# Patient Record
Sex: FEMALE | Race: WHITE | NOT HISPANIC OR LATINO | Employment: OTHER | URBAN - METROPOLITAN AREA
[De-identification: names, ages, dates, MRNs, and addresses within clinical notes are randomized per-mention and may not be internally consistent; named-entity substitution may affect disease eponyms.]

---

## 2017-05-09 ENCOUNTER — HOSPITAL ENCOUNTER (EMERGENCY)
Facility: HOSPITAL | Age: 82
Discharge: HOME | End: 2017-05-10
Attending: EMERGENCY MEDICINE
Payer: MEDICARE

## 2017-05-09 VITALS
DIASTOLIC BLOOD PRESSURE: 60 MMHG | HEIGHT: 64 IN | WEIGHT: 160 LBS | HEART RATE: 81 BPM | TEMPERATURE: 98 F | SYSTOLIC BLOOD PRESSURE: 134 MMHG | OXYGEN SATURATION: 98 % | RESPIRATION RATE: 16 BRPM | BODY MASS INDEX: 27.31 KG/M2

## 2017-05-09 DIAGNOSIS — R63.8 DECREASED ORAL INTAKE: ICD-10-CM

## 2017-05-09 DIAGNOSIS — N39.0 URINARY TRACT INFECTION WITHOUT HEMATURIA, SITE UNSPECIFIED: Primary | ICD-10-CM

## 2017-05-09 DIAGNOSIS — S13.9XXA NECK SPRAIN, INITIAL ENCOUNTER: ICD-10-CM

## 2017-05-09 PROCEDURE — 99285 EMERGENCY DEPT VISIT HI MDM: CPT | Mod: 25

## 2017-05-09 PROCEDURE — P9612 CATHETERIZE FOR URINE SPEC: HCPCS

## 2017-05-09 NOTE — ED AVS SNAPSHOT
[facility] [address] [place] [address]               Ellen Maravilla   2017 10:36 PM   ED    Description:  Female : 1932   Department:  [facility]           Your Care was Coordinated By:     Provider Role From To    Jake Mckeon MD Attending Provider [phone] --    Rakel Bhakta NP Nurse Practitioner [phone] --      Reason for Visit     Fall           Diagnoses this Visit        Comments    Urinary tract infection without hematuria, site unspecified    -  Primary     Decreased oral intake         Neck sprain, initial encounter           ED Disposition     None           To Do List           Follow-up Information     Follow up with [facility].    Specialty:  Emergency Medicine    Why:  As needed, If symptoms worsen    Contact information:    [address] [place] [id number] [phone]        Follow up with Tien Mckeon MD. Schedule an appointment as soon as possible for a visit in 2 days.    Specialty:  Family Medicine    Contact information:    [address] [facility] [address]  [phone]         These Medications        Disp Refills Start End    ciprofloxacin HCl (CIPRO) 500 MG tablet 13 tablet 0 5/10/2017 2017    Take 1 tablet (500 mg total) by mouth every 12 (twelve) hours. - Oral    Pharmacy: [id number] - [place], [place] - [address] [facility] Ph #: [phone]         [facility]     [facility] Nurse Care [facility] - 24/ Assistance  Unless otherwise directed by your provider, please contact [facility], our nurse care line that is available for 24/7 assistance.     Registered nurses in the [facility] provide: appointment scheduling, clinical advisement, health education, and other advisory services.  Call: [phone] (toll free)               Medications           Message regarding Medications     Verify the changes  and/or additions to your medication regime listed below are the same as discussed with your clinician today.  If any of these changes or additions are incorrect, please notify your healthcare provider.        START taking these NEW medications        Refills    ciprofloxacin HCl (CIPRO) 500 MG tablet 0    Sig: Take 1 tablet (500 mg total) by mouth every 12 (twelve) hours.    Class: Print    Route: Oral      These medications were administered today        Dose Freq    sodium chloride 0.9% bolus 500 mL 500 mL ED 1 Time    Sig: Inject 500 mLs into the vein ED 1 Time.    Class: Normal    Route: Intravenous    ciprofloxacin HCl tablet 500 mg 500 mg ED 1 Time    Sig: Take 1 tablet (500 mg total) by mouth ED 1 Time.    Class: Normal    Route: Oral           Verify that the below list of medications is an accurate representation of the medications you are currently taking.  If none reported, the list may be blank. If incorrect, please contact your healthcare provider. Carry this list with you in case of emergency.           Current Medications     alendronate (FOSAMAX) 70 MG tablet     amlodipine (NORVASC) 2.5 MG tablet Take 2.5 mg by mouth every evening.      amoxicillin-clavulanate 500-125mg (AUGMENTIN) 500-125 mg Tab     calcium-vitamin D (CALCIUM-VITAMIN D) 500 mg(1,250mg) -200 unit per tablet Take 1 tablet by mouth 2 (two) times daily with meals.      ciprofloxacin HCl (CIPRO) 500 MG tablet Take 1 tablet (500 mg total) by mouth every 12 (twelve) hours.    levothyroxine (SYNTHROID) 25 MCG tablet Take 25 mcg by mouth once daily.      nystatin (MYCOSTATIN) powder Apply topically 4 (four) times daily.    risedronate (ACTONEL) 35 MG tablet Take 35 mg by mouth every 7 days. with water on empty stomach, nothing by mouth or lie down for next 30 minutes.     UNABLE TO FIND medication name: viteyes 2 drops both eyes twice daily           Clinical Reference Information           Your Vitals Were     BP Pulse Temp Resp Height  "Weight    134/60 (BP Location: Right arm, Patient Position: Sitting) 81 98.4 °F (36.9 °C) (Oral) 16 5' 4" (1.626 m) 72.6 kg (160 lb)    SpO2 BMI             98% 27.46 kg/m2         Allergies as of 5/10/2017     No Known Allergies      Immunizations Administered on Date of Encounter - 5/10/2017     None      ED Micro, Lab, POCT     Start Ordered       Status Ordering Provider    05/10/17 0132 05/10/17 0131  Urine culture  STAT      In process     05/10/17 0035 05/10/17 0034  TSH  STAT      Final result     05/10/17 0033 05/10/17 0034  Magnesium  STAT      Final result     05/10/17 0033 05/10/17 0034  Phosphorus  STAT      Final result     05/10/17 0027 05/10/17 0026  CBC auto differential  STAT      Final result     05/10/17 0027 05/10/17 0026  Comprehensive metabolic panel  STAT      Final result     05/10/17 0027 05/10/17 0026  Urinalysis Catheterized  STAT      Final result     05/10/17 0026 05/10/17 0026  Urinalysis Microscopic  Once      Final result       ED Imaging Orders     Start Ordered       Status Ordering Provider    05/10/17 0034 05/10/17 0034  X-Ray Chest AP Portable  1 time imaging      In process     05/09/17 2256 05/09/17 2256  CT Cervical Spine Without Contrast  1 time imaging      In process         Discharge Instructions         Bladder Infection, Female (Adult)    Urine is normally doesn't have any bacteria in it. But bacteria can get into the urinary tract from the skin around the rectum. Or they can travel in the blood from elsewhere in the body. Once they are in your urinary tract, they can cause infection in the urethra (urethritis), the bladder (cystitis), or the kidneys (pyelonephritis).  The most common place for an infection is in the bladder. This is called a bladder infection. This is one of the most common infections in women. Most bladder infections are easily treated. They are not serious unless the infection spreads to the kidney.  The phrases "bladder infection," "UTI," and " ""cystitis" are often used to describe the same thing. But they are not always the same. Cystitis is an inflammation of the bladder. The most common cause of cystitis is an infection.  Symptoms  The infection causes inflammation in the urethra and bladder. This causes many of the symptoms. The most common symptoms of a bladder infection are:  · Pain or burning when urinating  · Having to urinate more often than usual  · Urgent need to urinate  · Only a small amount of urine comes out  · Blood in urine  · Abdominal discomfort. This is usually in the lower abdomen above the pubic bone.  · Cloudy urine  · Strong- or bad-smelling urine  · Unable to urinate (urinary retention)  · Unable to hold urine in (urinary incontinence)  · Fever  · Loss of appetite  · Confusion (in older adults)  Causes  Bladder infections are not contagious. You can't get one from someone else, from a toilet seat, or from sharing a bath.  The most common cause of bladder infections is bacteria from the bowels. The bacteria get onto the skin around the opening of the urethra. From there, they can get into the urine and travel up to the bladder, causing inflammation and infection. This usually happens because of:  · Wiping improperly after urinating. Always wipe from front to back.  · Bowel incontinence  · Pregnancy  · Procedures such as having a catheter inserted  · Older age  · Not emptying your bladder. This can allow bacteria a chance to grow in your urine.  · Dehydration  · Constipation  · Sex  · Use of a diaphragm for birth control   Treatment  Bladder infections are diagnosed by a urine test. They are treated with antibiotics and usually clear up quickly without complications. Treatment helps prevent a more serious kidney infection.  Medicines  Medicines can help in the treatment of a bladder infection:  · Take antibiotics until they are used up, even if you feel better. It is important to finish them to make sure the infection has " cleared.  · You can use acetaminophen or ibuprofen for pain, fever, or discomfort, unless another medicine was prescribed. If you have chronic liver or kidney disease, talk with your healthcare provider before using these medicines. Also talk with your provider if you've ever had a stomach ulcer or gastrointestinal bleeding, or are taking blood-thinner medicines.  · If you are given phenazopydridine to reduce burning with urination, it will cause your urine to become a bright orange color. This can stain clothing.  Care and prevention  These self-care steps can help prevent future infections:  · Drink plenty of fluids to prevent dehydration and flush out your bladder. Do this unless you must restrict fluids for other health reasons, or your doctor told you not to.  · Proper cleaning after going to the bathroom is important. Wipe from front to back after using the toilet to prevent the spread of bacteria.  · Urinate more often. Don't try to hold urine in for a long time.  · Wear loose-fitting clothes and cotton underwear. Avoid tight-fitting pants.  · Improve your diet and prevent constipation. Eat more fresh fruit and vegetables, and fiber, and less junk and fatty foods.  · Avoid sex until your symptoms are gone.  · Avoid caffeine, alcohol, and spicy foods. These can irritate your bladder.  · Urinate right after intercourse to flush out your bladder.  · If you use birth control pills and have frequent bladder infections, discuss it with your doctor.  Follow-up care  Call your healthcare provider if all symptoms are not gone after 3 days of treatment. This is especially important if you have repeat infections.  If a culture was done, you will be told if your treatment needs to be changed. If directed, you can call to find out the results.  If X-rays were done, you will be told if the results will affect your treatment.  Call 911  Call 911 if any of the following occur:  · Trouble breathing  · Hard to wake up  or confusion  · Fainting or loss of consciousness  · Rapid heart rate  When to seek medical advice  Call your healthcare provider right away if any of these occur:  · Fever of 100.4ºF (38.0ºC) or higher, or as directed by your healthcare provider  · Symptoms are not better by the third day of treatment  · Back or belly (abdominal) pain that gets worse  · Repeated vomiting, or unable to keep medicine down  · Weakness or dizziness  · Vaginal discharge  · Pain, redness, or swelling in the outer vaginal area (labia)  Date Last Reviewed: 10/1/2016  © 7333-0124 [employer]. [address] [place] [address]. All rights reserved. This information is not intended as a substitute for professional medical care. Always follow your healthcare professional's instructions.          Discharge References/Attachments     STRAINS AND SPRAINS, SELF-CARE FOR (ENGLISH)       [facility] complies with applicable Federal civil rights laws and does not discriminate on the basis of race, color, national origin, age, disability, or sex.        Language Assistance Services     ATTENTION: Language assistance services are available, free of charge. Please call [phone].      ATENCIÓN: Si habla español, tiene a dahl disposición servicios gratuitos de asistencia lingüística. Llame al [phone].     COLIN Ý: N?u b?n nói Ti?ng Vi?t, có các d?ch v? h? tr? ngôn ng? mi?n phí dành cho b?n. G?i s? [phone].

## 2017-05-10 LAB
ALBUMIN SERPL BCP-MCNC: 3.2 G/DL
ALP SERPL-CCNC: 71 U/L
ALT SERPL W/O P-5'-P-CCNC: 13 U/L
ANION GAP SERPL CALC-SCNC: 10 MMOL/L
AST SERPL-CCNC: 16 U/L
BACTERIA #/AREA URNS HPF: NORMAL /HPF
BASOPHILS # BLD AUTO: 0 K/UL
BASOPHILS NFR BLD: 0.5 %
BILIRUB SERPL-MCNC: 0.9 MG/DL
BILIRUB UR QL STRIP: NEGATIVE
BUN SERPL-MCNC: 26 MG/DL
CALCIUM SERPL-MCNC: 9.1 MG/DL
CHLORIDE SERPL-SCNC: 105 MMOL/L
CLARITY UR: CLEAR
CO2 SERPL-SCNC: 24 MMOL/L
COLOR UR: YELLOW
CREAT SERPL-MCNC: 0.6 MG/DL
DIFFERENTIAL METHOD: ABNORMAL
EOSINOPHIL # BLD AUTO: 0 K/UL
EOSINOPHIL NFR BLD: 0.4 %
ERYTHROCYTE [DISTWIDTH] IN BLOOD BY AUTOMATED COUNT: 13.8 %
EST. GFR  (AFRICAN AMERICAN): >60 ML/MIN/1.73 M^2
EST. GFR  (NON AFRICAN AMERICAN): >60 ML/MIN/1.73 M^2
GLUCOSE SERPL-MCNC: 121 MG/DL
GLUCOSE UR QL STRIP: NEGATIVE
HCT VFR BLD AUTO: 40.6 %
HGB BLD-MCNC: 13.4 G/DL
HGB UR QL STRIP: NEGATIVE
HYALINE CASTS #/AREA URNS LPF: 0 /LPF
KETONES UR QL STRIP: ABNORMAL
LEUKOCYTE ESTERASE UR QL STRIP: NEGATIVE
LYMPHOCYTES # BLD AUTO: 1.2 K/UL
LYMPHOCYTES NFR BLD: 21.4 %
MAGNESIUM SERPL-MCNC: 1.8 MG/DL
MCH RBC QN AUTO: 35.3 PG
MCHC RBC AUTO-ENTMCNC: 33.1 %
MCV RBC AUTO: 107 FL
MICROSCOPIC COMMENT: NORMAL
MONOCYTES # BLD AUTO: 0.5 K/UL
MONOCYTES NFR BLD: 8.5 %
NEUTROPHILS # BLD AUTO: 3.8 K/UL
NEUTROPHILS NFR BLD: 69.2 %
NITRITE UR QL STRIP: POSITIVE
PH UR STRIP: 6 [PH] (ref 5–8)
PHOSPHATE SERPL-MCNC: 3.1 MG/DL
PLATELET # BLD AUTO: 201 K/UL
PMV BLD AUTO: 6.2 FL
POTASSIUM SERPL-SCNC: 3.9 MMOL/L
PROT SERPL-MCNC: 6.7 G/DL
PROT UR QL STRIP: ABNORMAL
RBC # BLD AUTO: 3.8 M/UL
RBC #/AREA URNS HPF: 3 /HPF (ref 0–4)
SODIUM SERPL-SCNC: 139 MMOL/L
SP GR UR STRIP: 1.02 (ref 1–1.03)
SQUAMOUS #/AREA URNS HPF: 1 /HPF
TSH SERPL DL<=0.005 MIU/L-ACNC: 1.85 UIU/ML
URN SPEC COLLECT METH UR: ABNORMAL
UROBILINOGEN UR STRIP-ACNC: ABNORMAL EU/DL
WBC # BLD AUTO: 5.5 K/UL
WBC #/AREA URNS HPF: 1 /HPF (ref 0–5)

## 2017-05-10 PROCEDURE — 85025 COMPLETE CBC W/AUTO DIFF WBC: CPT

## 2017-05-10 PROCEDURE — 84443 ASSAY THYROID STIM HORMONE: CPT

## 2017-05-10 PROCEDURE — 36415 COLL VENOUS BLD VENIPUNCTURE: CPT

## 2017-05-10 PROCEDURE — 80053 COMPREHEN METABOLIC PANEL: CPT

## 2017-05-10 PROCEDURE — 81000 URINALYSIS NONAUTO W/SCOPE: CPT

## 2017-05-10 PROCEDURE — 25000003 PHARM REV CODE 250: Performed by: NURSE PRACTITIONER

## 2017-05-10 PROCEDURE — 87086 URINE CULTURE/COLONY COUNT: CPT

## 2017-05-10 PROCEDURE — 84100 ASSAY OF PHOSPHORUS: CPT

## 2017-05-10 PROCEDURE — 83735 ASSAY OF MAGNESIUM: CPT

## 2017-05-10 RX ORDER — CIPROFLOXACIN 500 MG/1
500 TABLET ORAL
Status: COMPLETED | OUTPATIENT
Start: 2017-05-10 | End: 2017-05-10

## 2017-05-10 RX ORDER — CIPROFLOXACIN 500 MG/1
500 TABLET ORAL EVERY 12 HOURS
Qty: 13 TABLET | Refills: 0 | Status: SHIPPED | OUTPATIENT
Start: 2017-05-10 | End: 2017-05-17

## 2017-05-10 RX ADMIN — SODIUM CHLORIDE 500 ML: 0.9 INJECTION, SOLUTION INTRAVENOUS at 01:05

## 2017-05-10 RX ADMIN — CIPROFLOXACIN 500 MG: 500 TABLET, FILM COATED ORAL at 01:05

## 2017-05-10 NOTE — ED NOTES
Pt was moving on the couch when she fell to the floor and was caught between coffee table and couch. No LOC per EMS. Pt c/o neck pain.

## 2017-05-10 NOTE — ED PROVIDER NOTES
Encounter Date: 5/9/2017       History     Chief Complaint   Patient presents with    Fall     fell off couch while trying to move over. Complains of neck pain     Review of patient's allergies indicates:  No Known Allergies  HPI Comments: Ellen Maravilla is an 84 year old female with pmh CAD, HTN, thyroid/uterine cancer presenting to the ED with c/o posterior neck pain. The patient was lying on a couch and rolled over, falling off of the couch and onto the floor. She did not hit her head or have LOC. She states she fell between a coffee table and couch and was unable to get up because of her positioning. She was able to stand and ambulate upon EMS arrival. She denies hip and back pain, upper extremity weakness.     After workup was initiated regarding patient's neck pain, the patient's son and daughter in law arrived to the ED with additional history. The son stated that the patient had a decrease in her appetite over the past 3-4 days and was not eating as usual. She was drinking Boost with no vomiting or diarrhea. She also had no fever, urinary symptoms, abdominal pain, chest pain, SOB. The patient stated that she was hungry but was unsure of why she was not eating as she typically does.     The history is provided by the patient.     Past Medical History:   Diagnosis Date    Coronary artery disease     Hypertension     Thyroid disease     Uterine cancer      Past Surgical History:   Procedure Laterality Date    APPENDECTOMY      BACK SURGERY      HYSTERECTOMY      KNEE SURGERY      TONSILLECTOMY       History reviewed. No pertinent family history.  Social History   Substance Use Topics    Smoking status: Never Smoker    Smokeless tobacco: Never Used    Alcohol use No      Comment: rarely     Review of Systems   Constitutional: Negative.    HENT: Negative.    Respiratory: Negative.    Cardiovascular: Negative.    Gastrointestinal: Negative.    Genitourinary: Negative.    Musculoskeletal: Positive for  neck pain.   Skin: Negative.    Neurological: Negative.        Physical Exam   Initial Vitals   BP Pulse Resp Temp SpO2   05/09/17 2233 05/09/17 2233 05/09/17 2233 05/09/17 2233 05/09/17 2233   134/60 81 16 98.4 °F (36.9 °C) 98 %     Physical Exam    Nursing note and vitals reviewed.  Constitutional: She appears well-developed and well-nourished. She is not diaphoretic. No distress.   HENT:   Head: Normocephalic and atraumatic.   Eyes: Conjunctivae are normal.   Neck: Spinous process tenderness present.       Cervical collar in place   Cardiovascular: Regular rhythm.   Pulmonary/Chest: Breath sounds normal. No respiratory distress.   Abdominal: Soft. Bowel sounds are normal. There is no tenderness. There is no guarding.   Musculoskeletal:        Right shoulder: Normal.        Left shoulder: Normal.        Right upper arm: Normal.        Left upper arm: Normal.   Neurological: She is alert and oriented to person, place, and time.   Sensation intact to light touch in bilateral upper extremities. 5/5 strength in bilateral upper extremities   Skin: Skin is warm and dry.   Psychiatric: She has a normal mood and affect.         ED Course   Procedures  Labs Reviewed - No data to display  EKG Readings: (Independently Interpreted)   Rhythm: Normal Sinus Rhythm. Heart Rate: 76. Clinical Impression: with RBBB Other Impression: No evidence of acute ischemia as interpreted by Dr. Mckeon.                 APC / Resident Notes:   Ellen Maravilla is an 84 year old female presenting to the ED with c/o neck pain s/p fall. There was no head injury or LOC and patient had a normal neuro exam. I do not suspect intracranial hemorrhage or skull fracture and do not think that imaging is necessary at this time. The patient's cervical spine CT was negative for fracture or subluxation and patient was able to perform full ROM of the neck without pain. The patient's son later arrived and reported that her appetite was decreased. Workup was expanded  at that time and patient was found to have a UTI. Urine sent for culture and patient treated with Cipro. She has been able to tolerate PO intake without difficulty and has had no vomiting or diarrhea. I do not think that admission is necessary at this time for IV hydration at this time. I encouraged the continuation of boost and advised them to follow up with Dr. Mckeon in 1-2 days. I informed them of the finding of the thyroid mass and provided a copy of the CT as well as the radiology report for follow up. I discussed specific return precautions and the patient and family verbalized understanding. Based on my clinical evaluation, I do not appreciate any immediate, emergent, or life threatening condition or etiology that warrants additional workup today and feel that the patient can be discharged with close follow up care.                 ED Course     Clinical Impression:   The primary encounter diagnosis was Urinary tract infection without hematuria, site unspecified. Diagnoses of Decreased oral intake and Neck sprain, initial encounter were also pertinent to this visit.    Disposition:   Disposition: Discharged  Condition: Stable       Rakel Bhakta NP  05/10/17 5660

## 2017-05-10 NOTE — DISCHARGE INSTRUCTIONS

## 2017-05-11 LAB — BACTERIA UR CULT: NO GROWTH

## 2017-06-05 ENCOUNTER — TELEPHONE (OUTPATIENT)
Dept: NEUROLOGY | Facility: CLINIC | Age: 82
End: 2017-06-05

## 2017-06-05 NOTE — TELEPHONE ENCOUNTER
Received referral from Dr. Mckeon for patient to be seen for senility. Referral in [facility].

## 2017-12-10 ENCOUNTER — HOSPITAL ENCOUNTER (OUTPATIENT)
Facility: HOSPITAL | Age: 82
Discharge: HOME HEALTH | End: 2017-12-12
Attending: EMERGENCY MEDICINE | Admitting: INTERNAL MEDICINE
Payer: MEDICARE

## 2017-12-10 DIAGNOSIS — S22.000A CLOSED COMPRESSION FRACTURE OF THORACIC VERTEBRA, INITIAL ENCOUNTER: ICD-10-CM

## 2017-12-10 DIAGNOSIS — E03.9 HYPOTHYROIDISM, UNSPECIFIED TYPE: ICD-10-CM

## 2017-12-10 DIAGNOSIS — M54.6 DISCOGENIC THORACIC PAIN: ICD-10-CM

## 2017-12-10 DIAGNOSIS — M81.0 OSTEOPOROSIS, UNSPECIFIED OSTEOPOROSIS TYPE, UNSPECIFIED PATHOLOGICAL FRACTURE PRESENCE: ICD-10-CM

## 2017-12-10 DIAGNOSIS — I10 HYPERTENSION, UNSPECIFIED TYPE: Primary | ICD-10-CM

## 2017-12-10 DIAGNOSIS — R29.6 MULTIPLE FALLS: ICD-10-CM

## 2017-12-10 LAB
ANION GAP SERPL CALC-SCNC: 9 MMOL/L
BACTERIA #/AREA URNS HPF: ABNORMAL /HPF
BASOPHILS # BLD AUTO: 0.1 K/UL
BASOPHILS NFR BLD: 1.2 %
BILIRUB UR QL STRIP: NEGATIVE
BUN SERPL-MCNC: 22 MG/DL
CALCIUM SERPL-MCNC: 8.8 MG/DL
CHLORIDE SERPL-SCNC: 104 MMOL/L
CLARITY UR: CLEAR
CO2 SERPL-SCNC: 23 MMOL/L
COLOR UR: YELLOW
CREAT SERPL-MCNC: 0.6 MG/DL
DIFFERENTIAL METHOD: ABNORMAL
EOSINOPHIL # BLD AUTO: 0 K/UL
EOSINOPHIL NFR BLD: 0.6 %
ERYTHROCYTE [DISTWIDTH] IN BLOOD BY AUTOMATED COUNT: 13.7 %
EST. GFR  (AFRICAN AMERICAN): >60 ML/MIN/1.73 M^2
EST. GFR  (NON AFRICAN AMERICAN): >60 ML/MIN/1.73 M^2
GLUCOSE SERPL-MCNC: 128 MG/DL
GLUCOSE UR QL STRIP: NEGATIVE
HCT VFR BLD AUTO: 35.3 %
HGB BLD-MCNC: 11.9 G/DL
HGB UR QL STRIP: ABNORMAL
KETONES UR QL STRIP: ABNORMAL
LEUKOCYTE ESTERASE UR QL STRIP: NEGATIVE
LYMPHOCYTES # BLD AUTO: 0.7 K/UL
LYMPHOCYTES NFR BLD: 10.9 %
MCH RBC QN AUTO: 31.3 PG
MCHC RBC AUTO-ENTMCNC: 33.8 G/DL
MCV RBC AUTO: 93 FL
MICROSCOPIC COMMENT: ABNORMAL
MONOCYTES # BLD AUTO: 0.3 K/UL
MONOCYTES NFR BLD: 4.6 %
NEUTROPHILS # BLD AUTO: 5.6 K/UL
NEUTROPHILS NFR BLD: 82.7 %
NITRITE UR QL STRIP: NEGATIVE
PH UR STRIP: 6 [PH] (ref 5–8)
PLATELET # BLD AUTO: 130 K/UL
PMV BLD AUTO: 6.7 FL
POTASSIUM SERPL-SCNC: 3.5 MMOL/L
PROT UR QL STRIP: ABNORMAL
RBC # BLD AUTO: 3.82 M/UL
RBC #/AREA URNS HPF: 18 /HPF (ref 0–4)
SODIUM SERPL-SCNC: 136 MMOL/L
SP GR UR STRIP: >=1.03 (ref 1–1.03)
SQUAMOUS #/AREA URNS HPF: 5 /HPF
URN SPEC COLLECT METH UR: ABNORMAL
UROBILINOGEN UR STRIP-ACNC: 1 EU/DL
WBC # BLD AUTO: 6.7 K/UL
WBC #/AREA URNS HPF: 1 /HPF (ref 0–5)

## 2017-12-10 PROCEDURE — 85025 COMPLETE CBC W/AUTO DIFF WBC: CPT

## 2017-12-10 PROCEDURE — G0378 HOSPITAL OBSERVATION PER HR: HCPCS

## 2017-12-10 PROCEDURE — 63600175 PHARM REV CODE 636 W HCPCS: Performed by: EMERGENCY MEDICINE

## 2017-12-10 PROCEDURE — 80048 BASIC METABOLIC PNL TOTAL CA: CPT

## 2017-12-10 PROCEDURE — 36415 COLL VENOUS BLD VENIPUNCTURE: CPT

## 2017-12-10 PROCEDURE — 81000 URINALYSIS NONAUTO W/SCOPE: CPT

## 2017-12-10 PROCEDURE — 96374 THER/PROPH/DIAG INJ IV PUSH: CPT

## 2017-12-10 PROCEDURE — 51701 INSERT BLADDER CATHETER: CPT

## 2017-12-10 PROCEDURE — 96375 TX/PRO/DX INJ NEW DRUG ADDON: CPT

## 2017-12-10 PROCEDURE — 99285 EMERGENCY DEPT VISIT HI MDM: CPT | Mod: 25

## 2017-12-10 RX ORDER — ENOXAPARIN SODIUM 100 MG/ML
40 INJECTION SUBCUTANEOUS EVERY 24 HOURS
Status: DISCONTINUED | OUTPATIENT
Start: 2017-12-10 | End: 2017-12-12 | Stop reason: HOSPADM

## 2017-12-10 RX ORDER — METOPROLOL TARTRATE 25 MG/1
25 TABLET, FILM COATED ORAL 2 TIMES DAILY
COMMUNITY
End: 2019-06-05 | Stop reason: CLARIF

## 2017-12-10 RX ORDER — SODIUM,POTASSIUM PHOSPHATES 280-250MG
2 POWDER IN PACKET (EA) ORAL
Status: DISCONTINUED | OUTPATIENT
Start: 2017-12-10 | End: 2017-12-12 | Stop reason: HOSPADM

## 2017-12-10 RX ORDER — LANOLIN ALCOHOL/MO/W.PET/CERES
800 CREAM (GRAM) TOPICAL
Status: DISCONTINUED | OUTPATIENT
Start: 2017-12-10 | End: 2017-12-12 | Stop reason: HOSPADM

## 2017-12-10 RX ORDER — IBUPROFEN 200 MG
24 TABLET ORAL
Status: DISCONTINUED | OUTPATIENT
Start: 2017-12-10 | End: 2017-12-12 | Stop reason: HOSPADM

## 2017-12-10 RX ORDER — GLUCAGON 1 MG
1 KIT INJECTION
Status: DISCONTINUED | OUTPATIENT
Start: 2017-12-10 | End: 2017-12-12 | Stop reason: HOSPADM

## 2017-12-10 RX ORDER — POTASSIUM CHLORIDE 20 MEQ/15ML
40 SOLUTION ORAL
Status: DISCONTINUED | OUTPATIENT
Start: 2017-12-10 | End: 2017-12-12 | Stop reason: HOSPADM

## 2017-12-10 RX ORDER — MORPHINE SULFATE 2 MG/ML
2 INJECTION, SOLUTION INTRAMUSCULAR; INTRAVENOUS
Status: COMPLETED | OUTPATIENT
Start: 2017-12-10 | End: 2017-12-10

## 2017-12-10 RX ORDER — LEVOTHYROXINE SODIUM 25 UG/1
25 TABLET ORAL
Status: DISCONTINUED | OUTPATIENT
Start: 2017-12-11 | End: 2017-12-12 | Stop reason: HOSPADM

## 2017-12-10 RX ORDER — SODIUM CHLORIDE 0.9 % (FLUSH) 0.9 %
5 SYRINGE (ML) INJECTION
Status: DISCONTINUED | OUTPATIENT
Start: 2017-12-10 | End: 2017-12-12 | Stop reason: HOSPADM

## 2017-12-10 RX ORDER — METOPROLOL TARTRATE 25 MG/1
25 TABLET, FILM COATED ORAL 2 TIMES DAILY
Status: DISCONTINUED | OUTPATIENT
Start: 2017-12-10 | End: 2017-12-12 | Stop reason: HOSPADM

## 2017-12-10 RX ORDER — ONDANSETRON 2 MG/ML
8 INJECTION INTRAMUSCULAR; INTRAVENOUS EVERY 8 HOURS PRN
Status: DISCONTINUED | OUTPATIENT
Start: 2017-12-10 | End: 2017-12-12 | Stop reason: HOSPADM

## 2017-12-10 RX ORDER — IBUPROFEN 200 MG
16 TABLET ORAL
Status: DISCONTINUED | OUTPATIENT
Start: 2017-12-10 | End: 2017-12-12 | Stop reason: HOSPADM

## 2017-12-10 RX ORDER — ONDANSETRON 2 MG/ML
4 INJECTION INTRAMUSCULAR; INTRAVENOUS
Status: COMPLETED | OUTPATIENT
Start: 2017-12-10 | End: 2017-12-10

## 2017-12-10 RX ORDER — POTASSIUM CHLORIDE 20 MEQ/15ML
60 SOLUTION ORAL
Status: DISCONTINUED | OUTPATIENT
Start: 2017-12-10 | End: 2017-12-12 | Stop reason: HOSPADM

## 2017-12-10 RX ADMIN — ONDANSETRON HYDROCHLORIDE 4 MG: 2 INJECTION, SOLUTION INTRAMUSCULAR; INTRAVENOUS at 09:12

## 2017-12-10 RX ADMIN — MORPHINE SULFATE 2 MG: 2 INJECTION, SOLUTION INTRAMUSCULAR; INTRAVENOUS at 09:12

## 2017-12-11 PROBLEM — S22.000A CLOSED COMPRESSION FRACTURE OF THORACIC VERTEBRA: Status: ACTIVE | Noted: 2017-12-11

## 2017-12-11 LAB
ANION GAP SERPL CALC-SCNC: 8 MMOL/L
BUN SERPL-MCNC: 24 MG/DL
CALCIUM SERPL-MCNC: 8.5 MG/DL
CHLORIDE SERPL-SCNC: 105 MMOL/L
CO2 SERPL-SCNC: 25 MMOL/L
CREAT SERPL-MCNC: 0.6 MG/DL
ERYTHROCYTE [DISTWIDTH] IN BLOOD BY AUTOMATED COUNT: 14 %
EST. GFR  (AFRICAN AMERICAN): >60 ML/MIN/1.73 M^2
EST. GFR  (NON AFRICAN AMERICAN): >60 ML/MIN/1.73 M^2
ESTIMATED AVG GLUCOSE: 94 MG/DL
GLUCOSE SERPL-MCNC: 124 MG/DL
HBA1C MFR BLD HPLC: 4.9 %
HCT VFR BLD AUTO: 34.4 %
HGB BLD-MCNC: 11.5 G/DL
MCH RBC QN AUTO: 31.1 PG
MCHC RBC AUTO-ENTMCNC: 33.4 G/DL
MCV RBC AUTO: 93 FL
PLATELET # BLD AUTO: 123 K/UL
PMV BLD AUTO: 6.8 FL
POTASSIUM SERPL-SCNC: 3.6 MMOL/L
RBC # BLD AUTO: 3.7 M/UL
SODIUM SERPL-SCNC: 138 MMOL/L
WBC # BLD AUTO: 6.6 K/UL

## 2017-12-11 PROCEDURE — 25000003 PHARM REV CODE 250: Performed by: INTERNAL MEDICINE

## 2017-12-11 PROCEDURE — 85027 COMPLETE CBC AUTOMATED: CPT

## 2017-12-11 PROCEDURE — 97166 OT EVAL MOD COMPLEX 45 MIN: CPT

## 2017-12-11 PROCEDURE — 97110 THERAPEUTIC EXERCISES: CPT

## 2017-12-11 PROCEDURE — G0378 HOSPITAL OBSERVATION PER HR: HCPCS

## 2017-12-11 PROCEDURE — 25000003 PHARM REV CODE 250: Performed by: NURSE PRACTITIONER

## 2017-12-11 PROCEDURE — 83036 HEMOGLOBIN GLYCOSYLATED A1C: CPT

## 2017-12-11 PROCEDURE — 97530 THERAPEUTIC ACTIVITIES: CPT

## 2017-12-11 PROCEDURE — G8983 BODY POS D/C STATUS: HCPCS | Mod: CJ

## 2017-12-11 PROCEDURE — 97162 PT EVAL MOD COMPLEX 30 MIN: CPT

## 2017-12-11 PROCEDURE — 94761 N-INVAS EAR/PLS OXIMETRY MLT: CPT

## 2017-12-11 PROCEDURE — 80048 BASIC METABOLIC PNL TOTAL CA: CPT

## 2017-12-11 PROCEDURE — 36415 COLL VENOUS BLD VENIPUNCTURE: CPT

## 2017-12-11 PROCEDURE — 63600175 PHARM REV CODE 636 W HCPCS: Performed by: NURSE PRACTITIONER

## 2017-12-11 PROCEDURE — G8982 BODY POS GOAL STATUS: HCPCS | Mod: CJ

## 2017-12-11 PROCEDURE — G8987 SELF CARE CURRENT STATUS: HCPCS | Mod: CL

## 2017-12-11 PROCEDURE — 99219 PR INITIAL OBSERVATION CARE,LEVL II: CPT | Mod: ,,, | Performed by: INTERNAL MEDICINE

## 2017-12-11 PROCEDURE — G8981 BODY POS CURRENT STATUS: HCPCS | Mod: CK

## 2017-12-11 PROCEDURE — G8988 SELF CARE GOAL STATUS: HCPCS | Mod: CK

## 2017-12-11 RX ORDER — FERROUS SULFATE, DRIED 160(50) MG
1 TABLET, EXTENDED RELEASE ORAL 2 TIMES DAILY WITH MEALS
Status: DISCONTINUED | OUTPATIENT
Start: 2017-12-11 | End: 2017-12-12 | Stop reason: HOSPADM

## 2017-12-11 RX ORDER — HYDROCODONE BITARTRATE AND ACETAMINOPHEN 5; 325 MG/1; MG/1
1 TABLET ORAL EVERY 6 HOURS PRN
Status: DISCONTINUED | OUTPATIENT
Start: 2017-12-11 | End: 2017-12-12 | Stop reason: HOSPADM

## 2017-12-11 RX ORDER — PANTOPRAZOLE SODIUM 40 MG/1
40 TABLET, DELAYED RELEASE ORAL DAILY
Status: DISCONTINUED | OUTPATIENT
Start: 2017-12-11 | End: 2017-12-12 | Stop reason: HOSPADM

## 2017-12-11 RX ORDER — RISEDRONATE SODIUM 35 MG/1
35 TABLET, FILM COATED ORAL
Status: DISCONTINUED | OUTPATIENT
Start: 2017-12-11 | End: 2017-12-11

## 2017-12-11 RX ORDER — AMLODIPINE BESYLATE 2.5 MG/1
2.5 TABLET ORAL NIGHTLY
Status: DISCONTINUED | OUTPATIENT
Start: 2017-12-11 | End: 2017-12-12 | Stop reason: HOSPADM

## 2017-12-11 RX ADMIN — METOPROLOL TARTRATE 25 MG: 25 TABLET ORAL at 09:12

## 2017-12-11 RX ADMIN — PANTOPRAZOLE SODIUM 40 MG: 40 TABLET, DELAYED RELEASE ORAL at 10:12

## 2017-12-11 RX ADMIN — CALCIUM CARBONATE 500 MG (1,250 MG)-VITAMIN D3 200 UNIT TABLET 1 TABLET: at 10:12

## 2017-12-11 RX ADMIN — POTASSIUM CHLORIDE 40 MEQ: 20 SOLUTION ORAL at 02:12

## 2017-12-11 RX ADMIN — ENOXAPARIN SODIUM 40 MG: 100 INJECTION SUBCUTANEOUS at 04:12

## 2017-12-11 RX ADMIN — METOPROLOL TARTRATE 25 MG: 25 TABLET ORAL at 10:12

## 2017-12-11 RX ADMIN — HYDROCODONE BITARTRATE AND ACETAMINOPHEN 1 TABLET: 5; 325 TABLET ORAL at 11:12

## 2017-12-11 RX ADMIN — HYDROCODONE BITARTRATE AND ACETAMINOPHEN 1 TABLET: 5; 325 TABLET ORAL at 02:12

## 2017-12-11 RX ADMIN — CALCIUM CARBONATE 500 MG (1,250 MG)-VITAMIN D3 200 UNIT TABLET 1 TABLET: at 04:12

## 2017-12-11 RX ADMIN — AMLODIPINE BESYLATE 2.5 MG: 2.5 TABLET ORAL at 09:12

## 2017-12-11 RX ADMIN — QUETIAPINE FUMARATE 12.5 MG: 25 TABLET ORAL at 10:12

## 2017-12-11 NOTE — PROGRESS NOTES
The following medication been discontinued for Ellen Maravilla [id number] according to the pharmacy practice protocol:  Risedronate    Thank you,  Monique Au

## 2017-12-11 NOTE — PT/OT/SLP EVAL
"Occupational Therapy   Evaluation    Name: Ellen Maravilla  MRN: [id number]  Admitting Diagnosis:  Multiple falls      Recommendations:     Discharge Recommendations: nursing facility, skilled  Discharge Equipment Recommendations:  none  Barriers to discharge:  Decreased caregiver support    History:     Occupational Profile:  Living Environment: Pt lives with her son in a 2 story house with a 2nd floor bedroom although she can stay downstairs if needed.  Previous level of function: Pt was (I)/Mod I with ADL's. She has a walker but was walking around her house with no assistive devices. She has fallen a few times recently.  Roles and Routines: Pt enjoys doing word puzzles and is alone most of the day while her son is at work. Her other son checks in on her frequently.  Equipment Owned:  cane, quad, bath bench, wheelchair, walker, rolling, 3-in-1 commode  Assistance upon Discharge: She does not have anyone who can stay with her consistently.     Past Medical History:   Diagnosis Date    Coronary artery disease     Dementia     Hypertension     Thyroid disease     Uterine cancer        Past Surgical History:   Procedure Laterality Date    APPENDECTOMY      BACK SURGERY      HYSTERECTOMY      KNEE SURGERY      TONSILLECTOMY         Subjective     Chief Complaint: "Back pain when I move"  Patient/Family stated goals: To stop hurting.  Communicated with: nurse Sandy prior to session.  Pain/Comfort:  · Pain Rating 1: 2/10  · Location - Orientation 1: generalized  · Location 1: back  · Pain Addressed 1: Pre-medicate for activity, Distraction  · Pain Rating Post-Intervention 1: 2/10    Objective:     Patient found with: telemetry (supine in bed)    General Precautions: Standard, fall   Orthopedic Precautions:N/A   Braces: N/A     Occupational Performance:    Bed Mobility:    · Pt repeatedly refused to perform any bed mobility due to fear of increased pain and c/o fatigue.    Activities of Daily Living:  · Grooming: " stand by assistance and set-up to wipe face with a washcloth in bed    Cognitive/Visual Perceptual:  Cognitive/Psychosocial Skills:     -       Oriented to: Person, Place, Time and Situation   -       Follows Commands/attention:Follows two-step commands  -       Communication: clear/fluent  -       Memory: Poor immediate recall  -       Safety awareness/insight to disability: impaired   -       Mood/Affect/Coping skills/emotional control: Anxious and uncooperative  Visual/Perceptual: Intact    Physical Exam:  Postural examination/scapula alignment:    -       Rounded shoulders  -       Forward head  Skin integrity: Visible skin intact  Sensation:    -       Intact  Dominant hand: -       Right  Upper Extremity Range of Motion:     -       Right Upper Extremity: WFL  -       Left Upper Extremity: WFL  Upper Extremity Strength:    -       Right Upper Extremity: 4/5  -       Left Upper Extremity:  4/5   Strength:    -       Right Upper Extremity: WFL  -       Left Upper Extremity: WFL  Fine Motor Coordination:    -       Intact    Patient left supine with all lines intact and call button in reach    AMPAC 6 Click:  AMPAC Total Score: 14    Treatment & Education:  OT ed pt on OT role & POC as well as discharge recommendations.  OT ed pt on fall risk and strongly advised pt to call for help for all OOB mobility.  OT ed pt on risks of prolonged bedrest & importance of active participation in therapy and getting OOB in order to regain ADL independence,OT also instructed pt on keeping HOB upright as pt can tolerate to preserve endurance and minimize effects of prolonged bedrest.       Education:    Assessment:     Ellen Maravilla is a 84 y.o. female with a medical diagnosis of Multiple falls.  She presents with impaired functional status. Evaluation was limited due to pt refusal to perform mobility tasks. Performance deficits affecting function are weakness, impaired self care skills, impaired endurance, impaired  "functional mobilty, gait instability, pain, impaired cognition, impaired cardiopulmonary response to activity, decreased safety awareness, impaired balance.  She is at an increased risk for more falls and needs 24 hour supervision for safety.    Rehab Prognosis:  Fair; patient would benefit from acute skilled OT services to address these deficits and reach maximum level of function.         Clinical Decision Makin.  OT Mod:  "Pt evaluation falls under moderate complexity for evaluation coding due to identification of 3-5 performance deficits noted as stated above. Eval required Min/Mod assistance to complete on this date and detailed assessment(s) were utilized. Moreover, an expanded review of history and occupational profile obtained with additional review of cognitive, physical and psychosocial hx."     Plan:     Patient to be seen 3 x/week, 4 x/week to address the above listed problems via self-care/home management, therapeutic activities, therapeutic exercises  · Plan of Care Expires:   17  · Plan of Care Reviewed with: patient, son    This Plan of care has been discussed with the patient who was involved in its development and understands and is in agreement with the identified goals and treatment plan    GOALS:    Occupational Therapy Goals        Problem: Occupational Therapy Goal    Goal Priority Disciplines Outcome Interventions   Occupational Therapy Goal     OT, PT/OT Ongoing (interventions implemented as appropriate)    Description:  Goals to be met by: 17     Patient will increase functional independence with ADLs by performing:    UE Dressing with Set-up Assistance and Supervision.  Grooming while standing at sink with Contact Guard Assistance and Assistive Devices as needed.  Toileting from bedside commode with Minimal Assistance and Assistive Devices as needed for hygiene and clothing management.   Sitting at edge of bed x5 minutes with Supervision.  Supine to sit with Contact " Guard Assistance and use of bedrail as needed.  Toilet transfer to bedside commode with Minimal Assistance.  Upper extremity exercise program x10 reps per handout, with assistance as needed.                      Time Tracking:     OT Date of Treatment: 12/11/17  OT Start Time: 1040  OT Stop Time: 1102  OT Total Time (min): 22 min    Billable Minutes:Evaluation 22    SELMA Alegre  12/11/2017

## 2017-12-11 NOTE — PLAN OF CARE
12/11/17 0838   Patient Assessment/Suction   Level of Consciousness (AVPU) alert   PRE-TX-O2-ETCO2   O2 Device (Oxygen Therapy) room air   SpO2 (!) 94 %   Pulse Oximetry Type Intermittent   $ Pulse Oximetry - Multiple Charge Pulse Oximetry - Multiple   Pulse 84   Resp 18

## 2017-12-11 NOTE — PLAN OF CARE
The pt stated that she lives at home alone with her son, Jhonny Patino [phone]. She denies HH and has a home cane, walker, wheelchair and shower chair. She stated that she hasn't been driving lately and her son brings her to medical appts. She stated that she arrived from [facility]. She sees Dr. Mckeon and has [employer] insurance. She uses [facility] pharmacy. She stated that once discharged she plans on returning to her home with her son. I educated her on the blue discharge folder and left my name and number on the pts white board. Magdalena Obrien LMSW     12/11/17 1403   Discharge Assessment   Assessment Type Discharge Planning Assessment   Confirmed/corrected address and phone number on facesheet? Yes   Assessment information obtained from? Patient   Communicated expected length of stay with patient/caregiver no   Prior to hospitilization cognitive status: Alert/Oriented   Prior to hospitalization functional status: Assistive Equipment   Current cognitive status: Alert/Oriented   Current Functional Status: Assistive Equipment   Lives With child(buzz), adult   Able to Return to Prior Arrangements yes   Is patient able to care for self after discharge? Unable to determine at this time (comments)   Readmission Within The Last 30 Days no previous admission in last 30 days   Patient currently being followed by outpatient case management? No   Patient currently receives any other outside agency services? No   Equipment Currently Used at Home cane, quad;walker, rolling;wheelchair;shower chair   Do you have any problems affording any of your prescribed medications? No   Is the patient taking medications as prescribed? yes   Does the patient have transportation home? Yes   Transportation Available family or friend will provide   Does the patient receive services at the [facility]? No   Discharge Plan A Home   Discharge Plan B Home with family   Patient/Family In Agreement With Plan yes

## 2017-12-11 NOTE — NURSING
"Patient removed Iv @0330  When asked if another could be started patient was very anxious and stated "I will not have an IV until my son is here."  Used patient teaching for need of IV and medication, patient continued to refuse.  Patient also refused tele box.  Patient also refused 0600 dose of levothyroxine.  Day shift nurse notified at shift report.    "

## 2017-12-11 NOTE — ED PROVIDER NOTES
Encounter Date: 12/10/2017    SCRIBE #1 NOTE: [facility] am scribing for, and in the presence of, Dr. Gracia .       History     Chief Complaint   Patient presents with    Fall     Back pain        12/10/2017 8:04 PM     Chief complaint: Back pain      Ellen Maravilla is a 84 y.o. female with a hx of HTN, CAD, Thyroid disease, Uterine cancer, and Dementia who presents to the ED via EMS with complaints of persistent back pain since a slip and fall in the bathroom 3 days ago. Pt was seen for the sx at [facility]. No alleviating or exacerbating sx noted. NKDA noted.       The history is provided by the patient and a relative. The history is limited by the condition of the patient (dementia).     Review of patient's allergies indicates:  No Known Allergies  Past Medical History:   Diagnosis Date    Coronary artery disease     Dementia     Hypertension     Thyroid disease     Uterine cancer      Past Surgical History:   Procedure Laterality Date    APPENDECTOMY      BACK SURGERY      HYSTERECTOMY      KNEE SURGERY      TONSILLECTOMY       History reviewed. No pertinent family history.  Social History   Substance Use Topics    Smoking status: Never Smoker    Smokeless tobacco: Never Used    Alcohol use No      Comment: rarely     Review of Systems   Unable to perform ROS: Dementia       Physical Exam     Initial Vitals   BP Pulse Resp Temp SpO2   -- -- -- -- --      MAP       --         Physical Exam    Nursing note and vitals reviewed.  Constitutional: She appears well-developed and well-nourished. She is not diaphoretic. No distress.   HENT:   Head: Normocephalic and atraumatic.   Neck: Normal range of motion. Neck supple.   Cardiovascular: Normal rate, regular rhythm and normal heart sounds. Exam reveals no gallop and no friction rub.    No murmur heard.  Pulmonary/Chest: Breath sounds normal. No respiratory distress. She has no wheezes. She has no rhonchi. She has no rales.   Right upper port in place.     Abdominal: Soft. She exhibits no distension. There is no tenderness. There is no rebound.   Musculoskeletal: Normal range of motion.   Bilateral TKA scars. Paraspinal tenderness. Thoracic spine tenderness. Lumbar spine and thoracic spine pain.   Neurological: She is alert and oriented to person, place, and time.   Skin: Skin is warm and dry.   Psychiatric: She has a normal mood and affect. Her behavior is normal. Judgment and thought content normal.         ED Course   Procedures  Labs Reviewed - No data to display          Medical Decision Making:   Clinical Tests:   Lab Tests: Ordered and Reviewed                   ED Course as of Dec 10 2303   Sun Dec 10, 2017   2057 Lumbar films demonstrate L4 height loss.  L2 height loss L1 prior kyphoplasty.  Possible L5 height loss.  Numerous old thoracic fractures.  [EF]   2205 84-year-old female presents after a fall at home.  Several falls recently.  No evidence of head trauma.  Recently seen at [facility] for thoracic and low back pain after fall.  X-rays here demonstrate multiple thoracic and lumbar fractures.  Patient has a history of dementia and initially refused x-rays with her son was able to convince her to get them.  Family reports difficulty controlling the pain at home and difficulty caring for the patient to request admission to the hospital.  Patient will be admitted to hospital medicine to [facility].  Case discussed with her at this time.  [EF]      ED Course User Index  [EF] Jaec Gracia MD     Clinical Impression:     1. Discogenic thoracic pain              CT of the head at [facility] Yesterday Is Unremarkable  CT of the lumbar spine demonstrates multilevel body height loss    I, Dr. Gracia, personally performed the services described in this documentation. All medical record entries made by the scribe were at my direction and in my presence.  I have reviewed the chart and agree that the record reflects my personal  performance and is accurate and complete.11:52 AM 02/15/2018                 Jace Gracia MD  12/11/17 0001       Jace Gracia MD  02/15/18 1152

## 2017-12-11 NOTE — PT/OT/SLP EVAL
"Physical Therapy Evaluation    Patient Name:  Ellen Maravilla   MRN:  [id number]    Recommendations:     Discharge Recommendations:  nursing facility, skilled   Discharge Equipment Recommendations:     Barriers to discharge: Inaccessible home and Decreased caregiver support    Assessment:     Ellen Maravilla is a 84 y.o. female admitted with a medical diagnosis of <principal problem not specified>.  She presents with the following impairments/functional limitations:  weakness, impaired endurance, impaired self care skills, impaired functional mobilty, gait instability, impaired cognition, decreased lower extremity function, pain, impaired cardiopulmonary response to activity . Pt admit with fall history, back pain. Pt stated had a fall "3 days ago" coming from bathroom and since then unable to walk. Pt appears anxious, poor memory- dementia. Pt will benefit from 24 hr supervision with therapies SNF..    Rehab Prognosis:  fair; patient would benefit from acute skilled PT services to address these deficits and reach maximum level of function.      Recent Surgery: * No surgery found *      Plan:     During this hospitalization, patient to be seen 6 x/week to address the above listed problems via therapeutic activities, therapeutic exercises, gait training  · Plan of Care Expires:  12/29/17   Plan of Care Reviewed with: patient    Subjective     Communicated with nurse Castro prior to session.  Patient found supine upon PT entry to room, agreeable to evaluation.    Stated had a long night and is tired " I can't get up right now"  Stated of SOB with exercises- SAT on RA 95%      Chief Complaint: "back pain"  Patient comments/goals: go home with son  Pain/Comfort:  · Pain Rating 1: 7/10  · Location 1: back  · Pain Addressed 1: Pre-medicate for activity, Reposition    Patients cultural, spiritual, Congregational conflicts given the current situation:      Living Environment:  Home with son in a 2 story home with 13 steps to " bedroom. Son manages [employer]- family owned  Pt stated has a guest room downstairs but has been sleeping in sofa at the den.      Prior to admission, patients level of function was previously ambulatory but has not ambulated since fall.  Patient has the following equipment:  QC/SC.  DME owned (not currently used): .  Upon discharge, patient will have assistance from son and granddaughter.    Objective:     Patient found with:       General Precautions: Standard,     Orthopedic Precautions:N/A   Braces: N/A     Exams:  · RLE ROM: WFL  · RLE Strength: 3+/5  · LLE ROM: WFL  · LLE Strength: 3+/5    Functional Mobility:  · Bed Mobility:     · Rolling Left:  minimum assistance  · Rolling Right: minimum assistance    A  Therapeutic Activities and Exercises:   pt seen for limited PT eval in supine  Pt repositioned mod assist  Rolling min assist ustilizing rails  Pt completed AP,LAQ, HS x 10 with rests due to fatigue/back pain and SOB  Pt encouraged AP exercises throughout the day    Patient left HOB elevated with all lines intact and call button in reach.    GOALS:    Physical Therapy Goals        Problem: Physical Therapy Goal    Goal Priority Disciplines Outcome Goal Variances Interventions   Physical Therapy Goal    High PT/OT, PT      Description:  Goals to be met by: 2017     Patient will increase functional independence with mobility by performin. Supine to sit with MInimal Assistance  2. Sit to stand transfer with Minimal Assistance  3. Bed to chair transfer with Minimal Assistance using Rolling Walker  4. Gait  x 50 feet with Minimal Assistance using Rolling Walker.   5. Lower extremity exercise program x20 reps per handout, with assistance as needed                      History:     Past Medical History:   Diagnosis Date    Coronary artery disease     Dementia     Hypertension     Thyroid disease     Uterine cancer        Past Surgical History:   Procedure Laterality Date     APPENDECTOMY      BACK SURGERY      HYSTERECTOMY      KNEE SURGERY      TONSILLECTOMY         Clinical Decision Making:     History  Co-morbidities and personal factors that may impact the plan of care Examination  Body Structures and Functions, activity limitations and participation restrictions that may impact the plan of care Clinical Presentation   Decision Making/ Complexity Score   Co-morbidities:   [] Time since onset of injury / illness / exacerbation  [] Status of current condition  []Patient's cognitive status and safety concerns    [] Multiple Medical Problems (see med hx)  Personal Factors:   [] Patient's age  [] Prior Level of function   [] Patient's home situation (environment and family support)  [] Patient's level of motivation  [] Expected progression of patient      HISTORY:(criteria)    [] 23302 - no personal factors/history    [] 72092 - has 1-2 personal factor/comorbidity     [] 61621 - has >3 personal factor/comorbidity     Body Regions:  [] Objective examination findings  [] Head     []  Neck  [] Trunk   [] Upper Extremity  [] Lower Extremity    Body Systems:  [] For communication ability, affect, cognition, language, and learning style: the assessment of the ability to make needs known, consciousness, orientation (person, place, and time), expected emotional /behavioral responses, and learning preferences (eg, learning barriers, education  needs)  [] For the neuromuscular system: a general assessment of gross coordinated movement (eg, balance, gait, locomotion, transfers, and transitions) and motor function  (motor control and motor learning)  [] For the musculoskeletal system: the assessment of gross symmetry, gross range of motion, gross strength, height, and weight  [] For the integumentary system: the assessment of pliability(texture), presence of scar formation, skin color, and skin integrity  [] For cardiovascular/pulmonary system: the assessment of heart rate, respiratory rate,  blood pressure, and edema     Activity limitations:    [] Patient's cognitive status and saf ety concerns          [] Status of current condition      [] Weight bearing restriction  [] Cardiopulmunary Restriction    Participation Restrictions:   [] Goals and goal agreement with the patient     [] Rehab potential (prognosis) and probable outcome      Examination of Body System: (criteria)    [] 67911 - addressing 1-2 elements    [] 98073 - addressing a total of 3 or more elements     [] 37656 -  Addressing a total of 4 or more elements         Clinical Presentation: (criteria)  Choose one     On examination of body system using standardized tests and measures patient presents with (CHOOSE ONE) elements from any of the following: body structures and functions, activity limitations, and/or participation restrictions.  Leading to a clinical presentation that is considered (CHOOSE ONE)                              Clinical Decision Making  (Eval Complexity):  Choose One     Time Tracking:     PT Received On: 12/11/17  PT Start Time: 0904     PT Stop Time: 0933  PT Total Time (min): 29 min     Billable Minutes: Evaluation 10, Therapeutic Activity 9 and Therapeutic Exercise 10      Amanda Martinez, PT  12/11/2017

## 2017-12-11 NOTE — ED NOTES
Spoke with Guzman house supervisor at [facility], awaiting medical records. Medical release form faxed to House supervisor fax machine.

## 2017-12-11 NOTE — ED NOTES
Pt c/o fall a few days ago c/o back pain not getting better.  Pt seen at [facility] for same and discharged.  Pt awake and alert. Resp even and unlabored. Pt reports tenderness to middle of mid and lower back.

## 2017-12-11 NOTE — PLAN OF CARE
"   12/11/17 1207   Discharge Reassessment   Assessment Type Discharge Planning Reassessment     Spoke with the pt along with Dr Finn at the bedside regarding her plan of care; the pt is refusing to walk at this time. She states she has been "put through the ringers" and needs to rest right now. She denies the need for any placement at the time of discharge. She states she is going to return and is refusing NH placement.....LÁZARO Rockwell CM   "

## 2017-12-11 NOTE — H&P
PCP: Tien Mckeon MD    History & Physical    Chief Complaint: Back pain s/p recent fall at home few days ago    History of Present Illness:  Patient is a 84 y.o. female admitted to Hospitalist Service from [facility] with complaint of back pain s/p recent fall at home few days. Patient has PMH significant for hypertension, CAD and hypothyroidism. Patient presented with complaints of persistent back pain since a slip and fall in the bathroom 3 days ago. Patient denied chest pain, shortness of breath, abdominal pain, nausea, vomiting, headache, vision changes, focal neuro-deficits, cough or fever. No reported history of LOC or head injury.     Past Medical History:   Diagnosis Date    Coronary artery disease     Dementia     Hypertension     Thyroid disease     Uterine cancer      Past Surgical History:   Procedure Laterality Date    APPENDECTOMY      BACK SURGERY      HYSTERECTOMY      KNEE SURGERY      TONSILLECTOMY       History reviewed. No pertinent family history.  Social History   Substance Use Topics    Smoking status: Never Smoker    Smokeless tobacco: Never Used    Alcohol use No      Comment: rarely      Review of patient's allergies indicates:  No Known Allergies  PTA Medications   Medication Sig    metoprolol tartrate (LOPRESSOR) 25 MG tablet Take 25 mg by mouth 2 (two) times daily.    QUETIAPINE FUMARATE (QUETIAPINE ORAL) Take 12.5 mg by mouth once daily.    levothyroxine (SYNTHROID) 25 MCG tablet Take 25 mcg by mouth once daily.       Review of Systems: Poor historian due to dementia. Unable to obtain ROS.     OBJECTIVE:     Vital Signs (Most Recent)  Temp: 98.1 °F (36.7 °C) (12/11/17 0802)  Pulse: 84 (12/11/17 0838)  Resp: 18 (12/11/17 0838)  BP: (!) 191/81 (12/11/17 0802)  SpO2: (!) 94 % (12/11/17 0838)    Physical Exam:  General appearance: well developed, appears stated age  Head: normocephalic, atraumatic  Eyes:  conjunctivae/corneas clear.  PERRL.  Nose: Nares normal. Septum midline.  Throat: lips, mucosa, and tongue normal; teeth and gums normal, no throat erythema.  Neck: supple, symmetrical, trachea midline, no JVD and thyroid not enlarged, symmetric, no tenderness/mass/nodules  Lungs:  clear to auscultation bilaterally and normal respiratory effort  Chest wall: Right upper chest port in place.  Heart: regular rate and rhythm, S1, S2 normal, no murmur, click, rub or gallop  Abdomen: soft, non-tender non-distented; bowel sounds normal; no masses,  no organomegaly  Extremities: no cyanosis, clubbing or edema. Bilateral TKA scars. Paraspinal tenderness. Thoracic spine tenderness. Lumbar spine and thoracic spine pain.   Pulses: 2+ and symmetric  Skin: Skin color, texture, turgor normal. No rashes or lesions.  Lymph nodes: Cervical, supraclavicular, and axillary nodes normal.  Neurologic: Normal strength and tone. No focal numbness or weakness. CNII-XII intact.      Laboratory:   CBC:   Recent Labs  Lab 12/11/17  0009   WBC 6.60   RBC 3.70*   HGB 11.5*   HCT 34.4*   *   MCV 93   MCH 31.1*   MCHC 33.4     CMP:   Recent Labs  Lab 12/10/17  2045   *   CALCIUM 8.8      K 3.5   CO2 23      BUN 22   CREATININE 0.6       Recent Labs  Lab 12/10/17  2055   COLORU Yellow   SPECGRAV >=1.030*   PHUR 6.0   PROTEINUA Trace*   BACTERIA Rare   NITRITE Negative   LEUKOCYTESUR Negative   UROBILINOGEN 1.0   Diagnostic Results:  Thoracic and lumbar X-Ray:   Multiple thoracolumbar compression fractures in the setting of osteopenia. That of T9 is new since August 2016. That of L2 has increased since October 2013. Status post L1 vertebroplasty/kyphoplasty.    Assessment/Plan:     Active Hospital Problems    Diagnosis  POA    *Multiple falls [R29.6]  Not Applicable    Closed compression fracture of thoracic vertebra, T9 and old multiple lumbar compression fractures [S22.000A]  Unknown    Discogenic thoracic pain [M54.6]  Supportive care, fall  precautions, refusing NH placement.  Refusing PT - evaluation pending.  Will consult spine specialist for opinion.    Yes    Hypothyroidism [E03.9]  Chronic problem. Will continue chronic medications and monitor for any changes, adjusting as needed.    Yes      Chronic problem. Will continue chronic medications and monitor for any changes, adjusting as needed.    Yes    Osteoporosis [M81.0]  Continue Bisphosphonate therapy.     Yes        VTE Risk Mitigation         Ordered     enoxaparin injection 40 mg  Daily     Route:  Subcutaneous        12/10/17 2253     Medium Risk of VTE  Once      12/10/17 2253        Brittanie Finn MD  Department of Hospital Medicine   [facility]

## 2017-12-11 NOTE — PLAN OF CARE
Problem: Patient Care Overview  Goal: Plan of Care Review  Outcome: Ongoing (interventions implemented as appropriate)  Pt is non-compliant with care, extensive assist and redirecting for pt to participate with ADL's, pt is alert, able to answer simple questions and follow simple commands, bruise noted to left arm s/p fall at home, pt c/o intermittent pain to spine relieved with prn medication (see MAR's) maintaining sinus rhythm on telemetry, bed alarm is set, continue to monitor, observe and note any changes, safety maintain

## 2017-12-11 NOTE — PLAN OF CARE
Problem: Physical Therapy Goal  Goal: Physical Therapy Goal  Goals to be met by: 2017     Patient will increase functional independence with mobility by performin. Supine to sit with MInimal Assistance  2. Sit to stand transfer with Minimal Assistance  3. Bed to chair transfer with Minimal Assistance using Rolling Walker  4. Gait  x 50 feet with Minimal Assistance using Rolling Walker.   5. Lower extremity exercise program x20 reps per handout, with assistance as needed     Outcome: Ongoing (interventions implemented as appropriate)  PT eval and treat- pt will benefit from 24 hr supervision with therapies at [facility]

## 2017-12-12 VITALS
WEIGHT: 166.81 LBS | TEMPERATURE: 97 F | RESPIRATION RATE: 16 BRPM | OXYGEN SATURATION: 93 % | HEART RATE: 63 BPM | SYSTOLIC BLOOD PRESSURE: 116 MMHG | DIASTOLIC BLOOD PRESSURE: 55 MMHG | HEIGHT: 64 IN | BODY MASS INDEX: 28.48 KG/M2

## 2017-12-12 PROCEDURE — G0378 HOSPITAL OBSERVATION PER HR: HCPCS

## 2017-12-12 PROCEDURE — 25000003 PHARM REV CODE 250: Performed by: NURSE PRACTITIONER

## 2017-12-12 PROCEDURE — 99217 PR OBSERVATION CARE DISCHARGE: CPT | Mod: ,,, | Performed by: INTERNAL MEDICINE

## 2017-12-12 PROCEDURE — 25000003 PHARM REV CODE 250: Performed by: INTERNAL MEDICINE

## 2017-12-12 RX ORDER — AMLODIPINE BESYLATE 2.5 MG/1
2.5 TABLET ORAL NIGHTLY
Qty: 30 TABLET | Refills: 11 | Status: SHIPPED | OUTPATIENT
Start: 2017-12-12 | End: 2019-06-05 | Stop reason: CLARIF

## 2017-12-12 RX ORDER — RISEDRONATE SODIUM 35 MG/1
35 TABLET, FILM COATED ORAL
Start: 2017-12-12 | End: 2019-06-05 | Stop reason: CLARIF

## 2017-12-12 RX ORDER — HYDROCODONE BITARTRATE AND ACETAMINOPHEN 5; 325 MG/1; MG/1
1 TABLET ORAL EVERY 6 HOURS PRN
Qty: 14 TABLET | Refills: 0 | Status: SHIPPED | OUTPATIENT
Start: 2017-12-12 | End: 2019-06-05 | Stop reason: CLARIF

## 2017-12-12 RX ORDER — FERROUS SULFATE, DRIED 160(50) MG
1 TABLET, EXTENDED RELEASE ORAL 2 TIMES DAILY WITH MEALS
COMMUNITY
Start: 2017-12-12 | End: 2019-06-05 | Stop reason: CLARIF

## 2017-12-12 RX ADMIN — HYDROCODONE BITARTRATE AND ACETAMINOPHEN 1 TABLET: 5; 325 TABLET ORAL at 08:12

## 2017-12-12 RX ADMIN — CALCIUM CARBONATE 500 MG (1,250 MG)-VITAMIN D3 200 UNIT TABLET 1 TABLET: at 08:12

## 2017-12-12 RX ADMIN — LEVOTHYROXINE SODIUM 25 MCG: 25 TABLET ORAL at 06:12

## 2017-12-12 RX ADMIN — QUETIAPINE FUMARATE 12.5 MG: 25 TABLET ORAL at 08:12

## 2017-12-12 RX ADMIN — METOPROLOL TARTRATE 25 MG: 25 TABLET ORAL at 08:12

## 2017-12-12 RX ADMIN — PANTOPRAZOLE SODIUM 40 MG: 40 TABLET, DELAYED RELEASE ORAL at 08:12

## 2017-12-12 NOTE — HPI
Patient admitted after fal  With acute thoracic pain. History of lumbar vertebroplasty 10 years ago at [facility].history aided by her son.

## 2017-12-12 NOTE — PT/OT/SLP PROGRESS
Physical Therapy      Patient Name:  Ellen Maravilla   MRN:  [id number]    Patient not seen today secondary to Patient unwilling to participate (Pt visited- son present, TLSO at bedside and son stated was instructed on donning and doffing and that pt will have HHPT. Pt for discharge home per nurse Isaac)..    Amanda Martinez, PT

## 2017-12-12 NOTE — PLAN OF CARE
12/12/17 0951   Discharge Reassessment   Assessment Type Discharge Planning Reassessment     Spoke with Edvin MOORE regarding the need for a TLSO brace; he will have one delivered to the room this morning....LÁZARO Rockwell CM

## 2017-12-12 NOTE — PLAN OF CARE
[facility] sent home health referral and evals to [facility] via [facility] system for professing, awaiting N approval. Hardik    1251- per Odette w/ PHN, awaiting accepting home health agency     1540- Per Saritha w/ LUISA, awaiting to be processed     1631- Per Saritha, patient assigned to [facility]

## 2017-12-12 NOTE — PROGRESS NOTES
IRVIN Escobar NP notified that patient has no IV access & refused placement of new access. Order received O.K for no IV access.

## 2017-12-12 NOTE — PROGRESS NOTES
"Patient refused to be changed this am. Threatening staff , balling up fist. Stated " I'm going to hit you in your head." Will continue to monitor.   "

## 2017-12-12 NOTE — PLAN OF CARE
Problem: Patient Care Overview  Goal: Plan of Care Review  Outcome: Ongoing (interventions implemented as appropriate)  Patient oriented to person , place & situation . No iv access due to patient refusing reinsertion. No pressure ulcers noted, patient turns self in bed with verbal cueing. Remains free from falls/injury. Instructed to call for assistance as needed during night. Call light in reach. Bed in low & locked position. Frequent rounds made for safety. Medicated for pain once during night.

## 2017-12-12 NOTE — PT/OT/SLP PROGRESS
"Occupational Therapy      Patient Name:  Ellen Maravilla   MRN:  [id number]    Patient not seen today secondary to Patient unwilling to participate (Despite encouragement, pt repeatedly stated and the yelled, "Leave me alone!"). Pt's son was present and stated that she is often uncooperative. He stated that he was educated on how to properly put TLSO brace on pt and he stated he had no questions on how to do so.    SELMA Alegre  12/12/2017  "

## 2017-12-12 NOTE — PROGRESS NOTES
"[facility]  Orthopedics  Progress Note    Patient Name: Ellen Maravilla  MRN: [id number]  Admission Date: 12/10/2017  Hospital Length of Stay: 0 days  Attending Provider: Brittanie Finn MD  Primary Care Provider: Tien Mckeon MD    Subjective:     Principal Problem:Multiple falls     Principal Orthopedic Problem:  Thoracic pain         Review of patient's allergies indicates:  No Known Allergies    Current Facility-Administered Medications   Medication    amLODIPine tablet 2.5 mg    calcium-vitamin D3 500 mg(1,250mg) -200 unit per tablet 1 tablet    dextrose 50% injection 12.5 g    dextrose 50% injection 25 g    enoxaparin injection 40 mg    glucagon (human recombinant) injection 1 mg    glucose chewable tablet 16 g    glucose chewable tablet 24 g    hydrocodone-acetaminophen 5-325mg per tablet 1 tablet    influenza (FLUZONE HIGH-DOSE) vaccine 0.5 mL    levothyroxine tablet 25 mcg    magnesium oxide tablet 800 mg    magnesium oxide tablet 800 mg    metoprolol tartrate (LOPRESSOR) tablet 25 mg    ondansetron injection 8 mg    pantoprazole EC tablet 40 mg    potassium chloride 10% solution 40 mEq    potassium chloride 10% solution 40 mEq    potassium chloride 10% solution 60 mEq    potassium, sodium phosphates 280-160-250 mg packet 2 packet    potassium, sodium phosphates 280-160-250 mg packet 2 packet    potassium, sodium phosphates 280-160-250 mg packet 2 packet    quetiapine split tablet 12.5 mg    sodium chloride 0.9% flush 5 mL     Objective:     Vital Signs (Most Recent):  Temp: 97.4 °F (36.3 °C) (12/12/17 0748)  Pulse: 73 (12/12/17 0748)  Resp: 16 (12/12/17 0748)  BP: (!) 181/75 (12/12/17 0748)  SpO2: 96 % (12/12/17 0748) Vital Signs (24h Range):  Temp:  [96.1 °F (35.6 °C)-98.2 °F (36.8 °C)] 97.4 °F (36.3 °C)  Pulse:  [67-74] 73  Resp:  [16-20] 16  SpO2:  [92 %-96 %] 96 %  BP: (139-181)/(65-75) 181/75     Weight: 75.7 kg (166 lb 12.8 oz)  Height: 5' 4" (162.6 cm)  Body " mass index is 28.63 kg/m².      Intake/Output Summary (Last 24 hours) at 12/12/17 0906  Last data filed at 12/12/17 0600   Gross per 24 hour   Intake              500 ml   Output                0 ml   Net              500 ml       General    Neck: Normal range of motion.   Abdominal: Soft. There is no tenderness.   Neurological: She is alert.   Psychiatric: She has a normal mood and affect.         Back (L-Spine & T-Spine) / Neck (C-Spine) Exam     Comments:  Diffuse tender ness lower thoracic area  nv intact both lower ext  Thoracic and lumbar xrays reviewed        Significant Labs: All pertinent labs within the past 24 hours have been reviewed.    Significant Imaging: I have reviewed and interpreted all pertinent imaging results/findings.    Assessment/Plan:     Closed compression fracture of thoracic vertebra, T9 and old multiple lumbar compression fractures    Imp-multiple thoracic compression fractures presumed new.neurologically intact  Plan-treatment options discussed with patient and her son and she refuses vertebroplasty procedure          -recc TLSO back brace to be worn when out of bed and activity as tolerated                    Mild analgesics for pain                     Follow up in 2 weeks              Gibran Patel MD  Orthopedics  [facility]

## 2017-12-12 NOTE — ASSESSMENT & PLAN NOTE
Imp-multiple thoracic compression fractures presumed new.neurologically intact  Plan-treatment options discussed with patient and her son and she refuses vertebroplasty procedure          -[facility] TLSO back brace to be worn when out of bed and activity as tolerated                    Mild analgesics for pain                     Follow up in 2 weeks

## 2017-12-12 NOTE — DISCHARGE SUMMARY
Discharge Summary  Hospital Medicine    Admit Date: 12/10/2017    Date and Time: 12/12/20179:43 AM    Discharge Attending Physician: Brittanie Finn MD    Primary Care Physician: Tien Mckeon MD    Diagnoses:  Active Hospital Problems    Diagnosis  POA    *Multiple falls [R29.6]  Not Applicable    Closed compression fracture of thoracic vertebra, T9 and old multiple lumbar compression fractures [S22.000A]  Yes    Discogenic thoracic pain [M54.6]  Yes    Hypothyroidism [E03.9]  Yes    Hypertension [I10]  Yes    Osteoporosis [M81.0]  Yes        Discharged Condition: Good    Hospital Course:   Patient is a 84 y.o. female admitted to Hospitalist Service from [facility] Room with complaint of back pain s/p recent fall at home few days. Patient has PMH significant for hypertension, CAD and hypothyroidism. Patient presented with complaints of persistent back pain since a slip and fall in the bathroom 3 days ago. Patient denied chest pain, shortness of breath, abdominal pain, nausea, vomiting, headache, vision changes, focal neuro-deficits, cough or fever. No reported history of LOC or head injury.  Patient was admitted to Hospitalist medicine service. Patient was evaluated by Dr. Patel. Patient declined Kyphoplasty or any surgical intervention. Patient provided supportive care and a TLSO brace applied to the back. Home pT is arranged. Fall precautions discussed with the patient. Patient was discharged home in stable condition with following discharge plan of care.     Consults: Dr. Patel    Significant Diagnostic Studies:   Thoracic and lumbar X-Ray:   Multiple thoracolumbar compression fractures in the setting of osteopenia. That of T9 is new since August 2016. That of L2 has increased since October 2013. Status post L1 vertebroplasty/kyphoplasty.    Microbiology Results (last 7 days)     ** No results found for the last 168 hours. **        Special Treatments/Procedures: None  Disposition:  Home or Self Care    Medications:  Reconciled Home Medications: Current Discharge Medication List      START taking these medications    Details   hydrocodone-acetaminophen 5-325mg (NORCO) 5-325 mg per tablet Take 1 tablet by mouth every 6 (six) hours as needed for Pain.  Qty: 14 tablet, Refills: 0         CONTINUE these medications which have CHANGED    Details   amLODIPine (NORVASC) 2.5 MG tablet Take 1 tablet (2.5 mg total) by mouth every evening.  Qty: 30 tablet, Refills: 11      calcium-vitamin D3 (CALCIUM 500 + D) 500 mg(1,250mg) -200 unit per tablet Take 1 tablet by mouth 2 (two) times daily with meals.      risedronate (ACTONEL) 35 MG tablet Take 1 tablet (35 mg total) by mouth every 7 days. with water on empty stomach, nothing by mouth or lie down for next 30 minutes.         CONTINUE these medications which have NOT CHANGED    Details   metoprolol tartrate (LOPRESSOR) 25 MG tablet Take 25 mg by mouth 2 (two) times daily.      QUETIAPINE FUMARATE (QUETIAPINE ORAL) Take 12.5 mg by mouth once daily.      levothyroxine (SYNTHROID) 25 MCG tablet Take 25 mcg by mouth once daily.           STOP taking these medications       alendronate (FOSAMAX) 70 MG tablet Comments:   Reason for Stopping:         amoxicillin-clavulanate 500-125mg (AUGMENTIN) 500-125 mg Tab Comments:   Reason for Stopping:         nystatin (MYCOSTATIN) powder Comments:   Reason for Stopping:         UNABLE TO FIND Comments:   Reason for Stopping:               Discharge Procedure Orders  Referral to Home health   Referral Priority: Routine Referral Type: Home Health   Referral Reason: Specialty Services Required    Requested Specialty: Home Health Services    Number of Visits Requested: 1      Diet general   Order Comments: Cardiac/ 2 gram sodium low cholesterol diet     Other restrictions (specify):   Order Comments: Fall precautions     Call MD for:   Order Comments: For worsening symptoms, chest pain, shortness of breath, increased abdominal  pain, high grade fever, stroke or stroke like symptoms, immediately go to the nearest Emergency Room or call 911 as soon as possible.       Follow-up Information     Tien Mckeon MD In 1 week.    Specialty:  Family Medicine  Contact information:  [address] [facility] [address]  [phone]             Gibran Patel MD In 2 weeks.    Specialty:  Orthopedic Surgery  Contact information:  [address]  [address]  [facility] [address]  [phone]

## 2017-12-12 NOTE — SUBJECTIVE & OBJECTIVE
"Principal Problem:Multiple falls     Principal Orthopedic Problem:  Thoracic pain         Review of patient's allergies indicates:  No Known Allergies    Current Facility-Administered Medications   Medication    amLODIPine tablet 2.5 mg    calcium-vitamin D3 500 mg(1,250mg) -200 unit per tablet 1 tablet    dextrose 50% injection 12.5 g    dextrose 50% injection 25 g    enoxaparin injection 40 mg    glucagon (human recombinant) injection 1 mg    glucose chewable tablet 16 g    glucose chewable tablet 24 g    hydrocodone-acetaminophen 5-325mg per tablet 1 tablet    influenza (FLUZONE HIGH-DOSE) vaccine 0.5 mL    levothyroxine tablet 25 mcg    magnesium oxide tablet 800 mg    magnesium oxide tablet 800 mg    metoprolol tartrate (LOPRESSOR) tablet 25 mg    ondansetron injection 8 mg    pantoprazole EC tablet 40 mg    potassium chloride 10% solution 40 mEq    potassium chloride 10% solution 40 mEq    potassium chloride 10% solution 60 mEq    potassium, sodium phosphates 280-160-250 mg packet 2 packet    potassium, sodium phosphates 280-160-250 mg packet 2 packet    potassium, sodium phosphates 280-160-250 mg packet 2 packet    quetiapine split tablet 12.5 mg    sodium chloride 0.9% flush 5 mL     Objective:     Vital Signs (Most Recent):  Temp: 97.4 °F (36.3 °C) (12/12/17 0748)  Pulse: 73 (12/12/17 0748)  Resp: 16 (12/12/17 0748)  BP: (!) 181/75 (12/12/17 0748)  SpO2: 96 % (12/12/17 0748) Vital Signs (24h Range):  Temp:  [96.1 °F (35.6 °C)-98.2 °F (36.8 °C)] 97.4 °F (36.3 °C)  Pulse:  [67-74] 73  Resp:  [16-20] 16  SpO2:  [92 %-96 %] 96 %  BP: (139-181)/(65-75) 181/75     Weight: 75.7 kg (166 lb 12.8 oz)  Height: 5' 4" (162.6 cm)  Body mass index is 28.63 kg/m².      Intake/Output Summary (Last 24 hours) at 12/12/17 0906  Last data filed at 12/12/17 0600   Gross per 24 hour   Intake              500 ml   Output                0 ml   Net              500 ml       General    Neck: Normal range of " motion.   Abdominal: Soft. There is no tenderness.   Neurological: She is alert.   Psychiatric: She has a normal mood and affect.         Back (L-Spine & T-Spine) / Neck (C-Spine) Exam     Comments:  Diffuse tender ness lower thoracic area  nv intact both lower ext  Thoracic and lumbar xrays reviewed        Significant Labs: All pertinent labs within the past 24 hours have been reviewed.    Significant Imaging: I have reviewed and interpreted all pertinent imaging results/findings.

## 2017-12-12 NOTE — NURSING
Pt is being discharged. Pt refused flu shot prior to discharge. Son verbalized ok. No IV to discontinue. No falls/trauma on shift. VSS, afebrile. Pt given pain medication once on shift. Discharge instructions given to son and daughter in law. Rx for Norco given to son. All items gathered at bedside. Wheelchair assistance provided.

## 2018-07-09 ENCOUNTER — HOSPITAL ENCOUNTER (EMERGENCY)
Facility: HOSPITAL | Age: 83
Discharge: HOME | End: 2018-07-09
Attending: EMERGENCY MEDICINE
Payer: MEDICARE

## 2018-07-09 VITALS
HEIGHT: 64 IN | RESPIRATION RATE: 16 BRPM | BODY MASS INDEX: 28.34 KG/M2 | WEIGHT: 166 LBS | OXYGEN SATURATION: 97 % | TEMPERATURE: 97 F | SYSTOLIC BLOOD PRESSURE: 192 MMHG | DIASTOLIC BLOOD PRESSURE: 80 MMHG | HEART RATE: 76 BPM

## 2018-07-09 DIAGNOSIS — S05.02XA ABRASION OF LEFT CORNEA, INITIAL ENCOUNTER: Primary | ICD-10-CM

## 2018-07-09 PROCEDURE — 63600175 PHARM REV CODE 636 W HCPCS: Performed by: EMERGENCY MEDICINE

## 2018-07-09 PROCEDURE — 99283 EMERGENCY DEPT VISIT LOW MDM: CPT | Mod: 25

## 2018-07-09 PROCEDURE — 90715 TDAP VACCINE 7 YRS/> IM: CPT | Performed by: EMERGENCY MEDICINE

## 2018-07-09 PROCEDURE — 90471 IMMUNIZATION ADMIN: CPT | Performed by: EMERGENCY MEDICINE

## 2018-07-09 RX ORDER — ERYTHROMYCIN 5 MG/G
OINTMENT OPHTHALMIC
Qty: 1 TUBE | Refills: 0 | Status: SHIPPED | OUTPATIENT
Start: 2018-07-09 | End: 2019-06-05 | Stop reason: CLARIF

## 2018-07-09 RX ADMIN — CLOSTRIDIUM TETANI TOXOID ANTIGEN (FORMALDEHYDE INACTIVATED), CORYNEBACTERIUM DIPHTHERIAE TOXOID ANTIGEN (FORMALDEHYDE INACTIVATED), BORDETELLA PERTUSSIS TOXOID ANTIGEN (GLUTARALDEHYDE INACTIVATED), BORDETELLA PERTUSSIS FILAMENTOUS HEMAGGLUTININ ANTIGEN (FORMALDEHYDE INACTIVATED), BORDETELLA PERTUSSIS PERTACTIN ANTIGEN, AND BORDETELLA PERTUSSIS FIMBRIAE 2/3 ANTIGEN 0.5 ML: 5; 2; 2.5; 5; 3; 5 INJECTION, SUSPENSION INTRAMUSCULAR at 08:07

## 2018-07-09 NOTE — ED PROVIDER NOTES
"Encounter Date: 7/9/2018    SCRIBE #1 NOTE: I, Frank Erazo, am scribing for, and in the presence of, Dr. Mccullough .       History     Chief Complaint   Patient presents with    Eye Injury     " scratched eye last night "     Time seen by provider: 8:02 AM on 07/09/2018    Chief Complaint: left eye injury     Ellen Maravilla is a 85 y.o. female with a PMHx HTN, CAD, thyroid disease, and dementia who presents to the ED for further evaluation of an left eye injury that occurred last night. Patient reports that she scratched her left eye accidentally with her nails. She describes the pain as more of an itch. Patient states that she has been having more tear production so far from this eye and has had eye redness. Patient is unsure of her last tetanus shot. Son relays that the patient has a history of catarct extractions, but unsure of what eye this occurred in. She denies visual disturbances, headache, or any other complaints at this time.            The history is provided by the patient and a relative.     Review of patient's allergies indicates:  No Known Allergies  Past Medical History:   Diagnosis Date    Coronary artery disease     Dementia     Hypertension     Thyroid disease     Uterine cancer      Past Surgical History:   Procedure Laterality Date    APPENDECTOMY      BACK SURGERY      HYSTERECTOMY      KNEE SURGERY      TONSILLECTOMY       History reviewed. No pertinent family history.  Social History   Substance Use Topics    Smoking status: Never Smoker    Smokeless tobacco: Never Used    Alcohol use No      Comment: rarely     Review of Systems   Eyes: Positive for pain, discharge and redness. Negative for visual disturbance.   Neurological: Negative for headaches.   All other systems reviewed and are negative.      Physical Exam     Initial Vitals [07/09/18 0758]   BP Pulse Resp Temp SpO2   (!) 192/80 76 16 97.4 °F (36.3 °C) 97 %      MAP       --         Physical Exam    Nursing note and " vitals reviewed.  Constitutional: She appears well-developed and well-nourished. She is not diaphoretic. No distress.   HENT:   Head: Normocephalic and atraumatic.   Mouth/Throat: Oropharynx is clear and moist.   Eyes: Conjunctivae are normal.   Irregular 3x2mm corneal abrasion over the left pupil. Negative milagro's test. EMOI intact. Slight sclera injection present. Bilateral cataracts noted.     Neck: Neck supple.   Cardiovascular: Normal rate, regular rhythm and intact distal pulses. Exam reveals no gallop and no friction rub.    Murmur heard.   Systolic murmur is present with a grade of 2/6   Pulmonary/Chest: Breath sounds normal. She has no wheezes. She has no rhonchi. She has no rales.   Abdominal: Soft. She exhibits no distension. There is no tenderness.   Musculoskeletal: Normal range of motion.   Neurological: She is alert and oriented to person, place, and time.   Skin: No rash noted. No erythema.   Psychiatric: Her speech is normal.         ED Course   Procedures  Labs Reviewed - No data to display       Imaging Results    None          Medical Decision Making:   History:   Old Medical Records: I decided to obtain old medical records.            Scribe Attestation:   Scribe #1: I performed the above scribed service and the documentation accurately describes the services I performed. I attest to the accuracy of the note.    I, Dr. Richi Mccullough, personally performed the services described in this documentation. All medical record entries made by the scribe were at my direction and in my presence.  I have reviewed the chart and agree that the record reflects my personal performance and is accurate and complete. Richi Mccullough MD.  8:51 AM 07/09/2018    Ellen Maravilla is a 85 y.o. female presenting with left corneal abrasion from scratching.  No open globe.  Prophylactic ophthalmic antibiotic initiated.  Tetanus updated.  Very low suspicion for other acute process such as angle closure glaucoma,  uveitis.  There is clear abrasion on fluorescein staining.  Follow up with Ophthalmology.  Return precautions reviewed.               Clinical Impression:   The encounter diagnosis was Abrasion of left cornea, initial encounter.      Disposition:   Disposition: Discharged  Condition: Stable                        Richi Mccullough MD  07/09/18 0853

## 2019-06-05 ENCOUNTER — HOSPITAL ENCOUNTER (EMERGENCY)
Facility: HOSPITAL | Age: 84
Discharge: HOME | End: 2019-06-05
Attending: EMERGENCY MEDICINE
Payer: MEDICARE

## 2019-06-05 VITALS
HEART RATE: 82 BPM | DIASTOLIC BLOOD PRESSURE: 70 MMHG | TEMPERATURE: 98 F | WEIGHT: 220 LBS | OXYGEN SATURATION: 95 % | RESPIRATION RATE: 16 BRPM | BODY MASS INDEX: 37.76 KG/M2 | SYSTOLIC BLOOD PRESSURE: 174 MMHG

## 2019-06-05 DIAGNOSIS — M25.552 LEFT HIP PAIN: ICD-10-CM

## 2019-06-05 DIAGNOSIS — S30.0XXA CONTUSION OF LOWER BACK, INITIAL ENCOUNTER: Primary | ICD-10-CM

## 2019-06-05 DIAGNOSIS — M54.50 LOW BACK PAIN: ICD-10-CM

## 2019-06-05 DIAGNOSIS — S70.02XA CONTUSION OF LEFT HIP, INITIAL ENCOUNTER: ICD-10-CM

## 2019-06-05 PROCEDURE — 99284 EMERGENCY DEPT VISIT MOD MDM: CPT

## 2019-06-05 PROCEDURE — 25000003 PHARM REV CODE 250: Performed by: EMERGENCY MEDICINE

## 2019-06-05 RX ORDER — ACETAMINOPHEN 500 MG
1000 TABLET ORAL
Status: COMPLETED | OUTPATIENT
Start: 2019-06-05 | End: 2019-06-05

## 2019-06-05 RX ADMIN — ACETAMINOPHEN 1000 MG: 500 TABLET ORAL at 06:06

## 2019-06-05 NOTE — ED PROVIDER NOTES
Encounter Date: 6/5/2019    SCRIBE #1 NOTE: IYuliana, am scribing for, and in the presence of, Frank Hidalgo MD.       History     Chief Complaint   Patient presents with    Fall     fell down two steps. c/o lower back pain and left lower abdominal pain     Time seen by provider: 6:19 PM on 06/05/2019    Ellen Maravilla is a 86 y.o. female with PMHx of HTN, CAD, thyroid disease, uterine CA, and dementia who presents to the ED via EMS with an onset of lower back pain and left hip pain s/p fall that occurred prior to arrival. While at home, the pt tripped and fell down two steps in which she landed onto her buttocks. She reports hitting her head, but denies LOC. Pt denies abdominal pain, nausea, vomiting, headache, or any other symptoms at this time. Patient has ambulated since fall. PSHx of knee surgery, hysterectomy, tonsillectomy, back surgery, appendectomy noted. NKDA noted.    The history is provided by the patient and the EMS personnel.     Review of patient's allergies indicates:  No Known Allergies  Past Medical History:   Diagnosis Date    Coronary artery disease     Dementia     Hypertension     Thyroid disease     Uterine cancer      Past Surgical History:   Procedure Laterality Date    APPENDECTOMY      APPENDECTOMY-LAPAROSCOPIC N/A 6/29/2014    [facility] OR    BACK SURGERY      COLONOSCOPY N/A 9/28/2012    Performed by Quincy Tidwell MD at [facility] ENDO    ESOPHAGOGASTRODUODENOSCOPY (EGD) N/A 2/11/2015    Performed by Gibran Gregorio [facility] at [facility] (2ND FLR)    HYSTERECTOMY      KNEE SURGERY      TONSILLECTOMY       History reviewed. No pertinent family history.  Social History     Tobacco Use    Smoking status: Never Smoker    Smokeless tobacco: Never Used   Substance Use Topics    Alcohol use: No     Comment: rarely    Drug use: No     Review of Systems   Constitutional: Negative for fever.   HENT: Negative for sore throat.    Respiratory:  Negative for shortness of breath.    Cardiovascular: Negative for chest pain.   Gastrointestinal: Negative for nausea.   Genitourinary: Negative for dysuria.   Musculoskeletal: Positive for arthralgias (Left hip pain) and back pain.   Skin: Negative for rash.   Neurological: Negative for syncope, weakness and headaches.   Hematological: Does not bruise/bleed easily.       Physical Exam     Initial Vitals [06/05/19 1819]   BP Pulse Resp Temp SpO2   (!) 181/77 86 16 98.3 °F (36.8 °C) 95 %      MAP       --         Physical Exam    Nursing note and vitals reviewed.  Constitutional: She appears well-developed.   HENT:   Head: Normocephalic and atraumatic.   No hematoma.    Eyes: EOM are normal. Pupils are equal, round, and reactive to light.   Neck: Normal range of motion. Neck supple.   Cardiovascular: Normal rate, regular rhythm, normal heart sounds and intact distal pulses. Exam reveals no gallop and no friction rub.    No murmur heard.  Pulmonary/Chest: Effort normal and breath sounds normal. No respiratory distress. She has no decreased breath sounds. She has no wheezes. She has no rhonchi. She has no rales.   Abdominal: Soft. Bowel sounds are normal. She exhibits no distension. There is no tenderness.   Erythema well demarcated and painless abdomen.    Musculoskeletal:        Cervical back: She exhibits normal range of motion.        Lumbar back: She exhibits pain (left lower lumbar region). She exhibits no tenderness (lumbar spine).   Midline surgical scar over lumbar region. No signs of hernia on left pelvis. No ecchymosis on buttocks or significant tenderness.     Neurological: She is alert and oriented to person, place, and time.   Skin: Skin is warm and dry.   Surgical scars to bilateral knees.    Psychiatric: She has a normal mood and affect.         ED Course   Procedures  Labs Reviewed - No data to display       Imaging Results          CT Lumbar Spine Without Contrast (In process)                X-Ray Hip  2 View Left (In process)                X-Ray Lumbar Spine Ap And Lateral (In process)               X-Rays:   Independently Interpreted Readings:   Other Readings:  Left Hip 2 View: Osteopenia.No sign of fracture of left hip.  Lumbar Spine Ap and Lateral: Prior kyphoplasty. DJD. spinal stenosis of lumbar region L4, L2,L1, T11, T9, and T10.    Medical Decision Making:   History:   Old Medical Records: I decided to obtain old medical records.  Independently Interpreted Test(s):   I have ordered and independently interpreted X-rays - see prior notes.  Clinical Tests:   Radiological Study: Ordered and Reviewed  Patient appears to have a hip contusion and back contusion.  CT was read as old fractures except for fracture of undetermined age near L2.  Given that the patient was able to stand up walk around did have any significant discomfort I suspect is probably old and that she can follow up with primary care physician next week.  She will go home with family.  I stressed with them that she needs to use her walker and/or cane.  If she has worsening significant pain weakness number she can return to the ER.  She can take Tylenol for discomfort.  Stable for discharge. She did have some mild lower abdominal discomfort but I believe that some mild abdominal wall strain and no signs of hernia.  Vital signs are stable except for mild hypertension, but she is stable for discharge.            Scribe Attestation:   Scribe #1: I performed the above scribed service and the documentation accurately describes the services I performed. I attest to the accuracy of the note.    I, Dr. Frank Hidalgo personally performed the services described in this documentation. All medical record entries made by the scribe were at my direction and in my presence.  I have reviewed the chart and agree that the record reflects my personal performance and is accurate and complete. Frank Hidalgo MD.  8:59 PM 06/05/2019    DISCLAIMER: This note was  prepared with Dragon NaturallySpeaking voice recognition transcription software. Garbled syntax, mangled pronouns, and other bizarre constructions may be attributed to that software system            Clinical Impression:       ICD-10-CM ICD-9-CM   1. Contusion of lower back, initial encounter S30.0XXA 922.31   2. Left hip pain M25.552 719.45   3. Low back pain M54.5 724.2   4. Contusion of left hip, initial encounter S70.02XA 924.01                                Frank Hidalgo MD  06/05/19 2054

## 2019-06-06 NOTE — DISCHARGE INSTRUCTIONS
Since Ms. Maravilla was able to walk in the ER I doubt she has an acute fracture of her spine.  I think the findings on the CT scan represent all old fractures of the lumbar spine (vertebrae).  She can take tylenol for pain.  She need to call her regular doctor tomorrow to schedule an appointment for next week.  Make sure she uses a cane or walker when moving around her house and check her home for any potential risks for falling.

## 2019-11-21 ENCOUNTER — HOSPITAL ENCOUNTER (INPATIENT)
Facility: HOSPITAL | Age: 84
LOS: 9 days | Discharge: NURSING, REHAB OR LONG-TERM CARE | DRG: 065 | End: 2019-11-30
Attending: EMERGENCY MEDICINE | Admitting: INTERNAL MEDICINE
Payer: MEDICARE

## 2019-11-21 DIAGNOSIS — R07.9 CHEST PAIN: ICD-10-CM

## 2019-11-21 DIAGNOSIS — G93.40 ENCEPHALOPATHY: ICD-10-CM

## 2019-11-21 DIAGNOSIS — R42 DIZZINESS: ICD-10-CM

## 2019-11-21 DIAGNOSIS — M79.605 LEFT LEG PAIN: ICD-10-CM

## 2019-11-21 DIAGNOSIS — N39.0 URINARY TRACT INFECTION WITHOUT HEMATURIA, SITE UNSPECIFIED: ICD-10-CM

## 2019-11-21 DIAGNOSIS — I63.9 ACUTE ISCHEMIC STROKE: Primary | ICD-10-CM

## 2019-11-21 DIAGNOSIS — G45.9 TIA (TRANSIENT ISCHEMIC ATTACK): ICD-10-CM

## 2019-11-21 PROBLEM — Z95.828 PORT-A-CATH IN PLACE: Status: ACTIVE | Noted: 2019-11-21

## 2019-11-21 PROBLEM — E44.1 MALNUTRITION OF MILD DEGREE: Status: ACTIVE | Noted: 2019-11-21

## 2019-11-21 PROBLEM — E78.5 HYPERLIPIDEMIA: Status: ACTIVE | Noted: 2019-11-21

## 2019-11-21 PROBLEM — N30.00 ACUTE CYSTITIS WITHOUT HEMATURIA: Status: ACTIVE | Noted: 2019-11-21

## 2019-11-21 PROBLEM — F01.50 VASCULAR DEMENTIA WITHOUT BEHAVIORAL DISTURBANCE: Status: ACTIVE | Noted: 2019-11-21

## 2019-11-21 LAB
ALBUMIN SERPL BCP-MCNC: 3.4 G/DL (ref 3.5–5.2)
ALP SERPL-CCNC: 99 U/L (ref 55–135)
ALT SERPL W/O P-5'-P-CCNC: 10 U/L (ref 10–44)
ANION GAP SERPL CALC-SCNC: 11 MMOL/L (ref 8–16)
AST SERPL-CCNC: 15 U/L (ref 10–40)
BACTERIA #/AREA URNS HPF: ABNORMAL /HPF
BASOPHILS # BLD AUTO: 0.02 K/UL (ref 0–0.2)
BASOPHILS NFR BLD: 0.6 % (ref 0–1.9)
BILIRUB SERPL-MCNC: 0.9 MG/DL (ref 0.1–1)
BILIRUB UR QL STRIP: ABNORMAL
BNP SERPL-MCNC: 15 PG/ML (ref 0–99)
BUN SERPL-MCNC: 19 MG/DL (ref 8–23)
CALCIUM SERPL-MCNC: 9.3 MG/DL (ref 8.7–10.5)
CHLORIDE SERPL-SCNC: 103 MMOL/L (ref 95–110)
CHOLEST SERPL-MCNC: 212 MG/DL (ref 120–199)
CHOLEST/HDLC SERPL: 3.1 {RATIO} (ref 2–5)
CLARITY UR: CLEAR
CO2 SERPL-SCNC: 25 MMOL/L (ref 23–29)
COLOR UR: YELLOW
CREAT SERPL-MCNC: 0.7 MG/DL (ref 0.5–1.4)
DIFFERENTIAL METHOD: ABNORMAL
EOSINOPHIL # BLD AUTO: 0 K/UL (ref 0–0.5)
EOSINOPHIL NFR BLD: 0.9 % (ref 0–8)
ERYTHROCYTE [DISTWIDTH] IN BLOOD BY AUTOMATED COUNT: 15 % (ref 11.5–14.5)
EST. GFR  (AFRICAN AMERICAN): >60 ML/MIN/1.73 M^2
EST. GFR  (NON AFRICAN AMERICAN): >60 ML/MIN/1.73 M^2
ESTIMATED AVG GLUCOSE: 100 MG/DL (ref 68–131)
GLUCOSE SERPL-MCNC: 121 MG/DL (ref 70–110)
GLUCOSE UR QL STRIP: NEGATIVE
HBA1C MFR BLD HPLC: 5.1 % (ref 4–5.6)
HCT VFR BLD AUTO: 40.6 % (ref 37–48.5)
HDLC SERPL-MCNC: 68 MG/DL (ref 40–75)
HDLC SERPL: 32.1 % (ref 20–50)
HGB BLD-MCNC: 13.4 G/DL (ref 12–16)
HGB UR QL STRIP: ABNORMAL
IMM GRANULOCYTES # BLD AUTO: 0 K/UL (ref 0–0.04)
KETONES UR QL STRIP: ABNORMAL
LDLC SERPL CALC-MCNC: 132 MG/DL (ref 63–159)
LEUKOCYTE ESTERASE UR QL STRIP: NEGATIVE
LYMPHOCYTES # BLD AUTO: 0.9 K/UL (ref 1–4.8)
LYMPHOCYTES NFR BLD: 28.1 % (ref 18–48)
MCH RBC QN AUTO: 34.3 PG (ref 27–31)
MCHC RBC AUTO-ENTMCNC: 33 G/DL (ref 32–36)
MCV RBC AUTO: 104 FL (ref 82–98)
MICROSCOPIC COMMENT: ABNORMAL
MONOCYTES # BLD AUTO: 0.2 K/UL (ref 0.3–1)
MONOCYTES NFR BLD: 5.3 % (ref 4–15)
NEUTROPHILS # BLD AUTO: 2.1 K/UL (ref 1.8–7.7)
NEUTROPHILS NFR BLD: 65.1 % (ref 38–73)
NITRITE UR QL STRIP: POSITIVE
NONHDLC SERPL-MCNC: 144 MG/DL
NRBC BLD-RTO: 0 /100 WBC
PH UR STRIP: 5 [PH] (ref 5–8)
PLATELET # BLD AUTO: 140 K/UL (ref 150–350)
PMV BLD AUTO: 9.2 FL (ref 9.2–12.9)
POTASSIUM SERPL-SCNC: 4 MMOL/L (ref 3.5–5.1)
PROT SERPL-MCNC: 6.9 G/DL (ref 6–8.4)
PROT UR QL STRIP: NEGATIVE
RBC # BLD AUTO: 3.91 M/UL (ref 4–5.4)
RBC #/AREA URNS HPF: 2 /HPF (ref 0–4)
SODIUM SERPL-SCNC: 139 MMOL/L (ref 136–145)
SP GR UR STRIP: >=1.03 (ref 1–1.03)
TRIGL SERPL-MCNC: 60 MG/DL (ref 30–150)
TSH SERPL DL<=0.005 MIU/L-ACNC: 3.72 UIU/ML (ref 0.4–4)
URN SPEC COLLECT METH UR: ABNORMAL
UROBILINOGEN UR STRIP-ACNC: ABNORMAL EU/DL
WBC # BLD AUTO: 3.2 K/UL (ref 3.9–12.7)

## 2019-11-21 PROCEDURE — 25000003 PHARM REV CODE 250: Performed by: NURSE PRACTITIONER

## 2019-11-21 PROCEDURE — 81000 URINALYSIS NONAUTO W/SCOPE: CPT

## 2019-11-21 PROCEDURE — 99285 EMERGENCY DEPT VISIT HI MDM: CPT | Mod: 25

## 2019-11-21 PROCEDURE — G0378 HOSPITAL OBSERVATION PER HR: HCPCS

## 2019-11-21 PROCEDURE — 94760 N-INVAS EAR/PLS OXIMETRY 1: CPT

## 2019-11-21 PROCEDURE — 83036 HEMOGLOBIN GLYCOSYLATED A1C: CPT

## 2019-11-21 PROCEDURE — 63600175 PHARM REV CODE 636 W HCPCS: Performed by: NURSE PRACTITIONER

## 2019-11-21 PROCEDURE — 11000001 HC ACUTE MED/SURG PRIVATE ROOM

## 2019-11-21 PROCEDURE — 96372 THER/PROPH/DIAG INJ SC/IM: CPT | Mod: 59

## 2019-11-21 PROCEDURE — 96375 TX/PRO/DX INJ NEW DRUG ADDON: CPT

## 2019-11-21 PROCEDURE — 25500020 PHARM REV CODE 255: Performed by: EMERGENCY MEDICINE

## 2019-11-21 PROCEDURE — 80061 LIPID PANEL: CPT

## 2019-11-21 PROCEDURE — 85025 COMPLETE CBC W/AUTO DIFF WBC: CPT

## 2019-11-21 PROCEDURE — 83880 ASSAY OF NATRIURETIC PEPTIDE: CPT

## 2019-11-21 PROCEDURE — 84443 ASSAY THYROID STIM HORMONE: CPT

## 2019-11-21 PROCEDURE — 63600175 PHARM REV CODE 636 W HCPCS: Performed by: EMERGENCY MEDICINE

## 2019-11-21 PROCEDURE — 96365 THER/PROPH/DIAG IV INF INIT: CPT

## 2019-11-21 PROCEDURE — 36415 COLL VENOUS BLD VENIPUNCTURE: CPT

## 2019-11-21 PROCEDURE — 80053 COMPREHEN METABOLIC PANEL: CPT

## 2019-11-21 PROCEDURE — 87086 URINE CULTURE/COLONY COUNT: CPT

## 2019-11-21 PROCEDURE — 93005 ELECTROCARDIOGRAM TRACING: CPT

## 2019-11-21 RX ORDER — NAPROXEN SODIUM 220 MG/1
81 TABLET, FILM COATED ORAL DAILY
Status: DISCONTINUED | OUTPATIENT
Start: 2019-11-22 | End: 2019-11-22

## 2019-11-21 RX ORDER — POLYETHYLENE GLYCOL 3350 17 G/17G
17 POWDER, FOR SOLUTION ORAL DAILY
Status: DISCONTINUED | OUTPATIENT
Start: 2019-11-22 | End: 2019-11-30 | Stop reason: HOSPADM

## 2019-11-21 RX ORDER — POTASSIUM CHLORIDE 20 MEQ/15ML
60 SOLUTION ORAL
Status: DISCONTINUED | OUTPATIENT
Start: 2019-11-21 | End: 2019-11-30 | Stop reason: HOSPADM

## 2019-11-21 RX ORDER — SODIUM CHLORIDE 0.9 % (FLUSH) 0.9 %
10 SYRINGE (ML) INJECTION
Status: DISCONTINUED | OUTPATIENT
Start: 2019-11-21 | End: 2019-11-30 | Stop reason: HOSPADM

## 2019-11-21 RX ORDER — SODIUM,POTASSIUM PHOSPHATES 280-250MG
2 POWDER IN PACKET (EA) ORAL
Status: DISCONTINUED | OUTPATIENT
Start: 2019-11-21 | End: 2019-11-30 | Stop reason: HOSPADM

## 2019-11-21 RX ORDER — IBUPROFEN 200 MG
16 TABLET ORAL
Status: DISCONTINUED | OUTPATIENT
Start: 2019-11-21 | End: 2019-11-30 | Stop reason: HOSPADM

## 2019-11-21 RX ORDER — QUETIAPINE FUMARATE 25 MG/1
25 TABLET, FILM COATED ORAL NIGHTLY
Status: DISCONTINUED | OUTPATIENT
Start: 2019-11-21 | End: 2019-11-22

## 2019-11-21 RX ORDER — LANOLIN ALCOHOL/MO/W.PET/CERES
800 CREAM (GRAM) TOPICAL
Status: DISCONTINUED | OUTPATIENT
Start: 2019-11-21 | End: 2019-11-30 | Stop reason: HOSPADM

## 2019-11-21 RX ORDER — AMLODIPINE BESYLATE 2.5 MG/1
2.5 TABLET ORAL DAILY
Status: DISCONTINUED | OUTPATIENT
Start: 2019-11-22 | End: 2019-11-29

## 2019-11-21 RX ORDER — ONDANSETRON 4 MG/1
8 TABLET, ORALLY DISINTEGRATING ORAL EVERY 8 HOURS PRN
Status: DISCONTINUED | OUTPATIENT
Start: 2019-11-21 | End: 2019-11-30 | Stop reason: HOSPADM

## 2019-11-21 RX ORDER — POTASSIUM CHLORIDE 20 MEQ/15ML
40 SOLUTION ORAL
Status: DISCONTINUED | OUTPATIENT
Start: 2019-11-21 | End: 2019-11-30 | Stop reason: HOSPADM

## 2019-11-21 RX ORDER — ACETAMINOPHEN 325 MG/1
650 TABLET ORAL EVERY 4 HOURS PRN
Status: DISCONTINUED | OUTPATIENT
Start: 2019-11-21 | End: 2019-11-30 | Stop reason: HOSPADM

## 2019-11-21 RX ORDER — IBUPROFEN 400 MG/1
400 TABLET ORAL EVERY 6 HOURS PRN
Status: DISCONTINUED | OUTPATIENT
Start: 2019-11-21 | End: 2019-11-30 | Stop reason: HOSPADM

## 2019-11-21 RX ORDER — IBUPROFEN 200 MG
24 TABLET ORAL
Status: DISCONTINUED | OUTPATIENT
Start: 2019-11-21 | End: 2019-11-30 | Stop reason: HOSPADM

## 2019-11-21 RX ORDER — ENOXAPARIN SODIUM 100 MG/ML
40 INJECTION SUBCUTANEOUS EVERY 24 HOURS
Status: DISCONTINUED | OUTPATIENT
Start: 2019-11-21 | End: 2019-11-30 | Stop reason: HOSPADM

## 2019-11-21 RX ORDER — ONDANSETRON 2 MG/ML
8 INJECTION INTRAMUSCULAR; INTRAVENOUS
Status: COMPLETED | OUTPATIENT
Start: 2019-11-21 | End: 2019-11-21

## 2019-11-21 RX ORDER — GLUCAGON 1 MG
1 KIT INJECTION
Status: DISCONTINUED | OUTPATIENT
Start: 2019-11-21 | End: 2019-11-30 | Stop reason: HOSPADM

## 2019-11-21 RX ORDER — ATORVASTATIN CALCIUM 40 MG/1
40 TABLET, FILM COATED ORAL DAILY
Status: DISCONTINUED | OUTPATIENT
Start: 2019-11-22 | End: 2019-11-30 | Stop reason: HOSPADM

## 2019-11-21 RX ORDER — ONDANSETRON 2 MG/ML
8 INJECTION INTRAMUSCULAR; INTRAVENOUS EVERY 8 HOURS PRN
Status: DISCONTINUED | OUTPATIENT
Start: 2019-11-21 | End: 2019-11-30 | Stop reason: HOSPADM

## 2019-11-21 RX ORDER — METOPROLOL TARTRATE 25 MG/1
25 TABLET, FILM COATED ORAL 2 TIMES DAILY
Status: DISCONTINUED | OUTPATIENT
Start: 2019-11-21 | End: 2019-11-30 | Stop reason: HOSPADM

## 2019-11-21 RX ORDER — QUETIAPINE FUMARATE 25 MG/1
25 TABLET, FILM COATED ORAL NIGHTLY
Status: ON HOLD | COMMUNITY
End: 2019-11-29 | Stop reason: SDUPTHER

## 2019-11-21 RX ADMIN — IOHEXOL 100 ML: 350 INJECTION, SOLUTION INTRAVENOUS at 01:11

## 2019-11-21 RX ADMIN — ENOXAPARIN SODIUM 40 MG: 100 INJECTION SUBCUTANEOUS at 05:11

## 2019-11-21 RX ADMIN — ONDANSETRON 8 MG: 2 INJECTION INTRAMUSCULAR; INTRAVENOUS at 12:11

## 2019-11-21 RX ADMIN — METOPROLOL TARTRATE 25 MG: 25 TABLET, FILM COATED ORAL at 09:11

## 2019-11-21 RX ADMIN — QUETIAPINE FUMARATE 25 MG: 25 TABLET ORAL at 09:11

## 2019-11-21 RX ADMIN — CEFTRIAXONE 1 G: 1 INJECTION, SOLUTION INTRAVENOUS at 01:11

## 2019-11-21 NOTE — SUBJECTIVE & OBJECTIVE
Past Medical History:   Diagnosis Date    Coronary artery disease     Dementia     Hypertension     Thyroid disease     Uterine cancer        Past Surgical History:   Procedure Laterality Date    APPENDECTOMY      BACK SURGERY      HYSTERECTOMY      KNEE SURGERY      TONSILLECTOMY         Review of patient's allergies indicates:  No Known Allergies    No current facility-administered medications on file prior to encounter.      Current Outpatient Medications on File Prior to Encounter   Medication Sig    QUEtiapine (SEROQUEL) 25 MG Tab Take 25 mg by mouth every evening.     Family History     None        Tobacco Use    Smoking status: Never Smoker    Smokeless tobacco: Never Used   Substance and Sexual Activity    Alcohol use: No     Comment: rarely    Drug use: No    Sexual activity: Not on file     Review of Systems   Unable to perform ROS: Dementia   Constitutional: Positive for activity change.   Musculoskeletal: Positive for arthralgias, back pain and gait problem.     Objective:     Vital Signs (Most Recent):  Temp: 97.9 °F (36.6 °C) (11/21/19 1045)  Pulse: 95 (11/21/19 1432)  Resp: 16 (11/21/19 1045)  BP: (!) 171/81 (11/21/19 1432)  SpO2: 97 % (11/21/19 1432) Vital Signs (24h Range):  Temp:  [97.9 °F (36.6 °C)] 97.9 °F (36.6 °C)  Pulse:  [84-95] 95  Resp:  [16] 16  SpO2:  [94 %-98 %] 97 %  BP: (131-193)/(65-81) 171/81     Weight: 77.1 kg (170 lb)  Body mass index is 27.44 kg/m².    Physical Exam   Constitutional: She appears well-developed and well-nourished.   HENT:   Head: Normocephalic and atraumatic.   Right Ear: External ear normal.   Left Ear: External ear normal.   Mouth/Throat: Oropharynx is clear and moist.   Eyes: Pupils are equal, round, and reactive to light. Conjunctivae and EOM are normal.   Neck: Normal range of motion. Neck supple. No JVD present.   Cardiovascular: Normal rate, regular rhythm and intact distal pulses.   Murmur (ASM) heard.  Pulmonary/Chest: Effort normal and  breath sounds normal. No respiratory distress. She has no wheezes. She has no rales.   Lungs CTA bilaterally, currently on room air   Abdominal: Soft. Bowel sounds are normal. There is no tenderness. There is no guarding.   Musculoskeletal: Normal range of motion. She exhibits edema.   Mild BLE edema   Neurological: She is alert. No sensory deficit.   Patient oriented to self only, advanced dementia. No obvious deficits in strength, equal to upper and lower ext.    Skin: Skin is warm and dry. Capillary refill takes less than 2 seconds. There is pallor.   Psychiatric: She has a normal mood and affect. Her speech is normal. Judgment normal. She is withdrawn.   Nursing note and vitals reviewed.        CRANIAL NERVES     CN III, IV, VI   Pupils are equal, round, and reactive to light.  Extraocular motions are normal.        Significant Labs:   CBC:   Recent Labs   Lab 11/21/19  1054   WBC 3.20*   HGB 13.4   HCT 40.6   *     CMP:   Recent Labs   Lab 11/21/19  1054      K 4.0      CO2 25   *   BUN 19   CREATININE 0.7   CALCIUM 9.3   PROT 6.9   ALBUMIN 3.4*   BILITOT 0.9   ALKPHOS 99   AST 15   ALT 10   ANIONGAP 11   EGFRNONAA >60     All pertinent labs within the past 24 hours have been reviewed.    Significant Imaging: I have reviewed and interpreted all pertinent imaging results/findings within the past 24 hours.

## 2019-11-21 NOTE — HOSPITAL COURSE
Patient monitor closely during her stay. Patient was placed on continuous telemetry monitoring. Patient presented with increased confusion and slurred speech. Urinalysis was significant for few bacteria and nitrate positive. Urine culture was obtained and patient was continued on IV Rocephin.  CT head without contrast showed  no  acute abnormality. CT abdomen and pelvis with contrast showed no acute abdominopelvic process. 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 Patient was also noted to have osteopenia with multiple chronic thoracolumbar compression fractures and a prior L1 vertebroplasty.  Neurology was consulted.  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 Neurology was consulted. Patient had an echocardiogram which showed EF of 55%. No PFO on saline bubble and color flow study was noted. A bilateral carotid ultrasound was done with bilateral carotid artery disease noted but no significant stenosis noted. Patient was continued on aspirin and statin.  Physical therapy, occupational therapy, and speech therapy were consulted.  She did complain of some left leg pain during her stay.  Ultrasound of left lower extremity was negative for DVT.  X-ray of the left hip showed no acute issues.  Patient continued with her increased confusion and also put periods of agitation.  She was noted to have a UTI.  A urine culture was sent to the lab and she was placed on IV Rocephin.  Psych was consulted and felt patient had delirium on top of her dementia.  The patient's Seroquel was increased.  She was noted to have improvement in confusion.  It was felt patient would benefit from further physical therapy Occupational therapy due to her debility.  Case management was consulted for discharge planning needs.  In the meantime, patient underwent modified barium  swallow study which showed patient with significant dysphagia and speech therapy recommended NPO status.  The patient's overall condition was discussed with her son, Jhonny Patino, who was to get with his stepbrother for a family meeting for further discussion of the patient's overall condition and discharge options.  Patient was later noted to be lethargic.  It was unsure if patient may have extended her stroke or if she was having affects from her Seroquel.  Her Seroquel was placed on hold. The next morning the patient was noted to be more alert with improvement in her mental status.  Patient was scheduled for a repeat modified barium swallow study with speech therapy.  Further discussion regarding the patient's overall status was had with her sons, Jhonny Patino and Lionel. They reported their mother had a prior living will and her wishes were to be a do not resuscitate. Plan was then for patient to have a  repeat modified barium swallow study and if  patient remained in decline, hospice was to be consulted. Patient did much better on the repeat modified barium swallow study and speech recommended patient could have a pureed diet with thin liquids but no straw with crushed medicines in pudding or applesauce.  Patient had a repeat MRI of the brain without contrast which showed unchanged appearance of recent acute infarctions in the anterior kathleen.  No interval infarction or hemorrhage was noted. The patient's overall condition continued to improve.  She was eating 100% of her meals and participating in therapy.  Case management was consulted to assist with discharge planning needs.  SNF was consulted.  Patient was accepted to [facility] for further physical therapy and Occupational therapy.

## 2019-11-21 NOTE — PLAN OF CARE
11/21/19 1513   BLANCO Message   Medicare Outpatient and Observation Notification regarding financial responsibility Explained to patient/caregiver;Given to patient/caregiver;Signed/date by patient/caregiver   Date BLANCO was signed 11/21/19   Time BLANCO was signed 1519

## 2019-11-21 NOTE — ASSESSMENT & PLAN NOTE
Fall precautions.   Continuous telemetry.     Antithrombotics for secondary stroke prevention: Antiplatelets: Aspirin: 81 mg daily    Statins for secondary stroke prevention and hyperlipidemia, if present:   Statins: Atorvastatin- 40 mg daily    Aggressive risk factor modification: HTN, HLD     Rehab efforts: The patient has been evaluated by a stroke team provider and the therapy needs have been fully considered based off the presenting complaints and exam findings. The following therapy evaluations are needed: PT evaluate and treat, OT evaluate and treat, SLP evaluate and treat    Diagnostics ordered/pending: HgbA1C to assess blood glucose levels, Lipid Profile to assess cholesterol levels, MRI head without contrast to assess brain parenchyma, TSH to assess thyroid function, Other: carotid US and Echo    VTE prophylaxis: Enoxaparin 40 mg SQ every 24 hours    BP parameters: TIA: SBP <220 until imaging confirmation of no infarct

## 2019-11-21 NOTE — ED PROVIDER NOTES
Encounter Date: 11/21/2019    SCRIBE #1 NOTE: IMagda, am scribing for, and in the presence of, Josué Gzuman MD.       History     Chief Complaint   Patient presents with    Aphasia     Last seen normal on Monday.       Time seen by provider: 12:12 PM on 11/21/2019    Ellen Maravilla is a 86 y.o. female with PMHx of HTN, CAD, thyroid disease, and dementia who presents to the ED for evaluation of an episode of confusion and slurred speech that was noticed x24 hours ago. Patient has had recent decreased appetite, nausea, and vomiting that was noticed yesterday. Patient was referred to the ED by her PCP, Dr. Tien Mckeon, for further evaluation of a suspected stroke. Per son, the patient typically ambulates with a walker but has had increasing difficulty walking. Patient was last seen normal x2 days ago. Patient denies pain, cough, and abdominal pain. Full ROS limited due to dementia. Son denies recent known falls. SHx includes hysterectomy and appendectomy. NKDA.     The history is provided by a relative and the patient (son).     Review of patient's allergies indicates:  No Known Allergies  Past Medical History:   Diagnosis Date    Coronary artery disease     Dementia     Hypertension     Thyroid disease     Uterine cancer      Past Surgical History:   Procedure Laterality Date    APPENDECTOMY      BACK SURGERY      HYSTERECTOMY      KNEE SURGERY      TONSILLECTOMY       History reviewed. No pertinent family history.  Social History     Tobacco Use    Smoking status: Never Smoker    Smokeless tobacco: Never Used   Substance Use Topics    Alcohol use: No     Comment: rarely    Drug use: No     Review of Systems   Unable to perform ROS: Dementia   Constitutional: Positive for appetite change.   Respiratory: Positive for cough.    Gastrointestinal: Positive for nausea and vomiting. Negative for abdominal pain.   Neurological: Positive for speech difficulty.   Psychiatric/Behavioral: Positive  for confusion.       Physical Exam     Initial Vitals [11/21/19 1045]   BP Pulse Resp Temp SpO2   131/65 89 16 97.9 °F (36.6 °C) (!) 94 %      MAP       --         Physical Exam    Nursing note and vitals reviewed.  Constitutional: She appears well-developed. She is not diaphoretic.  Non-toxic appearance. She does not have a sickly appearance. She appears ill. No distress.   Patient elderly and chronically ill appearing.    HENT:   Head: Normocephalic and atraumatic.   Nose: Nose normal.   Eyes: EOM are normal. No scleral icterus.   Neck: Normal range of motion. Neck supple.   Cardiovascular: Normal rate, regular rhythm, normal heart sounds and intact distal pulses. Exam reveals no gallop and no friction rub.    No murmur heard.  Pulses:       Radial pulses are 2+ on the right side, and 2+ on the left side.        Dorsalis pedis pulses are 2+ on the right side, and 2+ on the left side.        Posterior tibial pulses are 2+ on the right side, and 2+ on the left side.   Pulmonary/Chest: Breath sounds normal. No stridor. No respiratory distress. She has no wheezes. She has no rhonchi. She has no rales.   Right upper chest wall port.   Equal, bilateral breath sounds noted without wheezing.    Abdominal: Soft. Bowel sounds are normal. There is tenderness in the right lower quadrant. There is no rebound and no guarding.   Abdomen soft and non distended. RLQ abdominal tenderness.    Musculoskeletal: Normal range of motion.   Neurological: She is alert. She displays normal reflexes.   Alert. Oriented to person only, not to place or time.  3/5 strength in BLE's. 5/5 strength in BUE's.    Skin: Skin is warm and dry. Capillary refill takes less than 2 seconds. No rash noted.   Psychiatric: She has a normal mood and affect.         ED Course   Procedures  Labs Reviewed   CBC W/ AUTO DIFFERENTIAL - Abnormal; Notable for the following components:       Result Value    WBC 3.20 (*)     RBC 3.91 (*)     Mean Corpuscular Volume 104  "(*)     Mean Corpuscular Hemoglobin 34.3 (*)     RDW 15.0 (*)     Platelets 140 (*)     Lymph # 0.9 (*)     Mono # 0.2 (*)     All other components within normal limits   COMPREHENSIVE METABOLIC PANEL - Abnormal; Notable for the following components:    Glucose 121 (*)     Albumin 3.4 (*)     All other components within normal limits   URINALYSIS, REFLEX TO URINE CULTURE - Abnormal; Notable for the following components:    Specific Gravity, UA >=1.030 (*)     Ketones, UA 1+ (*)     Bilirubin (UA) 2+ (*)     Occult Blood UA 1+ (*)     Nitrite, UA Positive (*)     Urobilinogen, UA 2.0-3.0 (*)     All other components within normal limits    Narrative:     Preferred Collection Type->Urine, Clean Catch   URINALYSIS MICROSCOPIC - Abnormal; Notable for the following components:    Bacteria Few (*)     All other components within normal limits    Narrative:     Preferred Collection Type->Urine, Clean Catch   CULTURE, URINE     EKG Readings: (Independently Interpreted)   Initial Reading: No STEMI. Rhythm: Sinus Tachycardia. Heart Rate: 103. Conduction: RBBB.   Normal CO and QTc intervals.   Nonspecific ST segment changes.        Imaging Results          CT Abdomen Pelvis With Contrast (In process)                CT Head Without Contrast (Final result)  Result time 11/21/19 11:33:44    Final result by Everett Coello MD (11/21/19 11:33:44)                 Impression:      1. There is generalized volume loss with moderate to marked nonspecific white matter change.  There is no hemorrhage, mass, mass effect or obvious acute infarction.  It should be noted that MRI is more sensitive in the detection of subtle or acute nonhemorrhagic ischemic disease.  2. Atherosclerosis.      Electronically signed by: Everett Coello MD  Date:    11/21/2019  Time:    11:33             Narrative:    EXAMINATION:  CT HEAD WITHOUT CONTRAST    CLINICAL HISTORY:  Dizziness;PCP concerned about possible "mild stroke" that occurred on Monday or " Tuesday; sent for further eval;    TECHNIQUE:  Routine unenhanced axial images were obtained through the head.  Sagittal and coronal reformatted images were created.  The study is reviewed in bone and soft tissue windows.    COMPARISON:  Head CT dated 12/09/2017    FINDINGS:  Intracranial contents: There is no obvious acute abnormality but there is generalized volume loss with moderate to marked nonspecific white matter change.  These white matter findings were present previously and although are nonspecific, likely reflect sequelae of chronic small vessel disease.  There is no intracranial hemorrhage or mass effect.  There is no hydrocephalus or midline shift.  Also, the gray-white interface is preserved without obvious acute infarction.  There is a tiny chronic lacunar infarction in the left thalamus.  There is no abnormal extra-axial fluid collection.  The basilar cisterns are open.  The cerebellar tonsils are normal position.  Sellar structures are normal.  The orbits are grossly normal.    Extracranial contents, calvarium, soft tissues: The calvarium is normal.  There is moderate to marked atherosclerosis.  The included paranasal sinuses and mastoid air cells are grossly clear.                                 Medical Decision Making:   History:   Old Medical Records: I decided to obtain old medical records.  Independently Interpreted Test(s):   I have ordered and independently interpreted EKG Reading(s) - see prior notes  Clinical Tests:   Lab Tests: Reviewed and Ordered  Radiological Study: Reviewed and Ordered  Medical Tests: Reviewed and Ordered  Other:   I have discussed this case with another health care provider.            Scribe Attestation:   Scribe #1: I performed the above scribed service and the documentation accurately describes the services I performed. I attest to the accuracy of the note.        Attending Attestation:     Physician Attestation for Scribe:    I, Dr. Josué Guzman, personally  performed the services described in this documentation.   All medical record entries made by the scribe were at my direction and in my presence.   I have reviewed the chart and agree that the record is accurate and complete.   Josué Guzman MD  9:15 AM 11/22/2019     DISCLAIMER: This note was prepared with [employer] voice recognition transcription software. Garbled syntax, mangled pronouns, and other bizarre constructions may be attributed to that software system.            ED Course as of Nov 21 1347   Thu Nov 21, 2019   1200 Impression      1. There is generalized volume loss with moderate to marked nonspecific white matter change.  There is no hemorrhage, mass, mass effect or obvious acute infarction.  It should be noted that MRI is more sensitive in the detection of subtle or acute nonhemorrhagic ischemic disease.  2. Atherosclerosis.      Electronically signed by: Everett Coello MD  Date: 11/21/2019  Time: 11:33    [BD]   1300 86-year-old female past medical history of dementia, hypertension, CAD and hypothyroidism presents today with decreased p.o. worsening confusion x2 days.    [BD]   1341 UA concerning for UTI.  Given Rocephin.  Abdominal exam with mild right lower abdominal tenderness will get CT abdomen pelvis and admit to Medicine for further workup and management of AMS. Likely delirium vs TIA vs polypharmacy vs other.  Patient accepted by Dr. Finn with CT abdomen pelvis pending at the time of admission.    [BD]      ED Course User Index  [BD] Josué Guzman MD                Clinical Impression:       ICD-10-CM ICD-9-CM   1. Dizziness R42 780.4         Disposition:   Disposition: Admitted                     Josué Guzman MD  11/22/19 0918

## 2019-11-21 NOTE — ASSESSMENT & PLAN NOTE
Patient is not chronically on statin.will start statin. Monitor clinically. Last LDL was   Lab Results   Component Value Date    LDLCALC 132.0 11/21/2019

## 2019-11-21 NOTE — ASSESSMENT & PLAN NOTE
Nutrition consulted. Body mass index is 27.44 kg/m².. Encourage maximal PO intake. Diet supplementation ordered per nutrition approval. Will encourage PO and monitor closely for weight changes.

## 2019-11-21 NOTE — ASSESSMENT & PLAN NOTE
Urinalysis nitrite positive with few bacteria. High risk culture in process.   Continue on IV rocephin.

## 2019-11-21 NOTE — ED NOTES
ER MD @ bedside for initial eval. Somewhat confused. Equal  strength bilat. Denies any c/o of her own except acknowledeges decrease appetite

## 2019-11-21 NOTE — H&P
"[facility] Medicine  History & Physical    Patient Name: Ellen Maravilla  MRN: [id number]  Admission Date: 11/21/2019  Attending Physician: Brittanie Finn MD   Primary Care Provider: Tien Mckeon MD         Patient information was obtained from patient, relative(s) and ER records.     Subjective:     Principal Problem:TIA (transient ischemic attack)    Chief Complaint:   Chief Complaint   Patient presents with    Aphasia     Last seen normal on Monday.        HPI: Ellen Maravilla is a 86 y.o. female with a PMHx HTN, CAD, thyroid disease, uterine cancer, and dementia who presented to the emergency department increased confusion and slurred speech. History limited due to patient's baseline dementia and increased confusion. Patient's son and grandson are at the bedside and providing history. The son reports that the patient became confused yesterday morning and had slurred speech and generalized weakness. The family brought her to see her PCP, Dr. Mckeon, who referred her to the ED for further evaluation. The son reports she was unable to get up from the chair today, and typically she is able to ambulate with her walker. The patient also had decreased appetite that began last night, and had nausea and vomiting upon arrival to Dr. Mckeon's office. Per the son she had one occurrence of emesis that he described as bile. The patient is currently reporting "spasming" right knee pain, the son denies any previous complaints of knee pain. Son denies of any known falls. The patient reports her nausea is improved at this time after receiving medication in the ED. The patient denies any shortness of breath, abdominal pain, chest pain, headaches, or cough.    The ED work up is significant for urinalysis revealing few bacteria and nitrite positive. Urine cx pending. CT head, abd/pelvis show no acute process. Started on IV rocephin. Patient will be placed in observation for further work up and " monitoring.     Past Medical History:   Diagnosis Date    Coronary artery disease     Dementia     Hypertension     Thyroid disease     Uterine cancer        Past Surgical History:   Procedure Laterality Date    APPENDECTOMY      BACK SURGERY      HYSTERECTOMY      KNEE SURGERY      TONSILLECTOMY         Review of patient's allergies indicates:  No Known Allergies    No current facility-administered medications on file prior to encounter.      Current Outpatient Medications on File Prior to Encounter   Medication Sig    QUEtiapine (SEROQUEL) 25 MG Tab Take 25 mg by mouth every evening.     Family History     None        Tobacco Use    Smoking status: Never Smoker    Smokeless tobacco: Never Used   Substance and Sexual Activity    Alcohol use: No     Comment: rarely    Drug use: No    Sexual activity: Not on file     Review of Systems   Unable to perform ROS: Dementia   Constitutional: Positive for activity change.   Musculoskeletal: Positive for arthralgias, back pain and gait problem.     Objective:     Vital Signs (Most Recent):  Temp: 97.9 °F (36.6 °C) (11/21/19 1045)  Pulse: 95 (11/21/19 1432)  Resp: 16 (11/21/19 1045)  BP: (!) 171/81 (11/21/19 1432)  SpO2: 97 % (11/21/19 1432) Vital Signs (24h Range):  Temp:  [97.9 °F (36.6 °C)] 97.9 °F (36.6 °C)  Pulse:  [84-95] 95  Resp:  [16] 16  SpO2:  [94 %-98 %] 97 %  BP: (131-193)/(65-81) 171/81     Weight: 77.1 kg (170 lb)  Body mass index is 27.44 kg/m².    Physical Exam   Constitutional: She appears well-developed and well-nourished.   HENT:   Head: Normocephalic and atraumatic.   Right Ear: External ear normal.   Left Ear: External ear normal.   Mouth/Throat: Oropharynx is clear and moist.   Eyes: Pupils are equal, round, and reactive to light. Conjunctivae and EOM are normal.   Neck: Normal range of motion. Neck supple. No JVD present.   Cardiovascular: Normal rate, regular rhythm and intact distal pulses.   Murmur (ASM) heard.  Pulmonary/Chest:  Effort normal and breath sounds normal. No respiratory distress. She has no wheezes. She has no rales.   Lungs CTA bilaterally, currently on room air   Abdominal: Soft. Bowel sounds are normal. There is no tenderness. There is no guarding.   Musculoskeletal: Normal range of motion. She exhibits edema.   Mild BLE edema   Neurological: She is alert. No sensory deficit.   Patient oriented to self only, advanced dementia. No obvious deficits in strength, equal to upper and lower ext.    Skin: Skin is warm and dry. Capillary refill takes less than 2 seconds. There is pallor.   Psychiatric: She has a normal mood and affect. Her speech is normal. Judgment normal. She is withdrawn.   Nursing note and vitals reviewed.        CRANIAL NERVES     CN III, IV, VI   Pupils are equal, round, and reactive to light.  Extraocular motions are normal.        Significant Labs:   CBC:   Recent Labs   Lab 11/21/19  1054   WBC 3.20*   HGB 13.4   HCT 40.6   *     CMP:   Recent Labs   Lab 11/21/19  1054      K 4.0      CO2 25   *   BUN 19   CREATININE 0.7   CALCIUM 9.3   PROT 6.9   ALBUMIN 3.4*   BILITOT 0.9   ALKPHOS 99   AST 15   ALT 10   ANIONGAP 11   EGFRNONAA >60     All pertinent labs within the past 24 hours have been reviewed.    Significant Imaging: I have reviewed and interpreted all pertinent imaging results/findings within the past 24 hours.    Assessment/Plan:     * TIA (transient ischemic attack)  Fall precautions.   Continuous telemetry.     Antithrombotics for secondary stroke prevention: Antiplatelets: Aspirin: 81 mg daily    Statins for secondary stroke prevention and hyperlipidemia, if present:   Statins: Atorvastatin- 40 mg daily    Aggressive risk factor modification: HTN, HLD     Rehab efforts: The patient has been evaluated by a stroke team provider and the therapy needs have been fully considered based off the presenting complaints and exam findings. The following therapy evaluations are  needed: PT evaluate and treat, OT evaluate and treat, SLP evaluate and treat    Diagnostics ordered/pending: HgbA1C to assess blood glucose levels, Lipid Profile to assess cholesterol levels, MRI head without contrast to assess brain parenchyma, TSH to assess thyroid function, Other: carotid US and Echo    VTE prophylaxis: Enoxaparin 40 mg SQ every 24 hours    BP parameters: TIA: SBP <220 until imaging confirmation of no infarct             Hyperlipidemia   Patient is not chronically on statin.will start statin. Monitor clinically. Last LDL was   Lab Results   Component Value Date    LDLCALC 132.0 11/21/2019            Port-A-Cath in place  Care per nursing.   Maintain sterility when accessing or de accessing port.         Vascular dementia without behavioral disturbance  Dementia is controlled currently. Continue home dementia meds and non-pharmacologic interventions to prevent delirium (No VS between 11PM-5AM, activity during day, opening blinds, providing glasses/hearing aids, and up in chair during daytime). Use PRN anti-psychotics to prevent behavior of self harm during sundowning, and avoid narcotics and benzos unless absolutely necessary. PRN anti-psychotics prescribed to avoid self harm behaviors.        Malnutrition of mild degree  Nutrition consulted. Body mass index is 27.44 kg/m².. Encourage maximal PO intake. Diet supplementation ordered per nutrition approval. Will encourage PO and monitor closely for weight changes.        Acute cystitis without hematuria  Urinalysis nitrite positive with few bacteria. High risk culture in process.   Continue on IV rocephin.       Closed compression fracture of thoracic vertebra, T9 and old multiple lumbar compression fractures  Chronic.   Mild analgesics for pain    Hypothyroidism  Patient has chronic hypothyroidism. TFTs reviewed-   Lab Results   Component Value Date    TSH 1.848 05/10/2017   . Patient not currently on levothyroxine. Will obtain  TSH.      Hypertension  Chronic, uncontrolled.  Latest blood pressure and vitals reviewed-   Temp:  [96.9 °F (36.1 °C)-97.9 °F (36.6 °C)]   Pulse:  [84-99]   Resp:  [16]   BP: (131-193)/(65-81)   SpO2:  [94 %-98 %] .   Patient is currently on no home antihypertensive medication. Pain may be a factor.    Will utilize p.r.n. blood pressure medication only if patient's blood pressure greater than  180/110 and she develops symptoms such as worsening chest pain or shortness of breath.  Reviewed previous records.  Patient was previously on Norvasc 2.5 mg and metoprolol 25 mg b.i.d..  Will resume.  Cardiac diet          VTE Risk Mitigation (From admission, onward)         Ordered     enoxaparin injection 40 mg  Daily      11/21/19 1548     IP VTE HIGH RISK PATIENT  Once      11/21/19 1548                   Wen Escobar NP  Department of Hospital Medicine   [facility]

## 2019-11-21 NOTE — PROGRESS NOTES
"I have evaluated and performed a medical screening assessment on this patient while awaiting a thorough evaluation and treatment. All of the emergency department beds are occupied at this time. When appropriate, laboratory studies will be ordered from triage. The patient has been advised of this process and care plan. Patient complains of dizziness and possible "mild stroke" a couple of days ago.  Caregiver states she seemed like she wasn't able to move her legs as well yesterday.  Went to PCP today and they sent her here for further evaluation.  Pt is alert and oriented.      Orders pending:EKG, CT Head, CBC, CMP, UA  Disposition: stable     "

## 2019-11-21 NOTE — PLAN OF CARE
POC reviewed with patient and patient's family, understanding verbalized. Disoriented to time and situation. Turned q 2 for bedbound status and limited mobility. Full liquid diet. Telemetry monitoring maintained. VS stable throughout shift. Safety maintained. Will continue to monitor.

## 2019-11-21 NOTE — ASSESSMENT & PLAN NOTE
Patient has chronic hypothyroidism. TFTs reviewed-   Lab Results   Component Value Date    TSH 1.848 05/10/2017   . Patient not currently on levothyroxine. Will obtain TSH.

## 2019-11-21 NOTE — ASSESSMENT & PLAN NOTE
Chronic, uncontrolled.  Latest blood pressure and vitals reviewed-   Temp:  [96.9 °F (36.1 °C)-97.9 °F (36.6 °C)]   Pulse:  [84-99]   Resp:  [16]   BP: (131-193)/(65-81)   SpO2:  [94 %-98 %] .   Patient is currently on no home antihypertensive medication. Pain may be a factor.    Will utilize p.r.n. blood pressure medication only if patient's blood pressure greater than  180/110 and she develops symptoms such as worsening chest pain or shortness of breath.  Reviewed previous records.  Patient was previously on Norvasc 2.5 mg and metoprolol 25 mg b.i.d..  Will resume.  Cardiac diet

## 2019-11-21 NOTE — HPI
"This is a 86 y.o. female with a PMHx HTN, CAD, thyroid disease, uterine cancer, and dementia who presented to the emergency department increased confusion and slurred speech. History limited due to patient's baseline dementia and increased confusion. Patient's son and grandson are at the bedside and providing history. The son reports that the patient became confused yesterday morning and had slurred speech and generalized weakness. The family brought her to see her PCP, Dr. Mckeon, who referred her to the ED for further evaluation. The son reports she was unable to get up from the chair today, and typically she is able to ambulate with her walker. The patient also had decreased appetite that began last night, and had nausea and vomiting upon arrival to Dr. Mckeon's office. Per the son she had one occurrence of emesis that he described as bile. The patient is currently reporting "spasming" right knee pain, the son denies any previous complaints of knee pain. Son denies of any known falls. The patient reports her nausea is improved at this time after receiving medication in the ED. The patient denies any shortness of breath, abdominal pain, chest pain, headaches, or cough. The ED work up is significant for urinalysis revealing few bacteria and nitrite positive. Urine cx pending. CT scan of head and abd/pelvis show no acute process.  Patient was started  on IV rocephin.  Patient was placed in the hospital for further evaluation treatment.  "

## 2019-11-21 NOTE — ASSESSMENT & PLAN NOTE
Dementia is controlled currently. Continue home dementia meds and non-pharmacologic interventions to prevent delirium (No VS between 11PM-5AM, activity during day, opening blinds, providing glasses/hearing aids, and up in chair during daytime). Use PRN anti-psychotics to prevent behavior of self harm during sundowning, and avoid narcotics and benzos unless absolutely necessary. PRN anti-psychotics prescribed to avoid self harm behaviors.

## 2019-11-22 PROBLEM — E44.0 MODERATE MALNUTRITION: Status: ACTIVE | Noted: 2019-11-21

## 2019-11-22 PROBLEM — I63.9 ACUTE ISCHEMIC STROKE: Status: ACTIVE | Noted: 2019-11-21

## 2019-11-22 LAB
25(OH)D3+25(OH)D2 SERPL-MCNC: 33 NG/ML (ref 30–96)
ALBUMIN SERPL BCP-MCNC: 3.1 G/DL (ref 3.5–5.2)
ALP SERPL-CCNC: 87 U/L (ref 55–135)
ALT SERPL W/O P-5'-P-CCNC: 12 U/L (ref 10–44)
ANION GAP SERPL CALC-SCNC: 8 MMOL/L (ref 8–16)
ANION GAP SERPL CALC-SCNC: 8 MMOL/L (ref 8–16)
APTT BLDCRRT: 27 SEC (ref 21–32)
AST SERPL-CCNC: 17 U/L (ref 10–40)
BASOPHILS # BLD AUTO: 0.01 K/UL (ref 0–0.2)
BASOPHILS NFR BLD: 0.2 % (ref 0–1.9)
BILIRUB SERPL-MCNC: 0.9 MG/DL (ref 0.1–1)
BUN SERPL-MCNC: 17 MG/DL (ref 8–23)
BUN SERPL-MCNC: 17 MG/DL (ref 8–23)
CALCIUM SERPL-MCNC: 8.8 MG/DL (ref 8.7–10.5)
CALCIUM SERPL-MCNC: 8.8 MG/DL (ref 8.7–10.5)
CHLORIDE SERPL-SCNC: 101 MMOL/L (ref 95–110)
CHLORIDE SERPL-SCNC: 101 MMOL/L (ref 95–110)
CK MB SERPL-MCNC: 1.7 NG/ML (ref 0.1–6.5)
CK MB SERPL-RTO: 1.4 % (ref 0–5)
CK SERPL-CCNC: 120 U/L (ref 20–180)
CO2 SERPL-SCNC: 26 MMOL/L (ref 23–29)
CO2 SERPL-SCNC: 26 MMOL/L (ref 23–29)
CREAT SERPL-MCNC: 0.6 MG/DL (ref 0.5–1.4)
CREAT SERPL-MCNC: 0.6 MG/DL (ref 0.5–1.4)
DIFFERENTIAL METHOD: ABNORMAL
EOSINOPHIL # BLD AUTO: 0 K/UL (ref 0–0.5)
EOSINOPHIL NFR BLD: 0.2 % (ref 0–8)
ERYTHROCYTE [DISTWIDTH] IN BLOOD BY AUTOMATED COUNT: 14.9 % (ref 11.5–14.5)
EST. GFR  (AFRICAN AMERICAN): >60 ML/MIN/1.73 M^2
EST. GFR  (AFRICAN AMERICAN): >60 ML/MIN/1.73 M^2
EST. GFR  (NON AFRICAN AMERICAN): >60 ML/MIN/1.73 M^2
EST. GFR  (NON AFRICAN AMERICAN): >60 ML/MIN/1.73 M^2
GLUCOSE SERPL-MCNC: 110 MG/DL (ref 70–110)
GLUCOSE SERPL-MCNC: 110 MG/DL (ref 70–110)
HCT VFR BLD AUTO: 37.7 % (ref 37–48.5)
HGB BLD-MCNC: 12.7 G/DL (ref 12–16)
IMM GRANULOCYTES # BLD AUTO: 0.03 K/UL (ref 0–0.04)
INR PPP: 1.1 (ref 0.8–1.2)
LYMPHOCYTES # BLD AUTO: 0.8 K/UL (ref 1–4.8)
LYMPHOCYTES NFR BLD: 12.6 % (ref 18–48)
MAGNESIUM SERPL-MCNC: 1.6 MG/DL (ref 1.6–2.6)
MCH RBC QN AUTO: 34 PG (ref 27–31)
MCHC RBC AUTO-ENTMCNC: 33.7 G/DL (ref 32–36)
MCV RBC AUTO: 101 FL (ref 82–98)
MONOCYTES # BLD AUTO: 0.3 K/UL (ref 0.3–1)
MONOCYTES NFR BLD: 4.2 % (ref 4–15)
NEUTROPHILS # BLD AUTO: 5.1 K/UL (ref 1.8–7.7)
NEUTROPHILS NFR BLD: 82.3 % (ref 38–73)
NRBC BLD-RTO: 0 /100 WBC
PHOSPHATE SERPL-MCNC: 3 MG/DL (ref 2.7–4.5)
PLATELET # BLD AUTO: 130 K/UL (ref 150–350)
PMV BLD AUTO: 8.6 FL (ref 9.2–12.9)
POTASSIUM SERPL-SCNC: 3.9 MMOL/L (ref 3.5–5.1)
POTASSIUM SERPL-SCNC: 3.9 MMOL/L (ref 3.5–5.1)
PROT SERPL-MCNC: 6.4 G/DL (ref 6–8.4)
PROTHROMBIN TIME: 11.2 SEC (ref 9–12.5)
RBC # BLD AUTO: 3.74 M/UL (ref 4–5.4)
SODIUM SERPL-SCNC: 135 MMOL/L (ref 136–145)
SODIUM SERPL-SCNC: 135 MMOL/L (ref 136–145)
TROPONIN I SERPL DL<=0.01 NG/ML-MCNC: 0.02 NG/ML (ref 0–0.03)
WBC # BLD AUTO: 6.17 K/UL (ref 3.9–12.7)

## 2019-11-22 PROCEDURE — 83735 ASSAY OF MAGNESIUM: CPT

## 2019-11-22 PROCEDURE — 92610 EVALUATE SWALLOWING FUNCTION: CPT

## 2019-11-22 PROCEDURE — 36415 COLL VENOUS BLD VENIPUNCTURE: CPT

## 2019-11-22 PROCEDURE — G8988 SELF CARE GOAL STATUS: HCPCS | Mod: CK

## 2019-11-22 PROCEDURE — 97162 PT EVAL MOD COMPLEX 30 MIN: CPT | Performed by: PHYSICAL THERAPIST

## 2019-11-22 PROCEDURE — 97802 MEDICAL NUTRITION INDIV IN: CPT

## 2019-11-22 PROCEDURE — 84484 ASSAY OF TROPONIN QUANT: CPT

## 2019-11-22 PROCEDURE — 85610 PROTHROMBIN TIME: CPT

## 2019-11-22 PROCEDURE — 63600175 PHARM REV CODE 636 W HCPCS: Performed by: NURSE PRACTITIONER

## 2019-11-22 PROCEDURE — 80053 COMPREHEN METABOLIC PANEL: CPT

## 2019-11-22 PROCEDURE — 85730 THROMBOPLASTIN TIME PARTIAL: CPT

## 2019-11-22 PROCEDURE — 82550 ASSAY OF CK (CPK): CPT

## 2019-11-22 PROCEDURE — 86580 TB INTRADERMAL TEST: CPT | Performed by: NURSE PRACTITIONER

## 2019-11-22 PROCEDURE — 30200315 PPD INTRADERMAL TEST REV CODE 302: Performed by: NURSE PRACTITIONER

## 2019-11-22 PROCEDURE — G8987 SELF CARE CURRENT STATUS: HCPCS | Mod: CK

## 2019-11-22 PROCEDURE — 82306 VITAMIN D 25 HYDROXY: CPT

## 2019-11-22 PROCEDURE — 84100 ASSAY OF PHOSPHORUS: CPT

## 2019-11-22 PROCEDURE — 94761 N-INVAS EAR/PLS OXIMETRY MLT: CPT

## 2019-11-22 PROCEDURE — 25000003 PHARM REV CODE 250: Performed by: NURSE PRACTITIONER

## 2019-11-22 PROCEDURE — 97530 THERAPEUTIC ACTIVITIES: CPT | Performed by: PHYSICAL THERAPIST

## 2019-11-22 PROCEDURE — 11000001 HC ACUTE MED/SURG PRIVATE ROOM

## 2019-11-22 PROCEDURE — 97165 OT EVAL LOW COMPLEX 30 MIN: CPT

## 2019-11-22 PROCEDURE — 85025 COMPLETE CBC W/AUTO DIFF WBC: CPT

## 2019-11-22 PROCEDURE — 82553 CREATINE MB FRACTION: CPT

## 2019-11-22 RX ORDER — QUETIAPINE FUMARATE 25 MG/1
50 TABLET, FILM COATED ORAL NIGHTLY
Status: DISCONTINUED | OUTPATIENT
Start: 2019-11-22 | End: 2019-11-24

## 2019-11-22 RX ORDER — ASPIRIN 81 MG/1
81 TABLET ORAL DAILY
Status: DISCONTINUED | OUTPATIENT
Start: 2019-11-23 | End: 2019-11-30 | Stop reason: HOSPADM

## 2019-11-22 RX ADMIN — QUETIAPINE FUMARATE 50 MG: 25 TABLET ORAL at 09:11

## 2019-11-22 RX ADMIN — TUBERCULIN PURIFIED PROTEIN DERIVATIVE 5 UNITS: 5 INJECTION, SOLUTION INTRADERMAL at 05:11

## 2019-11-22 RX ADMIN — ENOXAPARIN SODIUM 40 MG: 100 INJECTION SUBCUTANEOUS at 05:11

## 2019-11-22 RX ADMIN — IBUPROFEN 400 MG: 400 TABLET, FILM COATED ORAL at 11:11

## 2019-11-22 RX ADMIN — METOPROLOL TARTRATE 25 MG: 25 TABLET, FILM COATED ORAL at 09:11

## 2019-11-22 RX ADMIN — CEFTRIAXONE 1 G: 1 INJECTION, SOLUTION INTRAVENOUS at 12:11

## 2019-11-22 NOTE — PT/OT/SLP EVAL
Occupational Therapy   Evaluation    Name: Ellen Maravilla  MRN: [id number]  Admitting Diagnosis:  Acute ischemic stroke      Recommendations:     Discharge Recommendations: nursing facility, skilled  Discharge Equipment Recommendations:  (TBD)  Barriers to discharge:  None    Assessment:     Ellne Maravilla is a 86 y.o. female with a medical diagnosis of Acute ischemic stroke.  Pt self limiting with participation in OT evaluation. Pt refused to participate with most self care tasks and transfers. Pt was agreeable to MMT and AROM assessment of B UE. Pt seemed slightly weaker in L UE as compared to R UE. Pt tangential at times requiring verbal cues to re-orient.  Performance deficits affecting function: weakness, impaired endurance, impaired self care skills, impaired functional mobilty, decreased lower extremity function, decreased upper extremity function, decreased coordination, decreased safety awareness.      Rehab Prognosis: Fair; patient would benefit from acute skilled OT services to address these deficits and reach maximum level of function.       Plan:     Patient to be seen 3 x/week to address the above listed problems via self-care/home management, therapeutic activities, therapeutic exercises  · Plan of Care Expires: 12/06/19  · Plan of Care Reviewed with: patient    Subjective     Chief Complaint: fatigue  Patient/Family Comments/goals: none     Occupational Profile:  Living Environment: Pt stated she lives with  and son in a 2SH.   Previous level of function: Pt stated she requires assist with dressing and bathing. Pt also stated she was ambulatory with RW.  Equipment Used at Home:  rollator  Assistance upon Discharge: Pt will receive assistance from family.     Pain/Comfort:  · Pain Rating 1: 0/10  · Pain Rating Post-Intervention 1: 0/10    Patients cultural, spiritual, Confucianist conflicts given the current situation:      Objective:     Communicated with: nurse Gaona prior to session.  Patient  found up in chair with telemetry, peripheral IV(AVASYS) upon OT entry to room.    General Precautions: Standard, fall, aspiration   Orthopedic Precautions:N/A   Braces: N/A     Occupational Performance:    Bed Mobility:    · Not assessed; pt seated in chair upon arrival. See PT notes.    Functional Mobility/Transfers:  · Pt refused participation with transfers.       Activities of Daily Living:  · Grooming: Stand-by Assistance with hair grooming while seated in chair. Noted tremors in L UE when combing hair.     Cognitive/Visual Perceptual:  Cognitive/Psychosocial Skills:     -       Oriented to: Person and Place   -       Follows Commands/attention:Follows two-step commands  -       Communication: clear but tangential at times.  -       Safety awareness/insight to disability: impaired   -       Mood/Affect/Coping skills/emotional control: Partly cooperative  Visual/Perceptual:      -Functional    Physical Exam:  Postural examination/scapula alignment:    -       Rounded shoulders  -       Forward head  Dominant hand:    -       Right  Upper Extremity Range of Motion:     -       Right Upper Extremity: WFL  -       Left Upper Extremity: WFL  Upper Extremity Strength:    -       Right Upper Extremity: 4-/5  -       Left Upper Extremity: 3+/5   Strength:    -       Right Upper Extremity: 4-/5  -       Left Upper Extremity: 3+/5  Fine Motor Coordination:    -       Impaired  2/2 intermittent tremors when combing her hair.  Gross motor coordination:   Limited 2/2 fatigue and incoodination    AMPAC 6 Click ADL:  AMPAC Total Score: 15    Treatment & Education:  Pt educated on benefits of full participation in therapy to increase independence with ADLs and functional mobility.   OT ed pt on OT role & POC as well as discharge recommendations.    Education:    Patient left up in chair with all lines intact, call button in reach, chair alarm on and nurse notified    GOALS:   Multidisciplinary Problems     Occupational  Therapy Goals        Problem: Occupational Therapy Goal    Goal Priority Disciplines Outcome Interventions   Occupational Therapy Goal     OT, PT/OT Ongoing, Progressing    Description:  Goals to be met by: 12/6/52019    Patient will increase functional independence with ADLs by performing:    LE Dressing with Stand-by Assistance and Assistive Devices as needed.  Grooming while standing at sink with Stand-by Assistance.  Toileting from toilet with Stand-by Assistance for hygiene and clothing management.   Supine to sit with Contact Guard Assistance.  Toilet transfer to toilet with Contact Guard Assistance.  Upper extremity exercise program x10 reps per handout, with supervision.                      History:     Past Medical History:   Diagnosis Date    Coronary artery disease     Dementia     Hypertension     Thyroid disease     Uterine cancer        Past Surgical History:   Procedure Laterality Date    APPENDECTOMY      BACK SURGERY      HYSTERECTOMY      KNEE SURGERY      TONSILLECTOMY         Time Tracking:     OT Date of Treatment: 11/22/19  OT Start Time: 1327  OT Stop Time: 1339  OT Total Time (min): 12 min    Billable Minutes:Evaluation 12    Christiano Ayoub OT  11/22/2019

## 2019-11-22 NOTE — PLAN OF CARE
11/21/19 2055   Patient Assessment/Suction   Level of Consciousness (AVPU) alert   PRE-TX-O2   SpO2 96 %   Pulse Oximetry Type Intermittent   $ Pulse Oximetry - Single Charge Pulse Oximetry - Single

## 2019-11-22 NOTE — ASSESSMENT & PLAN NOTE
Patient has chronic hypothyroidism. TFTs reviewed-   Lab Results   Component Value Date    TSH 3.719 11/21/2019      well developed

## 2019-11-22 NOTE — SUBJECTIVE & OBJECTIVE
"Interval History: Patient seen and examined. Patient was very agitated overnight and was moved to a room closer to the nurses station with a telesitter. Patient is calm and cooperative at the time of the exam. She follows commands and denies any pain at this time. The patient is slower to respond and does have garbled speech, unsure if this is from additional seroquel or stroke.     Per nurse the son came by in the morning and gave the patient an additional dose of seroquel because "you didn't give her any that's why she's like this." The nurse informed the son that we did in fact give her bedtime dose of seroquel and that he should not give her any medications while she is in the hospital.     Review of Systems   Unable to perform ROS: Dementia   Constitutional: Positive for activity change.   Musculoskeletal: Positive for back pain.   Psychiatric/Behavioral: Positive for confusion.     Objective:     Vital Signs (Most Recent):  Temp: 97.6 °F (36.4 °C) (11/22/19 1154)  Pulse: 82 (11/22/19 1154)  Resp: 16 (11/22/19 0727)  BP: (!) 111/59 (11/22/19 1154)  SpO2: 95 % (11/22/19 1154) Vital Signs (24h Range):  Temp:  [96.1 °F (35.6 °C)-97.6 °F (36.4 °C)] 97.6 °F (36.4 °C)  Pulse:  [] 82  Resp:  [16-18] 16  SpO2:  [95 %-98 %] 95 %  BP: (111-193)/(59-93) 111/59     Weight: 77.1 kg (170 lb)  Body mass index is 27.44 kg/m².  No intake or output data in the 24 hours ending 11/22/19 1215   Physical Exam   Constitutional: She appears well-developed and well-nourished.   HENT:   Head: Normocephalic and atraumatic.   Right Ear: External ear normal.   Left Ear: External ear normal.   Mouth/Throat: Oropharynx is clear and moist.   Eyes: Pupils are equal, round, and reactive to light. Conjunctivae and EOM are normal.   Neck: Normal range of motion. Neck supple. No JVD present.   Cardiovascular: Normal rate, regular rhythm and intact distal pulses.   Murmur (ASM) heard.  Pulmonary/Chest: Effort normal and breath sounds normal. " No respiratory distress. She has no wheezes. She has no rales.   Lungs CTA bilaterally, currently on room air   Abdominal: Soft. Bowel sounds are normal. There is no tenderness. There is no guarding.   Musculoskeletal: Normal range of motion. She exhibits no edema.   Mild BLE edema   Neurological: She is alert. No sensory deficit.   Patient oriented to self only, advanced dementia. No obvious deficits in strength, equal to upper and lower ext.    Skin: Skin is warm and dry. Capillary refill takes less than 2 seconds. There is pallor.   Psychiatric: She has a normal mood and affect. Judgment normal. Her speech is delayed and slurred. She is withdrawn.   Nursing note and vitals reviewed.      Significant Labs:   BMP:   Recent Labs   Lab 11/22/19  0644     110   *  135*   K 3.9  3.9     101   CO2 26  26   BUN 17  17   CREATININE 0.6  0.6   CALCIUM 8.8  8.8   MG 1.6     CBC:   Recent Labs   Lab 11/21/19  1054 11/22/19  0644   WBC 3.20* 6.17   HGB 13.4 12.7   HCT 40.6 37.7   * 130*     Urine Studies:   Recent Labs   Lab 11/21/19  1230   COLORU Yellow   APPEARANCEUA Clear   PHUR 5.0   SPECGRAV >=1.030*   PROTEINUA Negative   GLUCUA Negative   KETONESU 1+*   BILIRUBINUA 2+*   OCCULTUA 1+*   NITRITE Positive*   UROBILINOGEN 2.0-3.0*   LEUKOCYTESUR Negative   RBCUA 2   BACTERIA Few*     All pertinent labs within the past 24 hours have been reviewed.    Significant Imaging: I have reviewed and interpreted all pertinent imaging results/findings within the past 24 hours.

## 2019-11-22 NOTE — PT/OT/SLP EVAL
Physical Therapy Evaluation    Patient Name:  Ellen Maravilla   MRN:  [id number]    Recommendations:     Discharge Recommendations:  nursing facility, skilled   Discharge Equipment Recommendations: (TBD)   Barriers to discharge: Pt requires increased level of assistance for all functional mobility    Assessment:     Ellen Maravilla is a 86 y.o. female admitted with a medical diagnosis of Acute ischemic stroke.  She presents with the following impairments/functional limitations:  weakness, gait instability, decreased upper extremity function, impaired endurance, impaired balance, decreased lower extremity function, decreased safety awareness, impaired functional mobilty.  During PT evaluation, pt is highly distractable, and difficult to understand what she is saying.  Pt is able to follow commands and demonstrates fair strength in all extremities, however L side does seem weaker compared to the right.  She required Mod A for transfer supine<>sit, and Mod A for sit<>Stand with RW, and Max A for transfer bed<>chair with RW.      Rehab Prognosis: Fair; patient would benefit from acute skilled PT services to address these deficits and reach maximum level of function.    Recent Surgery: * No surgery found *      Plan:     During this hospitalization, patient to be seen daily to address the identified rehab impairments via gait training, therapeutic activities, therapeutic exercises and progress toward the following goals:    · Plan of Care Expires:  12/13/19    Subjective     Chief Complaint: None stat ed  Patient/Family Comments/goals: none stat ed  Pain/Comfort:  · Pain Rating 1: 0/10    Patients cultural, spiritual, Gnosticist conflicts given the current situation:      Living Environment:  Unable to fully determine.  Pt states she lives with her oldest son, but she is unable to answer how many stairs to enter or if the home is all on one level  Prior to admission, patients level of function was - unknown.  Equipment used  at home: rollator(unable to fully assess).  DME owned (not currently used): none.  Upon discharge, patient will have assistance from -unknown.    Objective:     Communicated with nurse Gaona prior to session.  Patient found HOB elevated with peripheral IV, telemetry  upon PT entry to room.    General Precautions: Standard, fall   Orthopedic Precautions:N/A   Braces: N/A     Exams:  · Cognitive Exam:  Patient is oriented to Person  · Postural Exam:  Patient presented with the following abnormalities:    · -       Rounded shoulders  · -       Forward head  · RLE ROM: WFL  · RLE Strength: grossly 3/5  · LLE ROM: WFL  · LLE Strength: Deficits: Grossly 3-/5    Functional Mobility:  · Bed Mobility:     · Supine to Sit: moderate assistance  · Transfers:     · Sit to Stand:  moderate assistance with rolling walker  · Bed to Chair: maximal assistance with  rolling walker  using  Stand Pivot  · Balance: fair      AM-PAC 6 CLICK MOBILITY  Total Score:10     Patient left up in chair with all lines intact, call button in reach, chair alarm on and nurse Candelaria notified.    GOALS:   Multidisciplinary Problems     Physical Therapy Goals        Problem: Physical Therapy Goal    Goal Priority Disciplines Outcome Goal Variances Interventions   Physical Therapy Goal     PT, PT/OT      Description:  Goals to be met by: 2019     Patient will increase functional independence with mobility by performin. Supine to sit with Contact Guard Assistance  2. Sit to supine with Contact Guard Assistance  3. Sit to stand transfer with Contact Guard Assistance  4. Bed to chair transfer with Contact Guard Assistance using Rolling Walker  5. Gait  x 20 feet with Minimal Assistance using Rolling Walker.   6. Lower extremity exercise program x10-20 reps per handout, with supervision                      History:     Past Medical History:   Diagnosis Date    Coronary artery disease     Dementia     Hypertension     Thyroid disease      Uterine cancer        Past Surgical History:   Procedure Laterality Date    APPENDECTOMY      BACK SURGERY      HYSTERECTOMY      KNEE SURGERY      TONSILLECTOMY         Time Tracking:     PT Received On: 11/22/19  PT Start Time: 1122     PT Stop Time: 1145  PT Total Time (min): 23 min     Billable Minutes: Evaluation 10 and Therapeutic Activity 13      Christina Sesay, PT  11/22/2019

## 2019-11-22 NOTE — ASSESSMENT & PLAN NOTE
With history of pathological fractures and osteoporosis.   Will check vitamin D levels  Fall precautions.

## 2019-11-22 NOTE — ASSESSMENT & PLAN NOTE
Dementia is controlled currently. Continue home dementia meds and non-pharmacologic interventions to prevent delirium (No VS between 11PM-5AM, activity during day, opening blinds, providing glasses/hearing aids, and up in chair during daytime). Use PRN anti-psychotics to prevent behavior of self harm during sundowning, and avoid narcotics and benzos unless absolutely necessary. PRN anti-psychotics prescribed to avoid self harm behaviors.    Became agitated overnight, increase seroquel.

## 2019-11-22 NOTE — ASSESSMENT & PLAN NOTE
Contributing Nutrition Diagnosis  Moderate chronic condition related malnutrition    Related to (etiology):   Decreased appetite r/t dementia at times    Signs and Symptoms (as evidenced by):   1) Mild LE edmea  2) Mild-moderate muscle/fat loss as charted below  * per family has variable appetite but maintaining wt  Interventions/Recommendations (treatment strategy):  Above    Nutrition Diagnosis Status:   New

## 2019-11-22 NOTE — ASSESSMENT & PLAN NOTE
Fall precautions.   Continuous telemetry.     Antithrombotics for secondary stroke prevention: Antiplatelets: Aspirin: 81 mg daily    Statins for secondary stroke prevention and hyperlipidemia, if present:   Statins: Atorvastatin- 40 mg daily    Aggressive risk factor modification: HTN, HLD     Rehab efforts: The patient has been evaluated by a stroke team provider and the therapy needs have been fully considered based off the presenting complaints and exam findings. The following therapy evaluations are needed: PT evaluate and treat, OT evaluate and treat, SLP evaluate and treat    Diagnostics ordered/pending: HgbA1C to assess blood glucose levels, Lipid Profile to assess cholesterol levels, MRI head without contrast to assess brain parenchyma, TSH to assess thyroid function, Other: carotid US and Echo    VTE prophylaxis: Enoxaparin 40 mg SQ every 24 hours    BP parameters: TIA: SBP <220 until imaging confirmation of no infarct      MRI head without contrast revealed 3 small linear foci of acute nonhemorrhagic infarction in the central kathleen.  Neurology consulted.  MRA brain without contrast ordered.  Reviewed lipid panel, HGBA1C, TSH, and Carotid US.  Awaiting ECHO.

## 2019-11-22 NOTE — PT/OT/SLP EVAL
Speech Language Pathology Evaluation  Bedside Swallow    Patient Name:  Ellen Maravilla   MRN:  [id number]  Admitting Diagnosis: Acute ischemic stroke    Recommendations:                 General Recommendations:  Speech language evaluation and Cognitive-linguistic evaluation  Diet recommendations:  Mechanical soft(IDDSI 5), Thin   Aspiration Precautions: Assistance with meals, Check for pocketing/oral residue and Monitor for s/s of aspiration   General Precautions: Standard, aspiration, fall  Communication strategies:  none    History:     Past Medical History:   Diagnosis Date    Coronary artery disease     Dementia     Hypertension     Thyroid disease     Uterine cancer        Past Surgical History:   Procedure Laterality Date    APPENDECTOMY      BACK SURGERY      HYSTERECTOMY      KNEE SURGERY      TONSILLECTOMY         Social History: Patient lives with  and son per her report.    Prior Intubation HX:  none    Modified Barium Swallow: none    Imaging:   MRI BRAIN WITHOUT CONTRAST   Final Result   Abnormal      1. There are 3 small linear foci of acute nonhemorrhagic infarction in the central kathleen.   2. There is generalized volume loss with moderate to marked nonspecific white matter change.  These findings likely reflect sequelae of chronic small vessel disease.  Also, there is a small chronic infarction in the posterior right cerebellum.   This report was flagged in Epic as abnormal.  Abnormal critical results were discussed with the patient's nurse, Candelaria Kuhn, at 8:35 am 11/22/2019         Electronically signed by: Everett Coello MD   Date:    11/22/2019   Time:    08:35      US Carotid Bilateral   Final Result      No evidence of a hemodynamically significant carotid bifurcation stenosis.      Flow in vertebral arteries appears antegrade bilaterally         Electronically signed by: Cee Boyd MD   Date:    11/22/2019   Time:    08:37      CT Abdomen Pelvis With Contrast   Final  "Result      No acute abdominopelvic process.      Unchanged pancreatic cystic lesion.  These commonly represent benign pancreatic cystic neoplasms.  For a lesion of this size follow-up to 10 years of stability is recommended, with next follow-up in 2 years.      Atherosclerosis.  Unchanged small splenic artery aneurysm.      Osteopenia with multiple chronic thoracolumbar compression fractures.  Prior L1 vertebroplasty.         Electronically signed by: Carlos Wilkinson MD   Date:    11/21/2019   Time:    14:25      CT Head Without Contrast   Final Result      1. There is generalized volume loss with moderate to marked nonspecific white matter change.  There is no hemorrhage, mass, mass effect or obvious acute infarction.  It should be noted that MRI is more sensitive in the detection of subtle or acute nonhemorrhagic ischemic disease.   2. Atherosclerosis.         Electronically signed by: Everett Coello MD   Date:    11/21/2019   Time:    11:33      MRA Brain without contrast    (Results Pending)        Prior diet: Regular/thin.    Occupation/hobbies/homemaking: Per chart pt ambulated with walker, however, she reports she is nonambulatory.    Subjective     "Better today than yesterday."    Objective:   Pt seen for clinical swallow evaluation. She is awake and alert sitting up in chair. Oriented to self and place only. Dysarthria noted with reduced speech intelligibility.     Oral Musculature Evaluation  · Oral Musculature: WFL  · Dentition: upper and lower dentures  · Secretion Management: adequate  · Mucosal Quality: good  · Mandibular Strength and Mobility: WFL  · Oral Labial Strength and Mobility: impaired retraction(reduced retraction on L)  · Lingual Strength and Mobility: WFL  · Buccal Strength and Mobility: WFL  · Volitional Cough: fair  · Volitional Swallow: able to palpate larygneal rise  · Voice Prior to PO Intake: clear quality. +dysarthria    Bedside Swallow Eval:   Consistencies Assessed:  · Thin " liquids --via tsp, cup and straw  · Puree --applesauce  · Mixed consistencies --diced peaches     Oral Phase:   · Lingual residue--clears with liquid wash    Pharyngeal Phase:   · no overt clinical signs/symptoms of aspiration    Compensatory Strategies  · None    Treatment: Pt/family educated re: results/recs of evaluation, SLP role and POC.     Assessment:     Ellen Maravilla is a 86 y.o. female with an SLP diagnosis of Dysphagia and Dysarthria.   Clinical swallow evaluation completed. Pt presents with oral dysphagia and no overt s/s penetration/aspiration. Dysarthria noted. Pt would benefit from SLP evaluation; will await second sign. Will follow.    Goals:   Multidisciplinary Problems     SLP Goals        Problem: SLP Goal    Goal Priority Disciplines Outcome   SLP Goal     SLP Ongoing, Progressing   Description:    1) Participate in full SLP evaluation                    Plan:     · Patient to be seen:  5 x/week   · Plan of Care expires:  12/06/19  · Plan of Care reviewed with:  patient   · SLP Follow-Up:  Yes       Discharge recommendations:  nursing facility, skilled       Time Tracking:     SLP Treatment Date:   11/22/19  Speech Start Time:  1350  Speech Stop Time:  1403     Speech Total Time (min):  13 min    Billable Minutes: Eval Swallow and Oral Function 13 and Total Time 13    Rayna Artis CCC-SLP  11/22/2019

## 2019-11-22 NOTE — CONSULTS
"  [facility]  Adult Nutrition  Consult Note    SUMMARY   Intervention: fat/sodium modified diet and nutrition supplement therapy-commercial food     Recommendation:   1) Continue cardiac diet, texture per SLP   2) weigh pt weekly   3) Nutrition education and handouts given   4) Boost pudding daily to encourage protein intake    Goals: 1) PO intakes > 50% of meals and supplements at f/u  Nutrition Goal Status: new  Communication of RD Recs: (POC, sticky note, second sign)    Reason for Assessment    Reason For Assessment: consult  Diagnosis: (TIA)  Relevant Medical History: HTN, CAD, uterine CA, dementia  Interdisciplinary Rounds: attended    General Information Comments: 87 y/o female admitted with TIA and cystitis, undergoing work-up. SLP evaluated today rec. Mechanical soft textures, now on cardiac diet. Wt gain per chart review. Per pt and son pt with variable appetite at home, sometimes drinks Boost. Loves her fried foods and hamburgers. Educated pt and son on heart healthy diet and left educational materials for the son who does most of the cooking. NFPE done 11/22/19, mild wasting seen.     Nutrition Discharge Planning: To be determined- Boost plus daily if skips a meal    Nutrition Risk Screen    Nutrition Risk Screen: no indicators present    Nutrition/Diet History    Patient Reported Diet/Restrictions/Preferences: general  Spiritual, Cultural Beliefs, Confucianism Practices, Values that Affect Care: no  Food Allergies: NKFA  Factors Affecting Nutritional Intake: None identified at this time    Anthropometrics    Temp: 97.6 °F (36.4 °C)  Height Method: Measured(office 5/6/19)  Height: 5' 5"  Height (inches): 65 in  Weight Method: Bed Scale  Weight: 77.1 kg (169 lb 15.6 oz)  Weight (lb): 169.98 lb  Ideal Body Weight (IBW), Female: 125 lb  % Ideal Body Weight, Female (lb): 135.98 lb  BMI (Calculated): 28.3  BMI Grade: 25 - 29.9 - overweight  Weight Loss: unintentional  Usual Body Weight (UBW), " k.4 kg(4/10/18)  Weight Change Amount: (76.7 kg 19)  % Usual Body Weight: 103.85  % Weight Change From Usual Weight: 3.63 %       Lab/Procedures/Meds    Pertinent Labs Reviewed: reviewed  BMP  Lab Results   Component Value Date     (L) 2019     (L) 2019    K 3.9 2019    K 3.9 2019     2019     2019    CO2 26 2019    CO2 26 2019    BUN 17 2019    BUN 17 2019    CREATININE 0.6 2019    CREATININE 0.6 2019    CALCIUM 8.8 2019    CALCIUM 8.8 2019    ANIONGAP 8 2019    ANIONGAP 8 2019    ESTGFRAFRICA >60 2019    ESTGFRAFRICA >60 2019    EGFRNONAA >60 2019    EGFRNONAA >60 2019     Lab Results   Component Value Date    CHOL 212 (H) 2019     Lab Results   Component Value Date    HDL 68 2019     Lab Results   Component Value Date    LDLCALC 132.0 2019     Lab Results   Component Value Date    TRIG 60 2019     Lab Results   Component Value Date    CHOLHDL 32.1 2019     Lab Results   Component Value Date    ALBUMIN 3.1 (L) 2019       Pertinent Medications Reviewed: reviewed  Pertinent Medications Comments: statin, zofran, KCl, KNaPhos, polyethylene glycol    Estimated/Assessed Needs    Weight Used For Calorie Calculations: 77.1 kg (169 lb 15.6 oz)  Energy Calorie Requirements (kcal): Giles St Jeor ( x 1.2) = 1450 kcal  Energy Need Method: Giles-St Jeor  Protein Requirements: 1.1 g protein/kg ( age) = 84 g protein  Weight Used For Protein Calculations: 77.1 kg (169 lb 15.6 oz)  Fluid Requirements (mL): 1500 ml  Estimated Fluid Requirement Method: RDA Method  CHO Requirement: N/A      Nutrition Prescription Ordered    Current Diet Order: Cardiac    Evaluation of Received Nutrient/Fluid Intake    Energy Calories Required: not meeting needs  Protein Required: not meeting needs  Fluid Required: not meeting needs  Comments: diet just advanced,  pt not hungry today  Tolerance: tolerating  % Intake of Estimated Energy Needs: 0%  % Meal Intake: 0%    Nutrition Risk    Level of Risk/Frequency of Follow-up: moderate 2 x weekly    Assessment and Plan    Moderate malnutrition  Contributing Nutrition Diagnosis  Moderate chronic condition related malnutrition    Related to (etiology):   Decreased appetite r/t dementia at times    Signs and Symptoms (as evidenced by):   1) Mild LE edmea  2) Mild-moderate muscle/fat loss as charted below  * per family has variable appetite but maintaining wt  Interventions/Recommendations (treatment strategy):  Above    Nutrition Diagnosis Status:   New           Monitor and Evaluation    Food and Nutrient Intake: energy intake, food and beverage intake  Food and Nutrient Adminstration: diet order  Anthropometric Measurements: weight  Biochemical Data, Medical Tests and Procedures: electrolyte and renal panel  Nutrition-Focused Physical Findings: overall appearance     Malnutrition Assessment     Skin (Micronutrient): (Eric = 13)  Teeth (Micronutrient): (Denies chewing trouble)   Micronutrient Evaluation: suspected deficiency  Micronutrient Evaluation Comments: Na       Orbital Region (Subcutaneous Fat Loss): mild depletion  Upper Arm Region (Subcutaneous Fat Loss): mild depletion   Mcloud Region (Muscle Loss): mild depletion  Clavicle Bone Region (Muscle Loss): mild depletion  Clavicle and Acromion Bone Region (Muscle Loss): mild depletion  Scapular Bone Region (Muscle Loss): mild depletion  Dorsal Hand (Muscle Loss): moderate depletion  Posterior Calf Region (Muscle Loss): (edematous LEs)   Edema (Fluid Accumulation): 2-->mild   Subcutaneous Fat Loss (Final Summary): mild protein-calorie malnutrition  Muscle Loss Evaluation (Final Summary): mild protein-calorie malnutrition         Nutrition Follow-Up    RD Follow-up?: Yes

## 2019-11-22 NOTE — CONSULTS
"[facility]  Neurology  Consult Note    Patient Name: Ellen Maravilla  MRN: [id number]  Admission Date: 11/21/2019  Hospital Length of Stay: 1 days  Code Status: Full Code   Attending Provider: Brittanie Finn MD   Consulting Provider:  Dano Crandall MD  Consulting NP: Radha Hanson NP  Primary Care Physician: Tien Mckeon MD  Principal Problem:Acute ischemic stroke    Inpatient consult to Neurology  Consult performed by: Frank Crandall MD  Consult ordered by: Wen Escobar NP        Subjective:     Chief Complaint:    Chief Complaint   Patient presents with    Aphasia     Last seen normal on Monday.       HPI: Ellen Maravilla is a 86 y.o. female with a PMHx HTN, CAD, thyroid disease, uterine cancer, and dementia who presented to the emergency department increased confusion and slurred speech. History limited due to patient's baseline dementia and increased confusion. Patient's son and grandson are at the bedside and providing history. The son reports that the patient became confused yesterday morning and had slurred speech and generalized weakness. The family brought her to see her PCP, Dr. Mckeon, who referred her to the ED for further evaluation. The son reports she was unable to get up from the chair today, and typically she is able to ambulate with her walker. The patient also had decreased appetite that began last night, and had nausea and vomiting upon arrival to Dr. Mckeon's office. Per the son she had one occurrence of emesis that he described as bile. The patient is currently reporting "spasming" right knee pain, the son denies any previous complaints of knee pain. Son denies of any known falls. The patient reports her nausea is improved at this time after receiving medication in the ED. The patient denies any shortness of breath, abdominal pain, chest pain, headaches, or cough.     The ED work up is significant for urinalysis revealing few bacteria and nitrite positive. Urine " cx pending. CT head, abd/pelvis show no acute process. Started on IV rocephin. Patient will be placed in observation for further work up and monitoring.        Neurology Interval History: Patient seen and examined with Dr. Crandall. She is dysarthric on exam. She states she does not feel well today, but feels better then she did yesterday. She was reportedly last seen in her normal state 2 days ago. No family members are at bedside currently. She does not currently have any facial droop or decreased sensation. The patient denies any headache, tingling or numbness, unilateral extremity weakness, or visual disturbance. MRI brain did reveal 3 small linear foci of acute nonhemorrhagic infarction in the central kathleen, will f/u MRA brain without contrast.      Past Medical History:   Diagnosis Date    Coronary artery disease     Dementia     Hypertension     Thyroid disease     Uterine cancer        Past Surgical History:   Procedure Laterality Date    APPENDECTOMY      BACK SURGERY      HYSTERECTOMY      KNEE SURGERY      TONSILLECTOMY         Review of patient's allergies indicates:  No Known Allergies    Current Neurological Medications:     Scheduled Meds:   amLODIPine  2.5 mg Oral Daily    [START ON 11/23/2019] aspirin  81 mg Oral Daily    atorvastatin  40 mg Oral Daily    cefTRIAXone (ROCEPHIN) IVPB  1 g Intravenous Q24H    enoxaparin  40 mg Subcutaneous Daily    metoprolol tartrate  25 mg Oral BID    polyethylene glycol  17 g Oral Daily    QUEtiapine  50 mg Oral QHS    tuberculin  5 Units Intradermal Once     Continuous Infusions:   sodium chloride 0.9%       PRN Meds:.acetaminophen, dextrose 50%, dextrose 50%, glucagon (human recombinant), glucose, glucose, ibuprofen, magnesium oxide, magnesium oxide, ondansetron, ondansetron, potassium chloride 10%, potassium chloride 10%, potassium chloride 10%, potassium, sodium phosphates, potassium, sodium phosphates, potassium, sodium phosphates, sodium  chloride 0.9%, sodium chloride 0.9%      No current facility-administered medications on file prior to encounter.      Current Outpatient Medications on File Prior to Encounter   Medication Sig    QUEtiapine (SEROQUEL) 25 MG Tab Take 25 mg by mouth every evening.      Family History     Problem Relation (Age of Onset)    Depression Father    Early death Father    No Known Problems Mother        Tobacco Use    Smoking status: Never Smoker    Smokeless tobacco: Never Used   Substance and Sexual Activity    Alcohol use: No     Comment: rarely    Drug use: No    Sexual activity: Not on file     Review of Systems   Constitutional: Positive for activity change.   HENT: Positive for trouble swallowing and voice change. Negative for tinnitus.    Respiratory: Negative.    Cardiovascular: Negative.    Gastrointestinal: Negative.    Genitourinary: Negative.    Musculoskeletal: Negative.    Neurological: Positive for speech difficulty and weakness. Negative for dizziness, tremors, seizures, syncope, facial asymmetry, light-headedness, numbness and headaches.   Hematological: Negative.    Psychiatric/Behavioral: Negative.      Objective:     Vital Signs (Most Recent):  Temp: 97.6 °F (36.4 °C) (11/22/19 1154)  Pulse: 82 (11/22/19 1154)  Resp: 16 (11/22/19 0727)  BP: (!) 111/59 (11/22/19 1154)  SpO2: 95 % (11/22/19 1154) Vital Signs (24h Range):  Temp:  [96.1 °F (35.6 °C)-97.6 °F (36.4 °C)] 97.6 °F (36.4 °C)  Pulse:  [] 82  Resp:  [16-18] 16  SpO2:  [95 %-97 %] 95 %  BP: (111-180)/(59-93) 111/59     Weight: 77.1 kg (169 lb 15.6 oz)  Body mass index is 28.29 kg/m².    Physical Exam   Constitutional: She appears well-developed and well-nourished.   HENT:   Head: Normocephalic and atraumatic.   Eyes: Pupils are equal, round, and reactive to light. EOM are normal.   Neck: Normal range of motion. Neck supple.   Cardiovascular: Normal rate.   Pulmonary/Chest: Effort normal.   Abdominal: Soft.   Musculoskeletal: Normal range  of motion.   Neurological: She is alert. She displays normal reflexes. No cranial nerve deficit or sensory deficit. She exhibits normal muscle tone. She has an abnormal Finger-Nose-Finger Test. Coordination abnormal.   Reflex Scores:       Tricep reflexes are 2+ on the right side and 2+ on the left side.       Bicep reflexes are 2+ on the right side and 2+ on the left side.       Brachioradialis reflexes are 2+ on the right side and 2+ on the left side.       Patellar reflexes are 2+ on the right side and 2+ on the left side.       Achilles reflexes are 2+ on the right side and 2+ on the left side.  Skin: Skin is warm and dry. Capillary refill takes less than 2 seconds.   Psychiatric: She has a normal mood and affect.       NEUROLOGICAL EXAMINATION:     MENTAL STATUS   Oriented to person.   Disoriented to place.   Disoriented to time.     CRANIAL NERVES   Cranial nerves II through XII intact.     CN III, IV, VI   Pupils are equal, round, and reactive to light.  Extraocular motions are normal.     MOTOR EXAM   Muscle bulk: normal    Strength   Right neck flexion: 4/5  Left neck flexion: 4/5  Right neck extension: 4/5  Left neck extension: 4/5  Right deltoid: 4/5  Left deltoid: 4/5  Right biceps: 4/5  Left biceps: 4/5  Right triceps: 4/5  Left triceps: 4/5  Right wrist flexion: 4/5  Left wrist flexion: 4/5  Right wrist extension: 4/5  Left wrist extension: 4/5  Right interossei: 4/5  Left interossei: 4/5  Right abdominals: 3/5  Left abdominals: 3/5  Right iliopsoas: 3/5  Left iliopsoas: 3/5  Right quadriceps: 3/5  Left quadriceps: 3/5  Right hamstring: 3/5  Left hamstring: 3/5  Right glutei: 3/5  Left glutei: 3/5  Right anterior tibial: 3/5  Left anterior tibial: 3/5  Right posterior tibial: 3/5  Left posterior tibial: 3/5  Right peroneal: 3/5  Left peroneal: 3/5  Right gastroc: 3/5  Left gastroc: 3/5    REFLEXES     Reflexes   Right brachioradialis: 2+  Left brachioradialis: 2+  Right biceps: 2+  Left biceps:  2+  Right triceps: 2+  Left triceps: 2+  Right patellar: 2+  Left patellar: 2+  Right achilles: 2+  Left achilles: 2+  Right : 2+  Left : 2+    SENSORY EXAM   Light touch normal.     GAIT AND COORDINATION      Coordination   Finger to nose coordination: abnormal        NIH Stroke Scale:    Level of Consciousness: 0 - alert  LOC Questions: 2 - answers none correctly  LOC Commands: 0 - performs both correctly  Best Gaze: 0 - normal  Visual: 0 - no visual loss  Facial Palsy: 0 - normal  Motor Left Arm: 0 - no drift  Motor Right Arm: 0 - no drift  Motor Left Le - can't resist gravity  Motor Right Le - can't resist gravity  Limb Ataxia: 2 - present in two limbs  Sensory: 0 - normal  Best Language: 0 - no aphasia  Dysarthria: 1 - mild to moderate dysarthria  Extinction and Inattention: 0 - no neglect  NIH Stroke Scale Total: 9        Significant Labs:     Lab Results   Component Value Date    CREATININE 0.6 2019    CREATININE 0.6 2019     Lab Results   Component Value Date    HGBA1C 5.1 2019     Lab Results   Component Value Date    TSH 3.719 2019     Lab Results   Component Value Date    LDLCALC 132.0 2019       Significant Imaging:    CT head without contrast  Impression       1. There is generalized volume loss with moderate to marked nonspecific white matter change.  There is no hemorrhage, mass, mass effect or obvious acute infarction.  It should be noted that MRI is more sensitive in the detection of subtle or acute nonhemorrhagic ischemic disease.  2. Atherosclerosis.           Mri Brain Without Contrast    Result Date: 2019  1. There are 3 small linear foci of acute nonhemorrhagic infarction in the central kathleen. 2. There is generalized volume loss with moderate to marked nonspecific white matter change.  These findings likely reflect sequelae of chronic small vessel disease.  Also, there is a small chronic infarction in the posterior right cerebellum. This report  was flagged in Epic as abnormal.  Abnormal critical results were discussed with the patient's nurse, Candelaria Kuhn, at 8:35 am 11/22/2019 Electronically signed by: Everett Coello MD Date:    11/22/2019 Time:    08:35    Us Carotid Bilateral    Result Date: 11/22/2019  No evidence of a hemodynamically significant carotid bifurcation stenosis. Flow in vertebral arteries appears antegrade bilaterally Electronically signed by: Cee Boyd MD Date:    11/22/2019 Time:    08:37    MRA brain without contrast pending  ECHO pending  Assessment and Plan:      1. 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 Hyperlipidemia  -High intensity statin for secondary stroke prevention  -Life style modification; LDL goal <70        [phone] within 2 weeks from discharge.  Stroke education was provided to patient/family members including stroke risk factor modification and patient/family members were informed if patient experiences any acute neurological changes including weakness, confusion, visual changes to come straight to the ER.  All side effects of new medications were discussed with patient/family members.             Active Diagnoses:    Diagnosis Date Noted POA    PRINCIPAL PROBLEM:  Acute ischemic stroke [I63.9] 11/21/2019 Yes    Acute cystitis without hematuria [N30.00] 11/21/2019 Yes    Moderate malnutrition [E44.0] 11/21/2019 Yes    Vascular dementia without behavioral disturbance [F01.50] 11/21/2019 Yes    Port-A-Cath in place [Z95.828] 11/21/2019 Not Applicable    Hyperlipidemia [E78.5] 11/21/2019 Yes    Closed compression fracture of thoracic vertebra, T9 and old multiple lumbar compression fractures [S22.000A] 12/11/2017  Yes    Hypertension [I10] 02/10/2015 Yes    Hypothyroidism [E03.9] 02/10/2015 Yes    Osteoporosis [M81.0] 02/10/2015 Yes      Problems Resolved During this Admission:       VTE Risk Mitigation (From admission, onward)         Ordered     enoxaparin injection 40 mg  Daily      11/21/19 1548     IP VTE HIGH RISK PATIENT  Once      11/21/19 1548                Patient was seen and examined by Dr. Crandall      Thank you for your consult. I will follow-up with patient. Please contact us if you have any additional questions.    Radha Hanson NP  Neurology  [facility]

## 2019-11-22 NOTE — PLAN OF CARE
Per the pts son I sent a 3 day packet, current meds and therapy notes to [facility] [place] [facility] via [facility]. CM following. Magdalena Obrien LCSW     11/22/19 1339   Post-Acute Status   Post-Acute Authorization Placement   Post-Acute Placement Status Referrals Sent

## 2019-11-22 NOTE — PLAN OF CARE
Intervention: fat/sodium modified diet and nutrition supplement therapy-commercial food      Recommendation:   1) Continue cardiac diet, texture per SLP   2) weigh pt weekly   3) Nutrition education and handouts given   4) Boost pudding daily to encourage protein intake     Goals: 1) PO intakes > 50% of meals and supplements at f/u  Nutrition Goal Status: new  Communication of RD Recs: (POC, sticky note, second sign)

## 2019-11-22 NOTE — PLAN OF CARE
POC reviewed with patient and patient's family, understanding verbalized. Up with assist plus two. Neuro checks q 4 hours. Room air. Glucose monitoring/coverage ACHS as needed.Cardiac diet. Telemetry monitoring maintained. VS stable throughout shift. Safety maintained. Will continue to monitor.

## 2019-11-22 NOTE — PLAN OF CARE
Problem: SLP Goal  Goal: SLP Goal  Description    1) Participate in full SLP evaluation   Outcome: Ongoing, Progressing    Clinical swallow evaluation completed. Pt presents with oral dysphagia and no overt s/s penetration/aspiration. Dysarthria noted. Pt would benefit from SLP evaluation.

## 2019-11-22 NOTE — PLAN OF CARE
CM went to pt's room to complete discharge assessment. Pt unable to verify information on facesheet. CM called pt's son Jhonny to complete. Jhonny verified information on facesheet as correct. Reports that pt lives at listed address with her other son Lionel. POA is Jhonny. [facility] is [facility] [address]. Denies any active hh servies. DME- rollator, , sc. Pt needs help with ADLs. Transportation is provided by Jhonny. DC plan is possible SNF placement. CM following to assist in any DC needs.        11/22/19 9339   Discharge Assessment   Assessment Type Discharge Planning Assessment   Confirmed/corrected address and phone number on facesheet? Yes   Assessment information obtained from? Patient   Communicated expected length of stay with patient/caregiver yes   Prior to hospitilization cognitive status: Not Oriented to Place   Prior to hospitalization functional status: Assistive Equipment;Needs Assistance   Current cognitive status: Not Oriented to Place;Not Oriented to Time   Current Functional Status: Assistive Equipment;Needs Assistance   Lives With child(buzz), adult   Able to Return to Prior Arrangements no   Is patient able to care for self after discharge? No   Patient's perception of discharge disposition skilled nursing facility   Readmission Within the Last 30 Days no previous admission in last 30 days   Patient currently being followed by outpatient case management? No   Patient currently receives any other outside agency services? No   Equipment Currently Used at Home rollator   Do you have any problems affording any of your prescribed medications? No   Is the patient taking medications as prescribed? yes   Does the patient have transportation home? Yes   Transportation Anticipated family or friend will provide   Does the patient receive services at the [facility]? No   Discharge Plan A Skilled Nursing Facility   Discharge Plan B Home Health   DME Needed Upon Discharge  none   Patient/Family in  Agreement with Plan yes

## 2019-11-22 NOTE — PLAN OF CARE
Problem: Occupational Therapy Goal  Goal: Occupational Therapy Goal  Description  Goals to be met by: 12/6/52019    Patient will increase functional independence with ADLs by performing:    LE Dressing with Stand-by Assistance and Assistive Devices as needed.  Grooming while standing at sink with Stand-by Assistance.  Toileting from toilet with Stand-by Assistance for hygiene and clothing management.   Supine to sit with Contact Guard Assistance.  Toilet transfer to toilet with Contact Guard Assistance.  Upper extremity exercise program x10 reps per handout, with supervision.     Outcome: Ongoing, Progressing

## 2019-11-22 NOTE — PROGRESS NOTES
"[facility] Medicine  Progress Note    Patient Name: Ellen Maravilla  MRN: [id number]  Patient Class: IP- Inpatient   Admission Date: 11/21/2019  Length of Stay: 1 days  Attending Physician: Brittanie Finn MD  Primary Care Provider: Tien Mckeon MD        Subjective:     Principal Problem:Acute ischemic stroke        HPI:  Ellen Maravilla is a 86 y.o. female with a PMHx HTN, CAD, thyroid disease, uterine cancer, and dementia who presented to the emergency department increased confusion and slurred speech. History limited due to patient's baseline dementia and increased confusion. Patient's son and grandson are at the bedside and providing history. The son reports that the patient became confused yesterday morning and had slurred speech and generalized weakness. The family brought her to see her PCP, Dr. Mckeon, who referred her to the ED for further evaluation. The son reports she was unable to get up from the chair today, and typically she is able to ambulate with her walker. The patient also had decreased appetite that began last night, and had nausea and vomiting upon arrival to Dr. Mckeon's office. Per the son she had one occurrence of emesis that he described as bile. The patient is currently reporting "spasming" right knee pain, the son denies any previous complaints of knee pain. Son denies of any known falls. The patient reports her nausea is improved at this time after receiving medication in the ED. The patient denies any shortness of breath, abdominal pain, chest pain, headaches, or cough.    The ED work up is significant for urinalysis revealing few bacteria and nitrite positive. Urine cx pending. CT head, abd/pelvis show no acute process. Started on IV rocephin. Patient will be placed in observation for further work up and monitoring.     Overview/Hospital Course:  Patient monitor closely during observation stay. Patient presented with increased confusion and slurred " "speech. Urinalysis significant for few bacteria and was nitrate positive. Urine culture obtained. CT head with no obvious acute abnormality. CT abdomen/pelvis showed no acute abdominopelvic process. Patient started on IV Rocephin. PT/OT ordered. MRI brain revealed 3 small linear foci of acute nonhemorrhagic infarction in the central kathleen. Neurology consulted. Carotid ultrasound reviewed.    Interval History: Patient seen and examined. Patient was very agitated overnight and was moved to a room closer to the nurses station with a telesitter. Patient is calm and cooperative at the time of the exam. She follows commands and denies any pain at this time. The patient is slower to respond and does have garbled speech, unsure if this is from additional seroquel or stroke.     Per nurse the son came by in the morning and gave the patient an additional dose of seroquel because "you didn't give her any that's why she's like this." The nurse informed the son that we did in fact give her bedtime dose of seroquel and that he should not give her any medications while she is in the hospital.     Review of Systems   Unable to perform ROS: Dementia   Constitutional: Positive for activity change.   Musculoskeletal: Positive for back pain.   Psychiatric/Behavioral: Positive for confusion.     Objective:     Vital Signs (Most Recent):  Temp: 97.6 °F (36.4 °C) (11/22/19 1154)  Pulse: 82 (11/22/19 1154)  Resp: 16 (11/22/19 0727)  BP: (!) 111/59 (11/22/19 1154)  SpO2: 95 % (11/22/19 1154) Vital Signs (24h Range):  Temp:  [96.1 °F (35.6 °C)-97.6 °F (36.4 °C)] 97.6 °F (36.4 °C)  Pulse:  [] 82  Resp:  [16-18] 16  SpO2:  [95 %-98 %] 95 %  BP: (111-193)/(59-93) 111/59     Weight: 77.1 kg (170 lb)  Body mass index is 27.44 kg/m².  No intake or output data in the 24 hours ending 11/22/19 1215   Physical Exam   Constitutional: She appears well-developed and well-nourished.   HENT:   Head: Normocephalic and atraumatic.   Right Ear: External " ear normal.   Left Ear: External ear normal.   Mouth/Throat: Oropharynx is clear and moist.   Eyes: Pupils are equal, round, and reactive to light. Conjunctivae and EOM are normal.   Neck: Normal range of motion. Neck supple. No JVD present.   Cardiovascular: Normal rate, regular rhythm and intact distal pulses.   Murmur (ASM) heard.  Pulmonary/Chest: Effort normal and breath sounds normal. No respiratory distress. She has no wheezes. She has no rales.   Lungs CTA bilaterally, currently on room air   Abdominal: Soft. Bowel sounds are normal. There is no tenderness. There is no guarding.   Musculoskeletal: Normal range of motion. She exhibits no edema.   Mild BLE edema   Neurological: She is alert. No sensory deficit.   Patient oriented to self only, advanced dementia. No obvious deficits in strength, equal to upper and lower ext.    Skin: Skin is warm and dry. Capillary refill takes less than 2 seconds. There is pallor.   Psychiatric: She has a normal mood and affect. Judgment normal. Her speech is delayed and slurred. She is withdrawn.   Nursing note and vitals reviewed.      Significant Labs:   BMP:   Recent Labs   Lab 11/22/19  0644     110   *  135*   K 3.9  3.9     101   CO2 26  26   BUN 17  17   CREATININE 0.6  0.6   CALCIUM 8.8  8.8   MG 1.6     CBC:   Recent Labs   Lab 11/21/19  1054 11/22/19  0644   WBC 3.20* 6.17   HGB 13.4 12.7   HCT 40.6 37.7   * 130*     Urine Studies:   Recent Labs   Lab 11/21/19  1230   COLORU Yellow   APPEARANCEUA Clear   PHUR 5.0   SPECGRAV >=1.030*   PROTEINUA Negative   GLUCUA Negative   KETONESU 1+*   BILIRUBINUA 2+*   OCCULTUA 1+*   NITRITE Positive*   UROBILINOGEN 2.0-3.0*   LEUKOCYTESUR Negative   RBCUA 2   BACTERIA Few*     All pertinent labs within the past 24 hours have been reviewed.    Significant Imaging: I have reviewed and interpreted all pertinent imaging results/findings within the past 24 hours.      Assessment/Plan:      * Acute  ischemic stroke  Fall precautions.   Continuous telemetry.     Antithrombotics for secondary stroke prevention: Antiplatelets: Aspirin: 81 mg daily    Statins for secondary stroke prevention and hyperlipidemia, if present:   Statins: Atorvastatin- 40 mg daily    Aggressive risk factor modification: HTN, HLD     Rehab efforts: The patient has been evaluated by a stroke team provider and the therapy needs have been fully considered based off the presenting complaints and exam findings. The following therapy evaluations are needed: PT evaluate and treat, OT evaluate and treat, SLP evaluate and treat    Diagnostics ordered/pending: HgbA1C to assess blood glucose levels, Lipid Profile to assess cholesterol levels, MRI head without contrast to assess brain parenchyma, TSH to assess thyroid function, Other: carotid US and Echo    VTE prophylaxis: Enoxaparin 40 mg SQ every 24 hours    BP parameters: TIA: SBP <220 until imaging confirmation of no infarct      MRI head without contrast revealed 3 small linear foci of acute nonhemorrhagic infarction in the central kathleen.  Neurology consulted.  MRA brain without contrast ordered.  Reviewed lipid panel, HGBA1C, TSH, and Carotid US.  Awaiting ECHO.              Hyperlipidemia   Patient is not chronically on statin.will start statin. Monitor clinically. Last LDL was   Lab Results   Component Value Date    LDLCALC 132.0 11/21/2019            Port-A-Cath in place  Care per nursing.   Maintain sterility when accessing or de accessing port.         Vascular dementia without behavioral disturbance  Dementia is controlled currently. Continue home dementia meds and non-pharmacologic interventions to prevent delirium (No VS between 11PM-5AM, activity during day, opening blinds, providing glasses/hearing aids, and up in chair during daytime). Use PRN anti-psychotics to prevent behavior of self harm during sundowning, and avoid narcotics and benzos unless absolutely necessary. PRN  anti-psychotics prescribed to avoid self harm behaviors.    Became agitated overnight, increase seroquel.        Moderate malnutrition  Nutrition consulted. Body mass index is 27.44 kg/m².. Encourage maximal PO intake. Diet supplementation ordered per nutrition approval. Will encourage PO and monitor closely for weight changes.        Acute cystitis without hematuria  Urinalysis nitrite positive with few bacteria. High risk culture in process.   Continue on IV rocephin.       Closed compression fracture of thoracic vertebra, T9 and old multiple lumbar compression fractures  Chronic.   Mild analgesics for pain    Osteoporosis  With history of pathological fractures and osteoporosis.   Will check vitamin D levels  Fall precautions.      Hypothyroidism  Patient has chronic hypothyroidism. TFTs reviewed-   Lab Results   Component Value Date    TSH 3.719 11/21/2019       Hypertension  Chronic, uncontrolled.  Latest blood pressure and vitals reviewed-   Temp:  [96.1 °F (35.6 °C)-97.6 °F (36.4 °C)]   Pulse:  []   Resp:  [16-18]   BP: (111-193)/(59-93)   SpO2:  [95 %-98 %] .   Home meds for hypertension were reviewed and noted below. Hospital anti-hypertensive changes were made as shown below.  Hospital Medications             amLODIPine tablet 2.5 mg 2.5 mg, Oral, Daily    metoprolol tartrate (LOPRESSOR) tablet 25 mg 25 mg, Oral, 2 times daily        Will utilize p.r.n. blood pressure medication only if patient's blood pressure greater than  180/110 and she develops symptoms such as worsening chest pain or shortness of breath.    Reviewed previous records.  Patient was previously on Norvasc 2.5 mg and metoprolol 25 mg b.i.d..  Will resume.  Cardiac diet          VTE Risk Mitigation (From admission, onward)         Ordered     enoxaparin injection 40 mg  Daily      11/21/19 1548     IP VTE HIGH RISK PATIENT  Once      11/21/19 1548                      Wen Escobar NP  Department of Hospital Medicine   [facility]

## 2019-11-22 NOTE — ASSESSMENT & PLAN NOTE
Chronic, uncontrolled.  Latest blood pressure and vitals reviewed-   Temp:  [96.1 °F (35.6 °C)-97.6 °F (36.4 °C)]   Pulse:  []   Resp:  [16-18]   BP: (111-193)/(59-93)   SpO2:  [95 %-98 %] .   Home meds for hypertension were reviewed and noted below. Hospital anti-hypertensive changes were made as shown below.  Hospital Medications             amLODIPine tablet 2.5 mg 2.5 mg, Oral, Daily    metoprolol tartrate (LOPRESSOR) tablet 25 mg 25 mg, Oral, 2 times daily        Will utilize p.r.n. blood pressure medication only if patient's blood pressure greater than  180/110 and she develops symptoms such as worsening chest pain or shortness of breath.    Reviewed previous records.  Patient was previously on Norvasc 2.5 mg and metoprolol 25 mg b.i.d..  Will resume.  Cardiac diet

## 2019-11-23 PROBLEM — R41.0 DELIRIUM: Status: ACTIVE | Noted: 2019-11-23

## 2019-11-23 LAB
ALBUMIN SERPL BCP-MCNC: 2.7 G/DL (ref 3.5–5.2)
ALP SERPL-CCNC: 69 U/L (ref 55–135)
ALT SERPL W/O P-5'-P-CCNC: 8 U/L (ref 10–44)
ANION GAP SERPL CALC-SCNC: 7 MMOL/L (ref 8–16)
ANION GAP SERPL CALC-SCNC: 7 MMOL/L (ref 8–16)
AORTIC ROOT ANNULUS: 2.91 CM
AORTIC VALVE CUSP SEPERATION: 1.7 CM
AST SERPL-CCNC: 14 U/L (ref 10–40)
AV INDEX (PROSTH): 0.67
AV MEAN GRADIENT: 9 MMHG
AV PEAK GRADIENT: 15 MMHG
AV VALVE AREA: 2.1 CM2
AV VELOCITY RATIO: 0.54
BACTERIA UR CULT: NO GROWTH
BASOPHILS # BLD AUTO: 0.02 K/UL (ref 0–0.2)
BASOPHILS NFR BLD: 0.5 % (ref 0–1.9)
BILIRUB SERPL-MCNC: 0.6 MG/DL (ref 0.1–1)
BSA FOR ECHO PROCEDURE: 1.89 M2
BUN SERPL-MCNC: 22 MG/DL (ref 8–23)
BUN SERPL-MCNC: 22 MG/DL (ref 8–23)
CALCIUM SERPL-MCNC: 8.6 MG/DL (ref 8.7–10.5)
CALCIUM SERPL-MCNC: 8.6 MG/DL (ref 8.7–10.5)
CHLORIDE SERPL-SCNC: 104 MMOL/L (ref 95–110)
CHLORIDE SERPL-SCNC: 104 MMOL/L (ref 95–110)
CO2 SERPL-SCNC: 25 MMOL/L (ref 23–29)
CO2 SERPL-SCNC: 25 MMOL/L (ref 23–29)
CREAT SERPL-MCNC: 0.6 MG/DL (ref 0.5–1.4)
CREAT SERPL-MCNC: 0.6 MG/DL (ref 0.5–1.4)
CV ECHO LV RWT: 0.71 CM
DIFFERENTIAL METHOD: ABNORMAL
DOP CALC AO PEAK VEL: 1.94 M/S
DOP CALC AO VTI: 32.95 CM
DOP CALC LVOT AREA: 3.1 CM2
DOP CALC LVOT DIAMETER: 2 CM
DOP CALC LVOT PEAK VEL: 1.05 M/S
DOP CALC LVOT STROKE VOLUME: 69.08 CM3
DOP CALCLVOT PEAK VEL VTI: 22 CM
E WAVE DECELERATION TIME: 315.85 MSEC
E/A RATIO: 0.7
E/E' RATIO: 8.86 M/S
ECHO LV POSTERIOR WALL: 0.87 CM (ref 0.6–1.1)
EOSINOPHIL # BLD AUTO: 0.1 K/UL (ref 0–0.5)
EOSINOPHIL NFR BLD: 3 % (ref 0–8)
ERYTHROCYTE [DISTWIDTH] IN BLOOD BY AUTOMATED COUNT: 14.6 % (ref 11.5–14.5)
EST. GFR  (AFRICAN AMERICAN): >60 ML/MIN/1.73 M^2
EST. GFR  (AFRICAN AMERICAN): >60 ML/MIN/1.73 M^2
EST. GFR  (NON AFRICAN AMERICAN): >60 ML/MIN/1.73 M^2
EST. GFR  (NON AFRICAN AMERICAN): >60 ML/MIN/1.73 M^2
FRACTIONAL SHORTENING: 27 % (ref 28–44)
GLUCOSE SERPL-MCNC: 94 MG/DL (ref 70–110)
GLUCOSE SERPL-MCNC: 94 MG/DL (ref 70–110)
HCT VFR BLD AUTO: 32.8 % (ref 37–48.5)
HGB BLD-MCNC: 10.9 G/DL (ref 12–16)
IMM GRANULOCYTES # BLD AUTO: 0.01 K/UL (ref 0–0.04)
INTERVENTRICULAR SEPTUM: 0.78 CM (ref 0.6–1.1)
IVRT: 0.12 MSEC
LEFT ATRIUM SIZE: 3.91 CM
LEFT INTERNAL DIMENSION IN SYSTOLE: 1.79 CM (ref 2.1–4)
LEFT VENTRICLE MASS INDEX: 24 G/M2
LEFT VENTRICULAR INTERNAL DIMENSION IN DIASTOLE: 2.45 CM (ref 3.5–6)
LEFT VENTRICULAR MASS: 45.71 G
LV LATERAL E/E' RATIO: 7.75 M/S
LV SEPTAL E/E' RATIO: 10.33 M/S
LYMPHOCYTES # BLD AUTO: 1.2 K/UL (ref 1–4.8)
LYMPHOCYTES NFR BLD: 32.5 % (ref 18–48)
MCH RBC QN AUTO: 34.2 PG (ref 27–31)
MCHC RBC AUTO-ENTMCNC: 33.2 G/DL (ref 32–36)
MCV RBC AUTO: 103 FL (ref 82–98)
MONOCYTES # BLD AUTO: 0.3 K/UL (ref 0.3–1)
MONOCYTES NFR BLD: 7.3 % (ref 4–15)
MV A" WAVE DURATION": 175 MSEC
MV PEAK A VEL: 0.89 M/S
MV PEAK E VEL: 0.62 M/S
NEUTROPHILS # BLD AUTO: 2.1 K/UL (ref 1.8–7.7)
NEUTROPHILS NFR BLD: 56.4 % (ref 38–73)
NRBC BLD-RTO: 0 /100 WBC
PISA TR MAX VEL: 2.19 M/S
PLATELET # BLD AUTO: 121 K/UL (ref 150–350)
PMV BLD AUTO: 9.1 FL (ref 9.2–12.9)
POTASSIUM SERPL-SCNC: 3.7 MMOL/L (ref 3.5–5.1)
POTASSIUM SERPL-SCNC: 3.7 MMOL/L (ref 3.5–5.1)
PROT SERPL-MCNC: 5.6 G/DL (ref 6–8.4)
PULM VEIN A" WAVE DURATION": 152 MSEC
PV PEAK VELOCITY: 0.86 CM/S
RBC # BLD AUTO: 3.19 M/UL (ref 4–5.4)
RIGHT VENTRICULAR END-DIASTOLIC DIMENSION: 4.1 CM
SODIUM SERPL-SCNC: 136 MMOL/L (ref 136–145)
SODIUM SERPL-SCNC: 136 MMOL/L (ref 136–145)
TDI LATERAL: 0.08 M/S
TDI SEPTAL: 0.06 M/S
TDI: 0.07 M/S
TR MAX PG: 19 MMHG
TRICUSPID ANNULAR PLANE SYSTOLIC EXCURSION: 2.19 CM
WBC # BLD AUTO: 3.69 K/UL (ref 3.9–12.7)

## 2019-11-23 PROCEDURE — 11000001 HC ACUTE MED/SURG PRIVATE ROOM

## 2019-11-23 PROCEDURE — 36415 COLL VENOUS BLD VENIPUNCTURE: CPT

## 2019-11-23 PROCEDURE — 94761 N-INVAS EAR/PLS OXIMETRY MLT: CPT

## 2019-11-23 PROCEDURE — 63600175 PHARM REV CODE 636 W HCPCS: Performed by: NURSE PRACTITIONER

## 2019-11-23 PROCEDURE — 25000003 PHARM REV CODE 250: Performed by: NURSE PRACTITIONER

## 2019-11-23 PROCEDURE — 25000003 PHARM REV CODE 250: Performed by: FAMILY MEDICINE

## 2019-11-23 PROCEDURE — 80053 COMPREHEN METABOLIC PANEL: CPT

## 2019-11-23 PROCEDURE — G0408 INPT/TELE FOLLOW UP 35: HCPCS | Mod: GT,,, | Performed by: PSYCHIATRY & NEUROLOGY

## 2019-11-23 PROCEDURE — G0408 PR TELHEALTH INPT CONSULT 35MIN: ICD-10-PCS | Mod: GT,,, | Performed by: PSYCHIATRY & NEUROLOGY

## 2019-11-23 PROCEDURE — 85025 COMPLETE CBC W/AUTO DIFF WBC: CPT

## 2019-11-23 RX ORDER — LIDOCAINE 50 MG/G
1 PATCH TOPICAL
Status: DISCONTINUED | OUTPATIENT
Start: 2019-11-23 | End: 2019-11-30 | Stop reason: HOSPADM

## 2019-11-23 RX ADMIN — QUETIAPINE FUMARATE 50 MG: 25 TABLET ORAL at 08:11

## 2019-11-23 RX ADMIN — ATORVASTATIN CALCIUM 40 MG: 40 TABLET, FILM COATED ORAL at 09:11

## 2019-11-23 RX ADMIN — ASPIRIN 81 MG: 81 TABLET ORAL at 09:11

## 2019-11-23 RX ADMIN — ACETAMINOPHEN 650 MG: 325 TABLET ORAL at 05:11

## 2019-11-23 RX ADMIN — POLYETHYLENE GLYCOL (3350) 17 G: 17 POWDER, FOR SOLUTION ORAL at 09:11

## 2019-11-23 RX ADMIN — AMLODIPINE BESYLATE 2.5 MG: 2.5 TABLET ORAL at 09:11

## 2019-11-23 RX ADMIN — ENOXAPARIN SODIUM 40 MG: 100 INJECTION SUBCUTANEOUS at 05:11

## 2019-11-23 RX ADMIN — Medication 800 MG: at 05:11

## 2019-11-23 RX ADMIN — LIDOCAINE 1 PATCH: 50 PATCH TOPICAL at 08:11

## 2019-11-23 RX ADMIN — METOPROLOL TARTRATE 25 MG: 25 TABLET, FILM COATED ORAL at 09:11

## 2019-11-23 RX ADMIN — CEFTRIAXONE 1 G: 1 INJECTION, SOLUTION INTRAVENOUS at 01:11

## 2019-11-23 RX ADMIN — METOPROLOL TARTRATE 25 MG: 25 TABLET, FILM COATED ORAL at 08:11

## 2019-11-23 NOTE — PROGRESS NOTES
"[facility]  Neurology  Progress Note    Patient Name: Ellen Maravilla  MRN: [id number]  Admission Date: 11/21/2019  Hospital Length of Stay: 2 days  Code Status: Full Code   Attending Provider: Brittanie Finn MD   Consulting Provider:  Sushil Centeno  Consulting NP: Radha Hanson NP  Primary Care Physician: Tien Mckeon MD  Principal Problem:Acute ischemic stroke      Subjective:     Chief Complaint:    Chief Complaint   Patient presents with    Aphasia     Last seen normal on Monday.       HPI: Ellen Maravilla is a 86 y.o. female with a PMHx HTN, CAD, thyroid disease, uterine cancer, and dementia who presented to the emergency department increased confusion and slurred speech. History limited due to patient's baseline dementia and increased confusion. Patient's son and grandson are at the bedside and providing history. The son reports that the patient became confused yesterday morning and had slurred speech and generalized weakness. The family brought her to see her PCP, Dr. Mckeon, who referred her to the ED for further evaluation. The son reports she was unable to get up from the chair today, and typically she is able to ambulate with her walker. The patient also had decreased appetite that began last night, and had nausea and vomiting upon arrival to Dr. Mckeon's office. Per the son she had one occurrence of emesis that he described as bile. The patient is currently reporting "spasming" right knee pain, the son denies any previous complaints of knee pain. Son denies of any known falls. The patient reports her nausea is improved at this time after receiving medication in the ED. The patient denies any shortness of breath, abdominal pain, chest pain, headaches, or cough.     The ED work up is significant for urinalysis revealing few bacteria and nitrite positive. Urine cx pending. CT head, abd/pelvis show no acute process. Started on IV rocephin. Patient will be placed in observation for " further work up and monitoring.        Neurology Interval History: Patient seen and examined with Dr. Sushil Centeno. No family at bedside. She remains dysarthric on exam. She states she does feel better today. MRA brain was unremarkable. Continuing ASA 81mg daily with statin therapy. Patient tolerating well. Echo pending. Her left side of face still remains droopy. LUE is weaker than the right. The patient denies any headache, tingling or numbness,visual disturbance.      Past Medical History:   Diagnosis Date    Coronary artery disease     Dementia     Hypertension     Thyroid disease     Uterine cancer        Past Surgical History:   Procedure Laterality Date    APPENDECTOMY      BACK SURGERY      HYSTERECTOMY      KNEE SURGERY      TONSILLECTOMY         Review of patient's allergies indicates:  No Known Allergies    Current Neurological Medications:     Scheduled Meds:   amLODIPine  2.5 mg Oral Daily    aspirin  81 mg Oral Daily    atorvastatin  40 mg Oral Daily    cefTRIAXone (ROCEPHIN) IVPB  1 g Intravenous Q24H    enoxaparin  40 mg Subcutaneous Daily    metoprolol tartrate  25 mg Oral BID    polyethylene glycol  17 g Oral Daily    QUEtiapine  50 mg Oral QHS     Continuous Infusions:   sodium chloride 0.9%       PRN Meds:.acetaminophen, dextrose 50%, dextrose 50%, glucagon (human recombinant), glucose, glucose, ibuprofen, magnesium oxide, magnesium oxide, ondansetron, ondansetron, potassium chloride 10%, potassium chloride 10%, potassium chloride 10%, potassium, sodium phosphates, potassium, sodium phosphates, potassium, sodium phosphates, sodium chloride 0.9%, sodium chloride 0.9%      No current facility-administered medications on file prior to encounter.      Current Outpatient Medications on File Prior to Encounter   Medication Sig    QUEtiapine (SEROQUEL) 25 MG Tab Take 25 mg by mouth every evening.      Family History     Problem Relation (Age of Onset)    Depression Father    Early death  Father    No Known Problems Mother        Tobacco Use    Smoking status: Never Smoker    Smokeless tobacco: Never Used   Substance and Sexual Activity    Alcohol use: No     Comment: rarely    Drug use: No    Sexual activity: Not on file     Review of Systems   Constitutional: Positive for activity change.   HENT: Positive for trouble swallowing and voice change. Negative for tinnitus.    Respiratory: Negative.    Cardiovascular: Negative.    Gastrointestinal: Negative.    Genitourinary: Negative.    Musculoskeletal: Negative.    Neurological: Positive for speech difficulty and weakness. Negative for dizziness, tremors, seizures, syncope, facial asymmetry, light-headedness, numbness and headaches.   Hematological: Negative.    Psychiatric/Behavioral: Negative.      Objective:     Vital Signs (Most Recent):  Temp: 97.4 °F (36.3 °C) (11/23/19 0731)  Pulse: (!) 57 (11/23/19 0731)  Resp: 15 (11/23/19 0731)  BP: (!) 111/53 (11/23/19 0731)  SpO2: 96 % (11/23/19 0731) Vital Signs (24h Range):  Temp:  [97 °F (36.1 °C)-98.6 °F (37 °C)] 97.4 °F (36.3 °C)  Pulse:  [57-82] 57  Resp:  [15-18] 15  SpO2:  [95 %-97 %] 96 %  BP: (111-172)/(53-75) 111/53     Weight: 77.1 kg (170 lb)  Body mass index is 27.44 kg/m².    Physical Exam   Constitutional: She appears well-developed and well-nourished.   HENT:   Head: Normocephalic and atraumatic.   Eyes: Pupils are equal, round, and reactive to light. EOM are normal.   Neck: Normal range of motion. Neck supple.   Cardiovascular: Normal rate.   Pulmonary/Chest: Effort normal.   Abdominal: Soft.   Musculoskeletal: Normal range of motion.   Neurological: She is alert. She displays normal reflexes. No cranial nerve deficit or sensory deficit. She exhibits normal muscle tone. She has an abnormal Finger-Nose-Finger Test. Coordination abnormal.   Reflex Scores:       Tricep reflexes are 2+ on the right side and 2+ on the left side.       Bicep reflexes are 2+ on the right side and 2+ on the  left side.       Brachioradialis reflexes are 2+ on the right side and 2+ on the left side.       Patellar reflexes are 2+ on the right side and 2+ on the left side.       Achilles reflexes are 2+ on the right side and 2+ on the left side.  Skin: Skin is warm and dry. Capillary refill takes less than 2 seconds.   Psychiatric: She has a normal mood and affect.       NEUROLOGICAL EXAMINATION:     MENTAL STATUS   Oriented to person.   Disoriented to place.   Disoriented to time.     CRANIAL NERVES   Cranial nerves II through XII intact.     CN III, IV, VI   Pupils are equal, round, and reactive to light.  Extraocular motions are normal.     MOTOR EXAM   Muscle bulk: normal    Strength   Right neck flexion: 4/5  Left neck flexion: 4/5  Right neck extension: 4/5  Left neck extension: 4/5  Right deltoid: 4/5  Left deltoid: 4/5  Right biceps: 4/5  Left biceps: 4/5  Right triceps: 4/5  Left triceps: 4/5  Right wrist flexion: 4/5  Left wrist flexion: 4/5  Right wrist extension: 4/5  Left wrist extension: 4/5  Right interossei: 4/5  Left interossei: 4/5  Right abdominals: 3/5  Left abdominals: 3/5  Right iliopsoas: 3/5  Left iliopsoas: 3/5  Right quadriceps: 3/5  Left quadriceps: 3/5  Right hamstring: 3/5  Left hamstring: 3/5  Right glutei: 3/5  Left glutei: 3/5  Right anterior tibial: 3/5  Left anterior tibial: 3/5  Right posterior tibial: 3/5  Left posterior tibial: 3/5  Right peroneal: 3/5  Left peroneal: 3/5  Right gastroc: 3/5  Left gastroc: 3/5    REFLEXES     Reflexes   Right brachioradialis: 2+  Left brachioradialis: 2+  Right biceps: 2+  Left biceps: 2+  Right triceps: 2+  Left triceps: 2+  Right patellar: 2+  Left patellar: 2+  Right achilles: 2+  Left achilles: 2+  Right : 2+  Left : 2+    SENSORY EXAM   Light touch normal.     GAIT AND COORDINATION      Coordination   Finger to nose coordination: abnormal        NIH Stroke Scale:    Level of Consciousness: 0 - alert  LOC Questions: 2 - answers none  correctly  LOC Commands: 0 - performs both correctly  Best Gaze: 0 - normal  Visual: 0 - no visual loss  Facial Palsy: 0 - normal  Motor Left Arm: 0 - no drift  Motor Right Arm: 0 - no drift  Motor Left Le - can't resist gravity  Motor Right Le - can't resist gravity  Limb Ataxia: 2 - present in two limbs  Sensory: 0 - normal  Best Language: 0 - no aphasia  Dysarthria: 1 - mild to moderate dysarthria  Extinction and Inattention: 0 - no neglect  NIH Stroke Scale Total: 9        Significant Labs:     Lab Results   Component Value Date    CREATININE 0.6 2019    CREATININE 0.6 2019     Lab Results   Component Value Date    HGBA1C 5.1 2019     Lab Results   Component Value Date    TSH 3.719 2019     Lab Results   Component Value Date    LDLCALC 132.0 2019       Significant Imaging:    CT head without contrast  Impression       1. There is generalized volume loss with moderate to marked nonspecific white matter change.  There is no hemorrhage, mass, mass effect or obvious acute infarction.  It should be noted that MRI is more sensitive in the detection of subtle or acute nonhemorrhagic ischemic disease.  2. Atherosclerosis.           Mri Brain Without Contrast    Result Date: 2019  1. There are 3 small linear foci of acute nonhemorrhagic infarction in the central kathleen. 2. There is generalized volume loss with moderate to marked nonspecific white matter change.  These findings likely reflect sequelae of chronic small vessel disease.  Also, there is a small chronic infarction in the posterior right cerebellum. This report was flagged in Epic as abnormal.  Abnormal critical results were discussed with the patient's nurse, Candelaria Kuhn, at 8:35 am 2019 Electronically signed by: Everett Coello MD Date:    2019 Time:    08:35    Us Carotid Bilateral    Result Date: 2019  No evidence of a hemodynamically significant carotid bifurcation stenosis. Flow in  vertebral arteries appears antegrade bilaterally Electronically signed by: Cee Boyd MD Date:    11/22/2019 Time:    08:37    MRA brain without contrast   Impression       Asymmetrically small and irregular distal P 2 segment of the right posterior cerebral artery, likely due to underlying atherosclerosis.  Otherwise, normal magnetic resonance angiogram of the bwjebh-ho-Saytse vasculature.           ECHO pending  Assessment and Plan:    1. Acute ischemic stroke  -ASA 81mg daily for secondary stroke prevention  -Statins for secondary stroke prevention and hyperlipidemia  -Risk factor modification: HTN, HLD  -PT/OT/ST  -MRA brain unremarkable  -F/u on ECHO    2. Hyperlipidemia  -High intensity statin for secondary stroke prevention  -Life style modification; LDL goal <70        [phone] within 2 weeks from discharge.  Stroke education was provided to patient/family members including stroke risk factor modification and patient/family members were informed if patient experiences any acute neurological changes including weakness, confusion, visual changes to come straight to the ER.  All side effects of new medications were discussed with patient/family members.             Active Diagnoses:    Diagnosis Date Noted POA    PRINCIPAL PROBLEM:  Acute ischemic stroke [I63.9] 11/21/2019 Yes    Acute cystitis without hematuria [N30.00] 11/21/2019 Yes    Moderate malnutrition [E44.0] 11/21/2019 Yes    Vascular dementia without behavioral disturbance [F01.50] 11/21/2019 Yes    Port-A-Cath in place [Z95.828] 11/21/2019 Not Applicable    Hyperlipidemia [E78.5] 11/21/2019 Yes    Closed compression fracture of thoracic vertebra, T9 and old multiple lumbar compression fractures [S22.000A] 12/11/2017 Yes    Hypertension [I10] 02/10/2015 Yes    Hypothyroidism [E03.9] 02/10/2015 Yes    Osteoporosis [M81.0] 02/10/2015 Yes      Problems Resolved During this  Admission:       VTE Risk Mitigation (From admission, onward)         Ordered     enoxaparin injection 40 mg  Daily      11/21/19 1548     IP VTE HIGH RISK PATIENT  Once      11/21/19 1548                Patient was seen and examined by Dr. Sushil Centeno    Thank you for your consult. I will follow-up with patient. Please contact us if you have any additional questions.    Radha Hanson, MISTY  Neurology  [facility]    I, Dr. Sushil Centeno, have personally seen and examined the patient with my advanced provider and agree with above. I personally did a focused exam, and reviewed all necessary clinical information. I discussed my management plan with my NP and agree with above. Stroke education provided.

## 2019-11-23 NOTE — NURSING
Pt being very combative. Refused morning meds and breakfast. Dr. Frias ordered PRN dose of ativan.

## 2019-11-23 NOTE — PLAN OF CARE
Pt resting quietly at this time. Able to make needs known. Incont b/b. Complaint of pain. Medicated. Bed low and locked. Call light in reach.

## 2019-11-23 NOTE — PT/OT/SLP PROGRESS
Physical Therapy      Patient Name:  Ellen Maravilla   MRN:  [id number]    Patient not seen today secondary to nurse refused PT today du to pt with increased confusion and combative. Will follow-up 11/23/19.    Alisa Miller, PTA

## 2019-11-23 NOTE — PLAN OF CARE
11/22/19 2033   Patient Assessment/Suction   Level of Consciousness (AVPU) alert   PRE-TX-O2   O2 Device (Oxygen Therapy) room air   SpO2 96 %   Pulse Oximetry Type Intermittent

## 2019-11-24 PROBLEM — E87.1 HYPONATREMIA: Status: ACTIVE | Noted: 2019-11-24

## 2019-11-24 PROBLEM — R91.8 PULMONARY NODULES: Status: ACTIVE | Noted: 2019-11-24

## 2019-11-24 PROBLEM — R53.81 DEBILITY: Status: ACTIVE | Noted: 2019-11-24

## 2019-11-24 PROBLEM — I72.8 SPLENIC ARTERY ANEURYSM: Status: ACTIVE | Noted: 2019-11-24

## 2019-11-24 PROBLEM — E87.1 HYPONATREMIA: Status: RESOLVED | Noted: 2019-11-24 | Resolved: 2019-11-24

## 2019-11-24 PROBLEM — D61.818 PANCYTOPENIA: Status: ACTIVE | Noted: 2019-11-24

## 2019-11-24 PROBLEM — M51.36 DDD (DEGENERATIVE DISC DISEASE), LUMBAR: Status: ACTIVE | Noted: 2019-11-24

## 2019-11-24 PROBLEM — M51.34 DDD (DEGENERATIVE DISC DISEASE), THORACIC: Status: ACTIVE | Noted: 2019-11-24

## 2019-11-24 PROBLEM — K86.9 PANCREATIC LESION: Status: ACTIVE | Noted: 2019-11-24

## 2019-11-24 PROBLEM — E83.42 HYPOMAGNESEMIA: Status: ACTIVE | Noted: 2019-11-24

## 2019-11-24 PROBLEM — I77.9 BILATERAL CAROTID ARTERY DISEASE: Status: ACTIVE | Noted: 2019-11-24

## 2019-11-24 LAB
ANION GAP SERPL CALC-SCNC: 8 MMOL/L (ref 8–16)
BASOPHILS # BLD AUTO: 0.02 K/UL (ref 0–0.2)
BASOPHILS NFR BLD: 0.7 % (ref 0–1.9)
BUN SERPL-MCNC: 29 MG/DL (ref 8–23)
CALCIUM SERPL-MCNC: 8.7 MG/DL (ref 8.7–10.5)
CHLORIDE SERPL-SCNC: 103 MMOL/L (ref 95–110)
CO2 SERPL-SCNC: 25 MMOL/L (ref 23–29)
CREAT SERPL-MCNC: 0.6 MG/DL (ref 0.5–1.4)
DIFFERENTIAL METHOD: ABNORMAL
EOSINOPHIL # BLD AUTO: 0.1 K/UL (ref 0–0.5)
EOSINOPHIL NFR BLD: 4.7 % (ref 0–8)
ERYTHROCYTE [DISTWIDTH] IN BLOOD BY AUTOMATED COUNT: 14.8 % (ref 11.5–14.5)
EST. GFR  (AFRICAN AMERICAN): >60 ML/MIN/1.73 M^2
EST. GFR  (NON AFRICAN AMERICAN): >60 ML/MIN/1.73 M^2
GLUCOSE SERPL-MCNC: 97 MG/DL (ref 70–110)
HCT VFR BLD AUTO: 33.5 % (ref 37–48.5)
HGB BLD-MCNC: 11.2 G/DL (ref 12–16)
IMM GRANULOCYTES # BLD AUTO: 0 K/UL (ref 0–0.04)
LYMPHOCYTES # BLD AUTO: 1.1 K/UL (ref 1–4.8)
LYMPHOCYTES NFR BLD: 37.4 % (ref 18–48)
MCH RBC QN AUTO: 34.5 PG (ref 27–31)
MCHC RBC AUTO-ENTMCNC: 33.4 G/DL (ref 32–36)
MCV RBC AUTO: 103 FL (ref 82–98)
MONOCYTES # BLD AUTO: 0.2 K/UL (ref 0.3–1)
MONOCYTES NFR BLD: 7.1 % (ref 4–15)
NEUTROPHILS # BLD AUTO: 1.5 K/UL (ref 1.8–7.7)
NEUTROPHILS NFR BLD: 50.1 % (ref 38–73)
NRBC BLD-RTO: 0 /100 WBC
PLATELET # BLD AUTO: 124 K/UL (ref 150–350)
PMV BLD AUTO: 9.4 FL (ref 9.2–12.9)
POTASSIUM SERPL-SCNC: 4.1 MMOL/L (ref 3.5–5.1)
RBC # BLD AUTO: 3.25 M/UL (ref 4–5.4)
SODIUM SERPL-SCNC: 136 MMOL/L (ref 136–145)
TB INDURATION 48 - 72 HR READ: 0 MM
WBC # BLD AUTO: 2.97 K/UL (ref 3.9–12.7)

## 2019-11-24 PROCEDURE — 97530 THERAPEUTIC ACTIVITIES: CPT

## 2019-11-24 PROCEDURE — 25000003 PHARM REV CODE 250: Performed by: NURSE PRACTITIONER

## 2019-11-24 PROCEDURE — 94761 N-INVAS EAR/PLS OXIMETRY MLT: CPT

## 2019-11-24 PROCEDURE — 36415 COLL VENOUS BLD VENIPUNCTURE: CPT

## 2019-11-24 PROCEDURE — 25000003 PHARM REV CODE 250: Performed by: FAMILY MEDICINE

## 2019-11-24 PROCEDURE — 27000221 HC OXYGEN, UP TO 24 HOURS

## 2019-11-24 PROCEDURE — 80048 BASIC METABOLIC PNL TOTAL CA: CPT

## 2019-11-24 PROCEDURE — 63600175 PHARM REV CODE 636 W HCPCS: Performed by: NURSE PRACTITIONER

## 2019-11-24 PROCEDURE — 85025 COMPLETE CBC W/AUTO DIFF WBC: CPT

## 2019-11-24 PROCEDURE — 63600175 PHARM REV CODE 636 W HCPCS: Performed by: INTERNAL MEDICINE

## 2019-11-24 PROCEDURE — 25000003 PHARM REV CODE 250: Performed by: INTERNAL MEDICINE

## 2019-11-24 PROCEDURE — G8982 BODY POS GOAL STATUS: HCPCS | Mod: CK

## 2019-11-24 PROCEDURE — G8981 BODY POS CURRENT STATUS: HCPCS | Mod: CM

## 2019-11-24 PROCEDURE — 11000001 HC ACUTE MED/SURG PRIVATE ROOM

## 2019-11-24 RX ORDER — TRAMADOL HYDROCHLORIDE 50 MG/1
50 TABLET ORAL ONCE
Status: COMPLETED | OUTPATIENT
Start: 2019-11-24 | End: 2019-11-24

## 2019-11-24 RX ORDER — SODIUM CHLORIDE 9 MG/ML
INJECTION, SOLUTION INTRAVENOUS ONCE
Status: COMPLETED | OUTPATIENT
Start: 2019-11-24 | End: 2019-11-24

## 2019-11-24 RX ORDER — ADHESIVE BANDAGE
30 BANDAGE TOPICAL ONCE
Status: COMPLETED | OUTPATIENT
Start: 2019-11-24 | End: 2019-11-24

## 2019-11-24 RX ORDER — LORAZEPAM 2 MG/ML
0.5 INJECTION INTRAMUSCULAR 2 TIMES DAILY PRN
Status: DISCONTINUED | OUTPATIENT
Start: 2019-11-24 | End: 2019-11-30 | Stop reason: HOSPADM

## 2019-11-24 RX ORDER — FERROUS SULFATE, DRIED 160(50) MG
1 TABLET, EXTENDED RELEASE ORAL DAILY
Status: DISCONTINUED | OUTPATIENT
Start: 2019-11-25 | End: 2019-11-30 | Stop reason: HOSPADM

## 2019-11-24 RX ORDER — QUETIAPINE FUMARATE 25 MG/1
50 TABLET, FILM COATED ORAL
Status: DISCONTINUED | OUTPATIENT
Start: 2019-11-24 | End: 2019-11-25

## 2019-11-24 RX ORDER — TRAMADOL HYDROCHLORIDE 50 MG/1
50 TABLET ORAL EVERY 6 HOURS PRN
Status: DISCONTINUED | OUTPATIENT
Start: 2019-11-24 | End: 2019-11-30 | Stop reason: HOSPADM

## 2019-11-24 RX ORDER — LORAZEPAM 2 MG/ML
1 CONCENTRATE ORAL EVERY 8 HOURS PRN
Status: DISCONTINUED | OUTPATIENT
Start: 2019-11-24 | End: 2019-11-24

## 2019-11-24 RX ADMIN — ASPIRIN 81 MG: 81 TABLET ORAL at 07:11

## 2019-11-24 RX ADMIN — ATORVASTATIN CALCIUM 40 MG: 40 TABLET, FILM COATED ORAL at 07:11

## 2019-11-24 RX ADMIN — LIDOCAINE 1 PATCH: 50 PATCH TOPICAL at 09:11

## 2019-11-24 RX ADMIN — AMLODIPINE BESYLATE 2.5 MG: 2.5 TABLET ORAL at 07:11

## 2019-11-24 RX ADMIN — TRAMADOL HYDROCHLORIDE 50 MG: 50 TABLET ORAL at 09:11

## 2019-11-24 RX ADMIN — QUETIAPINE FUMARATE 12.5 MG: 25 TABLET, FILM COATED ORAL at 07:11

## 2019-11-24 RX ADMIN — METOPROLOL TARTRATE 25 MG: 25 TABLET, FILM COATED ORAL at 07:11

## 2019-11-24 RX ADMIN — CEFTRIAXONE 1 G: 1 INJECTION, SOLUTION INTRAVENOUS at 01:11

## 2019-11-24 RX ADMIN — POLYETHYLENE GLYCOL (3350) 17 G: 17 POWDER, FOR SOLUTION ORAL at 07:11

## 2019-11-24 RX ADMIN — IBUPROFEN 400 MG: 400 TABLET, FILM COATED ORAL at 03:11

## 2019-11-24 RX ADMIN — QUETIAPINE FUMARATE 50 MG: 25 TABLET ORAL at 05:11

## 2019-11-24 RX ADMIN — SODIUM CHLORIDE: 0.9 INJECTION, SOLUTION INTRAVENOUS at 04:11

## 2019-11-24 RX ADMIN — LORAZEPAM 0.5 MG: 2 INJECTION INTRAMUSCULAR; INTRAVENOUS at 11:11

## 2019-11-24 RX ADMIN — ENOXAPARIN SODIUM 40 MG: 100 INJECTION SUBCUTANEOUS at 04:11

## 2019-11-24 RX ADMIN — IBUPROFEN 400 MG: 400 TABLET, FILM COATED ORAL at 07:11

## 2019-11-24 RX ADMIN — LORAZEPAM 0.5 MG: 2 INJECTION INTRAMUSCULAR; INTRAVENOUS at 03:11

## 2019-11-24 RX ADMIN — MAGNESIUM HYDROXIDE 2400 MG: 400 SUSPENSION ORAL at 05:11

## 2019-11-24 RX ADMIN — TRAMADOL HYDROCHLORIDE 50 MG: 50 TABLET ORAL at 06:11

## 2019-11-24 RX ADMIN — METOPROLOL TARTRATE 25 MG: 25 TABLET, FILM COATED ORAL at 09:11

## 2019-11-24 RX ADMIN — IBUPROFEN 400 MG: 400 TABLET, FILM COATED ORAL at 12:11

## 2019-11-24 NOTE — CONSULTS
"[facility]  Psychiatry  Telepsychiatry Consult Note  Please see previous notes:    Patient agreeable to consultation via telepsychiatry.    Tele-Consultation from Psychiatry started: 11/23/2019 at 6:16pm  The chief complaint leading to psychiatric consultation is: confusion  This consultation was requested by Wen Escobar NP , the Medicine Department provider.  The location of the consulting psychiatrist is [address] [place] [address].  The patient location is  [facility] GUME/PROGRESSIVE CARE *   The patient arrived at the ED at:    11/21/19  Also present with the patient at the time of the consultation:nursing    Patient Identification:       Patient information was obtained from patient and past medical records.  Patient presented voluntarily to the Emergency Department ambulatory.    Consults  Subjective:     History of Present Illness:  Ellen Maravilla is a 86 y.o. Female. With no known past psych hirotory and pmh of dementia presented to the ED for AMS. Since admission pt has been having intermittent episodes of agitation and combativeness .      Today on interview pt appears irritable and confused. Pt was difficult to understand due to dysarthria and garbled speech. When redirecting pt or Askin to repeat what she said pt becomes irritable. Interview limited by this. Pt did mention how her family member is in the [employer] and [employer] and that "they know" but wouldn't elaborate more. Pt then reprots "I am done" and no longer participates in the interview.     Psychiatric History:   Previous Psychiatric Hospitalizations: [facility]  Previous Medication Trials:seroquel  Previous Suicide Attempts: [facility]  History of Violence:  [facility]  History of Depression:  [facility]MAS  History of Keily:  [facility]MAS  History of Auditory/Visual Hallucination AL[facility]AS  History of Delusions: [facility]AS  Outpatient psychiatrist (current & past): [facility]    Substance Abuse History: [facility]  Legal History: Past charges/incarcerations: [facility]  Family Psychiatric History: " "[facility]MAS  Social History:  Developmental/Childhood: [facility]AS  *Education: [facility]  Employment Status/Finances:Disabled   Relationship Status/Sexual Orientation:  ALMAS  Children:  [facility]MAS  Housing Status: Home with family   history:   [facility] [[email or web address]  Access to gun:  [facility] [[email or web address]]  Yazidism: [facility] [[email or web address]]  Recreational activities ALMAS    Psychiatric Mental Status Exam:  Arousal: alert  Sensorium/Orientation: oriented to impaired only oriented to self  Behavior/Cooperation: reluctant to participate, uncooperative, very guarded   Speech: normal tone, normal rate, normal pitch, normal volume  Language: some dysarthria  Mood: " ok "   Affect: inappropriate, irritable and angry  Thought Process: blocked, tangential, illogical  Thought Content:   Auditory hallucinations:[facility]    Visual hallucinations: [facility] [[email or web address]]    Paranoia: YES: was talking about the [employer] and [employer] knowing something     Delusions:  YES:      Suicidal ideation: [facility]    Homicidal ideation:[facility]AS    Attention/Concentration:  impaired  Memory:    Recent:  Absent   Remote: Absent  Fund of Knowledge: unable to assess   Insight: poor awareness of illness  Judgment: impaired due to AMS      Past Medical History:   Past Medical History:   Diagnosis Date    Coronary artery disease     Dementia     Hypertension     Thyroid disease     Uterine cancer       Laboratory Data:   Labs Reviewed   CBC W/ AUTO DIFFERENTIAL - Abnormal; Notable for the following components:       Result Value    WBC 3.20 (*)     RBC 3.91 (*)     Mean Corpuscular Volume 104 (*)     Mean Corpuscular Hemoglobin 34.3 (*)     RDW 15.0 (*)     Platelets 140 (*)     Lymph # 0.9 (*)     Mono # 0.2 (*)     All other components within normal limits   COMPREHENSIVE METABOLIC PANEL - Abnormal; Notable for the following components:    Glucose 121 (*)     Albumin 3.4 (*)     All other components within normal limits   URINALYSIS, REFLEX TO URINE CULTURE - Abnormal; Notable for the following components:    Specific Gravity, UA >=1.030 (*)     Ketones, UA 1+ (*)  "    Bilirubin (UA) 2+ (*)     Occult Blood UA 1+ (*)     Nitrite, UA Positive (*)     Urobilinogen, UA 2.0-3.0 (*)     All other components within normal limits    Narrative:     Preferred Collection Type->Urine, Clean Catch   URINALYSIS MICROSCOPIC - Abnormal; Notable for the following components:    Bacteria Few (*)     All other components within normal limits    Narrative:     Preferred Collection Type->Urine, Clean Catch       Neurological History:  Seizures:[facility]    Head trauma: [facility]      Allergies:  Review of patient's allergies indicates:  No Known Allergies    Medications in ER:   Medications   sodium chloride 0.9% flush 10 mL (has no administration in time range)   potassium chloride 10% oral solution 40 mEq (has no administration in time range)   potassium chloride 10% oral solution 40 mEq (has no administration in time range)   potassium chloride 10% oral solution 60 mEq (has no administration in time range)   magnesium oxide tablet 800 mg (has no administration in time range)   magnesium oxide tablet 800 mg (800 mg Oral Given 11/23/19 1709)   potassium, sodium phosphates 280-160-250 mg packet 2 packet (has no administration in time range)   potassium, sodium phosphates 280-160-250 mg packet 2 packet (has no administration in time range)   potassium, sodium phosphates 280-160-250 mg packet 2 packet (has no administration in time range)   ondansetron injection 8 mg (has no administration in time range)   glucose chewable tablet 16 g (has no administration in time range)   glucose chewable tablet 24 g (has no administration in time range)   dextrose 50% injection 12.5 g (has no administration in time range)   dextrose 50% injection 25 g (has no administration in time range)   glucagon (human recombinant) injection 1 mg (has no administration in time range)   enoxaparin injection 40 mg (40 mg Subcutaneous Given 11/23/19 1704)   acetaminophen tablet 650 mg (650 mg Oral Given 11/23/19 1709)   ibuprofen tablet 400  mg (400 mg Oral Given 11/22/19 2332)   cefTRIAXone (ROCEPHIN) 1 g in dextrose 5 % 50 mL IVPB (1 g Intravenous New Bag 11/23/19 1349)   amLODIPine tablet 2.5 mg (2.5 mg Oral Given 11/23/19 0942)   metoprolol tartrate (LOPRESSOR) tablet 25 mg (25 mg Oral Given 11/23/19 0941)   sodium chloride 0.9% bolus 500 mL (has no administration in time range)   atorvastatin tablet 40 mg (40 mg Oral Given 11/23/19 0942)   ondansetron disintegrating tablet 8 mg (has no administration in time range)   polyethylene glycol packet 17 g (17 g Oral Given 11/23/19 0941)   QUEtiapine tablet 50 mg (50 mg Oral Given 11/22/19 2100)   aspirin EC tablet 81 mg (81 mg Oral Given 11/23/19 0942)   quetiapine split tablet 12.5 mg (has no administration in time range)   ondansetron injection 8 mg (8 mg Intravenous Given 11/21/19 1220)   cefTRIAXone (ROCEPHIN) 1 g in dextrose 5 % 50 mL IVPB (0 g Intravenous Stopped 11/21/19 1524)   iohexol (OMNIPAQUE 350) injection 100 mL (100 mLs Intravenous Given 11/21/19 1349)   tuberculin injection 5 Units (5 Units Intradermal Given 11/22/19 1710)       Medications at home: unable to assess    No new subjective & objective note has been filed under this hospital service since the last note was generated.      Assessment - Diagnosis - Goals:     Diagnosis/Impression:   Delirium on dementia  Dementia     Rec:   Difficult to interview pt on telemed machine due to pts dysarthria and confusion  Clinical picture consistent with delirium .    Psych meds  · D/c Antipsychotics due to QTC prolongation  · Scheduled- May consider Depacon IV 250mg qhs for sleep induction and mood dysregulation   · PRN meds- Depacon IV 125mg q 6 IV for any non redirectable agitation. Please obtain informed consent from family  · Obtain VPA level in 72 hrs before first dose of depacon and monitor LFTs    DELIRIUM BEHAVIOR MANAGEMENT  · PLEASE utilize CHEMICAL restraints with PRN meds first for agitation. Minimize use of PHYSICAL restraints OR  have periods of being out of physical restraints if possible.  · Keep window shades open and room lit during day and room dim at night in order to promote normal sleep-wake cycles  · Encourage family at bedside. [place] patient often to situation, location, date.  · Continue to Limit or Discontinue use of Narcotics, Benzos and Anti-cholinergic medications as they may worsen delirium.  · Continue medical workup for causative etiology of Delirium.    More than 50% of the time was spent counseling/coordinating care    Unable to inform Consulting clinician of the encounter and consult note as nobody answered the phone. Informed nursing    Consultation ended: 11/23/2019 at 6:35pm    Rosy Alfaro MD   Psychiatry  [facility]

## 2019-11-24 NOTE — ASSESSMENT & PLAN NOTE
Dementia is controlled currently. Continue home dementia meds and non-pharmacologic interventions to prevent delirium (No VS between 11PM-5AM, activity during day, opening blinds, providing glasses/hearing aids, and up in chair during daytime).     Increased Seroquel during hospital stay.  Given one dose of ativan in the morning  With behavior issues. Consult psychiatry-telemed. Add Seroquel 12.5 mg in am.

## 2019-11-24 NOTE — ASSESSMENT & PLAN NOTE
Temp:  [97 °F (36.1 °C)-98.6 °F (37 °C)]   Pulse:  [56-75]   Resp:  [15-20]   BP: (111-172)/(53-75)   SpO2:  [96 %-100 %] .   Home meds for hypertension were reviewed and noted below. Hospital anti-hypertensive changes were made as shown below.  Hospital Medications             amLODIPine tablet 2.5 mg 2.5 mg, Oral, Daily    metoprolol tartrate (LOPRESSOR) tablet 25 mg 25 mg, Oral, 2 times daily        Will utilize p.r.n. blood pressure medication only if patient's blood pressure greater than  180/110 and she develops symptoms such as worsening chest pain or shortness of breath.    Reviewed previous records.  Patient was previously on Norvasc 2.5 mg and metoprolol 25 mg b.i.d..  Will resume.  Cardiac diet

## 2019-11-24 NOTE — PLAN OF CARE
Pt resting quietly at this time. Able to make needs known. Complaint of pain. Medicated. Bed low and locked. Call light in reach.

## 2019-11-24 NOTE — SUBJECTIVE & OBJECTIVE
Interval History: Patient laying in bed. Not really verbal but awoke.     Review of Systems   Unable to perform ROS: Dementia     Objective:     Vital Signs (Most Recent):  Temp: 97.2 °F (36.2 °C) (11/23/19 1636)  Pulse: (!) 58 (11/23/19 1636)  Resp: 20 (11/23/19 1131)  BP: (!) 119/58 (11/23/19 1636)  SpO2: 97 % (11/23/19 1636) Vital Signs (24h Range):  Temp:  [97 °F (36.1 °C)-98.6 °F (37 °C)] 97.2 °F (36.2 °C)  Pulse:  [56-75] 58  Resp:  [15-20] 20  SpO2:  [96 %-100 %] 97 %  BP: (111-172)/(53-75) 119/58     Weight: 77.1 kg (170 lb)  Body mass index is 27.44 kg/m².    Intake/Output Summary (Last 24 hours) at 11/23/2019 1830  Last data filed at 11/23/2019 1802  Gross per 24 hour   Intake 100 ml   Output --   Net 100 ml      Physical Exam   Constitutional: She appears well-developed and well-nourished.   HENT:   Head: Normocephalic and atraumatic.   Eyes: Conjunctivae are normal.   Cardiovascular: Normal rate and regular rhythm.   Pulmonary/Chest: Effort normal and breath sounds normal.   Abdominal: Soft. Bowel sounds are normal.   Neurological: She is alert.   Skin: Skin is warm.   Psychiatric:   Patient demented       Significant Labs:   Recent Lab Results       11/23/19  0534        Albumin 2.7     Alkaline Phosphatase 69     ALT 8     Anion Gap 7      7     AST 14     Baso # 0.02     Basophil% 0.5     BILIRUBIN TOTAL 0.6  Comment:  For infants and newborns, interpretation of results should be based  on gestational age, weight and in agreement with clinical  observations.  Premature Infant recommended reference ranges:  Up to 24 hours.............<8.0 mg/dL  Up to 48 hours............<12.0 mg/dL  3-5 days..................<15.0 mg/dL  6-29 days.................<15.0 mg/dL       BUN, Bld 22      22     Calcium 8.6      8.6     Chloride 104      104     CO2 25      25     Creatinine 0.6      0.6     Differential Method Automated     eGFR if  >60      >60     eGFR if non   >60  Comment:  Calculation used to obtain the estimated glomerular filtration  rate (eGFR) is the CKD-EPI equation.         >60  Comment:  Calculation used to obtain the estimated glomerular filtration  rate (eGFR) is the CKD-EPI equation.        Eos # 0.1     Eosinophil% 3.0     Glucose 94      94     Gran # (ANC) 2.1     Gran% 56.4     Hematocrit 32.8     Hemoglobin 10.9     Immature Grans (Abs) 0.01  Comment:  Mild elevation in immature granulocytes is non specific and   can be seen in a variety of conditions including stress response,   acute inflammation, trauma and pregnancy. Correlation with other   laboratory and clinical findings is essential.       Lymph # 1.2     Lymph% 32.5     MCH 34.2     MCHC 33.2          Mono # 0.3     Mono% 7.3     MPV 9.1     nRBC 0     Platelets 121     Potassium 3.7      3.7     PROTEIN TOTAL 5.6     RBC 3.19     RDW 14.6     Sodium 136      136     WBC 3.69           Significant Imaging: I have reviewed all pertinent imaging results/findings within the past 24 hours.     ECHO:  · Concentric left ventricular remodeling.  · Small left ventricular cavity size.  · Normal left ventricular systolic function. The estimated ejection fraction is 55%  · Grade I (mild) left ventricular diastolic dysfunction consistent with impaired relaxation.  · Mild right ventricular enlargement.  · Normal right ventricular systolic function.  · Mild right atrial enlargement.  · Mild to moderate tricuspid regurgitation.  · Indeterminate central venous pressure. Estimated PA pressure is at least 19 mmHg.  · No PFO on saline bubble and color flow study    MRA Brain:   Asymmetrically small and irregular distal P 2 segment of the right posterior cerebral artery, likely due to underlying atherosclerosis.  Otherwise, normal magnetic resonance angiogram of the [id number] vasculature.

## 2019-11-24 NOTE — PROGRESS NOTES
"[facility] Hospital Medicine  Progress Note    Patient Name: Ellen Maravilla  MRN: [id number]  Patient Class: IP- Inpatient   Admission Date: 11/21/2019  Length of Stay: 3 days  Attending Physician: Brittanie Finn MD  Primary Care Provider: Tien Mckeon MD      Subjective:     Principal Problem:Acute ischemic stroke, multiple areas, history of dementia now with delirium, debility, UTI    HPI:  This is a 86 y.o. female with a PMHx HTN, CAD, thyroid disease, uterine cancer, and dementia who presented to the emergency department increased confusion and slurred speech. History limited due to patient's baseline dementia and increased confusion. Patient's son and grandson are at the bedside and providing history. The son reports that the patient became confused yesterday morning and had slurred speech and generalized weakness. The family brought her to see her PCP, Dr. Mckeon, who referred her to the ED for further evaluation. The son reports she was unable to get up from the chair today, and typically she is able to ambulate with her walker. The patient also had decreased appetite that began last night, and had nausea and vomiting upon arrival to Dr. Mckeon's office. Per the son she had one occurrence of emesis that he described as bile. The patient is currently reporting "spasming" right knee pain, the son denies any previous complaints of knee pain. Son denies of any known falls. The patient reports her nausea is improved at this time after receiving medication in the ED. The patient denies any shortness of breath, abdominal pain, chest pain, headaches, or cough. The ED work up is significant for urinalysis revealing few bacteria and nitrite positive. Urine cx pending. CT scan of head and abd/pelvis show no acute process.  Patient was started  on IV rocephin.  Patient was placed in the hospital for further evaluation treatment.    Overview/Hospital Course:  Patient monitor closely during her stay. " Patient was placed on continuous telemetry monitoring. Patient presented with increased confusion and slurred speech. Urinalysis was significant for few bacteria and nitrate positive. Urine culture was obtained and patient was continued on IV Rocephin.  CT head without contrast showed  no  acute abnormality. CT abdomen and pelvis with contrast showed no acute abdominopelvic process. 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 Patient was also noted to have osteopenia with multiple chronic thoracolumbar compression fractures and a prior L1 vertebroplasty.  Neurology was consulted.  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 Neurology was consulted.Patient had an echocardiogram which showed EF of 55%. No PFO on saline bubble and color flow study was noted. A bilateral carotid ultrasound was done with bilateral carotid artery disease noted but no significant stenosis noted. Patient was continued on aspirin and statin.  Physical therapy, occupational therapy, and speech therapy were consulted.  She did complain of some left leg pain during her stay.  Ultrasound of left lower extremity was negative for DVT.  X-ray of the left hip showed no acute issues.  Patient continued with her increased confusion and also put periods of agitation.  Psych was consulted and felt patient had delirium on top of her dementia.           Interval History:  Patient remains confused but resting better now.  Had long discussion with son at the bedside who felt that the patient's confusion and agitation was related to some Seroquel doses that were missed prior to admission.    Review of Systems   Constitutional: Positive for activity change, appetite change and fatigue. Negative for chills and fever.   HENT: Negative for ear discharge, ear pain and facial swelling.    Eyes:  Negative for pain and redness.   Respiratory: Negative for cough and shortness of breath.    Cardiovascular: Negative for leg swelling.   Gastrointestinal: Negative for abdominal distention, abdominal pain, constipation, diarrhea, nausea and vomiting.   Endocrine: Negative for polydipsia and polyphagia.   Genitourinary: Negative for difficulty urinating, dysuria, flank pain and hematuria.   Musculoskeletal: Positive for back pain.   Skin:  Negative for color change.   Allergic/Immunologic: Negative for food allergies.   Neurological: Positive for weakness. Negative for seizures, facial asymmetry and speech difficulty.   Hematological: Does not bruise/bleed easily.   Psychiatric/Behavioral: Positive for confusion and hallucinations. Negative for suicidal ideas. The patient is not nervous/anxious.         Staff reports patient had some visual hallucinations earlier this a.m.     Objective:     Vital Signs (Most Recent):  Temp: 97.2 °F (36.2 °C) (11/24/19 1214)  Pulse: (!) 53 (11/24/19 1214)  Resp: 18 (11/24/19 0729)  BP: (!) 128/59 (11/24/19 1214)  SpO2: 98 % (11/24/19 1214) Vital Signs (24h Range):  Temp:  [97 °F (36.1 °C)-98.8 °F (37.1 °C)] 97.2 °F (36.2 °C)  Pulse:  [53-68] 53  Resp:  [18-20] 18  SpO2:  [94 %-100 %] 98 %  BP: (116-134)/(58-71) 128/59     Weight: 77.1 kg (170 lb)  Body mass index is 27.44 kg/m².    Intake/Output Summary (Last 24 hours) at 11/24/2019 1549  Last data filed at 11/23/2019 1802  Gross per 24 hour   Intake 100 ml   Output --   Net 100 ml      Physical Exam   Constitutional: She appears well-developed and well-nourished. No distress.   HENT:   Head: Atraumatic.   Eyes:  Conjunctivae and EOM are normal. Right eye exhibits no discharge. Left eye exhibits no discharge.   Neck: Normal range of motion. Neck supple. No JVD present.   Cardiovascular: Normal rate, regular rhythm and intact distal pulses.   Murmur (ASM) heard.  Pulmonary/Chest: Effort normal and breath sounds normal. No respiratory  distress. She has no wheezes. She has no rales.   Lungs CTA bilaterally, currently on room air   Abdominal: Soft. Bowel sounds are normal. She exhibits no distension. There is no tenderness. There is no guarding.   Genitourinary:   Genitourinary Comments: Not examined   Musculoskeletal: Normal range of motion. She exhibits no edema.   Neurological: She is alert. No sensory deficit.   Oriented to person  Forgetful, confused  Responds appropriately to simple commands   Skin: Skin is warm and dry. Capillary refill takes less than 2 seconds. She is not diaphoretic. There is pallor.   Psychiatric: Her speech is delayed and slurred. She is withdrawn.   Calm and cooperative at this time   Nursing note and vitals reviewed.    Labs reviewed      Assessment/Plan:      * Acute ischemic stroke  [place] CVA- 3 areas noted  Telemetry  Orders as per Neurology  Remains on aspirin statin  Physical therapy, occupational therapy, and speech therapy consulted  Fall, skin, aspiration precautions  Case management consulted to assist with discharge planning needs-patient previously lived at home with the son  Skilled consult pending        Debility  Fall, skin, aspiration precautions  Turn q.2 hours  Continue physical therapy, occupational therapy, and speech therapy      Pulmonary nodules  Noted      Splenic artery aneurysm  Noted      Pancreatic lesion  Unchanged from prior CT scan      Pancytopenia  Chronic  Noted in records reviewed from 2012      Bilateral carotid artery disease  Continue aspirin and statin      Delirium  Add delirium precautions.     Psych consulted    Hyperlipidemia  Continue statin      Port-A-Cath in place  Noted        Vascular dementia  Long discussion had with son at bedside who reports patient missed a few dose of Seroquel prior to admission  Continue Seroquel- dose has been increased during hospitalization        Moderate malnutrition  Monitor and encourage p.o. Intake  Dietitian consulted   Continue p.o.  Supplements          Acute cystitis without hematuria  Continue IV Rocephin  Urine culture currently no growth    Closed compression fracture of thoracic vertebra, T9 and old multiple lumbar compression fractures  Chronic.   Mild analgesics for pain    Osteoporosis  With history of pathological fractures and osteoporosis.   Add [place] D  Vitamin-D level within normal limits      Hypothyroidism  Patient has chronic hypothyroidism.   Currently no home medications noted   TFTs reviewed-   Lab Results   Component Value Date    TSH 3.719 11/21/2019       Hypertension  Monitor  Continue current treatment      VTE Risk Mitigation (From admission, onward)         Ordered     enoxaparin injection 40 mg  Daily      11/21/19 1548     IP VTE HIGH RISK PATIENT  Once      11/21/19 1548                Time spent seeing patient( greater than 1/2 spent in direct contact) : 36 minutes        GLORIA Diaz  Department of Hospital Medicine   [facility]

## 2019-11-24 NOTE — ASSESSMENT & PLAN NOTE
Fall, skin, aspiration precautions  Turn q.2 hours  Continue physical therapy, occupational therapy, and speech therapy

## 2019-11-24 NOTE — ASSESSMENT & PLAN NOTE
[place] CVA- 3 areas noted  Telemetry  Orders as per Neurology  Remains on aspirin statin  Physical therapy, occupational therapy, and speech therapy consulted  Fall, skin, aspiration precautions  Case management consulted to assist with discharge planning needs-patient previously lived at home with the son  Skilled consult pending

## 2019-11-24 NOTE — SUBJECTIVE & OBJECTIVE
Interval History:  Patient remains confused but resting better now.  Had long discussion with son at the bedside who felt that the patient's confusion and agitation was related to some Seroquel doses that were missed prior to admission.    Review of Systems   Constitutional: Positive for activity change, appetite change and fatigue. Negative for chills and fever.   HENT: Negative for ear discharge, ear pain and facial swelling.    Eyes: Negative for pain and redness.   Respiratory: Negative for cough and shortness of breath.    Cardiovascular: Negative for leg swelling.   Gastrointestinal: Negative for abdominal distention, abdominal pain, constipation, diarrhea, nausea and vomiting.   Endocrine: Negative for polydipsia and polyphagia.   Genitourinary: Negative for difficulty urinating, dysuria, flank pain and hematuria.   Musculoskeletal: Positive for back pain.   Skin: Positive for wound. Negative for color change.   Allergic/Immunologic: Negative for food allergies.   Neurological: Positive for weakness. Negative for seizures, facial asymmetry and speech difficulty.   Hematological: Does not bruise/bleed easily.   Psychiatric/Behavioral: Positive for confusion and hallucinations. Negative for suicidal ideas. The patient is not nervous/anxious.         Staff reports patient had some visual hallucinations earlier this a.m.     Objective:     Vital Signs (Most Recent):  Temp: 97.2 °F (36.2 °C) (11/24/19 1214)  Pulse: (!) 53 (11/24/19 1214)  Resp: 18 (11/24/19 0729)  BP: (!) 128/59 (11/24/19 1214)  SpO2: 98 % (11/24/19 1214) Vital Signs (24h Range):  Temp:  [97 °F (36.1 °C)-98.8 °F (37.1 °C)] 97.2 °F (36.2 °C)  Pulse:  [53-68] 53  Resp:  [18-20] 18  SpO2:  [94 %-100 %] 98 %  BP: (116-134)/(58-71) 128/59     Weight: 77.1 kg (170 lb)  Body mass index is 27.44 kg/m².    Intake/Output Summary (Last 24 hours) at 11/24/2019 154  Last data filed at 11/23/2019 1802  Gross per 24 hour   Intake 100 ml   Output --   Net 100 ml       Physical Exam   Constitutional: She appears well-developed and well-nourished. No distress.   HENT:   Head: Normocephalic and atraumatic.   Eyes: Pupils are equal, round, and reactive to light. Conjunctivae and EOM are normal. Right eye exhibits no discharge. Left eye exhibits no discharge.   Neck: Normal range of motion. Neck supple. No JVD present.   Cardiovascular: Normal rate, regular rhythm and intact distal pulses.   Murmur (ASM) heard.  Pulmonary/Chest: Effort normal and breath sounds normal. No respiratory distress. She has no wheezes. She has no rales.   Lungs CTA bilaterally, currently on room air   Abdominal: Soft. Bowel sounds are normal. She exhibits no distension. There is no tenderness. There is no guarding.   Genitourinary:   Genitourinary Comments: Not examined   Musculoskeletal: Normal range of motion. She exhibits no edema.   Neurological: She is alert. No sensory deficit.   Oriented to person  Forgetful, confused  Responds appropriately to simple commands   Skin: Skin is warm and dry. Capillary refill takes less than 2 seconds. She is not diaphoretic. There is pallor.   Psychiatric: Her speech is delayed and slurred. She is withdrawn.   Calm and cooperative at this time   Nursing note and vitals reviewed.    Labs reviewed

## 2019-11-24 NOTE — PROGRESS NOTES
"[facility] Medicine  Progress Note    Patient Name: Ellen Maravilla  MRN: [id number]  Patient Class: IP- Inpatient   Admission Date: 11/21/2019  Length of Stay: 2 days  Attending Physician: Brittanie Finn MD  Primary Care Provider: Tien Mckeon MD        Subjective:     Principal Problem:Acute ischemic stroke        HPI:  Ellen Maravilla is a 86 y.o. female with a PMHx HTN, CAD, thyroid disease, uterine cancer, and dementia who presented to the emergency department increased confusion and slurred speech. History limited due to patient's baseline dementia and increased confusion. Patient's son and grandson are at the bedside and providing history. The son reports that the patient became confused yesterday morning and had slurred speech and generalized weakness. The family brought her to see her PCP, Dr. Mckeon, who referred her to the ED for further evaluation. The son reports she was unable to get up from the chair today, and typically she is able to ambulate with her walker. The patient also had decreased appetite that began last night, and had nausea and vomiting upon arrival to Dr. Mckeon's office. Per the son she had one occurrence of emesis that he described as bile. The patient is currently reporting "spasming" right knee pain, the son denies any previous complaints of knee pain. Son denies of any known falls. The patient reports her nausea is improved at this time after receiving medication in the ED. The patient denies any shortness of breath, abdominal pain, chest pain, headaches, or cough.    The ED work up is significant for urinalysis revealing few bacteria and nitrite positive. Urine cx pending. CT head, abd/pelvis show no acute process. Started on IV rocephin. Patient will be placed in observation for further work up and monitoring.     Overview/Hospital Course:  Patient monitor closely during observation stay. Patient presented with increased confusion and slurred " speech. Urinalysis significant for few bacteria and was nitrate positive. Urine culture obtained. CT head with no obvious acute abnormality. CT abdomen/pelvis showed no acute abdominopelvic process. Patient started on IV Rocephin. PT/OT ordered. MRI brain revealed 3 small linear foci of acute nonhemorrhagic infarction in the central kathleen. Neurology consulted. Carotid ultrasound reviewed.    Interval History: Patient seen and examined. Staff reports persistent confusion and combative behavior. Swing at staff and refusing meds.   Slept well with increased dose of Seroquel. Not participating with PT. Afebrile       Review of Systems   Unable to perform ROS: Dementia   Constitutional: Positive for activity change.   Musculoskeletal: Positive for back pain.   Psychiatric/Behavioral: Positive for confusion.     Objective:     Vital Signs (Most Recent):  Temp: 97.2 °F (36.2 °C) (11/23/19 1636)  Pulse: (!) 58 (11/23/19 1636)  Resp: 20 (11/23/19 1131)  BP: (!) 119/58 (11/23/19 1636)  SpO2: 97 % (11/23/19 1636) Vital Signs (24h Range):  Temp:  [97 °F (36.1 °C)-98.6 °F (37 °C)] 97.2 °F (36.2 °C)  Pulse:  [56-75] 58  Resp:  [15-20] 20  SpO2:  [96 %-100 %] 97 %  BP: (111-138)/(53-65) 119/58     Weight: 77.1 kg (170 lb)  Body mass index is 27.44 kg/m².    Intake/Output Summary (Last 24 hours) at 11/23/2019 2027  Last data filed at 11/23/2019 1802  Gross per 24 hour   Intake 100 ml   Output --   Net 100 ml      Physical Exam   Constitutional: She appears well-developed and well-nourished.   HENT:   Head: Normocephalic and atraumatic.   Right Ear: External ear normal.   Left Ear: External ear normal.   Mouth/Throat: Oropharynx is clear and moist.   Eyes: Pupils are equal, round, and reactive to light. Conjunctivae and EOM are normal.   Neck: Normal range of motion. Neck supple. No JVD present.   Cardiovascular: Normal rate, regular rhythm and intact distal pulses.   Murmur (ASM) heard.  Pulmonary/Chest: Effort normal and breath  sounds normal. No respiratory distress. She has no wheezes. She has no rales.   Lungs CTA bilaterally, currently on room air   Abdominal: Soft. Bowel sounds are normal. There is no tenderness. There is no guarding.   Musculoskeletal: Normal range of motion. She exhibits no edema.   Mild BLE edema   Neurological: She is alert. No sensory deficit.   Patient oriented to self only, advanced dementia. No obvious deficits in strength, equal to upper and lower ext.   agitated   Skin: Skin is warm and dry. Capillary refill takes less than 2 seconds. There is pallor.   Psychiatric: She has a normal mood and affect. Judgment normal. Her speech is delayed and slurred. She is withdrawn.   Nursing note and vitals reviewed.      Significant Labs:   BMP:   Recent Labs   Lab 11/22/19 0644 11/23/19  0534     110 94  94   *  135* 136  136   K 3.9  3.9 3.7  3.7     101 104  104   CO2 26  26 25  25   BUN 17  17 22  22   CREATININE 0.6  0.6 0.6  0.6   CALCIUM 8.8  8.8 8.6*  8.6*   MG 1.6  --      CBC:   Recent Labs   Lab 11/22/19 0644 11/23/19  0534   WBC 6.17 3.69*   HGB 12.7 10.9*   HCT 37.7 32.8*   * 121*     Urine Studies:   No results for input(s): COLORU, APPEARANCEUA, PHUR, SPECGRAV, PROTEINUA, GLUCUA, KETONESU, BILIRUBINUA, OCCULTUA, NITRITE, UROBILINOGEN, LEUKOCYTESUR, RBCUA, WBCUA, BACTERIA, SQUAMEPITHEL, HYALINECASTS in the last 48 hours.    Invalid input(s): WRIGHTSUR  All pertinent labs within the past 24 hours have been reviewed.    Significant Imaging: I have reviewed and interpreted all pertinent imaging results/findings within the past 24 hours.      Assessment/Plan:      * Acute ischemic stroke  [place] CVA  Fall precautions.   Continuous telemetry.       Antithrombotics for secondary stroke prevention: Antiplatelets: Aspirin: 81 mg daily    Statins for secondary stroke prevention and hyperlipidemia, if present:   Statins: Atorvastatin- 40 mg daily    Aggressive risk factor  modification: HTN, HLD     Rehab efforts: The patient has been evaluated by a stroke team provider and the therapy needs have been fully considered based off the presenting complaints and exam findings. The following therapy evaluations are needed: PT evaluate and treat, OT evaluate and treat, SLP evaluate and treat    Diagnostics ordered/pending: HgbA1C to assess blood glucose levels, Lipid Profile to assess cholesterol levels, MRI head without contrast to assess brain parenchyma, TSH to assess thyroid function, Other: carotid US and Echo    VTE prophylaxis: Enoxaparin 40 mg SQ every 24 hours    BP parameters: TIA: SBP <220 until imaging confirmation of no infarct      Neurology consulted.              Delirium  Add delirium precautions.     Hyperlipidemia   Continue lipitor  Lab Results   Component Value Date    LDLCALC 132.0 11/21/2019            Port-A-Cath in place  Care per nursing.   Maintain sterility when accessing or de accessing port.         Vascular dementia  Dementia is controlled currently. Continue home dementia meds and non-pharmacologic interventions to prevent delirium (No VS between 11PM-5AM, activity during day, opening blinds, providing glasses/hearing aids, and up in chair during daytime).     Increased Seroquel during hospital stay.  Given one dose of ativan in the morning  With behavior issues. Consult psychiatry-telemed. Add Seroquel 12.5 mg in am.        Moderate malnutrition  Nutrition consulted. Body mass index is 27.44 kg/m².. Encourage maximal PO intake. Diet supplementation ordered per nutrition approval. Will encourage PO and monitor closely for weight changes.        Acute cystitis without hematuria  Urinalysis nitrite positive with few bacteria.   So far culture is negative for growth    Closed compression fracture of thoracic vertebra, T9 and old multiple lumbar compression fractures  Chronic.   Mild analgesics for pain    Osteoporosis  With history of pathological fractures and  osteoporosis.     Fall precautions.      Hypothyroidism  Patient has chronic hypothyroidism. TFTs reviewed-   Lab Results   Component Value Date    TSH 3.719 11/21/2019       Hypertension    Temp:  [97 °F (36.1 °C)-98.6 °F (37 °C)]   Pulse:  [56-75]   Resp:  [15-20]   BP: (111-172)/(53-75)   SpO2:  [96 %-100 %] .   Home meds for hypertension were reviewed and noted below. Hospital anti-hypertensive changes were made as shown below.  Hospital Medications             amLODIPine tablet 2.5 mg 2.5 mg, Oral, Daily    metoprolol tartrate (LOPRESSOR) tablet 25 mg 25 mg, Oral, 2 times daily        Will utilize p.r.n. blood pressure medication only if patient's blood pressure greater than  180/110 and she develops symptoms such as worsening chest pain or shortness of breath.    Reviewed previous records.  Patient was previously on Norvasc 2.5 mg and metoprolol 25 mg b.i.d..  Will resume.  Cardiac diet          VTE Risk Mitigation (From admission, onward)         Ordered     enoxaparin injection 40 mg  Daily      11/21/19 1548     IP VTE HIGH RISK PATIENT  Once      11/21/19 1548                Wen Escobar NP  Department of Hospital Medicine   [facility]

## 2019-11-24 NOTE — ASSESSMENT & PLAN NOTE
With history of pathological fractures and osteoporosis.   Add Oscal D  Vitamin-D level within normal limits

## 2019-11-24 NOTE — SUBJECTIVE & OBJECTIVE
Interval History: Patient seen and examined. Staff reports persistent confusion and combative behavior. Swing at staff and refusing meds.   Slept well with increased dose of Seroquel. Not participating with PT. Afebrile       Review of Systems   Unable to perform ROS: Dementia   Constitutional: Positive for activity change.   Musculoskeletal: Positive for back pain.   Psychiatric/Behavioral: Positive for confusion.     Objective:     Vital Signs (Most Recent):  Temp: 97.2 °F (36.2 °C) (11/23/19 1636)  Pulse: (!) 58 (11/23/19 1636)  Resp: 20 (11/23/19 1131)  BP: (!) 119/58 (11/23/19 1636)  SpO2: 97 % (11/23/19 1636) Vital Signs (24h Range):  Temp:  [97 °F (36.1 °C)-98.6 °F (37 °C)] 97.2 °F (36.2 °C)  Pulse:  [56-75] 58  Resp:  [15-20] 20  SpO2:  [96 %-100 %] 97 %  BP: (111-138)/(53-65) 119/58     Weight: 77.1 kg (170 lb)  Body mass index is 27.44 kg/m².    Intake/Output Summary (Last 24 hours) at 11/23/2019 2027  Last data filed at 11/23/2019 1802  Gross per 24 hour   Intake 100 ml   Output --   Net 100 ml      Physical Exam   Constitutional: She appears well-developed and well-nourished.   HENT:   Head: Normocephalic and atraumatic.   Right Ear: External ear normal.   Left Ear: External ear normal.   Mouth/Throat: Oropharynx is clear and moist.   Eyes: Pupils are equal, round, and reactive to light. Conjunctivae and EOM are normal.   Neck: Normal range of motion. Neck supple. No JVD present.   Cardiovascular: Normal rate, regular rhythm and intact distal pulses.   Murmur (ASM) heard.  Pulmonary/Chest: Effort normal and breath sounds normal. No respiratory distress. She has no wheezes. She has no rales.   Lungs CTA bilaterally, currently on room air   Abdominal: Soft. Bowel sounds are normal. There is no tenderness. There is no guarding.   Musculoskeletal: Normal range of motion. She exhibits no edema.   Mild BLE edema   Neurological: She is alert. No sensory deficit.   Patient oriented to self only, advanced  dementia. No obvious deficits in strength, equal to upper and lower ext.   agitated   Skin: Skin is warm and dry. Capillary refill takes less than 2 seconds. There is pallor.   Psychiatric: She has a normal mood and affect. Judgment normal. Her speech is delayed and slurred. She is withdrawn.   Nursing note and vitals reviewed.      Significant Labs:   BMP:   Recent Labs   Lab 11/22/19 0644 11/23/19  0534     110 94  94   *  135* 136  136   K 3.9  3.9 3.7  3.7     101 104  104   CO2 26  26 25  25   BUN 17  17 22  22   CREATININE 0.6  0.6 0.6  0.6   CALCIUM 8.8  8.8 8.6*  8.6*   MG 1.6  --      CBC:   Recent Labs   Lab 11/22/19 0644 11/23/19  0534   WBC 6.17 3.69*   HGB 12.7 10.9*   HCT 37.7 32.8*   * 121*     Urine Studies:   No results for input(s): COLORU, APPEARANCEUA, PHUR, SPECGRAV, PROTEINUA, GLUCUA, KETONESU, BILIRUBINUA, OCCULTUA, NITRITE, UROBILINOGEN, LEUKOCYTESUR, RBCUA, WBCUA, BACTERIA, SQUAMEPITHEL, HYALINECASTS in the last 48 hours.    Invalid input(s): LAWSON  All pertinent labs within the past 24 hours have been reviewed.    Significant Imaging: I have reviewed and interpreted all pertinent imaging results/findings within the past 24 hours.

## 2019-11-24 NOTE — ASSESSMENT & PLAN NOTE
Patient has chronic hypothyroidism.   Currently no home medications noted   TFTs reviewed-   Lab Results   Component Value Date    TSH 3.719 11/21/2019

## 2019-11-24 NOTE — PT/OT/SLP PROGRESS
"Physical Therapy Treatment    Patient Name:  Ellen Maravilla   MRN:  [id number]    Recommendations:     Discharge Recommendations:  nursing facility, skilled   Discharge Equipment Recommendations: (TBD)   Barriers to discharge: total assist    Assessment:     Ellen Maravilla is a 86 y.o. female admitted with a medical diagnosis of Acute ischemic stroke.  She presents with the following impairments/functional limitations:  weakness, impaired endurance, impaired functional mobilty, decreased lower extremity function, decreased upper extremity function, impaired balance  .    Rehab Prognosis: Fair; patient would benefit from acute skilled PT services to address these deficits and reach maximum level of function.    Recent Surgery: * No surgery found *      Plan:     During this hospitalization, patient to be seen daily to address the identified rehab impairments via therapeutic activities, therapeutic exercises, gait training and progress toward the following goals:    · Plan of Care Expires:  12/13/19    Subjective     Chief Complaint: "I'll never walk again."  Patient/Family Comments/goals: none stated  Pain/Comfort:  ·        Objective:     Communicated with RN (Stefano) prior to session.  Patient found supine with oxygen, peripheral IV upon PT entry to room.     General Precautions: Standard, fall   Orthopedic Precautions:N/A   Braces: N/A     Functional Mobility:  · Bed Mobility:     · Rolling Right: maximal assistance  · Supine to Sit: maximal assistance  · Sit to Supine: total assistance      AM-PAC 6 CLICK MOBILITY  Turning over in bed (including adjusting bedclothes, sheets and blankets)?: 2  Sitting down on and standing up from a chair with arms (e.g., wheelchair, bedside commode, etc.): 1  Moving from lying on back to sitting on the side of the bed?: 2  Moving to and from a bed to a chair (including a wheelchair)?: 1  Need to walk in hospital room?: 1  Climbing 3-5 steps with a railing?: 1  Basic Mobility Total " Score: 8       Therapeutic Activities and Exercises:   SUPINE: SLR (with assist), ankle PF, hip abd/add, x 10 reps each  Sit balance training at eob x ~ 5 min with min/mod assist and cues (pt requested to lie down due to dizziness    Patient left supine with all lines intact, call button in reach and bed alarm on..    GOALS:   Multidisciplinary Problems     Physical Therapy Goals        Problem: Physical Therapy Goal    Goal Priority Disciplines Outcome Goal Variances Interventions   Physical Therapy Goal     PT, PT/OT Ongoing, Progressing     Description:  Goals to be met by: 2019     Patient will increase functional independence with mobility by performin. Supine to sit with Contact Guard Assistance  2. Sit to supine with Contact Guard Assistance  3. Sit to stand transfer with Contact Guard Assistance  4. Bed to chair transfer with Contact Guard Assistance using Rolling Walker  5. Gait  x 20 feet with Minimal Assistance using Rolling Walker.   6. Lower extremity exercise program x10-20 reps per handout, with supervision                      Time Tracking:     PT Received On: 19  PT Start Time: 1315     PT Stop Time: 1340  PT Total Time (min): 25     Billable Minutes: Therapeutic Activity 15 and Therapeutic Exercise 10       PT/PTA: PT     PTA Visit Number: 0     Darren William, PT  2019

## 2019-11-24 NOTE — ASSESSMENT & PLAN NOTE
Fall precautions.   Continuous telemetry.     Antithrombotics for secondary stroke prevention: Antiplatelets: Aspirin: 81 mg daily    Statins for secondary stroke prevention and hyperlipidemia, if present:   Statins: Atorvastatin- 40 mg daily    Aggressive risk factor modification: HTN, HLD     Rehab efforts: The patient has been evaluated by a stroke team provider and the therapy needs have been fully considered based off the presenting complaints and exam findings. The following therapy evaluations are needed: PT evaluate and treat, OT evaluate and treat, SLP evaluate and treat    Diagnostics ordered/pending: HgbA1C to assess blood glucose levels, Lipid Profile to assess cholesterol levels, MRI head without contrast to assess brain parenchyma, TSH to assess thyroid function, Other: carotid US and Echo    VTE prophylaxis: Enoxaparin 40 mg SQ every 24 hours    BP parameters: TIA: SBP <220 until imaging confirmation of no infarct      Neurology consulted.

## 2019-11-24 NOTE — PLAN OF CARE
11/23/19 2017   Patient Assessment/Suction   Level of Consciousness (AVPU) alert   PRE-TX-O2   O2 Device (Oxygen Therapy) room air   SpO2 97 %   Pulse Oximetry Type Intermittent

## 2019-11-24 NOTE — PLAN OF CARE
Problem: Physical Therapy Goal  Goal: Physical Therapy Goal  Description  Goals to be met by: 2019     Patient will increase functional independence with mobility by performin. Supine to sit with Contact Guard Assistance  2. Sit to supine with Contact Guard Assistance  3. Sit to stand transfer with Contact Guard Assistance  4. Bed to chair transfer with Contact Guard Assistance using Rolling Walker  5. Gait  x 20 feet with Minimal Assistance using Rolling Walker.   6. Lower extremity exercise program x10-20 reps per handout, with supervision     Outcome: Ongoing, Progressing

## 2019-11-24 NOTE — ASSESSMENT & PLAN NOTE
[place] CVA  Fall precautions.   Continuous telemetry.       Antithrombotics for secondary stroke prevention: Antiplatelets: Aspirin: 81 mg daily    Statins for secondary stroke prevention and hyperlipidemia, if present:   Statins: Atorvastatin- 40 mg daily    Aggressive risk factor modification: HTN, HLD     Rehab efforts: The patient has been evaluated by a stroke team provider and the therapy needs have been fully considered based off the presenting complaints and exam findings. The following therapy evaluations are needed: PT evaluate and treat, OT evaluate and treat, SLP evaluate and treat    Diagnostics ordered/pending: HgbA1C to assess blood glucose levels, Lipid Profile to assess cholesterol levels, MRI head without contrast to assess brain parenchyma, TSH to assess thyroid function, Other: carotid US and Echo    VTE prophylaxis: Enoxaparin 40 mg SQ every 24 hours    BP parameters: TIA: SBP <220 until imaging confirmation of no infarct      Neurology consulted.

## 2019-11-24 NOTE — ASSESSMENT & PLAN NOTE
Dementia is controlled currently. Continue home dementia meds and non-pharmacologic interventions to prevent delirium (No VS between 11PM-5AM, activity during day, opening blinds, providing glasses/hearing aids, and up in chair during daytime).     Increased Seroquel during hospital stay.  Given one dose of ativan in the morning

## 2019-11-24 NOTE — PROGRESS NOTES
"[facility] Medicine  Progress Note    Patient Name: Ellen Maravilla  MRN: [id number]  Patient Class: IP- Inpatient   Admission Date: 11/21/2019  Length of Stay: 2 days  Attending Physician: Brittanie Finn MD  Primary Care Provider: Tien Mckeon MD        Subjective:     Principal Problem:Acute ischemic stroke        HPI:  Ellen Maravilla is a 86 y.o. female with a PMHx HTN, CAD, thyroid disease, uterine cancer, and dementia who presented to the emergency department increased confusion and slurred speech. History limited due to patient's baseline dementia and increased confusion. Patient's son and grandson are at the bedside and providing history. The son reports that the patient became confused yesterday morning and had slurred speech and generalized weakness. The family brought her to see her PCP, Dr. Mckeon, who referred her to the ED for further evaluation. The son reports she was unable to get up from the chair today, and typically she is able to ambulate with her walker. The patient also had decreased appetite that began last night, and had nausea and vomiting upon arrival to Dr. Mckeon's office. Per the son she had one occurrence of emesis that he described as bile. The patient is currently reporting "spasming" right knee pain, the son denies any previous complaints of knee pain. Son denies of any known falls. The patient reports her nausea is improved at this time after receiving medication in the ED. The patient denies any shortness of breath, abdominal pain, chest pain, headaches, or cough.    The ED work up is significant for urinalysis revealing few bacteria and nitrite positive. Urine cx pending. CT head, abd/pelvis show no acute process. Started on IV rocephin. Patient will be placed in observation for further work up and monitoring.     Overview/Hospital Course:  Patient monitor closely during observation stay. Patient presented with increased confusion and slurred " speech. Urinalysis significant for few bacteria and was nitrate positive. Urine culture obtained. CT head with no obvious acute abnormality. CT abdomen/pelvis showed no acute abdominopelvic process. Patient started on IV Rocephin. PT/OT ordered. MRI brain revealed 3 small linear foci of acute nonhemorrhagic infarction in the central kathleen. Neurology consulted. Carotid ultrasound reviewed.    Interval History: Patient laying in bed. Not really verbal but awoke.     Review of Systems   Unable to perform ROS: Dementia     Objective:     Vital Signs (Most Recent):  Temp: 97.2 °F (36.2 °C) (11/23/19 1636)  Pulse: (!) 58 (11/23/19 1636)  Resp: 20 (11/23/19 1131)  BP: (!) 119/58 (11/23/19 1636)  SpO2: 97 % (11/23/19 1636) Vital Signs (24h Range):  Temp:  [97 °F (36.1 °C)-98.6 °F (37 °C)] 97.2 °F (36.2 °C)  Pulse:  [56-75] 58  Resp:  [15-20] 20  SpO2:  [96 %-100 %] 97 %  BP: (111-172)/(53-75) 119/58     Weight: 77.1 kg (170 lb)  Body mass index is 27.44 kg/m².    Intake/Output Summary (Last 24 hours) at 11/23/2019 1830  Last data filed at 11/23/2019 1802  Gross per 24 hour   Intake 100 ml   Output --   Net 100 ml      Physical Exam   Constitutional: She appears well-developed and well-nourished.   HENT:   Head: Normocephalic and atraumatic.   Eyes: Conjunctivae are normal.   Cardiovascular: Normal rate and regular rhythm.   Pulmonary/Chest: Effort normal and breath sounds normal.   Abdominal: Soft. Bowel sounds are normal.   Neurological: She is alert.   Skin: Skin is warm.   Psychiatric:   Patient demented       Significant Labs:   Recent Lab Results       11/23/19  0534        Albumin 2.7     Alkaline Phosphatase 69     ALT 8     Anion Gap 7      7     AST 14     Baso # 0.02     Basophil% 0.5     BILIRUBIN TOTAL 0.6  Comment:  For infants and newborns, interpretation of results should be based  on gestational age, weight and in agreement with clinical  observations.  Premature Infant recommended reference ranges:  Up to 24  hours.............<8.0 mg/dL  Up to 48 hours............<12.0 mg/dL  3-5 days..................<15.0 mg/dL  6-29 days.................<15.0 mg/dL       BUN, Bld 22      22     Calcium 8.6      8.6     Chloride 104      104     CO2 25      25     Creatinine 0.6      0.6     Differential Method Automated     eGFR if  >60      >60     eGFR if non  >60  Comment:  Calculation used to obtain the estimated glomerular filtration  rate (eGFR) is the CKD-EPI equation.         >60  Comment:  Calculation used to obtain the estimated glomerular filtration  rate (eGFR) is the CKD-EPI equation.        Eos # 0.1     Eosinophil% 3.0     Glucose 94      94     Gran # (ANC) 2.1     Gran% 56.4     Hematocrit 32.8     Hemoglobin 10.9     Immature Grans (Abs) 0.01  Comment:  Mild elevation in immature granulocytes is non specific and   can be seen in a variety of conditions including stress response,   acute inflammation, trauma and pregnancy. Correlation with other   laboratory and clinical findings is essential.       Lymph # 1.2     Lymph% 32.5     MCH 34.2     MCHC 33.2          Mono # 0.3     Mono% 7.3     MPV 9.1     nRBC 0     Platelets 121     Potassium 3.7      3.7     PROTEIN TOTAL 5.6     RBC 3.19     RDW 14.6     Sodium 136      136     WBC 3.69           Significant Imaging: I have reviewed all pertinent imaging results/findings within the past 24 hours.     ECHO:  · Concentric left ventricular remodeling.  · Small left ventricular cavity size.  · Normal left ventricular systolic function. The estimated ejection fraction is 55%  · Grade I (mild) left ventricular diastolic dysfunction consistent with impaired relaxation.  · Mild right ventricular enlargement.  · Normal right ventricular systolic function.  · Mild right atrial enlargement.  · Mild to moderate tricuspid regurgitation.  · Indeterminate central venous pressure. Estimated PA pressure is at least 19 mmHg.  · No PFO on saline  bubble and color flow study    MRA Brain:   Asymmetrically small and irregular distal P 2 segment of the right posterior cerebral artery, likely due to underlying atherosclerosis.  Otherwise, normal magnetic resonance angiogram of the lncqoi-rd-Nzthlh vasculature.      Assessment/Plan:      * Acute ischemic stroke  Fall precautions.   Continuous telemetry.     Antithrombotics for secondary stroke prevention: Antiplatelets: Aspirin: 81 mg daily    Statins for secondary stroke prevention and hyperlipidemia, if present:   Statins: Atorvastatin- 40 mg daily    Aggressive risk factor modification: HTN, HLD     Rehab efforts: The patient has been evaluated by a stroke team provider and the therapy needs have been fully considered based off the presenting complaints and exam findings. The following therapy evaluations are needed: PT evaluate and treat, OT evaluate and treat, SLP evaluate and treat    Diagnostics ordered/pending: HgbA1C to assess blood glucose levels, Lipid Profile to assess cholesterol levels, MRI head without contrast to assess brain parenchyma, TSH to assess thyroid function, Other: carotid US and Echo    VTE prophylaxis: Enoxaparin 40 mg SQ every 24 hours    BP parameters: TIA: SBP <220 until imaging confirmation of no infarct      Neurology consulted.              Hyperlipidemia   Continue lipitor  Lab Results   Component Value Date    LDLCALC 132.0 11/21/2019            Port-A-Cath in place  Care per nursing.   Maintain sterility when accessing or de accessing port.         Vascular dementia  Dementia is controlled currently. Continue home dementia meds and non-pharmacologic interventions to prevent delirium (No VS between 11PM-5AM, activity during day, opening blinds, providing glasses/hearing aids, and up in chair during daytime).     Increased Seroquel during hospital stay.  Given one dose of ativan in the morning        Moderate malnutrition  Nutrition consulted. Body mass index is 27.44 kg/m²..  Encourage maximal PO intake. Diet supplementation ordered per nutrition approval. Will encourage PO and monitor closely for weight changes.        Acute cystitis without hematuria  Urinalysis nitrite positive with few bacteria.   So far culture is negative for growth    Closed compression fracture of thoracic vertebra, T9 and old multiple lumbar compression fractures  Chronic.   Mild analgesics for pain    Osteoporosis  With history of pathological fractures and osteoporosis.     Fall precautions.      Hypothyroidism  Patient has chronic hypothyroidism. TFTs reviewed-   Lab Results   Component Value Date    TSH 3.719 11/21/2019       Hypertension    Temp:  [97 °F (36.1 °C)-98.6 °F (37 °C)]   Pulse:  [56-75]   Resp:  [15-20]   BP: (111-172)/(53-75)   SpO2:  [96 %-100 %] .   Home meds for hypertension were reviewed and noted below. Hospital anti-hypertensive changes were made as shown below.  Hospital Medications             amLODIPine tablet 2.5 mg 2.5 mg, Oral, Daily    metoprolol tartrate (LOPRESSOR) tablet 25 mg 25 mg, Oral, 2 times daily        Will utilize p.r.n. blood pressure medication only if patient's blood pressure greater than  180/110 and she develops symptoms such as worsening chest pain or shortness of breath.    Reviewed previous records.  Patient was previously on Norvasc 2.5 mg and metoprolol 25 mg b.i.d..  Will resume.  Cardiac diet          VTE Risk Mitigation (From admission, onward)         Ordered     enoxaparin injection 40 mg  Daily      11/21/19 1548     IP VTE HIGH RISK PATIENT  Once      11/21/19 1548                      Yadira Frias MD  Department of Hospital Medicine   [facility]

## 2019-11-24 NOTE — ASSESSMENT & PLAN NOTE
Long discussion had with son at bedside who reports patient missed a few dose of Seroquel prior to admission  Continue Seroquel- dose has been increased during hospitalization

## 2019-11-24 NOTE — PROGRESS NOTES
"[facility]  Neurology  Progress Note    Patient Name: Ellen Maravilla  MRN: [id number]  Admission Date: 11/21/2019  Hospital Length of Stay: 3 days  Code Status: Full Code   Attending Provider: Brittanie Finn MD   Consulting Provider:  Sushil Centeno  Consulting NP: Radha Hanson NP  Primary Care Physician: Tien Mckeon MD  Principal Problem:Acute ischemic stroke      Subjective:     Chief Complaint:    Chief Complaint   Patient presents with    Aphasia     Last seen normal on Monday.       HPI: Ellen Maravilla is a 86 y.o. female with a PMHx HTN, CAD, thyroid disease, uterine cancer, and dementia who presented to the emergency department increased confusion and slurred speech. History limited due to patient's baseline dementia and increased confusion. Patient's son and grandson are at the bedside and providing history. The son reports that the patient became confused yesterday morning and had slurred speech and generalized weakness. The family brought her to see her PCP, Dr. Mckeon, who referred her to the ED for further evaluation. The son reports she was unable to get up from the chair today, and typically she is able to ambulate with her walker. The patient also had decreased appetite that began last night, and had nausea and vomiting upon arrival to Dr. Mckeon's office. Per the son she had one occurrence of emesis that he described as bile. The patient is currently reporting "spasming" right knee pain, the son denies any previous complaints of knee pain. Son denies of any known falls. The patient reports her nausea is improved at this time after receiving medication in the ED. The patient denies any shortness of breath, abdominal pain, chest pain, headaches, or cough.     The ED work up is significant for urinalysis revealing few bacteria and nitrite positive. Urine cx pending. CT head, abd/pelvis show no acute process. Started on IV rocephin. Patient will be placed in observation for " further work up and monitoring.        Neurology Interval History: Patient seen and examined with Dr. Sushil Centeno. She is tearful on exam. States she does not feel good. Her speech does seem better today. She is oriented to person, time. Still ataxic. Left facial droop and left hand still weak. TTE was negative for PFO. Continue ASA 81mg daily. The patient denies any headache, tingling or numbness, or visual disturbance.    Past Medical History:   Diagnosis Date    Coronary artery disease     Dementia     Hypertension     Thyroid disease     Uterine cancer        Past Surgical History:   Procedure Laterality Date    APPENDECTOMY      BACK SURGERY      HYSTERECTOMY      KNEE SURGERY      TONSILLECTOMY         Review of patient's allergies indicates:  No Known Allergies    Current Neurological Medications:     Scheduled Meds:   amLODIPine  2.5 mg Oral Daily    aspirin  81 mg Oral Daily    atorvastatin  40 mg Oral Daily    cefTRIAXone (ROCEPHIN) IVPB  1 g Intravenous Q24H    enoxaparin  40 mg Subcutaneous Daily    lidocaine  1 patch Transdermal Q24H    metoprolol tartrate  25 mg Oral BID    polyethylene glycol  17 g Oral Daily    quetiapine  12.5 mg Oral Daily    QUEtiapine  50 mg Oral QHS     Continuous Infusions:   sodium chloride 0.9%       PRN Meds:.acetaminophen, dextrose 50%, dextrose 50%, glucagon (human recombinant), glucose, glucose, ibuprofen, lorazepam, magnesium oxide, magnesium oxide, ondansetron, ondansetron, potassium chloride 10%, potassium chloride 10%, potassium chloride 10%, potassium, sodium phosphates, potassium, sodium phosphates, potassium, sodium phosphates, sodium chloride 0.9%, sodium chloride 0.9%      No current facility-administered medications on file prior to encounter.      Current Outpatient Medications on File Prior to Encounter   Medication Sig    QUEtiapine (SEROQUEL) 25 MG Tab Take 25 mg by mouth every evening.      Family History     Problem Relation (Age of  Onset)    Depression Father    Early death Father    No Known Problems Mother        Tobacco Use    Smoking status: Never Smoker    Smokeless tobacco: Never Used   Substance and Sexual Activity    Alcohol use: No     Comment: rarely    Drug use: No    Sexual activity: Not on file     Review of Systems   Constitutional: Positive for activity change.   HENT: Positive for trouble swallowing and voice change. Negative for tinnitus.    Respiratory: Negative.    Cardiovascular: Negative.    Gastrointestinal: Negative.    Genitourinary: Negative.    Musculoskeletal: Negative.    Neurological: Positive for speech difficulty and weakness. Negative for dizziness, tremors, seizures, syncope, facial asymmetry, light-headedness, numbness and headaches.   Hematological: Negative.    Psychiatric/Behavioral: Negative.      Objective:     Vital Signs (Most Recent):  Temp: 97.7 °F (36.5 °C) (11/24/19 0729)  Pulse: 65 (11/24/19 0729)  Resp: 18 (11/24/19 0729)  BP: 116/71 (11/24/19 0729)  SpO2: 98 % (11/24/19 0729) Vital Signs (24h Range):  Temp:  [97 °F (36.1 °C)-98.8 °F (37.1 °C)] 97.7 °F (36.5 °C)  Pulse:  [56-68] 65  Resp:  [18-20] 18  SpO2:  [94 %-100 %] 98 %  BP: (116-134)/(58-71) 116/71     Weight: 77.1 kg (170 lb)  Body mass index is 27.44 kg/m².    Physical Exam   Constitutional: She appears well-developed and well-nourished.   HENT:   Head: Normocephalic and atraumatic.   Eyes: Pupils are equal, round, and reactive to light. EOM are normal.   Neck: Normal range of motion. Neck supple.   Cardiovascular: Normal rate.   Pulmonary/Chest: Effort normal.   Abdominal: Soft.   Musculoskeletal: Normal range of motion.   Neurological: She is alert. She displays normal reflexes. No cranial nerve deficit or sensory deficit. She exhibits normal muscle tone. She has an abnormal Finger-Nose-Finger Test. Coordination abnormal.   Reflex Scores:       Tricep reflexes are 2+ on the right side and 2+ on the left side.       Bicep reflexes  are 2+ on the right side and 2+ on the left side.       Brachioradialis reflexes are 2+ on the right side and 2+ on the left side.       Patellar reflexes are 2+ on the right side and 2+ on the left side.       Achilles reflexes are 2+ on the right side and 2+ on the left side.  Skin: Skin is warm and dry. Capillary refill takes less than 2 seconds.   Psychiatric: She has a normal mood and affect.       NEUROLOGICAL EXAMINATION:     MENTAL STATUS   Oriented to person.   Disoriented to place.   Disoriented to time.     CRANIAL NERVES   Cranial nerves II through XII intact.     CN III, IV, VI   Pupils are equal, round, and reactive to light.  Extraocular motions are normal.     MOTOR EXAM   Muscle bulk: normal    Strength   Right neck flexion: 4/5  Left neck flexion: 4/5  Right neck extension: 4/5  Left neck extension: 4/5  Right deltoid: 4/5  Left deltoid: 4/5  Right biceps: 4/5  Left biceps: 4/5  Right triceps: 4/5  Left triceps: 4/5  Right wrist flexion: 4/5  Left wrist flexion: 4/5  Right wrist extension: 4/5  Left wrist extension: 4/5  Right interossei: 4/5  Left interossei: 4/5  Right abdominals: 3/5  Left abdominals: 3/5  Right iliopsoas: 3/5  Left iliopsoas: 3/5  Right quadriceps: 3/5  Left quadriceps: 3/5  Right hamstring: 3/5  Left hamstring: 3/5  Right glutei: 3/5  Left glutei: 3/5  Right anterior tibial: 3/5  Left anterior tibial: 3/5  Right posterior tibial: 3/5  Left posterior tibial: 3/5  Right peroneal: 3/5  Left peroneal: 3/5  Right gastroc: 3/5  Left gastroc: 3/5    REFLEXES     Reflexes   Right brachioradialis: 2+  Left brachioradialis: 2+  Right biceps: 2+  Left biceps: 2+  Right triceps: 2+  Left triceps: 2+  Right patellar: 2+  Left patellar: 2+  Right achilles: 2+  Left achilles: 2+  Right : 2+  Left : 2+    SENSORY EXAM   Light touch normal.     GAIT AND COORDINATION      Coordination   Finger to nose coordination: abnormal          Significant Labs:     Lab Results   Component Value  Date    CREATININE 0.6 11/24/2019     Lab Results   Component Value Date    HGBA1C 5.1 11/21/2019     Lab Results   Component Value Date    TSH 3.719 11/21/2019     Lab Results   Component Value Date    LDLCALC 132.0 11/21/2019       Significant Imaging:    CT head without contrast  Impression       1. There is generalized volume loss with moderate to marked nonspecific white matter change.  There is no hemorrhage, mass, mass effect or obvious acute infarction.  It should be noted that MRI is more sensitive in the detection of subtle or acute nonhemorrhagic ischemic disease.  2. Atherosclerosis.           Mri Brain Without Contrast    Result Date: 11/22/2019  1. There are 3 small linear foci of acute nonhemorrhagic infarction in the central kathleen. 2. There is generalized volume loss with moderate to marked nonspecific white matter change.  These findings likely reflect sequelae of chronic small vessel disease.  Also, there is a small chronic infarction in the posterior right cerebellum. This report was flagged in Epic as abnormal.  Abnormal critical results were discussed with the patient's nurse, Candelaria Kuhn, at 8:35 am 11/22/2019 Electronically signed by: Everett Coello MD Date:    11/22/2019 Time:    08:35    Us Carotid Bilateral    Result Date: 11/22/2019  No evidence of a hemodynamically significant carotid bifurcation stenosis. Flow in vertebral arteries appears antegrade bilaterally Electronically signed by: Cee Boyd MD Date:    11/22/2019 Time:    08:37    MRA brain without contrast   Impression       Asymmetrically small and irregular distal P 2 segment of the right posterior cerebral artery, likely due to underlying atherosclerosis.  Otherwise, normal magnetic resonance angiogram of the lvpskn-bp-Tnitta vasculature.           ECHO   · Concentric left ventricular remodeling.  · Small left ventricular cavity size.  · Normal left ventricular systolic function. The estimated ejection fraction is  55%  · Grade I (mild) left ventricular diastolic dysfunction consistent with impaired relaxation.  · Mild right ventricular enlargement.  · Normal right ventricular systolic function.  · Mild right atrial enlargement.  · Mild to moderate tricuspid regurgitation.  · Indeterminate central venous pressure. Estimated PA pressure is at least 19 mmHg.  · No PFO on saline bubble and color flow study       Assessment and Plan:    1. Acute nonhemorrhagic infarction in the central kathleen  -ASA 81mg daily for secondary stroke prevention  -Statins for secondary stroke prevention and hyperlipidemia  -Risk factor modification: HTN, HLD  -PT/OT/ST  -TTE neg for PFO    2. Hyperlipidemia  -High intensity statin for secondary stroke prevention  -Life style modification; LDL goal <70        [phone] 2 weeks from discharge.  Stroke education was provided to patient/family members including stroke risk factor modification and patient/family members were informed if patient experiences any acute neurological changes including weakness, confusion, visual changes to come straight to the ER.  All side effects of new medications were discussed with patient/family members.             Active Diagnoses:    Diagnosis Date Noted POA    PRINCIPAL PROBLEM:  Acute ischemic stroke [I63.9] 11/21/2019 Yes    Delirium [R41.0] 11/23/2019 No    Acute cystitis without hematuria [N30.00] 11/21/2019 Yes    Moderate malnutrition [E44.0] 11/21/2019 Yes    Vascular dementia [F01.50] 11/21/2019 Yes    Port-A-Cath in place [Z95.828] 11/21/2019 Not Applicable    Hyperlipidemia [E78.5] 11/21/2019 Yes    Closed compression fracture of thoracic vertebra, T9 and old multiple lumbar compression fractures [S22.000A] 12/11/2017 Yes    Hypertension [I10] 02/10/2015 Yes    Hypothyroidism [E03.9] 02/10/2015 Yes    Osteoporosis [M81.0] 02/10/2015 Yes      Problems Resolved During this Admission:        VTE Risk Mitigation (From admission, onward)         Ordered     enoxaparin injection 40 mg  Daily      11/21/19 1548     IP VTE HIGH RISK PATIENT  Once      11/21/19 1548                Patient was seen and examined by Dr. Sushil Centeno    Thank you for your consult. I will follow-up with patient. Please contact us if you have any additional questions.    Radha Hanson, MISTY  Neurology  [facility]    I, Dr. Sushil Centeno, have personally seen and examined the patient with my advanced provider and agree with above. I personally did a focused exam, and reviewed all necessary clinical information. I discussed my management plan with my NP and agree with above.

## 2019-11-24 NOTE — ASSESSMENT & PLAN NOTE
Patient has chronic hypothyroidism. TFTs reviewed-   Lab Results   Component Value Date    TSH 3.719 11/21/2019

## 2019-11-25 LAB
ANION GAP SERPL CALC-SCNC: 5 MMOL/L (ref 8–16)
BUN SERPL-MCNC: 25 MG/DL (ref 8–23)
CALCIUM SERPL-MCNC: 7.9 MG/DL (ref 8.7–10.5)
CHLORIDE SERPL-SCNC: 108 MMOL/L (ref 95–110)
CO2 SERPL-SCNC: 26 MMOL/L (ref 23–29)
CREAT SERPL-MCNC: 0.6 MG/DL (ref 0.5–1.4)
EST. GFR  (AFRICAN AMERICAN): >60 ML/MIN/1.73 M^2
EST. GFR  (NON AFRICAN AMERICAN): >60 ML/MIN/1.73 M^2
GLUCOSE SERPL-MCNC: 104 MG/DL (ref 70–110)
MAGNESIUM SERPL-MCNC: 2.1 MG/DL (ref 1.6–2.6)
POTASSIUM SERPL-SCNC: 3.8 MMOL/L (ref 3.5–5.1)
SODIUM SERPL-SCNC: 139 MMOL/L (ref 136–145)

## 2019-11-25 PROCEDURE — 94761 N-INVAS EAR/PLS OXIMETRY MLT: CPT

## 2019-11-25 PROCEDURE — 25000003 PHARM REV CODE 250: Performed by: HOSPITALIST

## 2019-11-25 PROCEDURE — 63600175 PHARM REV CODE 636 W HCPCS: Performed by: INTERNAL MEDICINE

## 2019-11-25 PROCEDURE — 83735 ASSAY OF MAGNESIUM: CPT

## 2019-11-25 PROCEDURE — 27000221 HC OXYGEN, UP TO 24 HOURS

## 2019-11-25 PROCEDURE — 25000003 PHARM REV CODE 250: Performed by: FAMILY MEDICINE

## 2019-11-25 PROCEDURE — 36415 COLL VENOUS BLD VENIPUNCTURE: CPT

## 2019-11-25 PROCEDURE — 80048 BASIC METABOLIC PNL TOTAL CA: CPT

## 2019-11-25 PROCEDURE — 25000003 PHARM REV CODE 250: Performed by: NURSE PRACTITIONER

## 2019-11-25 PROCEDURE — 63600175 PHARM REV CODE 636 W HCPCS: Performed by: NURSE PRACTITIONER

## 2019-11-25 PROCEDURE — 25000003 PHARM REV CODE 250: Performed by: INTERNAL MEDICINE

## 2019-11-25 PROCEDURE — 92610 EVALUATE SWALLOWING FUNCTION: CPT

## 2019-11-25 PROCEDURE — 11000001 HC ACUTE MED/SURG PRIVATE ROOM

## 2019-11-25 RX ORDER — BISACODYL 5 MG
10 TABLET, DELAYED RELEASE (ENTERIC COATED) ORAL ONCE
Status: COMPLETED | OUTPATIENT
Start: 2019-11-25 | End: 2019-11-25

## 2019-11-25 RX ORDER — BISACODYL 10 MG
10 SUPPOSITORY, RECTAL RECTAL ONCE
Status: DISCONTINUED | OUTPATIENT
Start: 2019-11-25 | End: 2019-11-30 | Stop reason: HOSPADM

## 2019-11-25 RX ORDER — BISACODYL 10 MG
10 SUPPOSITORY, RECTAL RECTAL DAILY PRN
Status: DISCONTINUED | OUTPATIENT
Start: 2019-11-26 | End: 2019-11-30 | Stop reason: HOSPADM

## 2019-11-25 RX ORDER — QUETIAPINE FUMARATE 25 MG/1
25 TABLET, FILM COATED ORAL
Status: DISCONTINUED | OUTPATIENT
Start: 2019-11-25 | End: 2019-11-26

## 2019-11-25 RX ADMIN — QUETIAPINE FUMARATE 12.5 MG: 25 TABLET, FILM COATED ORAL at 09:11

## 2019-11-25 RX ADMIN — BISACODYL 10 MG: 5 TABLET, COATED ORAL at 12:11

## 2019-11-25 RX ADMIN — CEFTRIAXONE 1 G: 1 INJECTION, SOLUTION INTRAVENOUS at 12:11

## 2019-11-25 RX ADMIN — ENOXAPARIN SODIUM 40 MG: 100 INJECTION SUBCUTANEOUS at 04:11

## 2019-11-25 RX ADMIN — LORAZEPAM 0.5 MG: 2 INJECTION INTRAMUSCULAR; INTRAVENOUS at 10:11

## 2019-11-25 RX ADMIN — QUETIAPINE FUMARATE 25 MG: 25 TABLET ORAL at 04:11

## 2019-11-25 RX ADMIN — METOPROLOL TARTRATE 25 MG: 25 TABLET, FILM COATED ORAL at 08:11

## 2019-11-25 RX ADMIN — TRAMADOL HYDROCHLORIDE 50 MG: 50 TABLET ORAL at 07:11

## 2019-11-25 RX ADMIN — LIDOCAINE 1 PATCH: 50 PATCH TOPICAL at 08:11

## 2019-11-25 RX ADMIN — TRAMADOL HYDROCHLORIDE 50 MG: 50 TABLET ORAL at 12:11

## 2019-11-25 RX ADMIN — AMLODIPINE BESYLATE 2.5 MG: 2.5 TABLET ORAL at 09:11

## 2019-11-25 RX ADMIN — METOPROLOL TARTRATE 25 MG: 25 TABLET, FILM COATED ORAL at 09:11

## 2019-11-25 NOTE — ASSESSMENT & PLAN NOTE
With history of pathological fractures and osteoporosis.   Continue Oscal D  Vitamin-D level within normal limits

## 2019-11-25 NOTE — ASSESSMENT & PLAN NOTE
[place] CVA- 3 areas noted  Telemetry  Orders as per Neurology  Remains on aspirin and statin  Physical therapy, occupational therapy, and speech therapy consulted  Fall, skin, aspiration precautions  Case management consulted to assist with discharge planning needs-patient previously lived at home with the son  Skilled consult pending

## 2019-11-25 NOTE — ASSESSMENT & PLAN NOTE
Continue a.m. Seroquel as is-patient drowsy this morning-decrease p.m. dose back to normal home dose of 25 mg q.p.m..

## 2019-11-25 NOTE — PT/OT/SLP PROGRESS
Physical Therapy      Patient Name:  Ellen Maravilla   MRN:  [id number]    Patient not seen today secondary to Patient fatigue. Will follow-up 11/26/2019.    Brooklyn Mac, PTA

## 2019-11-25 NOTE — PLAN OF CARE
11/24/19 1916   Patient Assessment/Suction   Level of Consciousness (AVPU) alert   PRE-TX-O2   O2 Device (Oxygen Therapy) nasal cannula   Flow (L/min) 2   Oxygen Concentration (%) 28   SpO2 97 %   Pulse Oximetry Type Intermittent   Pulse 69   Resp 18   Temp 96.9 °F (36.1 °C)   BP (!) 129/59   Ready to Wean/Extubation Screen   FIO2<=50 (chart decimal) 0.28

## 2019-11-25 NOTE — PROGRESS NOTES
"[facility] Medicine  Progress Note    Patient Name: Ellen Maravilla  MRN: [id number]  Patient Class: IP- Inpatient   Admission Date: 11/21/2019  Length of Stay: 4 days  Attending Physician: Jerzy Storm MD  Primary Care Provider: Tien Mckeon MD      Subjective:     Principal Problem:Acute ischemic stroke, dementia with behavioral disturbance, present admission urinary tract infection, severe debility    HPI:  This is a 86 y.o. female with a PMHx HTN, CAD, thyroid disease, uterine cancer, and dementia who presented to the emergency department increased confusion and slurred speech. History limited due to patient's baseline dementia and increased confusion. Patient's son and grandson are at the bedside and providing history. The son reports that the patient became confused yesterday morning and had slurred speech and generalized weakness. The family brought her to see her PCP, Dr. Mckeon, who referred her to the ED for further evaluation. The son reports she was unable to get up from the chair today, and typically she is able to ambulate with her walker. The patient also had decreased appetite that began last night, and had nausea and vomiting upon arrival to Dr. Mckeon's office. Per the son she had one occurrence of emesis that he described as bile. The patient is currently reporting "spasming" right knee pain, the son denies any previous complaints of knee pain. Son denies of any known falls. The patient reports her nausea is improved at this time after receiving medication in the ED. The patient denies any shortness of breath, abdominal pain, chest pain, headaches, or cough. The ED work up is significant for urinalysis revealing few bacteria and nitrite positive. Urine cx pending. CT scan of head and abd/pelvis show no acute process.  Patient was started  on IV rocephin.  Patient was placed in the hospital for further evaluation treatment.    Overview/Hospital Course:  Patient " monitor closely during her stay. Patient was placed on continuous telemetry monitoring. Patient presented with increased confusion and slurred speech. Urinalysis was significant for few bacteria and nitrate positive. Urine culture was obtained and patient was continued on IV Rocephin.  CT head without contrast showed  no  acute abnormality. CT abdomen and pelvis with contrast showed no acute abdominopelvic process. 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 Patient was also noted to have osteopenia with multiple chronic thoracolumbar compression fractures and a prior L1 vertebroplasty.  Neurology was consulted.  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 Neurology was consulted. Patient had an echocardiogram which showed EF of 55%. No PFO on saline bubble and color flow study was noted. A bilateral carotid ultrasound was done with bilateral carotid artery disease noted but no significant stenosis noted. Patient was continued on aspirin and statin.  Physical therapy, occupational therapy, and speech therapy were consulted.  She did complain of some left leg pain during her stay.  Ultrasound of left lower extremity was negative for DVT.  X-ray of the left hip showed no acute issues.  Patient continued with her increased confusion and also put periods of agitation.  She was noted to have a UTI.  A urine culture was sent to the lab and she was placed on IV Rocephin.  Psych was consulted and felt patient had delirium on top of her dementia.  The patient's Seroquel was increased.  She was noted to have improvement in confusion.  It was felt patient would benefit from further physical therapy Occupational therapy due to her debility.  Case management was consulted for discharge planning needs. SNF was consulted.          Interval History:  Patient's  confusion is improved this a.m..  Continue current treatment.  Case management consulted for discharge planning needs. SNF consult pending.    Review of Systems   Constitutional: Positive for activity change, appetite change and fatigue. Negative for chills and fever.   HENT: Negative for ear discharge, ear pain and facial swelling.    Eyes: Negative for pain and redness.   Respiratory: Negative for cough and shortness of breath.    Cardiovascular: Negative for leg swelling.   Gastrointestinal: Negative for abdominal distention, abdominal pain, constipation, diarrhea, nausea and vomiting.   Endocrine: Negative for polydipsia and polyphagia.   Genitourinary: Negative for difficulty urinating, dysuria, flank pain and hematuria.   Musculoskeletal: Positive for back pain.   Skin: Negative for color change.   Allergic/Immunologic: Negative for food allergies.   Neurological: Positive for weakness. Negative for seizures, facial asymmetry and speech difficulty.   Hematological: Does not bruise/bleed easily.   Psychiatric/Behavioral: Positive for confusion. Negative for hallucinations and suicidal ideas. The patient is not nervous/anxious.      Objective:     Vital Signs (Most Recent):  Temp: 97.8 °F (36.6 °C) (11/25/19 1227)  Pulse: 64 (11/25/19 1227)  Resp: 16 (11/25/19 0731)  BP: 132/62 (11/25/19 1227)  SpO2: 96 % (11/25/19 1227) Vital Signs (24h Range):  Temp:  [96 °F (35.6 °C)-97.8 °F (36.6 °C)] 97.8 °F (36.6 °C)  Pulse:  [61-88] 64  Resp:  [16-18] 16  SpO2:  [96 %-99 %] 96 %  BP: (119-150)/(59-88) 132/62     Weight: 78.1 kg (172 lb 2.9 oz)  Body mass index is 27.79 kg/m².  No intake or output data in the 24 hours ending 11/25/19 1353   Physical Exam   Constitutional: She appears well-developed and well-nourished. No distress.   HENT:   Head: Atraumatic.   Eyes:  Conjunctivae and EOM are normal. Right eye exhibits no discharge. Left eye exhibits no discharge.   Neck: Normal range of motion. Neck supple. No JVD present.    Cardiovascular: Normal rate, regular rhythm and intact distal pulses.   Murmur (ASM) heard.  Pulmonary/Chest: Effort normal and breath sounds normal. No respiratory distress. She has no wheezes. She has no rales.   Lungs CTA bilaterally, currently on room air   Abdominal: Soft. Bowel sounds are normal. She exhibits no distension. There is no tenderness. There is no guarding.   Genitourinary:   Genitourinary Comments: Not examined   Musculoskeletal: Normal range of motion. She exhibits no edema.   Neurological: She is sleepy but arousable. No sensory deficit.   Oriented to person  Forgetful, confused  Responds appropriately to simple commands   Skin: Skin is warm and dry. Capillary refill takes less than 2 seconds. She is not diaphoretic. There is pallor.   Psychiatric: Her speech is delayed and slurred. She is withdrawn.   Calm and cooperative at this time   Nursing note and vitals reviewed.    Labs reviewed      Assessment/Plan:      * Acute ischemic stroke  [place] CVA- 3 areas noted  Telemetry  Orders as per Neurology  Remains on aspirin and statin  Physical therapy, occupational therapy, and speech therapy consulted  Fall, skin, aspiration precautions  Case management consulted to assist with discharge planning needs-patient previously lived at home with the son  Skilled consult pending        Hypomagnesemia  Monitor  Supplement as needed      Debility  Fall, skin, aspiration precautions  Turn q.2 hours  Continue physical therapy, occupational therapy, and speech therapy      DDD (degenerative disc disease), lumbar        DDD (degenerative disc disease), thoracic        Pulmonary nodules  Noted      Splenic artery aneurysm  Noted      Pancreatic lesion  Unchanged from prior CT scan      Pancytopenia  Chronic  Noted in records reviewed from 2012      Bilateral carotid artery disease  Continue aspirin and statin      Delirium  Continue delirium precautions.     Psych previously consulted    Hyperlipidemia  Continue  statin      Port-A-Cath in place  Noted        Vascular dementia  Continue a.m. Seroquel as is-patient drowsy this morning-decrease p.m. dose back to normal home dose of 25 mg q.p.m..        Moderate malnutrition  Monitor and encourage p.o. Intake  Dietitian consulted   Continue p.o. Supplements          Acute cystitis without hematuria  Continue IV Rocephin  Urine culture currently no growth    Closed compression fracture of thoracic vertebra, T9 and old multiple lumbar compression fractures  Chronic.   Mild analgesics for pain    Osteoporosis  With history of pathological fractures and osteoporosis.   Continue [place] D  Vitamin-D level within normal limits      Hypothyroidism  Patient has chronic hypothyroidism.   Currently no home medications noted   TFTs reviewed-   Lab Results   Component Value Date    TSH 3.719 11/21/2019       Hypertension  Monitor  Continue current treatment      VTE Risk Mitigation (From admission, onward)         Ordered     enoxaparin injection 40 mg  Daily      11/21/19 1548     IP VTE HIGH RISK PATIENT  Once      11/21/19 1548                Time spent seeing patient( greater than 1/2 spent in direct contact) : 32 minutes      GLORIA Diaz  Department of Hospital Medicine   [facility]

## 2019-11-25 NOTE — PLAN OF CARE
Problem: SLP Goal  Goal: SLP Goal  Description    1) Participate in full SLP evaluation   2) Participate in MBSS     Outcome: Ongoing, Progressing    Orders received for swallow re-evaluation. Pt lethargic today with worsening dysarthria; very poor speech intelligibility. REC downgrading diet to pureed textures (IDDSI 4) and NECTAR thick liquids. MBSS for further evaluation .Please order if you agree.

## 2019-11-25 NOTE — PT/OT/SLP EVAL
Speech Language Pathology Evaluation  Bedside Swallow    Patient Name:  Ellen Maravilal   MRN:  [id number]  Admitting Diagnosis: Acute ischemic stroke    Recommendations:                 General Recommendations:  Speech language evaluation, Cognitive-linguistic evaluation and Modified barium swallow study  Diet recommendations:  Puree(IDDSI 4), [place] Thick   Aspiration Precautions: 1 bite/sip at a time, Assistance with meals and Assistance with thickening liquids, Check for pocketing/oral residue, Feed only when awake/alert, Meds crushed in puree and Small bites/sips   General Precautions: Standard, aspiration, fall  Communication strategies:  provide increased time to answer    History:     Past Medical History:   Diagnosis Date    Coronary artery disease     Dementia     Hypertension     Thyroid disease     Uterine cancer        Past Surgical History:   Procedure Laterality Date    APPENDECTOMY      BACK SURGERY      HYSTERECTOMY      KNEE SURGERY      TONSILLECTOMY         Social History: Patient lives with  and son per her report.    Prior Intubation HX:  None     Modified Barium Swallow: none    Imaging;  US Lower Extremity Veins Left   Final Result      No evidence of deep venous thrombosis in the left lower extremity.         Electronically signed by: Carlos Weber   Date:    11/24/2019   Time:    11:07      X-Ray Hip 1 View Left   Final Result      1. No acute radiographic findings of the left hip on this limited single AP view.   2. Bone demineralization.         Electronically signed by: Carlos Weber   Date:    11/24/2019   Time:    08:23      MRA Brain without contrast   Final Result      Asymmetrically small and irregular distal P 2 segment of the right posterior cerebral artery, likely due to underlying atherosclerosis.  Otherwise, normal magnetic resonance angiogram of the [id number] vasculature.         Electronically signed by: Subhash Maurer MD   Date:    11/22/2019    Time:    16:17      MRI BRAIN WITHOUT CONTRAST   Final Result   Abnormal      1. There are 3 small linear foci of acute nonhemorrhagic infarction in the central kathleen.   2. There is generalized volume loss with moderate to marked nonspecific white matter change.  These findings likely reflect sequelae of chronic small vessel disease.  Also, there is a small chronic infarction in the posterior right cerebellum.   This report was flagged in Epic as abnormal.  Abnormal critical results were discussed with the patient's nurse, Candelaria Kuhn, at 8:35 am 11/22/2019         Electronically signed by: Everett Coello MD   Date:    11/22/2019   Time:    08:35      US Carotid Bilateral   Final Result      No evidence of a hemodynamically significant carotid bifurcation stenosis.      Flow in vertebral arteries appears antegrade bilaterally         Electronically signed by: Cee Boyd MD   Date:    11/22/2019   Time:    08:37      CT Abdomen Pelvis With Contrast   Final Result      No acute abdominopelvic process.      Unchanged pancreatic cystic lesion.  These commonly represent benign pancreatic cystic neoplasms.  For a lesion of this size follow-up to 10 years of stability is recommended, with next follow-up in 2 years.      Atherosclerosis.  Unchanged small splenic artery aneurysm.      Osteopenia with multiple chronic thoracolumbar compression fractures.  Prior L1 vertebroplasty.         Electronically signed by: Carlos Wilkinson MD   Date:    11/21/2019   Time:    14:25      CT Head Without Contrast   Final Result      1. There is generalized volume loss with moderate to marked nonspecific white matter change.  There is no hemorrhage, mass, mass effect or obvious acute infarction.  It should be noted that MRI is more sensitive in the detection of subtle or acute nonhemorrhagic ischemic disease.   2. Atherosclerosis.         Electronically signed by: Everett Coello MD   Date:    11/21/2019   Time:    11:33            Prior diet: regular/thin.    Subjective     [unintelligible]    Objective:   Orders received for swallow re-evaluation 2' coughing with meats over the weekend. Clinical swallow evaluation completed 11/22/19 with recommendation of mechanical soft textures and thin liquids.     Upon entrance into room, pt asleep with family at bedside. She opens eyes to verbal/tactile stimulation. Required occasional verbal/tactile stimulation t/o evaluation to maintain alertness. She is lethargic. Very poor speech intelligibility today suggesting worsening dysarthria. She is oriented to self, place and moth. Disoriented to situation. Difficulty following simple commands for oral mech.     Oral Musculature Evaluation  · Oral Musculature: left weakness  · Dentition: upper and lower dentures  · Secretion Management: adequate  · Mucosal Quality: adequate  · Mandibular Strength and Mobility: WFL  · Oral Labial Strength and Mobility: impaired retraction  · Lingual Strength and Mobility: WFL  · Buccal Strength and Mobility: WFL  · Volitional Cough: weak  · Volitional Swallow: able to palpate larygneal rise  · Voice Prior to PO Intake: mildy wet, dysarthria    Bedside Swallow Eval:   Consistencies Assessed:  · Thin liquids --via tsp  · Nectar thick liquids --via tsp, cup and straw  · Puree --applesauce     Oral Phase:   · Anterior loss  · Drooling    Pharyngeal Phase:   · Coughing/choking--very weak following thin liquids via tsp  · throat clearing--thin via tsp  · wet vocal quality after swallow--following thin via tsp  · Delayed swallow    Compensatory Strategies  · None    Treatment: Pt/family educated re: results/recs of evaluation, SLP role and POC. Receptive to information provided.     Assessment:     Ellen Maravilla is a 86 y.o. female with an SLP diagnosis of Dysphagia and Dysarthria.  She presents with lethargy, worsening dysarthria, oral dysphagia and s/s pharyngeal dysphagia. REC downgrading diet to pureed textures (IDDSI  4) and NECTAR thick liquids. MBSS for further evaluation .Please order if you agree. .    Goals:   Multidisciplinary Problems     SLP Goals        Problem: SLP Goal    Goal Priority Disciplines Outcome   SLP Goal     SLP Ongoing, Progressing   Description:    1) Participate in full SLP evaluation   2) Participate in MBSS                      Plan:     · Patient to be seen:  5 x/week   · Plan of Care expires:  12/06/19  · Plan of Care reviewed with:  patient, son, family   · SLP Follow-Up:  Yes       Discharge recommendations:  nursing facility, skilled     Time Tracking:     SLP Treatment Date:   11/25/19  Speech Start Time:  1500  Speech Stop Time:  1515     Speech Total Time (min):  15 min    Billable Minutes: Eval Swallow and Oral Function 15 and Total Time 15    Rayna Artis, ESTELA-SLP  11/25/2019

## 2019-11-25 NOTE — SUBJECTIVE & OBJECTIVE
Interval History:  Patient's confusion is improved this a.m..  Continue current treatment.  Case management consulted for discharge planning needs. SNF consult pending.    Review of Systems   Constitutional: Positive for activity change, appetite change and fatigue. Negative for chills and fever.   HENT: Negative for ear discharge, ear pain and facial swelling.    Eyes: Negative for pain and redness.   Respiratory: Negative for cough and shortness of breath.    Cardiovascular: Negative for leg swelling.   Gastrointestinal: Negative for abdominal distention, abdominal pain, constipation, diarrhea, nausea and vomiting.   Endocrine: Negative for polydipsia and polyphagia.   Genitourinary: Negative for difficulty urinating, dysuria, flank pain and hematuria.   Musculoskeletal: Positive for back pain.   Skin: Positive for wound. Negative for color change.   Allergic/Immunologic: Negative for food allergies.   Neurological: Positive for weakness. Negative for seizures, facial asymmetry and speech difficulty.   Hematological: Does not bruise/bleed easily.   Psychiatric/Behavioral: Positive for confusion. Negative for hallucinations and suicidal ideas. The patient is not nervous/anxious.      Objective:     Vital Signs (Most Recent):  Temp: 97.8 °F (36.6 °C) (11/25/19 1227)  Pulse: 64 (11/25/19 1227)  Resp: 16 (11/25/19 0731)  BP: 132/62 (11/25/19 1227)  SpO2: 96 % (11/25/19 1227) Vital Signs (24h Range):  Temp:  [96 °F (35.6 °C)-97.8 °F (36.6 °C)] 97.8 °F (36.6 °C)  Pulse:  [61-88] 64  Resp:  [16-18] 16  SpO2:  [96 %-99 %] 96 %  BP: (119-150)/(59-88) 132/62     Weight: 78.1 kg (172 lb 2.9 oz)  Body mass index is 27.79 kg/m².  No intake or output data in the 24 hours ending 11/25/19 1353   Physical Exam   Constitutional: She appears well-developed and well-nourished. No distress.   HENT:   Head: Normocephalic and atraumatic.   Eyes: Pupils are equal, round, and reactive to light. Conjunctivae and EOM are normal. Right eye  exhibits no discharge. Left eye exhibits no discharge.   Neck: Normal range of motion. Neck supple. No JVD present.   Cardiovascular: Normal rate, regular rhythm and intact distal pulses.   Murmur (ASM) heard.  Pulmonary/Chest: Effort normal and breath sounds normal. No respiratory distress. She has no wheezes. She has no rales.   Lungs CTA bilaterally, currently on room air   Abdominal: Soft. Bowel sounds are normal. She exhibits no distension. There is no tenderness. There is no guarding.   Genitourinary:   Genitourinary Comments: Not examined   Musculoskeletal: Normal range of motion. She exhibits no edema.   Neurological: She is alert. No sensory deficit.   Oriented to person  Forgetful, confused  Responds appropriately to simple commands   Skin: Skin is warm and dry. Capillary refill takes less than 2 seconds. She is not diaphoretic. There is pallor.   Psychiatric: Her speech is delayed and slurred. She is withdrawn.   Calm and cooperative at this time   Nursing note and vitals reviewed.    Labs reviewed

## 2019-11-25 NOTE — PLAN OF CARE
Pt resting quietly at this time. Able to make some needs known. Incont b/b. Denies pain. Bed low and locked. Avasys at bedside. Call light in reach.

## 2019-11-26 PROCEDURE — 27000221 HC OXYGEN, UP TO 24 HOURS

## 2019-11-26 PROCEDURE — 63600175 PHARM REV CODE 636 W HCPCS: Performed by: NURSE PRACTITIONER

## 2019-11-26 PROCEDURE — 94761 N-INVAS EAR/PLS OXIMETRY MLT: CPT

## 2019-11-26 PROCEDURE — 25000003 PHARM REV CODE 250: Performed by: NURSE PRACTITIONER

## 2019-11-26 PROCEDURE — 92611 MOTION FLUOROSCOPY/SWALLOW: CPT

## 2019-11-26 PROCEDURE — 25000003 PHARM REV CODE 250: Performed by: FAMILY MEDICINE

## 2019-11-26 PROCEDURE — 97110 THERAPEUTIC EXERCISES: CPT

## 2019-11-26 PROCEDURE — 25000003 PHARM REV CODE 250: Performed by: HOSPITALIST

## 2019-11-26 PROCEDURE — 11000001 HC ACUTE MED/SURG PRIVATE ROOM

## 2019-11-26 PROCEDURE — 97535 SELF CARE MNGMENT TRAINING: CPT

## 2019-11-26 PROCEDURE — 92526 ORAL FUNCTION THERAPY: CPT

## 2019-11-26 PROCEDURE — 25000003 PHARM REV CODE 250: Performed by: INTERNAL MEDICINE

## 2019-11-26 RX ORDER — SODIUM CHLORIDE, SODIUM LACTATE, POTASSIUM CHLORIDE, CALCIUM CHLORIDE 600; 310; 30; 20 MG/100ML; MG/100ML; MG/100ML; MG/100ML
INJECTION, SOLUTION INTRAVENOUS CONTINUOUS
Status: DISCONTINUED | OUTPATIENT
Start: 2019-11-26 | End: 2019-11-29

## 2019-11-26 RX ORDER — HALOPERIDOL 5 MG/ML
5 INJECTION INTRAMUSCULAR ONCE AS NEEDED
Status: COMPLETED | OUTPATIENT
Start: 2019-11-26 | End: 2019-11-28

## 2019-11-26 RX ADMIN — ATORVASTATIN CALCIUM 40 MG: 40 TABLET, FILM COATED ORAL at 08:11

## 2019-11-26 RX ADMIN — METOPROLOL TARTRATE 25 MG: 25 TABLET, FILM COATED ORAL at 08:11

## 2019-11-26 RX ADMIN — SODIUM CHLORIDE, SODIUM LACTATE, POTASSIUM CHLORIDE, AND CALCIUM CHLORIDE: .6; .31; .03; .02 INJECTION, SOLUTION INTRAVENOUS at 05:11

## 2019-11-26 RX ADMIN — CEFTRIAXONE 1 G: 1 INJECTION, SOLUTION INTRAVENOUS at 12:11

## 2019-11-26 RX ADMIN — ENOXAPARIN SODIUM 40 MG: 100 INJECTION SUBCUTANEOUS at 04:11

## 2019-11-26 RX ADMIN — ASPIRIN 81 MG: 81 TABLET ORAL at 08:11

## 2019-11-26 RX ADMIN — METOPROLOL TARTRATE 25 MG: 25 TABLET, FILM COATED ORAL at 09:11

## 2019-11-26 RX ADMIN — LIDOCAINE 1 PATCH: 50 PATCH TOPICAL at 08:11

## 2019-11-26 RX ADMIN — QUETIAPINE FUMARATE 12.5 MG: 25 TABLET, FILM COATED ORAL at 08:11

## 2019-11-26 RX ADMIN — OYSTER SHELL CALCIUM WITH VITAMIN D 1 TABLET: 500; 200 TABLET, FILM COATED ORAL at 08:11

## 2019-11-26 RX ADMIN — AMLODIPINE BESYLATE 2.5 MG: 2.5 TABLET ORAL at 08:11

## 2019-11-26 RX ADMIN — QUETIAPINE FUMARATE 25 MG: 25 TABLET ORAL at 04:11

## 2019-11-26 NOTE — PROCEDURES
Modified Barium Swallow    Patient Name:  Ellen Maravilla   MRN:  [id number]      Recommendations:     Recommendations:                General Recommendations:  Dysphagia therapy, Speech/language therapy, Cognitive-linguistic therapy, Speech language evaluation and Cognitive-linguistic evaluation  Diet recommendations:  NPO, NPO(x meds crushed in applesauce)   General Precautions: Standard, aspiration, fall, NPO  Communication strategies:  provide increased time to answer    Referral     Reason for Referral  Patient was referred for a Modified Barium Swallow Study to assess the efficiency of his/her swallow function, rule out aspiration and make recommendations regarding safe dietary consistencies, effective compensatory strategies, and safe eating environment.     Diagnosis: Acute ischemic stroke       History:     Past Medical History:   Diagnosis Date    Coronary artery disease     Dementia     Hypertension     Thyroid disease     Uterine cancer        Objective:     Current Respiratory Status: Nasal cannula    Alert: inconsistent. Lethargic with difficulty following simple commands    Cooperative: yes    Follows Directions: inconsistent    Visualization  · Patient was seen in the lateral view    Oral Peripheral Examination  · Oral Musculature: left weakness  · Dentition: upper and lower dentures  · Secretion Management: adequate  · Mucosal Quality: adequate  · Mandibular Strength and Mobility: WFL  · Oral Labial Strength and Mobility: impaired retraction, impaired seal  · Lingual Strength and Mobility: impaired protrusion  · Buccal Strength and Mobility: WFL  · Volitional Cough: weak  · Volitional Swallow: able to palpate larygneal rise  · Voice Prior to PO Intake: mildy wet, dysarthria  · Oral Musculature Comments: see clinical swallow evaluation           11/26/19 1527   Speech Time Calculation   Speech Start Time 1303   Speech Stop Time 1315   Speech Total (min) 12 min   General Information   SLP Treatment  "Date 11/26/19   Current Respiratory Status nasal cannula   General Precautions aspiration;fall;NPO   Current Diet   Current Diet Textures Puree   Current Liquid Consistencies Nectar Thick   Subjective   Patient states "I didn't sleep good last night."   Oral Musculature Evaluation   Oral Labial Strength and Mobility impaired retraction;impaired seal   Lingual Strength and Mobility impaired protrusion   Oral Musculature Comments see clinical swallow evaluation        MBS Eval: Thin Liquid Trial   Mode of Presentation, Thin Liquid spoon;fed by clinician   Oral Prep/Phase, Thin Liquid decreased bolus control;premature spillage;anterior loss of bolus   Oral Residue, Thin Liquid left anterior loss of bolus   Pharyngeal Phase, Thin Liquid impaired;decreased base of tongue retraction;reduction in laryngeal elevation;pyriform sinuses pooling;delayed pharyngeal swallow;vallecular stasis  (Mild delay, incomplete epiglottic inversion)   Rosenbeck's 8-Point Penetration-Aspiration Scale, Thin Liquids (7) Material enters the airway, passes below the vocal folds, and is not ejected from the trachea despite effort.   Penetration/Aspiration, Thin Liquid Aspiration, during the swallow, across 2 of 3 tsp trials with weak ineffective cough noted   Successful Strategies Trialed During Procedure, Thin Liquid   (Pt unable to follow commands )        [facility]: Nectar Thick Liquid Trial   Mode of Presentation, Nectar spoon;fed by clinician   Oral Prep/Phase, Nectar decreased bolus control;premature spillage   Oral Residue, Nectar back posterior tongue;left side pocketing   Pharyngeal Phase, Nectar impaired;decreased base of tongue retraction;reduction in laryngeal elevation;pyriform sinuses pooling;premature spillage;delayed pharyngeal swallow;vallecular stasis  (incomplete epiglottic inversion)   Rosenbeck's 8-Point Penetration-Aspiration Scale, Nectar Thick Liquids (8) Material enters the airway, passes below the vocal folds, and no " effort is made to eject.   Penetration/Aspiration, Nectar Direct aspiration, during the swallow, noted on 2 of 5 trials via tsp. Silent aspiration x1.        MBS Eval: Pureed Trial   Mode of Presentation, Puree spoon;fed by clinician   Oral Prep/Phase, Puree slow oral transit time;anterior loss of bolus   Oral Residue, Puree left anterior loss of bolus   Pharyngeal Phase, Puree WFL   Rosenbeck's 8-Point Penetration-Aspiration Scale, Pureed (1) Material does not enter airway.   Recommendations   Solid Diet Level NPO   Liquid Diet Level NPO  (x meds crushed in applesauce)   Plan   Plan Dysphagia Therapy;Alter current plan;Speech Language/Cognitive Evaluation;Speech Language/Cognitive Therapy;Patient Education;Family Education   SLP Follow-up   SLP Follow-up? Yes   SLP - Next Visit Date 11/27/19   Treatment/Billable Minutes   Motion Fluoro Swallow, Cine/Vid 12   Total Time 12       Cervical Esophageal Phase  · UES appeared to accommodate all bolus types without stasis or retrograde movement observed     Assessment:     Impressions  ·  Patient presents moderate-severe oropharyngeal dysphagia characterized by reduced labial seal and lingual strength/ROM, decreased bolus control resulting in premature loss of bolus to level of pyriform sinus and mild swallow delay. Pt exhibited aspiration across 2 of 3 tsp trials of thin liquids. Aspiration, during the swallow, noted across 2 of 5 trials of nectar thick liquids. Episodes of aspiration were followed by weak, ineffective cough with the exception of single episode of silent aspiration. Applesauce was consumed without penetration, aspiration or significant residue but with L anterior loss. Pt was unable to follow simple commands to assess effectiveness of compensatory swallow strategies. Pt judged to be at high risk for aspiration 2' above stated deficits, lethargy and cognition. REC NPO x meds crushed in applesauce. Initiate dysphagia therapy.     Prognosis:  Fair    Barriers:  · Fatigue  · Cognitive status    Plan  Initiate dysphagia therapy    Education  Results discussed with pt, however, she did not demonstrate understanding of information presented. MD and Nsg notified of results via secure chat.     Goals:   Multidisciplinary Problems     SLP Goals        Problem: SLP Goal    Goal Priority Disciplines Outcome   SLP Goal     SLP Ongoing, Progressing   Description:    1) Participate in full SLP evaluation   2) Participate in MBSS--MET  3) Lingual/labial resistance exercises with MIN cues  4) Perform effortful swallow, CTAR, Mendelsohn and Shaker with MIN cues                       Plan:   · Patient to be seen:  Therapy Frequency: 5 x/week   · Plan of Care expires:  12/06/19  · Plan of Care reviewed with:  patient        Discharge recommendations:  nursing facility, skilled     Time Tracking:   SLP Treatment Date:   11/26/19  Speech Start Time:  1303  Speech Stop Time:  1315     Speech Total Time (min):  12 min    Rayna Artis CCC-SLP  11/26/2019

## 2019-11-26 NOTE — PLAN OF CARE
I completed the LOCET assessment with [facility] at [phone] opt 3. I faxed the signed and completed PASRR to [facility] at [phone]. CM will follow to obtain pts emailed 142. Magdalena Obrien LCSW    I received the pts 142 via email. PASRR and 142 scanned into [facility].      11/26/19 0916   Post-Acute Status   Post-Acute Authorization Placement

## 2019-11-26 NOTE — PT/OT/SLP PROGRESS
"Speech Language Pathology Treatment    Patient Name:  Ellen Maravilla   MRN:  [id number]  Admitting Diagnosis: Acute ischemic stroke    Recommendations:                 General Recommendations:  Modified barium swallow study  Diet recommendations:  Puree, Liquid Diet Level: Nectar Thick   Aspiration Precautions: 1 bite/sip at a time, Assistance with meals and Assistance with thickening liquids, Check for pocketing/oral residue and Feed only when awake/alert   General Precautions: Standard, aspiration, fall  Communication strategies:  provide increased time to answer    Subjective   "Its heavy." (body)    Objective:   Pt seen for tolerance of current diet and upgraded textures. Upon entrance into room, pt asleep but opens eyes to voice. She remains lethargic. She consumed pudding with slow oral prep and mild oral residue. Thin liquids trial via tsp with anterior loss on 1 of 3 trials and wet vocal quality.     Has the patient been evaluated by SLP for swallowing?   Yes  Keep patient NPO? No   Current Respiratory Status: nasal cannula      Assessment:     Ellen Maravilla is a 86 y.o. female with an SLP diagnosis of Dysphagia and Dysarthria. Remains lethargic. Continues with oral dysphagia and s/s pharyngeal dysphagia. MD notified via secure chat. Orders received for MBSS. Will proceed with MBSS, this afternoon, for further evaluation.     Goals:   Multidisciplinary Problems     SLP Goals        Problem: SLP Goal    Goal Priority Disciplines Outcome   SLP Goal     SLP Ongoing, Progressing   Description:    1) Participate in full SLP evaluation   2) Participate in MBSS                      Plan:     · Patient to be seen:  5 x/week   · Plan of Care expires:  12/06/19  · Plan of Care reviewed with:  patient   · SLP Follow-Up:  Yes       Discharge recommendations:  nursing facility, skilled       Time Tracking:     SLP Treatment Date:   11/26/19  Speech Start Time:  1031  Speech Stop Time:  1040     Speech Total Time (min):  9 " min    Billable Minutes: Treatment Swallowing Dysfunction 9 and Total Time 9    Rayna Artis CCC-SLP  11/26/2019

## 2019-11-26 NOTE — ASSESSMENT & PLAN NOTE
Patient underwent a modified barium swallow study earlier today and speech therapy recommended patient be NPO  Add lactated Ringer's for now and really evaluate swallow tomorrow when patient is more alert

## 2019-11-26 NOTE — PLAN OF CARE
Problem: SLP Goal  Goal: SLP Goal  Description    1) Participate in full SLP evaluation   2) Participate in MBSS     Outcome: Ongoing, Progressing    Remains lethargic. Continues with oral dysphagia and s/s pharyngeal dysphagia. Will proceed with MBSS for further evaluation.

## 2019-11-26 NOTE — PROGRESS NOTES
I had a long discussion with son Jhonny here in the hospital.  He reports there are 2 children,  he and his brother Lionel.  Neither have medical power of .  He states that he believes his mother wishes would be to be made comfortable if she were not going to get better.  He is going to contact his brother, Lionel, and both come tomorrow for a family meeting.

## 2019-11-26 NOTE — PLAN OF CARE
"Pt resting in bed with eyes closed. She says "no" when asked if she has any pain. Weight shift assistance provided during shift. Medication crushed and given per MAR. Bed in lowest position with wheels locked, and side rails up x2. HOB elevated 30-45 degrees. Safety maintained. Plan of care reviewed. Call light in reach. No further requests at this time. Will continue to monitor.    "

## 2019-11-26 NOTE — PROGRESS NOTES
"[facility]  Neurology  Progress Note    Patient Name: Ellen Maravilla  MRN: [id number]  Admission Date: 11/21/2019  Hospital Length of Stay: 5 days  Code Status: Full Code   Attending Provider: Jerzy Storm MD   Consulting Provider:  Sushil Centeno  Consulting NP: Lakeshia Jj NP  Primary Care Physician: Tien Mckeon MD  Principal Problem:Acute ischemic stroke      Subjective:     Chief Complaint:    Chief Complaint   Patient presents with    Aphasia     Last seen normal on Monday.       HPI: Ellen Maravilla is a 86 y.o. female with a PMHx HTN, CAD, thyroid disease, uterine cancer, and dementia who presented to the emergency department increased confusion and slurred speech. History limited due to patient's baseline dementia and increased confusion. Patient's son and grandson are at the bedside and providing history. The son reports that the patient became confused yesterday morning and had slurred speech and generalized weakness. The family brought her to see her PCP, Dr. Mckeon, who referred her to the ED for further evaluation. The son reports she was unable to get up from the chair today, and typically she is able to ambulate with her walker. The patient also had decreased appetite that began last night, and had nausea and vomiting upon arrival to Dr. Mckeon's office. Per the son she had one occurrence of emesis that he described as bile. The patient is currently reporting "spasming" right knee pain, the son denies any previous complaints of knee pain. Son denies of any known falls. The patient reports her nausea is improved at this time after receiving medication in the ED. The patient denies any shortness of breath, abdominal pain, chest pain, headaches, or cough.     The ED work up is significant for urinalysis revealing few bacteria and nitrite positive. Urine cx pending. CT head, abd/pelvis show no acute process. Started on IV rocephin. Patient will be placed in observation for " further work up and monitoring.        Neurology Interval History: Patient seen and examined with Dr. Crandall. Patient is able to be drowsy, sitting up in bed, responds to questions responses delayed. Patient son at bedside. Answer questions, Patient responds to name but states prior last name from previous marriage per son at bedside. Able to state correct location. Dysarthria noted expressive aphasia. Stroke score 14, Patient speech not improved, Patient seems worse today. Still ataxic. Left facial droop and left hand still weak. TTE was negative for PFO. Continue ASA 81mg daily. The patient denies any headache, tingling or numbness, or visual disturbance.     Past Medical History:   Diagnosis Date    Coronary artery disease     Dementia     Hypertension     Thyroid disease     Uterine cancer        Past Surgical History:   Procedure Laterality Date    APPENDECTOMY      BACK SURGERY      HYSTERECTOMY      KNEE SURGERY      TONSILLECTOMY         Review of patient's allergies indicates:  No Known Allergies    Current Neurological Medications:     Scheduled Meds:   amLODIPine  2.5 mg Oral Daily    aspirin  81 mg Oral Daily    atorvastatin  40 mg Oral Daily    bisacodyl  10 mg Rectal Once    calcium-vitamin D3  1 tablet Oral Daily    cefTRIAXone (ROCEPHIN) IVPB  1 g Intravenous Q24H    enoxaparin  40 mg Subcutaneous Daily    lidocaine  1 patch Transdermal Q24H    metoprolol tartrate  25 mg Oral BID    polyethylene glycol  17 g Oral Daily    quetiapine  12.5 mg Oral Daily    quetiapine  25 mg Oral Before dinner     Continuous Infusions:   sodium chloride 0.9%       PRN Meds:.acetaminophen, bisacodyl, dextrose 50%, dextrose 50%, glucagon (human recombinant), glucose, glucose, haloperidol lactate, ibuprofen, lorazepam, magnesium oxide, magnesium oxide, ondansetron, ondansetron, potassium chloride 10%, potassium chloride 10%, potassium chloride 10%, potassium, sodium phosphates, potassium, sodium  phosphates, potassium, sodium phosphates, sodium chloride 0.9%, sodium chloride 0.9%, traMADol      No current facility-administered medications on file prior to encounter.      Current Outpatient Medications on File Prior to Encounter   Medication Sig    QUEtiapine (SEROQUEL) 25 MG Tab Take 25 mg by mouth every evening.      Family History     Problem Relation (Age of Onset)    Depression Father    Early death Father    No Known Problems Mother        Tobacco Use    Smoking status: Never Smoker    Smokeless tobacco: Never Used   Substance and Sexual Activity    Alcohol use: No     Comment: rarely    Drug use: No    Sexual activity: Not on file     Review of Systems   Constitutional: Positive for activity change.   HENT: Positive for trouble swallowing and voice change. Negative for tinnitus.    Respiratory: Negative.    Cardiovascular: Negative.    Gastrointestinal: Negative.    Genitourinary: Negative.    Musculoskeletal: Negative.    Neurological: Positive for speech difficulty and weakness. Negative for dizziness, tremors, seizures, syncope, facial asymmetry, light-headedness, numbness and headaches.   Hematological: Negative.    Psychiatric/Behavioral: Negative.      Objective:     Vital Signs (Most Recent):  Temp: 96.7 °F (35.9 °C) (11/26/19 0726)  Pulse: 75 (11/26/19 0726)  Resp: 16 (11/26/19 0726)  BP: (!) 150/67 (11/26/19 0726)  SpO2: 98 % (11/26/19 0726) Vital Signs (24h Range):  Temp:  [96.7 °F (35.9 °C)-98.7 °F (37.1 °C)] 96.7 °F (35.9 °C)  Pulse:  [64-86] 75  Resp:  [16-18] 16  SpO2:  [96 %-100 %] 98 %  BP: (119-187)/(56-77) 150/67     Weight: 78.1 kg (172 lb 2.9 oz)  Body mass index is 27.79 kg/m².    Physical Exam   Constitutional: She appears well-developed and well-nourished.   HENT:   Head: Normocephalic and atraumatic.   Eyes: Pupils are equal, round, and reactive to light. EOM are normal.   Neck: Normal range of motion. Neck supple.   Cardiovascular: Normal rate.   Pulmonary/Chest: Effort  normal.   Abdominal: Soft.   Musculoskeletal: Normal range of motion.   Neurological: She is alert. She displays normal reflexes. No cranial nerve deficit or sensory deficit. She exhibits normal muscle tone. She has an abnormal Finger-Nose-Finger Test. Coordination abnormal. GCS eye subscore is 3 - to speech. GCS verbal subscore is 2 - incomprehensible speech. GCS motor subscore is 4 - withdraws from pain.   Reflex Scores:       Tricep reflexes are 2+ on the right side and 2+ on the left side.       Bicep reflexes are 2+ on the right side and 2+ on the left side.       Brachioradialis reflexes are 2+ on the right side and 2+ on the left side.       Patellar reflexes are 2+ on the right side and 2+ on the left side.       Achilles reflexes are 2+ on the right side and 2+ on the left side.  Skin: Skin is warm and dry. Capillary refill takes less than 2 seconds.   Psychiatric: Her speech is slurred.   Withdrawn        NEUROLOGICAL EXAMINATION:     MENTAL STATUS   Oriented to person.   Disoriented to place. Disoriented to city and area.   Disoriented to time.   Follows 1 step commands.   Attention: decreased. Concentration: decreased.   Speech: slurred   Level of consciousness: drowsy ,  responsive to painful stimuli  Knowledge: poor and inconsistent with education.        Very inattentive.      CRANIAL NERVES   Cranial nerves II through XII intact.     CN III, IV, VI   Pupils are equal, round, and reactive to light.  Extraocular motions are normal.     MOTOR EXAM   Muscle bulk: normal    Strength   Right neck flexion: 4/5  Left neck flexion: 4/5  Right neck extension: 4/5  Left neck extension: 4/5  Right deltoid: 4/5  Left deltoid: 4/5  Right biceps: 4/5  Left biceps: 4/5  Right triceps: 4/5  Left triceps: 4/5  Right wrist flexion: 4/5  Left wrist flexion: 4/5  Right wrist extension: 4/5  Left wrist extension: 4/5  Right interossei: 4/5  Left interossei: 4/5  Right abdominals: 3/5  Left abdominals: 3/5  Right  iliopsoas: 3/5  Left iliopsoas: 3/5  Right quadriceps: 3/5  Left quadriceps: 3/5  Right hamstring: 3/5  Left hamstring: 3/5  Right glutei: 3/5  Left glutei: 3/5  Right anterior tibial: 3/5  Left anterior tibial: 3/5  Right posterior tibial: 3/5  Left posterior tibial: 3/5  Right peroneal: 3/5  Left peroneal: 3/5  Right gastroc: 3/5  Left gastroc: 3/5    REFLEXES     Reflexes   Right brachioradialis: 2+  Left brachioradialis: 2+  Right biceps: 2+  Left biceps: 2+  Right triceps: 2+  Left triceps: 2+  Right patellar: 2+  Left patellar: 2+  Right achilles: 2+  Left achilles: 2+  Right : 2+  Left : 2+    SENSORY EXAM   Light touch normal.     GAIT AND COORDINATION      Coordination   Finger to nose coordination: abnormal    NIH Stroke Scale:    Level of Consciousness: 1 - drowsy  LOC Questions: 1 - answers one correctly    Best Gaze: 0 - normal  Visual: 0 - no visual loss  Facial Palsy: 1 - minor  Motor Left Arm: 1 - drift  Motor Right Arm: 1 - drift  Motor Left Leg: 3 - no effort against gravity  Motor Right Leg: 3 - no effort against gravity  Limb Ataxia: 1 - present in one limb  Sensory: 0 - normal  Best Language: 1 - mild to moderate aphasia  Dysarthria: 2 - near to unintelligible  Extinction and Inattention: 0 - no neglect  NIH Stroke Scale Total: 14  [place] Coma Scale:  Best Eye Response: 3 - to speech  Best Motor Response: 4 - withdraws from pain  Best Verbal Response: 2 - incomprehensible speech  [place] Coma Scale Total: 9            Significant Labs:     Lab Results   Component Value Date    CREATININE 0.6 11/25/2019     Lab Results   Component Value Date    HGBA1C 5.1 11/21/2019     Lab Results   Component Value Date    TSH 3.719 11/21/2019     Lab Results   Component Value Date    LDLCALC 132.0 11/21/2019       Significant Imaging:    CT head without contrast  Impression       1. There is generalized volume loss with moderate to marked nonspecific white matter change.  There is no hemorrhage, mass,  mass effect or obvious acute infarction.  It should be noted that MRI is more sensitive in the detection of subtle or acute nonhemorrhagic ischemic disease.  2. Atherosclerosis.     US Lower Extremity       Impression       No evidence of deep venous thrombosis in the left lower extremity.         Mri Brain Without Contrast    Result Date: 11/22/2019  1. There are 3 small linear foci of acute nonhemorrhagic infarction in the central kathleen. 2. There is generalized volume loss with moderate to marked nonspecific white matter change.  These findings likely reflect sequelae of chronic small vessel disease.  Also, there is a small chronic infarction in the posterior right cerebellum. This report was flagged in Epic as abnormal.  Abnormal critical results were discussed with the patient's nurse, Candelaria Kuhn, at 8:35 am 11/22/2019 Electronically signed by: Everett Coello MD Date:    11/22/2019 Time:    08:35    Us Carotid Bilateral    Result Date: 11/22/2019  No evidence of a hemodynamically significant carotid bifurcation stenosis. Flow in vertebral arteries appears antegrade bilaterally Electronically signed by: Cee Boyd MD Date:    11/22/2019 Time:    08:37    MRA brain without contrast   Impression       Asymmetrically small and irregular distal P 2 segment of the right posterior cerebral artery, likely due to underlying atherosclerosis.  Otherwise, normal magnetic resonance angiogram of the [id number]-ye-Ibmqdi vasculature.       ECHO   · Concentric left ventricular remodeling.  · Small left ventricular cavity size.  · Normal left ventricular systolic function. The estimated ejection fraction is 55%  · Grade I (mild) left ventricular diastolic dysfunction consistent with impaired relaxation.  · Mild right ventricular enlargement.  · Normal right ventricular systolic function.  · Mild right atrial enlargement.  · Mild to moderate tricuspid regurgitation.  · Indeterminate central venous pressure. Estimated PA  Left ureteral stone  02/06/2018    Active  [place] pressure is at least 19 mmHg.  · No PFO on saline bubble and color flow study       Assessment and Plan:    Patient is 85 y/o female, Patient drowsy today.  Son at bedside. Patient states name. Her responses are delayed.  Dysarthria noted expressive aphasia. Stroke score 14, Patient speech not improved. Still ataxic. Left facial droop and left hand still weak. TTE was negative for PFO. Continue ASA 81mg daily. The patient denies any headache, tingling or numbness, or visual disturbance. Recommend PT/OT/ST,  Will continue to follow    1. Acute nonhemorrhagic infarction in the central kathleen  Etiology: likely small vessel disease, but r/o cardioembolic source  -due to right atrial enlargement seen on TTE, will recommend outpatient cardiac event monitoring (at least 30 days) to rule out occult paroxysmal atrial fibrillation  -ASA 81mg daily for secondary stroke prevention  -Statins for secondary stroke prevention and hyperlipidemia  -Risk factor modification: HTN, HLD  -PT/OT/ST    2. Hyperlipidemia  -High intensity statin for secondary stroke prevention  -Life style modification; LDL goal <70          Patient is to follow up with [facility] at [phone] within 2 weeks from discharge.  Stroke education was provided to patient/family members including stroke risk factor modification and patient/family members were informed if patient experiences any acute neurological changes including weakness, confusion, visual changes to come straight to the ER.  All side effects of new medications were discussed with patient/family members.             Active Diagnoses:    Diagnosis Date Noted POA    PRINCIPAL PROBLEM:  Acute ischemic stroke [I63.9] 11/21/2019 Yes    Bilateral carotid artery disease [I73.9] 11/24/2019 Yes    Pancytopenia [D61.818] 11/24/2019 Yes    Pancreatic lesion [K86.9] 11/24/2019 Yes    Splenic artery aneurysm [I72.8] 11/24/2019 Yes    Pulmonary nodules [R91.8] 11/24/2019 Yes    DDD  (degenerative disc disease), thoracic [M51.34] 11/24/2019 Yes    DDD (degenerative disc disease), lumbar [M51.36] 11/24/2019 Yes    Debility [R53.81] 11/24/2019 Yes    Hypomagnesemia [E83.42] 11/24/2019 Yes    Delirium [R41.0] 11/23/2019 No    Acute cystitis without hematuria [N30.00] 11/21/2019 Yes    Moderate malnutrition [E44.0] 11/21/2019 Yes    Vascular dementia [F01.50] 11/21/2019 Yes    Port-A-Cath in place [Z95.828] 11/21/2019 Not Applicable    Hyperlipidemia [E78.5] 11/21/2019 Yes    Closed compression fracture of thoracic vertebra, T9 and old multiple lumbar compression fractures [S22.000A] 12/11/2017 Yes    Hypertension [I10] 02/10/2015 Yes    Hypothyroidism [E03.9] 02/10/2015 Yes    Osteoporosis [M81.0] 02/10/2015 Yes      Problems Resolved During this Admission:    Diagnosis Date Noted Date Resolved POA    Hyponatremia [E87.1] 11/24/2019 11/24/2019 No       VTE Risk Mitigation (From admission, onward)         Ordered     enoxaparin injection 40 mg  Daily      11/21/19 1548     IP VTE HIGH RISK PATIENT  Once      11/21/19 1548                Thank you for your consult. I will follow-up with patient. Please contact us if you have any additional questions.    Lakeshia Jj NP  Neurology  [facility]    I have seen the patient, reviewed the Nurse Practitioner's progress note. I have personally interviewed and examined the patient at bedside and agree with the findings.

## 2019-11-26 NOTE — SUBJECTIVE & OBJECTIVE
Interval History:  Patient remains drowsy this a.m..  Her Seroquel was reduced.  Patient underwent a modified barium swallow study and was recommend she be NPO at this time due to high risk of aspiration. The patient's overall condition was discussed with her son, Jhonny Patino, who was to get with his stepbrother for a family meeting for further discussion of the patient's overall condition and discharge options.      Review of Systems   Constitutional: Positive for activity change, appetite change and fatigue. Negative for chills and fever.   HENT: Negative for ear discharge, ear pain and facial swelling.    Eyes: Negative for pain and redness.   Respiratory: Negative for cough and shortness of breath.    Cardiovascular: Negative for leg swelling.   Gastrointestinal: Negative for abdominal distention, abdominal pain, constipation, diarrhea, nausea and vomiting.   Endocrine: Negative for polydipsia and polyphagia.   Genitourinary: Negative for difficulty urinating, dysuria, flank pain and hematuria.   Musculoskeletal: Positive for back pain.   Skin:  Negative for color change.   Allergic/Immunologic: Negative for food allergies.   Neurological: Positive for weakness. Negative for seizures, facial asymmetry and speech difficulty.   Hematological: Does not bruise/bleed easily.   Psychiatric/Behavioral: Positive for confusion, mild lethargy. Negative for hallucinations and suicidal ideas. The patient is not nervous/anxious.      Objective:     Vital Signs (Most Recent):  Temp: 97 °F (36.1 °C) (11/26/19 1551)  Pulse: 74 (11/26/19 1551)  Resp: 16 (11/26/19 1551)  BP: (!) 182/79 (11/26/19 1551)  SpO2: 98 % (11/26/19 1551) Vital Signs (24h Range):  Temp:  [96 °F (35.6 °C)-98.1 °F (36.7 °C)] 97 °F (36.1 °C)  Pulse:  [73-86] 74  Resp:  [15-18] 16  SpO2:  [96 %-100 %] 98 %  BP: (117-187)/(57-79) 182/79     Weight: 78.1 kg (172 lb 2.9 oz)  Body mass index is 27.79 kg/m².  No intake or output data in the 24 hours ending  11/26/19 1714   Physical Exam   Constitutional: She appears well-developed and well-nourished. No distress.   HENT:   Head: Atraumatic.   Eyes: Conjunctivae and EOM are normal. Right eye exhibits no discharge. Left eye exhibits no discharge.   Neck: Normal range of motion. Neck supple. No JVD present.   Cardiovascular: Normal rate, regular rhythm and intact distal pulses.   Murmur (ASM) heard.  Pulmonary/Chest: Effort normal and breath sounds normal. No respiratory distress. She has no wheezes. She has no rales.   BBS dimished   Abdominal: Soft. Bowel sounds are normal. She exhibits no distension. There is no tenderness. There is no guarding.   Genitourinary:   Genitourinary Comments: Not examined   Musculoskeletal: Normal range of motion. She exhibits no edema.   Neurological: She is drowsy.  No sensory deficit.   Forgetful, confused  Responds appropriately to simple commands at times  Skin: Skin is warm and dry. Capillary refill takes less than 2 seconds. She is not diaphoretic. There is pallor.   Psychiatric: Her speech is delayed and slurred.   Nursing note and vitals reviewed.    Labs reviewed

## 2019-11-26 NOTE — PLAN OF CARE
Patient able to complete a few reps of exercises AAROM with HOB elevated before getting fatigued.

## 2019-11-26 NOTE — PLAN OF CARE
POC reviewed with patient's family, understanding verbalized. Turned q 2 for bedbound status and limited mobility. Neuro checks q 4 hours. Mechanical soft diet. Telemetry monitoring maintained. VS stable throughout shift. Safety maintained. Will continue to monitor.

## 2019-11-26 NOTE — PT/OT/SLP PROGRESS
Occupational Therapy   Treatment    Name: Ellen Maravilla  MRN: [id number]  Admitting Diagnosis:  Acute ischemic stroke       Recommendations:     Discharge Recommendations: nursing facility, skilled  Discharge Equipment Recommendations:  (TBD)  Barriers to discharge:       Assessment:     Ellen Maravilla is a 86 y.o. female with a medical diagnosis of Acute ischemic stroke.  She presents with severe dysarthria, low vocal volume, what presented today as B LE paralysis, and B UE weakness and incoordination, with the R hand seeming to be weakest. Pt presents with impaired Gm and FM coordination. Performance deficits affecting function are weakness, impaired endurance, impaired sensation, impaired self care skills, impaired functional mobilty, impaired cognition, decreased upper extremity function, decreased lower extremity function, pain, impaired fine motor, impaired coordination, impaired skin, edema, impaired cardiopulmonary response to activity.     Rehab Prognosis:  Good; patient would benefit from acute skilled OT services to address these deficits and reach maximum level of function.       Plan:     Patient to be seen 3 x/week to address the above listed problems via self-care/home management, therapeutic activities, therapeutic exercises  · Plan of Care Expires: 12/06/19  · Plan of Care Reviewed with: patient    Subjective     Pain/Comfort:  · Pain Rating 1: other (see comments)(unable to quantify 2* dysarthria)  · Location - Side 1: Left  · Location - Orientation 1: generalized  · Location 1: leg  · Pain Addressed 1: Reposition, Distraction, Other (see comments)(treatment)  · Pain Rating Post-Intervention 1: other (see comments)    Objective:     Communicated with: LÁZARO Gaona prior to session.  Patient found HOB elevated and sleeping with bed alarm, oxygen, telemetry, peripheral IV(avasys) upon OT entry to room.    General Precautions: Standard, aspiration, fall, respiratory   Orthopedic Precautions:N/A   Braces:        Occupational Performance:     Bed Mobility:    · Despite verbal and tactile cueing and physical assist, pt was unable to perform supine>sit.     · Functional Mobility: severely impaired.    Activities of Daily Living:  · Grooming: total assistance for oral hygiene with HOB raised to 100*, complete set up on tray table over pt lap in bed. Pt was unable to remove either her top or bottom dentures on her own but agreed to having them out to be cleaned. BATISTA removed dentures while pt followed simple commands with her mouth to give assist. Pt was able to use a toothette with a bit of toothpaste on it to brush her mouth and gums with [place] assist and vc to continue task. Pt was able to hold and sip water from cup and swish and spit with BATISTA holding emeses basin under chin. Putting dentures back in pt's mouth proved to be more difficult as she was not able to coordinate doing so with her own UE and required Mod A for this portion of oral care too.      Patient left HOB elevated with all lines intact, call button in reach, bed alarm on and RN students  presentEducation:      GOALS:   Multidisciplinary Problems     Occupational Therapy Goals        Problem: Occupational Therapy Goal    Goal Priority Disciplines Outcome Interventions   Occupational Therapy Goal     OT, PT/OT Ongoing, Progressing    Description:  Goals to be met by: 12/6/52019    Patient will increase functional independence with ADLs by performing:    LE Dressing with Stand-by Assistance and Assistive Devices as needed.  Grooming while standing at sink with Stand-by Assistance.  Toileting from toilet with Stand-by Assistance for hygiene and clothing management.   Supine to sit with Contact Guard Assistance.  Toilet transfer to toilet with Contact Guard Assistance.  Upper extremity exercise program x10 reps per handout, with supervision.                      Time Tracking:     OT Date of Treatment: 11/26/19  OT Start Time: 1053  OT Stop Time: 1127  OT  Total Time (min): 34 min    Billable Minutes:Self Care/Home Management 34 min    WALTER Hauser  11/26/2019

## 2019-11-26 NOTE — PLAN OF CARE
Problem: Occupational Therapy Goal  Goal: Occupational Therapy Goal  Description  Goals to be met by: 12/6/52019    Patient will increase functional independence with ADLs by performing:    LE Dressing with Stand-by Assistance and Assistive Devices as needed.  Grooming while standing at sink with Stand-by Assistance.  Toileting from toilet with Stand-by Assistance for hygiene and clothing management.   Supine to sit with Contact Guard Assistance.  Toilet transfer to toilet with Contact Guard Assistance.  Upper extremity exercise program x10 reps per handout, with supervision.     Outcome: Ongoing, Progressing; goals may need to be reassessed

## 2019-11-26 NOTE — PT/OT/SLP PROGRESS
Physical Therapy Treatment    Patient Name:  Ellen Maravilla   MRN:  [id number]    Recommendations:     Discharge Recommendations:  nursing facility, skilled   Discharge Equipment Recommendations: (TBD)   Barriers to discharge: None    Assessment:     Ellen Maravilla is a 86 y.o. female admitted with a medical diagnosis of Acute ischemic stroke.  She presents with the following impairments/functional limitations:  weakness, impaired self care skills, impaired endurance, impaired functional mobilty, impaired coordination, decreased coordination, impaired balance. Pt agreeable to therapy in bed this morning but declined sitting EOB. Pt completed AAROM on BLEs with HOB elevated. Pt fatigued and unable to keep eyes open during all of session, pt needed consistent encouragement to participate.     Rehab Prognosis: Fair; patient would benefit from acute skilled PT services to address these deficits and reach maximum level of function.    Recent Surgery: * No surgery found *      Plan:     During this hospitalization, patient to be seen daily to address the identified rehab impairments via therapeutic activities, gait training, therapeutic exercises and progress toward the following goals:    · Plan of Care Expires:  12/13/19    Subjective     Chief Complaint: Left leg pain  Patient/Family Comments/goals: None stated.   Pain/Comfort:  · Pain Rating 1: (Not rated. )  · Location - Side 1: Left  · Location - Orientation 1: generalized  · Location 1: leg  · Pain Addressed 1: Pre-medicate for activity, Reposition      Objective:     Communicated with nurse Gaona prior to session.  Patient found HOB elevated with bed alarm, oxygen(AVASYS) upon PT entry to room.     General Precautions: Standard, aspiration, fall   Orthopedic Precautions:N/A   Braces: N/A     Functional Mobility:  ·       AM-PAC 6 CLICK MOBILITY          Therapeutic Activities and Exercises:  BLEs with HOB elevated: hip flex/abd/add, quad sets, APs x 10 reps      Patient left HOB elevated with all lines intact, call button in reach, bed alarm on, nurse Candelaria notified and AVASYS present..    GOALS:   Multidisciplinary Problems     Physical Therapy Goals        Problem: Physical Therapy Goal    Goal Priority Disciplines Outcome Goal Variances Interventions   Physical Therapy Goal     PT, PT/OT Ongoing, Progressing     Description:  Goals to be met by: 2019     Patient will increase functional independence with mobility by performin. Supine to sit with Contact Guard Assistance  2. Sit to supine with Contact Guard Assistance  3. Sit to stand transfer with Contact Guard Assistance  4. Bed to chair transfer with Contact Guard Assistance using Rolling Walker  5. Gait  x 20 feet with Minimal Assistance using Rolling Walker.   6. Lower extremity exercise program x10-20 reps per handout, with supervision                      Time Tracking:     PT Received On: 19  PT Start Time: 922     PT Stop Time: 935  PT Total Time (min): 13 min     Billable Minutes: Therapeutic Exercise 13 minutes    Treatment Type: Treatment  PT/PTA: PTA     PTA Visit Number: 1     Brooklyn Mac, PTA  2019

## 2019-11-26 NOTE — PLAN OF CARE
Date Status/Notes Created By   · 11/26/2019 10:05:22 AM Note: Please let us know if you can accept pt. pt medically ready for DC. still need PHN auth. thanks!  Lisandra Fonseca  · 11/25/2019 9:23:46 AM Under Review: Onsite Review  [email or web address]  · 11/22/2019 4:16:00 PM New: New SNF. Thanks !  Magdalena Obrien      Date Status/Notes Created By   · 11/26/2019 10:05:22 AM Note: Please let us know if you can accept pt. pt medically ready for DC. still need PHN auth. thanks!  Lisandra Fonseca  · 11/25/2019 9:23:46 AM Under Review: Onsite Review  [email or web address]  · 11/22/2019 4:16:00 PM New: New SNF. Thanks !  Magdalena Obrien    The pt is still pending acceptance from either [place] or [facility] and will need a PHN auth after acceptance. CM following. Magdalena Obrien, GRETEL     11/26/19 1105   Post-Acute Status   Post-Acute Authorization Placement   Post-Acute Placement Status Referrals Sent

## 2019-11-26 NOTE — PLAN OF CARE
PPD reading, PPD placement, 142 and PASRR sent to Guest house via [facility]. Magdalena Obrien, ALEXIAW     11/26/19 3421   Post-Acute Status   Post-Acute Authorization Placement

## 2019-11-26 NOTE — PLAN OF CARE
Problem: SLP Goal  Goal: SLP Goal  Description    1) Participate in full SLP evaluation   2) Participate in MBSS--MET  3) Lingual/labial resistance exercises with MIN cues  4) Perform effortful swallow, CTAR, Mendelsohn and Shaker with MIN cues      11/26/2019 1525 by Rayna Artis CCC-SLP  Outcome: Ongoing, Progressing    MBSS completed, however, pt remains lethargic. She presents with moderate-severe oropharyngeal dysphagia with aspiration of thin and nectar thick liquids. REC NPO x meds crushed in applesauce. Initiate dysphagia therapy.

## 2019-11-26 NOTE — PLAN OF CARE
POC reviewed with patient and patient's family, understanding verbalized. Patient very confused and lethargic throughout shift. Turned q 2 for bedbound status and limited mobility. 1L O2 NC. NPO except meds crushed. Telemetry monitoring maintained. VS stable throughout shift. Safety maintained. Will continue to monitor.

## 2019-11-26 NOTE — PLAN OF CARE
Status/Notes Created By   · 11/26/2019 2:03:44 PM Note: Her son wants to come in the morning around 9:30 to sign her paperwork  [email or web address]  · 11/26/2019 1:47:23 PM Note: We are shooting for today but pending PHN auth.  Magdalena Micah  · 11/26/2019 1:19:01 PM Note: When will she be ready to discharge  [email or web address]  · 11/26/2019 11:44:07 AM Accepted: Clinically accepted pending financials  [email or web address] [email or web address]  ·    The pt is still pending PHN auth for admit. CM following. Magdalena Obrien, [facility]      11/26/19 6015   Post-Acute Status   Post-Acute Authorization Placement   Post-Acute Placement Status Pending Payor Review

## 2019-11-26 NOTE — PLAN OF CARE
CM faxed request for SNF auth to [facility] at [phone].        11/26/19 1222   Post-Acute Status   Post-Acute Authorization Placement   Post-Acute Placement Status Pending Payor Medical Review

## 2019-11-27 LAB
AMMONIA PLAS-SCNC: 25 UMOL/L (ref 10–50)
ANION GAP SERPL CALC-SCNC: 5 MMOL/L (ref 8–16)
BASOPHILS # BLD AUTO: 0.01 K/UL (ref 0–0.2)
BASOPHILS NFR BLD: 0.4 % (ref 0–1.9)
BUN SERPL-MCNC: 14 MG/DL (ref 8–23)
CALCIUM SERPL-MCNC: 8.1 MG/DL (ref 8.7–10.5)
CHLORIDE SERPL-SCNC: 102 MMOL/L (ref 95–110)
CO2 SERPL-SCNC: 28 MMOL/L (ref 23–29)
CREAT SERPL-MCNC: 0.5 MG/DL (ref 0.5–1.4)
DIFFERENTIAL METHOD: ABNORMAL
EOSINOPHIL # BLD AUTO: 0.1 K/UL (ref 0–0.5)
EOSINOPHIL NFR BLD: 2.9 % (ref 0–8)
ERYTHROCYTE [DISTWIDTH] IN BLOOD BY AUTOMATED COUNT: 14.8 % (ref 11.5–14.5)
EST. GFR  (AFRICAN AMERICAN): >60 ML/MIN/1.73 M^2
EST. GFR  (NON AFRICAN AMERICAN): >60 ML/MIN/1.73 M^2
GLUCOSE SERPL-MCNC: 94 MG/DL (ref 70–110)
HCT VFR BLD AUTO: 32.6 % (ref 37–48.5)
HGB BLD-MCNC: 10.8 G/DL (ref 12–16)
IMM GRANULOCYTES # BLD AUTO: 0.01 K/UL (ref 0–0.04)
LYMPHOCYTES # BLD AUTO: 0.9 K/UL (ref 1–4.8)
LYMPHOCYTES NFR BLD: 33.5 % (ref 18–48)
MCH RBC QN AUTO: 34.2 PG (ref 27–31)
MCHC RBC AUTO-ENTMCNC: 33.1 G/DL (ref 32–36)
MCV RBC AUTO: 103 FL (ref 82–98)
MONOCYTES # BLD AUTO: 0.3 K/UL (ref 0.3–1)
MONOCYTES NFR BLD: 9.1 % (ref 4–15)
NEUTROPHILS # BLD AUTO: 1.5 K/UL (ref 1.8–7.7)
NEUTROPHILS NFR BLD: 53.7 % (ref 38–73)
NRBC BLD-RTO: 0 /100 WBC
PLATELET # BLD AUTO: 122 K/UL (ref 150–350)
PMV BLD AUTO: 9.2 FL (ref 9.2–12.9)
POTASSIUM SERPL-SCNC: 4 MMOL/L (ref 3.5–5.1)
RBC # BLD AUTO: 3.16 M/UL (ref 4–5.4)
SODIUM SERPL-SCNC: 135 MMOL/L (ref 136–145)
WBC # BLD AUTO: 2.75 K/UL (ref 3.9–12.7)

## 2019-11-27 PROCEDURE — 97803 MED NUTRITION INDIV SUBSEQ: CPT

## 2019-11-27 PROCEDURE — 25000003 PHARM REV CODE 250: Performed by: INTERNAL MEDICINE

## 2019-11-27 PROCEDURE — 92526 ORAL FUNCTION THERAPY: CPT

## 2019-11-27 PROCEDURE — 25000003 PHARM REV CODE 250: Performed by: NURSE PRACTITIONER

## 2019-11-27 PROCEDURE — 36415 COLL VENOUS BLD VENIPUNCTURE: CPT

## 2019-11-27 PROCEDURE — 94761 N-INVAS EAR/PLS OXIMETRY MLT: CPT

## 2019-11-27 PROCEDURE — 82140 ASSAY OF AMMONIA: CPT

## 2019-11-27 PROCEDURE — 92611 MOTION FLUOROSCOPY/SWALLOW: CPT

## 2019-11-27 PROCEDURE — 27000221 HC OXYGEN, UP TO 24 HOURS

## 2019-11-27 PROCEDURE — 97110 THERAPEUTIC EXERCISES: CPT

## 2019-11-27 PROCEDURE — 63600175 PHARM REV CODE 636 W HCPCS: Performed by: NURSE PRACTITIONER

## 2019-11-27 PROCEDURE — 85025 COMPLETE CBC W/AUTO DIFF WBC: CPT

## 2019-11-27 PROCEDURE — 11000001 HC ACUTE MED/SURG PRIVATE ROOM

## 2019-11-27 PROCEDURE — 25000003 PHARM REV CODE 250: Performed by: FAMILY MEDICINE

## 2019-11-27 PROCEDURE — 80048 BASIC METABOLIC PNL TOTAL CA: CPT

## 2019-11-27 RX ADMIN — LIDOCAINE 1 PATCH: 50 PATCH TOPICAL at 08:11

## 2019-11-27 RX ADMIN — TRAMADOL HYDROCHLORIDE 50 MG: 50 TABLET ORAL at 02:11

## 2019-11-27 RX ADMIN — TRAMADOL HYDROCHLORIDE 50 MG: 50 TABLET ORAL at 01:11

## 2019-11-27 RX ADMIN — METOPROLOL TARTRATE 25 MG: 25 TABLET, FILM COATED ORAL at 08:11

## 2019-11-27 RX ADMIN — ENOXAPARIN SODIUM 40 MG: 100 INJECTION SUBCUTANEOUS at 04:11

## 2019-11-27 RX ADMIN — AMLODIPINE BESYLATE 2.5 MG: 2.5 TABLET ORAL at 09:11

## 2019-11-27 RX ADMIN — METOPROLOL TARTRATE 25 MG: 25 TABLET, FILM COATED ORAL at 09:11

## 2019-11-27 RX ADMIN — OYSTER SHELL CALCIUM WITH VITAMIN D 1 TABLET: 500; 200 TABLET, FILM COATED ORAL at 09:11

## 2019-11-27 RX ADMIN — ASPIRIN 81 MG: 81 TABLET ORAL at 09:11

## 2019-11-27 RX ADMIN — CEFTRIAXONE 1 G: 1 INJECTION, SOLUTION INTRAVENOUS at 12:11

## 2019-11-27 RX ADMIN — ATORVASTATIN CALCIUM 40 MG: 40 TABLET, FILM COATED ORAL at 09:11

## 2019-11-27 NOTE — ASSESSMENT & PLAN NOTE
Contributing Nutrition Diagnosis  Moderate chronic condition related malnutrition    Related to (etiology):   Decreased appetite r/t dementia at times    Signs and Symptoms (as evidenced by):   1) Mild LE edmea  2) Mild-moderate muscle/fat loss as charted below    Interventions/Recommendations (treatment strategy):  See RD note    Nutrition Diagnosis Status:   continues

## 2019-11-27 NOTE — PLAN OF CARE
11/27/19 1153   PRE-TX-O2   O2 Device (Oxygen Therapy) nasal cannula   $ Is the patient on Low Flow Oxygen? Yes   Flow (L/min) 1   SpO2 (!) 94 %   Pulse Oximetry Type Intermittent   $ Pulse Oximetry - Multiple Charge Pulse Oximetry - Multiple

## 2019-11-27 NOTE — PLAN OF CARE
Problem: SLP Goal  Goal: SLP Goal  Description    1) Participate in full SLP evaluation   2) Participate in MBSS--MET  3) Lingual/labial resistance exercises with MIN cues  4) Perform effortful swallow, CTAR, Mendelsohn and Shaker with MIN cues  5) The patient will demonstrate appropriate compensatory strategies (chin tuck with liquids) with MIN cues.      Outcome: Ongoing, Progressing    Repeat MBSS completed. Pt presents with mild-moderate oropharyngeal dysphagia. REC Pureed textures (IDDSI 4) and thin liquids, no straws, crush meds, ONLY feed when alert. POC updated. Continue skilled ST to address deficits.

## 2019-11-27 NOTE — PROGRESS NOTES
Long discussion had with both sons,Jhonny Patino and Lionel,   who came to the hospital to visit.  They report their mother's wishes would be to be a do not resuscitate.  Dr. Storm also had long discussions with them as well.  Plan is for patient to have a repeat modified barium swallow study this afternoon to see how well her swallow is today.  If patient remains in decline, plan is to consult hospice.

## 2019-11-27 NOTE — PLAN OF CARE
Pt resting in bed with eyes closed. She reports some pain in her legs. Treated with PRN pain medication and repositioning. Weight shift assistance provided throughout shift. Medication given per MAR. Bed in lowest position with wheels locked, and side rails up x2. Safety maintained. Plan of care reviewed. Call light in reach. No further requests at this time. Will continue to monitor.

## 2019-11-27 NOTE — PLAN OF CARE
CM received auth from [facility] for SNF services at [facility]. auth # is [id number]       11/27/19 0814   Post-Acute Status   Post-Acute Authorization Placement   Post-Acute Placement Status Authorization Obtained

## 2019-11-27 NOTE — PLAN OF CARE
Date Status/Notes Created By   · 11/27/2019 10:53:47 AM Note: We have auth but they are repeating her MRI today to see if she extended her stroke. So likely not today  Magdalena Obrien  · 11/27/2019 10:45:01 AM Note: Will she admit today?  [email or web address]  · 11/26/2019 2:03:44 PM Note: Her son wants to come in the morning around 9:30 to sign her paperwork  [email or web address]     11/27/19 1054   Post-Acute Status   Post-Acute Authorization Placement   Post-Acute Placement Status Pending Post-Acute Medical Review

## 2019-11-27 NOTE — PROGRESS NOTES
"[facility] Medicine  Progress Note    Patient Name: Ellen Maravilla  MRN: [id number]  Patient Class: IP- Inpatient   Admission Date: 11/21/2019  Length of Stay: 6 days  Attending Physician: Jerzy Storm MD  Primary Care Provider: Tien Mckeon MD    Subjective:     Principal Problem:Acute ischemic stroke, dysphagia, severe debility, dementia with recent behavioral disturbance    HPI:  This is a 86 y.o. female with a PMHx HTN, CAD, thyroid disease, uterine cancer, and dementia who presented to the emergency department increased confusion and slurred speech. History limited due to patient's baseline dementia and increased confusion. Patient's son and grandson are at the bedside and providing history. The son reports that the patient became confused yesterday morning and had slurred speech and generalized weakness. The family brought her to see her PCP, Dr. Mckeon, who referred her to the ED for further evaluation. The son reports she was unable to get up from the chair today, and typically she is able to ambulate with her walker. The patient also had decreased appetite that began last night, and had nausea and vomiting upon arrival to Dr. Mckeon's office. Per the son she had one occurrence of emesis that he described as bile. The patient is currently reporting "spasming" right knee pain, the son denies any previous complaints of knee pain. Son denies of any known falls. The patient reports her nausea is improved at this time after receiving medication in the ED. The patient denies any shortness of breath, abdominal pain, chest pain, headaches, or cough. The ED work up is significant for urinalysis revealing few bacteria and nitrite positive. Urine cx pending. CT scan of head and abd/pelvis show no acute process.  Patient was started  on IV rocephin.  Patient was placed in the hospital for further evaluation treatment.    Overview/Hospital Course:  Patient monitor closely during her " stay. Patient was placed on continuous telemetry monitoring. Patient presented with increased confusion and slurred speech. Urinalysis was significant for few bacteria and nitrate positive. Urine culture was obtained and patient was continued on IV Rocephin.  CT head without contrast showed  no  acute abnormality. CT abdomen and pelvis with contrast showed no acute abdominopelvic process. 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 Patient was also noted to have osteopenia with multiple chronic thoracolumbar compression fractures and a prior L1 vertebroplasty.  Neurology was consulted.  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 Neurology was consulted. Patient had an echocardiogram which showed EF of 55%. No PFO on saline bubble and color flow study was noted. A bilateral carotid ultrasound was done with bilateral carotid artery disease noted but no significant stenosis noted. Patient was continued on aspirin and statin.  Physical therapy, occupational therapy, and speech therapy were consulted.  She did complain of some left leg pain during her stay.  Ultrasound of left lower extremity was negative for DVT.  X-ray of the left hip showed no acute issues.  Patient continued with her increased confusion and also put periods of agitation.  She was noted to have a UTI.  A urine culture was sent to the lab and she was placed on IV Rocephin.  Psych was consulted and felt patient had delirium on top of her dementia.  The patient's Seroquel was increased.  She was noted to have improvement in confusion.  It was felt patient would benefit from further physical therapy Occupational therapy due to her debility.  Case management was consulted for discharge planning needs.  In the meantime, patient underwent modified barium swallow study which showed patient  with significant dysphagia and speech therapy recommended NPO status.  The patient's overall condition was discussed with her son, Jhonny Patino, who was to get with his stepbrother for a family meeting for further discussion of the patient's overall condition and discharge options.  Patient was later noted to be lethargic.  It was unsure if patient may have extended her stroke or if she was having affects from her Seroquel.  Her Seroquel was placed on hold. The next morning the patient was noted to be more alert with improvement in her mental status.  Patient was scheduled for a repeat modified barium swallow study with speech therapy.  Further discussion regarding the patient's overall status was had with her sons, Jhonny Patino and Loni blue. They reported their mother had a prior living will and her wishes were to be a do not resuscitate. Plan  was then for patient to have a  repeat modified barium swallow study and if  patient remained in decline, hospice was to be consulted. Patient did much better on the repeat modified barium swallow study and speech recommended patient could have a pureed diet with thin liquids but no straw with crushed medicines in pudding or applesauce.           Interval History:  Patient was noted to be more alert with improvement in her mental status today.  She was scheduled for a repeat modified barium swallow study with speech therapy.  Further discussion regarding the patient's overall status was had with her sons, Jhonny Patino and Lionel. They reported their mother had a prior living will and her wishes were to be a do not resuscitate.  Dr. Storm also had a long discussion with her sons and the plan  was then for patient to have a  repeat modified barium swallow study and if  patient remained in decline, hospice was to be consulted. Patient did much better on the repeat modified barium swallow study and speech recommended patient could have a pureed diet with thin liquids but no  straw with crushed medicines in pudding or applesauce.  Dietary was consulted for calorie count.    Review of Systems   Constitutional: Positive for activity change, appetite change and fatigue. Negative for chills and fever.   HENT: Negative for ear discharge, ear pain and facial swelling.    Eyes: Negative for pain and redness.  Positive for dysphagia.   Respiratory: Negative for cough and shortness of breath.    Cardiovascular: Negative for leg swelling.   Gastrointestinal: Negative for abdominal distention, abdominal pain, constipation, diarrhea, nausea and vomiting.   Endocrine: Negative for polydipsia and polyphagia.   Genitourinary: Negative for difficulty urinating, dysuria, flank pain and hematuria.   Musculoskeletal: Negative for neck pain and neck stiffness.   Skin: Negative for color change.   Allergic/Immunologic: Negative for food allergies.   Neurological: Positive for weakness. Negative for seizures, facial asymmetry and speech difficulty.   Hematological: Does not bruise/bleed easily.   Psychiatric/Behavioral: Positive for confusion. Negative for hallucinations and suicidal ideas. The patient is not nervous/anxious.      Objective:     Vital Signs (Most Recent):  Temp: 97.2 °F (36.2 °C) (11/27/19 0710)  Pulse: (!) 56 (11/27/19 1134)  Resp: 16 (11/27/19 0710)  BP: 134/61 (11/27/19 1134)  SpO2: (!) 94 % (11/27/19 1153) Vital Signs (24h Range):  Temp:  [96.3 °F (35.7 °C)-97.6 °F (36.4 °C)] 97.2 °F (36.2 °C)  Pulse:  [56-81] 56  Resp:  [16-18] 16  SpO2:  [94 %-99 %] 94 %  BP: (134-182)/(61-79) 134/61     Weight: 78.1 kg (172 lb 2.9 oz)  Body mass index is 27.79 kg/m².    Intake/Output Summary (Last 24 hours) at 11/27/2019 1448  Last data filed at 11/27/2019 0600  Gross per 24 hour   Intake 0 ml   Output 0 ml   Net 0 ml      Physical Exam   Constitutional: She appears well-developed and well-nourished. No distress.   HENT:   Head: Atraumatic.   Eyes: Conjunctivae are normal. Right eye exhibits no  discharge. Left eye exhibits no discharge.   Neck: Normal range of motion. Neck supple. No JVD present.   Cardiovascular: Normal rate, regular rhythm and intact distal pulses.   Murmur (ASM) heard.  Pulmonary/Chest: Effort normal and breath sounds normal. No respiratory distress. She has no wheezes. She has no rales.   Lungs CTA bilaterally, currently on room air   Abdominal: Soft. Bowel sounds are normal. She exhibits no distension. There is no tenderness. There is no guarding.   Genitourinary:   Genitourinary Comments: Not examined   Musculoskeletal: Normal range of motion. She exhibits no edema.   Neurological: She is alert. No sensory deficit.   She is awake and Oriented to person  Forgetful, confused  Responds appropriately to simple commands at times   Skin: Skin is warm and dry. Capillary refill takes less than 2 seconds. She is not diaphoretic. There is pallor   Psychiatric: Her speech is delayed and slurred. She is withdrawn.   Calm and cooperative at this time   Nursing note and vitals reviewed.    Labs reviewed      Assessment/Plan:      * Acute ischemic stroke  [place] CVA- 3 areas noted  Telemetry  Orders as per Neurology  Remains on aspirin and statin  Physical therapy, occupational therapy, and speech therapy consulted  Fall, skin, aspiration precautions  Case management consulted to assist with discharge planning needs-patient previously lived at home with the son  Skilled consult pending        Hypomagnesemia  Monitor  Supplement as needed      Debility  Fall, skin, aspiration precautions  Turn q.2 hours  Continue physical therapy, occupational therapy, and speech therapy  SNF consult pending      DDD (degenerative disc disease), lumbar        DDD (degenerative disc disease), thoracic        Pulmonary nodules  Noted      Splenic artery aneurysm  Noted      Pancreatic lesion  Unchanged from prior CT scan      Pancytopenia  Chronic  Noted in records reviewed from 2012      Bilateral carotid artery  disease  Continue aspirin and statin      Delirium  Continue delirium precautions.     Psych previously consulted    Hyperlipidemia  Continue statin      Port-A-Cath in place  Noted        Vascular dementia  Home Seroquel remains on hold at this time due to recent lethargy        Moderate malnutrition  Patient underwent a modified barium swallow study on 11/26/2019 however patient done do well in was initially recommended NPO.  Patient had repeat modified barium swallow study today and was recommended patient could have pureed diet with thin liquids with no straw.  Will go ahead and start diet and do calorie count to see if patient able to take enough caloric intake in to meet her needs            Acute cystitis without hematuria  Continue IV Rocephin  Urine culture currently no growth    Closed compression fracture of thoracic vertebra, T9 and old multiple lumbar compression fractures  Chronic.   Mild analgesics for pain    Osteoporosis  With history of pathological fractures and osteoporosis.   Continue [place] D  Vitamin-D level within normal limits      Hypothyroidism  Patient has chronic hypothyroidism.   Currently no home medications noted   TFTs reviewed-   Lab Results   Component Value Date    TSH 3.719 11/21/2019       Hypertension  Monitor  Continue current treatment      VTE Risk Mitigation (From admission, onward)         Ordered     enoxaparin injection 40 mg  Daily      11/21/19 1548     IP VTE HIGH RISK PATIENT  Once      11/21/19 1548                Time spent seeing patient( greater than 1/2 spent in direct contact) : 58 minutes        GLORIA Diaz  Department of Hospital Medicine   [facility]

## 2019-11-27 NOTE — PROCEDURES
Modified Barium Swallow    Patient Name:  Ellen Maravilla   MRN:  [id number]      Recommendations:     Recommendations:                General Recommendations:  Dysphagia therapy, Speech/language therapy, Cognitive-linguistic therapy, Speech language evaluation and Cognitive-linguistic evaluation  Diet recommendations:  Puree(IDDSI 4), Thin   Aspiration Precautions: 1 bite/sip at a time, Assistance with meals, Check for pocketing/oral residue, Chin tuck, Feed only when awake/alert, Meds crushed in puree, Small bites/sips and Strict aspiration precautions   General Precautions: Standard, aspiration, fall  Communication strategies:  provide increased time to answer    Referral     Reason for Referral  Patient was referred for a Modified Barium Swallow Study to assess the efficiency of his/her swallow function, rule out aspiration and make recommendations regarding safe dietary consistencies, effective compensatory strategies, and safe eating environment.     Diagnosis: Acute ischemic stroke       History:     Past Medical History:   Diagnosis Date    Coronary artery disease     Dementia     Hypertension     Thyroid disease     Uterine cancer        Objective:       Alert: yes    Cooperative: yes    Follows Directions: yes    Visualization  · Patient was seen in the lateral view    Oral Peripheral Examination  · Oral Musculature: left weakness  · Dentition: upper and lower dentures  · Secretion Management: adequate  · Mucosal Quality: adequate  · Mandibular Strength and Mobility: WFL  · Oral Labial Strength and Mobility: impaired retraction, impaired seal  · Lingual Strength and Mobility: impaired protrusion  · Buccal Strength and Mobility: WFL  · Volitional Cough: weak  · Volitional Swallow: able to palpate larygneal rise  · Voice Prior to PO Intake: mildy wet, dysarthria  · Oral Musculature Comments: See clinical swallow evaluation         11/27/19 1610   Speech Time Calculation   Speech Start Time 1305   Speech  Stop Time 1320   Speech Total (min) 15 min   General Information   SLP Treatment Date 11/27/19   Current Respiratory Status nasal cannula   General Precautions aspiration;fall   Current Diet   Current Diet Textures NPO except meds   Current Liquid Consistencies NPO   Oral Musculature Evaluation   Oral Musculature Comments See clinical swallow evaluation        MBS Eval: Thin Liquid Trial   Mode of Presentation, Thin Liquid spoon;fed by clinician;cup;straw;self fed   Oral Prep/Phase, Thin Liquid decreased bolus control;premature spillage   Oral Residue, Thin Liquid back posterior tongue  (Mild L anterior L x1 via straw)   Pharyngeal Phase, Thin Liquid impaired;decreased base of tongue retraction;decreased pharyngeal wall contraction;premature spillage;pyriform sinuses pooling;reduction in laryngeal elevation;vallecular stasis   Rosenbeck's 8-Point Penetration-Aspiration Scale, Thin Liquids (5) Material enters the airway, contacts the vocal fold, and is not ejected from the airway.  (Trace)   Penetration/Aspiration, Thin Liquid Trace penetration 2 of 4 cup sips and 2 of 4 straw sips        MBS Eval: Nectar Thick Liquid Trial   Mode of Presentation, Nectar cup   Oral Prep/Phase, Nectar decreased bolus control;premature spillage   Oral Residue, Nectar back posterior tongue   Pharyngeal Phase, Nectar decreased base of tongue retraction;premature spillage;reduction in laryngeal elevation   Rosenbeck's 8-Point Penetration-Aspiration Scale, Nectar Thick Liquids (3) Material enters the airway, remains above the vocal folds, and is not ejected from the airway.        MBS Eval: Pureed Trial   Mode of Presentation, Puree spoon;fed by clinician   Oral Prep/Phase, Puree WFL   Pharyngeal Phase, Puree WFL   Rosenbeck's 8-Point Penetration-Aspiration Scale, Pureed (1) Material does not enter airway.        MBS Eval: Soft Solid Trial   Mode of Presentation, Semisolid spoon;fed by clinician   Oral Prep/Phase, Semisolid prolonged  chewing  (inadequate mastication prior to swallow)   Pharyngeal Phase, Semisolid WFL   Rosenbeck's 8-Point Penetration-Aspiration Scale, Semisolid (1) Material does not enter airway.   Recommendations   Solid Diet Level Puree  (IDDSI 4)   Liquid Diet Level Thin   Plan   Plan Alter current plan;Patient Education;Family Education;Dysphagia Therapy   Therapy Frequency minimum of 5 times a week   SLP Follow-up   SLP Follow-up? Yes   SLP - Next Visit Date 11/28/19   Treatment/Billable Minutes   Motion Fluoro Swallow, Cine/Vid 15   Total Time 15       Assessment:     Impressions  · She presents with improved level of alertness, mental status and speech intelligibility therefore repeat MBSS completed.     · Pt presents with mild-moderate oropharyngeal dysphagia characterized by mildly reduced labial seal resulting in anterior loss on L x1, and reduced lingual strength. Decreased bolus control with premature loss of bolus to level of pyriform sinus resulting in trace penetration of thin liquids via cup across 2 of 4 trials and via straw across 2 of 4 trials. No aspiration noted. Chin tuck was effective in eliminating penetration of thin liquids.  Applesauce was consumed without penetration, aspiration or significant residue. Peaches were consumed with prolonged oral prep and inadequate mastication. Pt with improvement in swallow function compared to MBSS yesterday. REC initiating PO diet of Pureed textures (IDDSI 4) and thin liquids, no straws, chin tuck, crush meds, ONLY feed when alert. Initiate dysphagia therapy.     Prognosis: Fair    Barriers:  · Cognitive status  · waxing/waning mental status    Plan  Dysphagia therapy    Education  Results were discussed with Medical Team who was in agreement with plan.     Goals:   Multidisciplinary Problems     SLP Goals        Problem: SLP Goal    Goal Priority Disciplines Outcome   SLP Goal     SLP Ongoing, Progressing   Description:    1) Participate in full SLP evaluation   2)  Participate in MBSS--MET  3) Lingual/labial resistance exercises with MIN cues  4) Perform effortful swallow, CTAR, Mendelsohn and Shaker with MIN cues  5) The patient will demonstrate appropriate compensatory strategies (chin tuck with liquids) with MIN cues.                         Plan:   · Patient to be seen:  Therapy Frequency: 5 x/week   · Plan of Care expires:  12/06/19  · Plan of Care reviewed with:  patient        Discharge recommendations:  nursing facility, skilled   Barriers to Discharge:  None    Time Tracking:   SLP Treatment Date:   11/27/19  Speech Start Time:  1305  Speech Stop Time:  1320     Speech Total Time (min):  15 min    Rayna Artis CCC-SLP  11/27/2019

## 2019-11-27 NOTE — ASSESSMENT & PLAN NOTE
Fall, skin, aspiration precautions  Turn q.2 hours  Continue physical therapy, occupational therapy, and speech therapy  SNF consult pending

## 2019-11-27 NOTE — PROGRESS NOTES
" [facility]  Adult Nutrition  Consult Note    SUMMARY   Intervention: fat/sodium modified diet and nutrition supplement therapy-commercial food     Recommendation:   1) Continue regular diet with texture per SLP to encourage (pureed)   2) weigh pt weekly   3) Boost pudding daily to encourage protein intake  4) Calorie count initiated    Goals: 1) PO intakes > 50% of meals and supplements at f/u  Nutrition Goal Status: 1) not met  Communication of RD Recs: (POC, reviewed with nursing)    Reason for Assessment    Reason For Assessment: RD follow upDiagnosis: (TIA)  Relevant Medical History: HTN, CAD, uterine CA, dementia  Interdisciplinary Rounds: attended    General Information Comments: 11/27/19: Pt not alert. Calorie count has been requested and initiated. Instructions re calorie count and how to count even the small amounts such as margarine, sugar, etc provided. Per SLP eval recommended pureed diet. Spoke with nursing, who reported yesterday pt only ate a few bites.  Today breakfast and lunch documented at 25%.  Chart review reveals pt is declining. Now is DNR.   Wt Readings from Last 1 Encounters:   11/25/19 0500 78.1 kg (172 lb 2.9 oz)   11/22/19 1901 77.1 kg (170 lb)   11/22/19 1441 77.1 kg (169 lb 15.6 oz)   11/21/19 1045 77.1 kg (170 lb)       Nutrition Discharge Planning: To be determined- Boost plus daily if skips a meal    Nutrition Risk Screen    Nutrition Risk Screen: no indicators present    Nutrition/Diet History    Patient Reported Diet/Restrictions/Preferences: general  Spiritual, Cultural Beliefs, Advent Practices, Values that Affect Care: no  Food Allergies: NKFA  Factors Affecting Nutritional Intake: None identified at this time    Anthropometrics    Temp: 97.2 °F (36.2 °C)  Height Method: Measured(office 5/6/19)  Height: 5' 6"  Height (inches): 66 in  Weight Method: Bed Scale  Weight: 78.1 kg (172 lb 2.9 oz)  Weight (lb): 172.18 lb  Ideal Body Weight (IBW), Female: 130 " lb  % Ideal Body Weight, Female (lb): 130.77 lb  BMI (Calculated): 27.8  BMI Grade: 25 - 29.9 - overweight  Weight Loss: unintentional  Usual Body Weight (UBW), k.4 kg(4/10/18)  Weight Change Amount: (76.7 kg 19)  % Usual Body Weight: 103.85  % Weight Change From Usual Weight: 3.63 %       Lab/Procedures/Meds    Pertinent Labs Reviewed: reviewed  BMP  Lab Results   Component Value Date     (L) 2019    K 4.0 2019     2019    CO2 28 2019    BUN 14 2019    CREATININE 0.5 2019    CALCIUM 8.1 (L) 2019    ANIONGAP 5 (L) 2019    ESTGFRAFRICA >60 2019    EGFRNONAA >60 2019     Lab Results   Component Value Date    CHOL 212 (H) 2019     Lab Results   Component Value Date    HDL 68 2019     Lab Results   Component Value Date    LDLCALC 132.0 2019     Lab Results   Component Value Date    TRIG 60 2019     Lab Results   Component Value Date    CHOLHDL 32.1 2019     Lab Results   Component Value Date    ALBUMIN 2.7 (L) 2019       Pertinent Medications Reviewed: reviewed  Pertinent Medications Comments: statin, zofran, KCl, KNaPhos, polyethylene glycol    Estimated/Assessed Needs    Concur with assessment on prior visit.   Weight Used For Calorie Calculations: 77.1 kg (169 lb 15.6 oz)  Energy Calorie Requirements (kcal): [place] St Jeor ( x 1.2) = 1450 kcal  Energy Need Method: [place]-St Jeor  Protein Requirements: 1.1 g protein/kg ( age) = 84 g protein  Weight Used For Protein Calculations: 77.1 kg (169 lb 15.6 oz)  Fluid Requirements (mL): 1500 ml  Estimated Fluid Requirement Method: RDA Method  CHO Requirement: N/A      Nutrition Prescription Ordered    Current Diet Order: Cardiac    Evaluation of Received Nutrient/Fluid Intake    Energy Calories Required: not meeting needs  Protein Required: not meeting needs  Fluid Required: not meeting needs    Intake/Output Summary (Last 24 hours) at 2019 1552  Last  data filed at 11/27/2019 0600  Gross per 24 hour   Intake 0 ml   Output 0 ml   Net 0 ml       Tolerance: tolerating  % Intake of Estimated Energy Needs: 0-25%  % Meal Intake: 0-25%    Nutrition Risk    Level of Risk/Frequency of Follow-up: moderate 2 x weekly    Assessment and Plan    Moderate malnutrition  [x]  Unprioritized   Change Dx Resolve      Details  Code: E44.0  Noted: 11/21/2019  Updated: Today   Bessy Hou RD       Overview    Current Assessment & Plan Note  Edited:  Bessy Hou RD  Today  Contributing Nutrition Diagnosis  Moderate chronic condition related malnutrition     Related to (etiology):   Decreased appetite r/t dementia at times     Signs and Symptoms (as evidenced by):   1) Mild LE edmea  2) Mild-moderate muscle/fat loss as charted below     Interventions/Recommendations (treatment strategy):  See RD note     Nutrition Diagnosis Status:   continues                            Monitor and Evaluation    Food and Nutrient Intake: energy intake, food and beverage intake  Food and Nutrient Adminstration: diet order  Anthropometric Measurements: weight  Biochemical Data, Medical Tests and Procedures: electrolyte and renal panel  Nutrition-Focused Physical Findings: overall appearance     Malnutrition Assessment        Concur with assessment completed on prior visit  Skin (Micronutrient): (Eric = 13)  Teeth (Micronutrient): (Denies chewing trouble)   Micronutrient Evaluation: suspected deficiency  Micronutrient Evaluation Comments: Na       Orbital Region (Subcutaneous Fat Loss): mild depletion  Upper Arm Region (Subcutaneous Fat Loss): mild depletion   Hinduism Region (Muscle Loss): mild depletion  Clavicle Bone Region (Muscle Loss): mild depletion  Clavicle and Acromion Bone Region (Muscle Loss): mild depletion  Scapular Bone Region (Muscle Loss): mild depletion  Dorsal Hand (Muscle Loss): moderate depletion  Posterior Calf Region (Muscle Loss): (edematous LEs)   Edema (Fluid Accumulation):  2-->mild   Subcutaneous Fat Loss (Final Summary): mild protein-calorie malnutrition  Muscle Loss Evaluation (Final Summary): mild protein-calorie malnutrition         Nutrition Follow-Up    RD Follow-up?: Yes

## 2019-11-27 NOTE — PLAN OF CARE
POC reviewed with patient and patient's family, understanding verbalized. Patient disoriented to time, place, and situation. PRN pain medication given as ordered (see MAR) for complaints of pain. Turned q 2 for bedbound status and limited mobility. IV fluid hydration per orders. 1L O2 NC. Telemetry monitoring maintained. VS stable throughout shift. Safety maintained. Will continue to monitor.

## 2019-11-27 NOTE — PT/OT/SLP PROGRESS
Physical Therapy Treatment    Patient Name:  Ellen Maravilla   MRN:  [id number]    Recommendations:     Discharge Recommendations:  nursing facility, skilled   Discharge Equipment Recommendations: (TBD)   Barriers to discharge: None    Assessment:     Ellen Maravilla is a 86 y.o. female admitted with a medical diagnosis of Acute ischemic stroke.  She presents with the following impairments/functional limitations:  weakness, impaired self care skills, impaired functional mobilty, impaired balance, gait instability, decreased upper extremity function, decreased lower extremity function, decreased safety awareness, impaired cardiopulmonary response to activity, decreased ROM, impaired skin, impaired fine motor. Patient alert but not able to understand language (did not make sense). She was able to follow directions with exercise but required AAROM for most exercises. Continue with PT and POC.    Rehab Prognosis: Fair; patient would benefit from acute skilled PT services to address these deficits and reach maximum level of function.    Recent Surgery: * No surgery found *      Plan:     During this hospitalization, patient to be seen daily to address the identified rehab impairments via therapeutic activities, therapeutic exercises and progress toward the following goals:    · Plan of Care Expires:  12/13/19    Subjective     Chief Complaint: none verbalized  Patient/Family Comments/goals: none verbalized  Pain/Comfort:  · Pain Rating 1: 0/10  · Pain Rating Post-Intervention 1: 0/10      Objective:     Communicated with LÁZARO Gaona prior to session.  Patient found HOB elevated with bed alarm, oxygen, telemetry, peripheral IV upon PT entry to room.     General Precautions: Standard, aspiration, fall, NPO   Orthopedic Precautions:N/A   Braces: N/A       AM-PAC 6 CLICK MOBILITY          Therapeutic Activities and Exercises:   AAROM BLE 2 x 10 ea.: AP, Hip Ab/Add, SKTC, HS, SLR, QS, GS.    Patient left HOB elevated with all lines  intact, call button in reach, bed alarm on and Nurse Practioner present..    GOALS:   Multidisciplinary Problems     Physical Therapy Goals        Problem: Physical Therapy Goal    Goal Priority Disciplines Outcome Goal Variances Interventions   Physical Therapy Goal     PT, PT/OT Ongoing, Progressing     Description:  Goals to be met by: 2019     Patient will increase functional independence with mobility by performin. Supine to sit with Contact Guard Assistance  2. Sit to supine with Contact Guard Assistance  3. Sit to stand transfer with Contact Guard Assistance  4. Bed to chair transfer with Contact Guard Assistance using Rolling Walker  5. Gait  x 20 feet with Minimal Assistance using Rolling Walker.   6. Lower extremity exercise program x10-20 reps per handout, with supervision                      Time Tracking:     PT Received On: 19  PT Start Time: 1012     PT Stop Time: 1035  PT Total Time (min): 23 min     Billable Minutes: Therapeutic Exercise 23    Treatment Type: Treatment  PT/PTA: PTA     PTA Visit Number: 2     Wen Gregorio, PTA  2019

## 2019-11-27 NOTE — PROGRESS NOTES
"[facility] Medicine  Progress Note    Patient Name: Ellen Maravilla  MRN: [id number]  Patient Class: IP- Inpatient   Admission Date: 11/21/2019  Length of Stay: 6 days  Attending Physician: Jerzy Storm MD  Primary Care Provider: Tien Mckeon MD      Subjective:     Principal Problem:Acute ischemic stroke, dementia with behavior disturbance, debility      HPI:  This is a 86 y.o. female with a PMHx HTN, CAD, thyroid disease, uterine cancer, and dementia who presented to the emergency department increased confusion and slurred speech. History limited due to patient's baseline dementia and increased confusion. Patient's son and grandson are at the bedside and providing history. The son reports that the patient became confused yesterday morning and had slurred speech and generalized weakness. The family brought her to see her PCP, Dr. Mckeon, who referred her to the ED for further evaluation. The son reports she was unable to get up from the chair today, and typically she is able to ambulate with her walker. The patient also had decreased appetite that began last night, and had nausea and vomiting upon arrival to Dr. Mckeon's office. Per the son she had one occurrence of emesis that he described as bile. The patient is currently reporting "spasming" right knee pain, the son denies any previous complaints of knee pain. Son denies of any known falls. The patient reports her nausea is improved at this time after receiving medication in the ED. The patient denies any shortness of breath, abdominal pain, chest pain, headaches, or cough. The ED work up is significant for urinalysis revealing few bacteria and nitrite positive. Urine cx pending. CT scan of head and abd/pelvis show no acute process.  Patient was started  on IV rocephin.  Patient was placed in the hospital for further evaluation treatment.    Overview/Hospital Course:  Patient monitor closely during her stay. Patient was placed " on continuous telemetry monitoring. Patient presented with increased confusion and slurred speech. Urinalysis was significant for few bacteria and nitrate positive. Urine culture was obtained and patient was continued on IV Rocephin.  CT head without contrast showed  no  acute abnormality. CT abdomen and pelvis with contrast showed no acute abdominopelvic process. 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 Patient was also noted to have osteopenia with multiple chronic thoracolumbar compression fractures and a prior L1 vertebroplasty.  Neurology was consulted.  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 Neurology was consulted. Patient had an echocardiogram which showed EF of 55%. No PFO on saline bubble and color flow study was noted. A bilateral carotid ultrasound was done with bilateral carotid artery disease noted but no significant stenosis noted. Patient was continued on aspirin and statin.  Physical therapy, occupational therapy, and speech therapy were consulted.  She did complain of some left leg pain during her stay.  Ultrasound of left lower extremity was negative for DVT.  X-ray of the left hip showed no acute issues.  Patient continued with her increased confusion and also put periods of agitation.  She was noted to have a UTI.  A urine culture was sent to the lab and she was placed on IV Rocephin.  Psych was consulted and felt patient had delirium on top of her dementia.  The patient's Seroquel was increased.  She was noted to have improvement in confusion.  It was felt patient would benefit from further physical therapy Occupational therapy due to her debility.  Case management was consulted for discharge planning needs.  In the meantime, patient underwent modified barium swallow study which showed patient with significant  dysphagia and speech therapy recommended NPO status.  The patient's overall condition was discussed with her son, Jhonny Patino, who was to get with his stepbrother for a family meeting for further discussion of the patient's overall condition and discharge options.  Patient was later noted to be lethargic.  It was unsure if patient may have extended her stroke or if she was having affects from her Seroquel.  Her Seroquel was placed on hold.     Interval History:  Patient remains drowsy this a.m..  Her Seroquel was placed on hold.  Patient underwent a modified barium swallow study and was recommend she be NPO at this time due to high risk of aspiration. The patient's overall condition was discussed with her son, Jhonny Patino, who was to get with his stepbrother for a family meeting for further discussion of the patient's overall condition and discharge options.      Review of Systems   Constitutional: Positive for activity change, appetite change and fatigue. Negative for chills and fever.   HENT: Negative for ear discharge, ear pain and facial swelling.    Eyes: Negative for pain and redness.   Respiratory: Negative for cough and shortness of breath.    Cardiovascular: Negative for leg swelling.   Gastrointestinal: Negative for abdominal distention, abdominal pain, constipation, diarrhea, nausea and vomiting.   Endocrine: Negative for polydipsia and polyphagia.   Genitourinary: Negative for difficulty urinating, dysuria, flank pain and hematuria.   Musculoskeletal: Positive for back pain.   Skin:  Negative for color change.   Allergic/Immunologic: Negative for food allergies.   Neurological: Positive for weakness. Negative for seizures, facial asymmetry and speech difficulty.   Hematological: Does not bruise/bleed easily.   Psychiatric/Behavioral: Positive for confusion, mild lethargy. Negative for hallucinations and suicidal ideas. The patient is not nervous/anxious.      Objective:     Vital Signs (Most  Recent):  Temp: 97 °F (36.1 °C) (11/26/19 1551)  Pulse: 74 (11/26/19 1551)  Resp: 16 (11/26/19 1551)  BP: (!) 182/79 (11/26/19 1551)  SpO2: 98 % (11/26/19 1551) Vital Signs (24h Range):  Temp:  [96 °F (35.6 °C)-98.1 °F (36.7 °C)] 97 °F (36.1 °C)  Pulse:  [73-86] 74  Resp:  [15-18] 16  SpO2:  [96 %-100 %] 98 %  BP: (117-187)/(57-79) 182/79     Weight: 78.1 kg (172 lb 2.9 oz)  Body mass index is 27.79 kg/m².  No intake or output data in the 24 hours ending 11/26/19 1714   Physical Exam   Constitutional: She appears well-developed and well-nourished. No distress.   HENT:   Head: Atraumatic.   Eyes: Conjunctivae and EOM are normal. Right eye exhibits no discharge. Left eye exhibits no discharge.   Neck: Normal range of motion. Neck supple. No JVD present.   Cardiovascular: Normal rate, regular rhythm and intact distal pulses.   Murmur (ASM) heard.  Pulmonary/Chest: Effort normal and breath sounds normal. No respiratory distress. She has no wheezes. She has no rales.   BBS dimished   Abdominal: Soft. Bowel sounds are normal. She exhibits no distension. There is no tenderness. There is no guarding.   Genitourinary:   Genitourinary Comments: Not examined   Musculoskeletal: Normal range of motion. She exhibits no edema.   Neurological: She is drowsy.  No sensory deficit.   Forgetful, confused  Responds appropriately to simple commands at times  Skin: Skin is warm and dry. Capillary refill takes less than 2 seconds. She is not diaphoretic. There is pallor.   Psychiatric: Her speech is delayed and slurred.   Nursing note and vitals reviewed.    Labs reviewed        Assessment/Plan:      * Acute ischemic stroke  [place] CVA- 3 areas noted  Telemetry  Orders as per Neurology  Remains on aspirin and statin  Physical therapy, occupational therapy, and speech therapy consulted  Fall, skin, aspiration precautions  Case management consulted to assist with discharge planning needs-patient previously lived at home with the son  Skilled  consult pending        Hypomagnesemia  Monitor  Supplement as needed      Debility  Fall, skin, aspiration precautions  Turn q.2 hours  Continue physical therapy, occupational therapy, and speech therapy      DDD (degenerative disc disease), lumbar        DDD (degenerative disc disease), thoracic        Pulmonary nodules  Noted      Splenic artery aneurysm  Noted      Pancreatic lesion  Unchanged from prior CT scan      Pancytopenia  Chronic  Noted in records reviewed from 2012      Bilateral carotid artery disease  Continue aspirin and statin      Delirium  Continue delirium precautions.     Psych previously consulted    Hyperlipidemia  Continue statin      Port-A-Cath in place  Noted        Vascular dementia  Patient continues to remain drowsy-hold Seroquel         Moderate malnutrition  Patient underwent a modified barium swallow study earlier today and speech therapy recommended patient be NPO  Add lactated Ringer's for now and attempt to reevaluate swallow tomorrow when patient is more alert          Acute cystitis without hematuria  Continue IV Rocephin  Urine culture currently no growth    Closed compression fracture of thoracic vertebra, T9 and old multiple lumbar compression fractures  Chronic.   Mild analgesics for pain    Osteoporosis  With history of pathological fractures and osteoporosis.   Continue Oscal D  Vitamin-D level within normal limits      Hypothyroidism  Patient has chronic hypothyroidism.   Currently no home medications noted   TFTs reviewed-   Lab Results   Component Value Date    TSH 3.719 11/21/2019       Hypertension  Monitor  Continue current treatment      VTE Risk Mitigation (From admission, onward)         Ordered     enoxaparin injection 40 mg  Daily      11/21/19 1548     IP VTE HIGH RISK PATIENT  Once      11/21/19 1548                Time spent seeing patient( greater than 1/2 spent in direct contact) : 48 minutes      GLORIA Diaz  Department of Hospital Medicine    [facility]

## 2019-11-27 NOTE — ASSESSMENT & PLAN NOTE
Patient underwent a modified barium swallow study on 11/26/2019 however patient done do well in was initially recommended NPO.  Patient had repeat modified barium swallow study today and was recommended patient could have pureed diet with thin liquids with no straw.  Will go ahead and start diet and do calorie count to see if patient able to take enough caloric intake in to meet her needs

## 2019-11-27 NOTE — PLAN OF CARE
ntervention: fat/sodium modified diet and nutrition supplement therapy-commercial food     Recommendation:   1) Continue regular diet with texture per SLP to encourage (pureed)   2) weigh pt weekly   3) Boost pudding daily to encourage protein intake  4) Calorie count initiated    Goals: 1) PO intakes > 50% of meals and supplements at f/u  Nutrition Goal Status: 1) not met  Communication of RD Recs: (POC, reviewed with nursing)

## 2019-11-27 NOTE — SUBJECTIVE & OBJECTIVE
Interval History:  Patient was noted to be more alert with improvement in her mental status today.  She was scheduled for a repeat modified barium swallow study with speech therapy.  Further discussion regarding the patient's overall status was had with her sons, Jhonny Patino and Lionel. They reported their mother had a prior living will and her wishes were to be a do not resuscitate.  Dr. Storm also had a long discussion with her sons and the plan  was then for patient to have a  repeat modified barium swallow study and if  patient remained in decline, hospice was to be consulted. Patient did much better on the repeat modified barium swallow study and speech recommended patient could have a pureed diet with thin liquids but no straw with crushed medicines in pudding or applesauce.     Review of Systems   Constitutional: Positive for activity change, appetite change and fatigue. Negative for chills and fever.   HENT: Negative for ear discharge, ear pain and facial swelling.    Eyes: Negative for pain and redness.   Respiratory: Negative for cough and shortness of breath.    Cardiovascular: Negative for leg swelling.   Gastrointestinal: Negative for abdominal distention, abdominal pain, constipation, diarrhea, nausea and vomiting.   Endocrine: Negative for polydipsia and polyphagia.   Genitourinary: Negative for difficulty urinating, dysuria, flank pain and hematuria.   Musculoskeletal: Negative for neck pain and neck stiffness.   Skin: Negative for color change.   Allergic/Immunologic: Negative for food allergies.   Neurological: Positive for weakness. Negative for seizures, facial asymmetry and speech difficulty.   Hematological: Does not bruise/bleed easily.   Psychiatric/Behavioral: Positive for confusion. Negative for hallucinations and suicidal ideas. The patient is not nervous/anxious.      Objective:     Vital Signs (Most Recent):  Temp: 97.2 °F (36.2 °C) (11/27/19 0710)  Pulse: (!) 56 (11/27/19 1134)  Resp:  16 (11/27/19 0710)  BP: 134/61 (11/27/19 1134)  SpO2: (!) 94 % (11/27/19 1153) Vital Signs (24h Range):  Temp:  [96.3 °F (35.7 °C)-97.6 °F (36.4 °C)] 97.2 °F (36.2 °C)  Pulse:  [56-81] 56  Resp:  [16-18] 16  SpO2:  [94 %-99 %] 94 %  BP: (134-182)/(61-79) 134/61     Weight: 78.1 kg (172 lb 2.9 oz)  Body mass index is 27.79 kg/m².    Intake/Output Summary (Last 24 hours) at 11/27/2019 1448  Last data filed at 11/27/2019 0600  Gross per 24 hour   Intake 0 ml   Output 0 ml   Net 0 ml      Physical Exam   Constitutional: She appears well-developed and well-nourished. No distress.   HENT:   Head: Atraumatic.   Eyes: Conjunctivae are normal. Right eye exhibits no discharge. Left eye exhibits no discharge.   Neck: Normal range of motion. Neck supple. No JVD present.   Cardiovascular: Normal rate, regular rhythm and intact distal pulses.   Murmur (ASM) heard.  Pulmonary/Chest: Effort normal and breath sounds normal. No respiratory distress. She has no wheezes. She has no rales.   Lungs CTA bilaterally, currently on room air   Abdominal: Soft. Bowel sounds are normal. She exhibits no distension. There is no tenderness. There is no guarding.   Genitourinary:   Genitourinary Comments: Not examined   Musculoskeletal: Normal range of motion. She exhibits no edema.   Neurological: She is alert. No sensory deficit.   She is awake and Oriented to person  Forgetful, confused  Responds appropriately to simple commands at times   Skin: Skin is warm and dry. Capillary refill takes less than 2 seconds. She is not diaphoretic. There is pallor.   Psychiatric: Her speech is delayed and slurred. She is withdrawn.   Calm and cooperative at this time   Nursing note and vitals reviewed.    Labs reviewed

## 2019-11-27 NOTE — PLAN OF CARE
Patient participated in therapeutic exercise to improve functional mobility, improve ADL's and to decrease fall risk.

## 2019-11-27 NOTE — PT/OT/SLP PROGRESS
"Speech Language Pathology Treatment    Patient Name:  Ellen Maravilla   MRN:  [id number]  Admitting Diagnosis: Acute ischemic stroke    Recommendations:                 General Recommendations:  Modified barium swallow study  Diet recommendations:  NPO, Liquid Diet Level: NPO(x meds crushed in applesauce)   Aspiration Precautions: Frequent oral care   General Precautions: Standard, aspiration, NPO  Communication strategies:  provide increased time to answer    Subjective     "I feel relaxed."    Objective:   Nsg reports improved level of alertness today. Upon entrance into room pt awake, cooperative, following simple commands and answering simple questions appropriately. Speech remains dysarthric but with improved intelligibility compared to last two days. She consumed nectar thick liquids via tsp x4 and water via tsp x3 with no overt s/s penetration/aspiration.     Has the patient been evaluated by SLP for swallowing?   Yes  Keep patient NPO? Yes   Current Respiratory Status: nasal cannula      Assessment:     Ellen Maravilla is a 86 y.o. female with an SLP diagnosis of Dysphagia, Dysarthria and Cognitive-Linguistic Impairment.  She presents with increased level of alertness. Mental status and speech intelligibility improved.  Appears to have some improvement in swallow function. Request order for F/u MBSS.    Goals:   Multidisciplinary Problems     SLP Goals        Problem: SLP Goal    Goal Priority Disciplines Outcome   SLP Goal     SLP Ongoing, Progressing   Description:    1) Participate in full SLP evaluation   2) Participate in MBSS--MET  3) Lingual/labial resistance exercises with MIN cues  4) Perform effortful swallow, CTAR, Mendelsohn and Shaker with MIN cues                       Plan:     · Patient to be seen:  5 x/week   · Plan of Care expires:  12/06/19  · Plan of Care reviewed with:  patient   · SLP Follow-Up:  Yes       Discharge recommendations:  nursing facility, skilled   Barriers to Discharge:  " None    Time Tracking:     SLP Treatment Date:   11/27/19  Speech Start Time:  1109  Speech Stop Time:  1117     Speech Total Time (min):  8 min    Billable Minutes: Treatment Swallowing Dysfunction 8 and Total Time 8    Rayna Artis CCC-SLP  11/27/2019

## 2019-11-28 PROCEDURE — 11000001 HC ACUTE MED/SURG PRIVATE ROOM

## 2019-11-28 PROCEDURE — G8981 BODY POS CURRENT STATUS: HCPCS | Mod: CM

## 2019-11-28 PROCEDURE — 97110 THERAPEUTIC EXERCISES: CPT

## 2019-11-28 PROCEDURE — 27000221 HC OXYGEN, UP TO 24 HOURS

## 2019-11-28 PROCEDURE — 25000003 PHARM REV CODE 250: Performed by: FAMILY MEDICINE

## 2019-11-28 PROCEDURE — 63600175 PHARM REV CODE 636 W HCPCS: Performed by: NURSE PRACTITIONER

## 2019-11-28 PROCEDURE — 63600175 PHARM REV CODE 636 W HCPCS: Performed by: INTERNAL MEDICINE

## 2019-11-28 PROCEDURE — 97530 THERAPEUTIC ACTIVITIES: CPT

## 2019-11-28 PROCEDURE — G8982 BODY POS GOAL STATUS: HCPCS | Mod: CK

## 2019-11-28 PROCEDURE — 94761 N-INVAS EAR/PLS OXIMETRY MLT: CPT

## 2019-11-28 PROCEDURE — 25000003 PHARM REV CODE 250: Performed by: NURSE PRACTITIONER

## 2019-11-28 PROCEDURE — 92526 ORAL FUNCTION THERAPY: CPT

## 2019-11-28 RX ORDER — QUETIAPINE FUMARATE 25 MG/1
25 TABLET, FILM COATED ORAL NIGHTLY
Status: DISCONTINUED | OUTPATIENT
Start: 2019-11-28 | End: 2019-11-30 | Stop reason: HOSPADM

## 2019-11-28 RX ADMIN — OYSTER SHELL CALCIUM WITH VITAMIN D 1 TABLET: 500; 200 TABLET, FILM COATED ORAL at 09:11

## 2019-11-28 RX ADMIN — ENOXAPARIN SODIUM 40 MG: 100 INJECTION SUBCUTANEOUS at 05:11

## 2019-11-28 RX ADMIN — METOPROLOL TARTRATE 25 MG: 25 TABLET, FILM COATED ORAL at 09:11

## 2019-11-28 RX ADMIN — QUETIAPINE FUMARATE 25 MG: 25 TABLET ORAL at 09:11

## 2019-11-28 RX ADMIN — CEFTRIAXONE 1 G: 1 INJECTION, SOLUTION INTRAVENOUS at 12:11

## 2019-11-28 RX ADMIN — POLYETHYLENE GLYCOL (3350) 17 G: 17 POWDER, FOR SOLUTION ORAL at 09:11

## 2019-11-28 RX ADMIN — HALOPERIDOL LACTATE 5 MG: 5 INJECTION INTRAMUSCULAR at 05:11

## 2019-11-28 RX ADMIN — ASPIRIN 81 MG: 81 TABLET ORAL at 09:11

## 2019-11-28 RX ADMIN — ATORVASTATIN CALCIUM 40 MG: 40 TABLET, FILM COATED ORAL at 09:11

## 2019-11-28 RX ADMIN — SODIUM CHLORIDE, SODIUM LACTATE, POTASSIUM CHLORIDE, AND CALCIUM CHLORIDE: .6; .31; .03; .02 INJECTION, SOLUTION INTRAVENOUS at 12:11

## 2019-11-28 RX ADMIN — LORAZEPAM 0.5 MG: 2 INJECTION INTRAMUSCULAR; INTRAVENOUS at 03:11

## 2019-11-28 RX ADMIN — LIDOCAINE 1 PATCH: 50 PATCH TOPICAL at 09:11

## 2019-11-28 RX ADMIN — AMLODIPINE BESYLATE 2.5 MG: 2.5 TABLET ORAL at 09:11

## 2019-11-28 NOTE — ASSESSMENT & PLAN NOTE
Home Seroquel remains on hold at this time due to recent lethargy    Patient's mental status much improved over the past day or so  Continue to monitor

## 2019-11-28 NOTE — PLAN OF CARE
Pt lying in bed insisting she wants to go home today, family visited earlier in the day, pt oriented to person and place, agitated at times , very good appetite today, VS stable , NP Jacquie consulted social work for placement in skilled facility , call light in reach, will continue to monitor

## 2019-11-28 NOTE — PLAN OF CARE
11/28/19 0822   PRE-TX-O2   O2 Device (Oxygen Therapy) nasal cannula   $ Is the patient on Low Flow Oxygen? Yes   Flow (L/min) 1   SpO2 98 %   Pulse Oximetry Type Intermittent   $ Pulse Oximetry - Multiple Charge Pulse Oximetry - Multiple

## 2019-11-28 NOTE — PT/OT/SLP PROGRESS
Physical Therapy Treatment    Patient Name:  Ellen Maravilla   MRN:  [id number]    Recommendations:     Discharge Recommendations:  nursing facility, skilled   Discharge Equipment Recommendations: (TBD)   Barriers to discharge: total care    Assessment:     Ellen Maravilla is a 86 y.o. female admitted with a medical diagnosis of Acute ischemic stroke.  She presents with the following impairments/functional limitations:  weakness, impaired functional mobilty, decreased lower extremity function, impaired balance  .    Rehab Prognosis: Fair; patient would benefit from acute skilled PT services to address these deficits and reach maximum level of function.    Recent Surgery: * No surgery found *      Plan:     During this hospitalization, patient to be seen daily to address the identified rehab impairments via therapeutic activities, therapeutic exercises and progress toward the following goals:    · Plan of Care Expires:  12/13/19    Subjective     Chief Complaint: weakness  Patient/Family Comments/goals: none stated  Pain/Comfort:  ·        Objective:     Communicated with nurse (Irena) prior to session.  Patient found supine with bed alarm, oxygen, peripheral IV upon PT entry to room.     General Precautions: Standard, fall   Orthopedic Precautions:N/A   Braces: N/A     Functional Mobility:  · Bed Mobility:     · Rolling Left:  maximal assistance  · Scooting: total assistance and of 2 persons  · Supine to Sit: maximal assistance  · Sit to Supine: maximal assistance  · Balance: SIT: poor+ (2+/5)      AM-PAC 6 CLICK MOBILITY  Turning over in bed (including adjusting bedclothes, sheets and blankets)?: 2  Sitting down on and standing up from a chair with arms (e.g., wheelchair, bedside commode, etc.): 1  Moving from lying on back to sitting on the side of the bed?: 2  Moving to and from a bed to a chair (including a wheelchair)?: 1  Need to walk in hospital room?: 1  Climbing 3-5 steps with a railing?: 1  Basic Mobility Total  Score: 8       Therapeutic Activities and Exercises:   SUPINE:LE aarom (with assist): SLR, hip abd/add, x 15 reps each    Patient left HOB elevated with all lines intact, call button in reach and bed alarm on..    GOALS:   Multidisciplinary Problems     Physical Therapy Goals        Problem: Physical Therapy Goal    Goal Priority Disciplines Outcome Goal Variances Interventions   Physical Therapy Goal     PT, PT/OT Ongoing, Progressing     Description:  Goals to be met by: 2019     Patient will increase functional independence with mobility by performin. Supine to sit with Contact Guard Assistance  2. Sit to supine with Contact Guard Assistance  3. Sit to stand transfer with Contact Guard Assistance  4. Bed to chair transfer with Contact Guard Assistance using Rolling Walker  5. Gait  x 20 feet with Minimal Assistance using Rolling Walker.   6. Lower extremity exercise program x10-20 reps per handout, with supervision                      Time Tracking:     PT Received On: 19  PT Start Time: 0850     PT Stop Time: 0920  PT Total Time (min): 30 min     Billable Minutes: Therapeutic Activity 22 and Therapeutic Exercise 8    Treatment Type: Treatment  PT/PTA: PT     PTA Visit Number: 0     Darren William, PT  2019

## 2019-11-28 NOTE — PT/OT/SLP PROGRESS
Speech Language Pathology Treatment    Patient Name:  Ellen Maravilla   MRN:  [id number]  Admitting Diagnosis: Acute ischemic stroke    Recommendations:                 General Recommendations:  Dysphagia therapy and Speech language evaluation  Diet recommendations:  Puree(IDDSI 4), Liquid Diet Level: Thin   Aspiration Precautions: 1 bite/sip at a time, Assistance with meals, Check for pocketing/oral residue, Chin tuck, Feed only when awake/alert, Meds crushed in puree, Small bites/sips and Strict aspiration precautions   General Precautions: Standard, aspiration, fall  Communication strategies:  provide increased time to answer    Subjective     Patient alert, responsive to conversation.  Low vocal intensity.  Patient goals: get better     Pain/Comfort:  · Pain Rating 1: 0/10    Objective:     Has the patient been evaluated by SLP for swallowing?   Yes  Keep patient NPO? No   Current Respiratory Status: nasal cannula      Pharyngeal exercise, specifically strengthening tongue base retraction    Assessment:     Ellen Maravilla is a 86 y.o. female with an SLP diagnosis of Dysphagia.  Tongue base exercises performed including Marleny x 5, lingual retraction x 10 and effortful swallow x 4.  Patient required MOD/MAX assist to perform, requiring visual, verbal and tactile cues.    Goals:   Multidisciplinary Problems     SLP Goals        Problem: SLP Goal    Goal Priority Disciplines Outcome   SLP Goal     SLP Ongoing, Progressing   Description:    1) Participate in full SLP evaluation   2) Participate in MBSS--MET  3) Lingual/labial resistance exercises with MIN cues  4) Perform effortful swallow, CTAR, Mendelsohn and Shaker with MIN cues  5) The patient will demonstrate appropriate compensatory strategies (chin tuck with liquids) with MIN cues.                         Plan:     · Patient to be seen:  5 x/week   · Plan of Care expires:  12/06/19  · Plan of Care reviewed with:  patient   · SLP Follow-Up:  Yes       Discharge  recommendations:  nursing facility, skilled   Barriers to Discharge:  Level of Skilled Assistance Needed MOD/MAX assist currently    Time Tracking:     SLP Treatment Date:   11/28/19  Speech Start Time:  1113  Speech Stop Time:  1131     Speech Total Time (min):  18 min    Billable Minutes: Treatment Swallowing Dysfunction 18    Jeri Sotelo CCC-SLP  11/28/2019

## 2019-11-28 NOTE — ASSESSMENT & PLAN NOTE
IV Rocephin started on 11/22/2019- plan discontinue after today's dose  Urine culture currently no growth

## 2019-11-28 NOTE — SUBJECTIVE & OBJECTIVE
Interval History: Patient remains alert this morning with decrease confusion.  Staff reports she ate 100% of her breakfast.  Repeat MRI of the brain without contrast shows u nchanged appearance of acute infarctions in the anterior kathleen.  No interval infarction or hemorrhage.  Patient overall status much improved.  Patient's condition discussed with her son, Jhonny Patino.  Plan discharge to [facility] once accepted.    Review of Systems   Constitutional: Positive for activity change and fatigue. Negative for appetite change, chills and fever.   HENT: Negative for ear discharge, ear pain and facial swelling.    Eyes: Negative for pain and redness.   Respiratory: Negative for cough and shortness of breath.    Cardiovascular: Negative for leg swelling.   Gastrointestinal: Negative for abdominal distention, abdominal pain, constipation, diarrhea, nausea and vomiting.   Endocrine: Negative for polydipsia and polyphagia.   Genitourinary: Negative for difficulty urinating, dysuria, flank pain and hematuria.   Musculoskeletal: Negative for neck pain and neck stiffness.   Skin: Negative for color change.   Allergic/Immunologic: Negative for food allergies.   Neurological: Positive for weakness. Negative for seizures, facial asymmetry and speech difficulty.   Hematological: Does not bruise/bleed easily.   Psychiatric/Behavioral: Positive for confusion. Negative for hallucinations and suicidal ideas. The patient is not nervous/anxious.      Objective:     Vital Signs (Most Recent):  Temp: 98 °F (36.7 °C) (11/28/19 1111)  Pulse: 67 (11/28/19 1111)  Resp: 18 (11/28/19 1111)  BP: 118/63 (11/28/19 1111)  SpO2: 96 % (11/28/19 1111) Vital Signs (24h Range):  Temp:  [97.5 °F (36.4 °C)-98.6 °F (37 °C)] 98 °F (36.7 °C)  Pulse:  [66-75] 67  Resp:  [18] 18  SpO2:  [93 %-98 %] 96 %  BP: (118-146)/(63-78) 118/63     Weight: 78.1 kg (172 lb 2.9 oz)  Body mass index is 27.79 kg/m².    Intake/Output Summary (Last 24 hours) at 11/28/2019  1454  Last data filed at 11/27/2019 1600  Gross per 24 hour   Intake --   Output 1 ml   Net -1 ml      Physical Exam   Constitutional: She appears well-developed and well-nourished. No distress.   HENT:   Head: Atraumatic.   Eyes: Conjunctivae are normal. Right eye exhibits no discharge. Left eye exhibits no discharge.   Neck: Normal range of motion. Neck supple. No JVD present.   Cardiovascular: Normal rate, regular rhythm and intact distal pulses.   Murmur (ASM) heard.  Pulmonary/Chest: Effort normal and breath sounds normal. No respiratory distress. She has no wheezes. She has no rales.   Lungs CTA bilaterally, currently on room air   Abdominal: Soft. Bowel sounds are normal. She exhibits no distension. There is no tenderness. There is no guarding.   Genitourinary:   Genitourinary Comments: Not examined   Musculoskeletal: Normal range of motion. She exhibits no edema.   Neurological: She is alert. No sensory deficit.   She is awake and Oriented to person  Forgetful, confused  Responds appropriately to simple commands at times   Skin: Skin is warm and dry. Capillary refill takes less than 2 seconds. She is not diaphoretic. No pallor.   Psychiatric: Her speech is delayed and slurred.   Calm and cooperative at this time   Nursing note and vitals reviewed.    Labs reviewed

## 2019-11-28 NOTE — ASSESSMENT & PLAN NOTE
Much improved over the past 2 days   Continue delirium precautions  Patient's Home Seroquel previously discontinued due to lethargy    Psych previously consulted

## 2019-11-28 NOTE — PLAN OF CARE
"Pt resting with eyes closed. She says "none" when asked to rate her pain. Weight shift assistance provided throughout shift. Medication given per MAR. Quiet environment and relaxation facilitated. Bed in lowest position with wheels locked, and side rails up x2. Safety maintained. Plan of care reviewed. Call light in reach. No further requests at this time. Will continue to monitor.    "

## 2019-11-28 NOTE — PLAN OF CARE
Patient participated in tongue base retraction exercises including lingual retraction, Marleny, and effortful swallow.

## 2019-11-28 NOTE — PROGRESS NOTES
"[facility] Medicine  Progress Note    Patient Name: Ellen Maravilla  MRN: [id number]  Patient Class: IP- Inpatient   Admission Date: 11/21/2019  Length of Stay: 7 days  Attending Physician: Jerzy Storm MD  Primary Care Provider: Tien Mckeon MD      Subjective:     Principal Problem:Acute ischemic stroke, dementia, debility    HPI:  This is a 86 y.o. female with a PMHx HTN, CAD, thyroid disease, uterine cancer, and dementia who presented to the emergency department increased confusion and slurred speech. History limited due to patient's baseline dementia and increased confusion. Patient's son and grandson are at the bedside and providing history. The son reports that the patient became confused yesterday morning and had slurred speech and generalized weakness. The family brought her to see her PCP, Dr. Mckeon, who referred her to the ED for further evaluation. The son reports she was unable to get up from the chair today, and typically she is able to ambulate with her walker. The patient also had decreased appetite that began last night, and had nausea and vomiting upon arrival to Dr. Mckeon's office. Per the son she had one occurrence of emesis that he described as bile. The patient is currently reporting "spasming" right knee pain, the son denies any previous complaints of knee pain. Son denies of any known falls. The patient reports her nausea is improved at this time after receiving medication in the ED. The patient denies any shortness of breath, abdominal pain, chest pain, headaches, or cough. The ED work up is significant for urinalysis revealing few bacteria and nitrite positive. Urine cx pending. CT scan of head and abd/pelvis show no acute process.  Patient was started  on IV rocephin.  Patient was placed in the hospital for further evaluation treatment.    Overview/Hospital Course:  Patient monitor closely during her stay. Patient was placed on continuous telemetry " monitoring. Patient presented with increased confusion and slurred speech. Urinalysis was significant for few bacteria and nitrate positive. Urine culture was obtained and patient was continued on IV Rocephin.  CT head without contrast showed  no  acute abnormality. CT abdomen and pelvis with contrast showed no acute abdominopelvic process. 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 Patient was also noted to have osteopenia with multiple chronic thoracolumbar compression fractures and a prior L1 vertebroplasty.  Neurology was consulted.  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 Neurology was consulted. Patient had an echocardiogram which showed EF of 55%. No PFO on saline bubble and color flow study was noted. A bilateral carotid ultrasound was done with bilateral carotid artery disease noted but no significant stenosis noted. Patient was continued on aspirin and statin.  Physical therapy, occupational therapy, and speech therapy were consulted.  She did complain of some left leg pain during her stay.  Ultrasound of left lower extremity was negative for DVT.  X-ray of the left hip showed no acute issues.  Patient continued with her increased confusion and also put periods of agitation.  She was noted to have a UTI.  A urine culture was sent to the lab and she was placed on IV Rocephin.  Psych was consulted and felt patient had delirium on top of her dementia.  The patient's Seroquel was increased.  She was noted to have improvement in confusion.  It was felt patient would benefit from further physical therapy Occupational therapy due to her debility.  Case management was consulted for discharge planning needs.  In the meantime, patient underwent modified barium swallow study which showed patient with significant dysphagia and speech therapy  recommended NPO status.  The patient's overall condition was discussed with her son, Jhonny Patino, who was to get with his stepbrother for a family meeting for further discussion of the patient's overall condition and discharge options.  Patient was later noted to be lethargic.  It was unsure if patient may have extended her stroke or if she was having affects from her Seroquel.  Her Seroquel was placed on hold. The next morning the patient was noted to be more alert with improvement in her mental status.  Patient was scheduled for a repeat modified barium swallow study with speech therapy.  Further discussion regarding the patient's overall status was had with her sons, Jhonny Patino and Loni blue. They reported their mother had a prior living will and her wishes were to be a do not resuscitate. Plan  was then for patient to have a  repeat modified barium swallow study and if  patient remained in decline, hospice was to be consulted. Patient did much better on the repeat modified barium swallow study and speech recommended patient could have a pureed diet with thin liquids but no straw with crushed medicines in pudding or applesauce.  Patient had a repeat MRI of the brain without contrast which showed unchanged appearance of recent acute infarctions in the anterior kathleen.  No interval infarction or hemorrhage was noted. The patient's overall condition continued to improve.  She was eating 100% of her meals and participating in therapy.         Interval History: Patient remains alert this morning with decrease confusion.  Staff reports she ate 100% of her breakfast.  Repeat MRI of the brain without contrast shows u nchanged appearance of acute infarctions in the anterior kathleen.  No interval infarction or hemorrhage.  Patient overall status much improved.  Patient's condition discussed with her son, Jhonny Patino.  Plan discharge to skilled once accepted.    Review of Systems   Constitutional: Positive for activity  change and fatigue. Negative for appetite change, chills and fever.   HENT: Negative for ear discharge, ear pain and facial swelling.    Eyes: Negative for pain and redness.   Respiratory: Negative for cough and shortness of breath.    Cardiovascular: Negative for leg swelling.   Gastrointestinal: Negative for abdominal distention, abdominal pain, constipation, diarrhea, nausea and vomiting.   Endocrine: Negative for polydipsia and polyphagia.   Genitourinary: Negative for difficulty urinating, dysuria, flank pain and hematuria.   Musculoskeletal: Negative for neck pain and neck stiffness.   Skin: Negative for color change.   Allergic/Immunologic: Negative for food allergies.   Neurological: Positive for weakness. Negative for seizures, facial asymmetry and speech difficulty.   Hematological: Does not bruise/bleed easily.   Psychiatric/Behavioral: Positive for confusion. Negative for hallucinations and suicidal ideas. The patient is not nervous/anxious.      Objective:     Vital Signs (Most Recent):  Temp: 98 °F (36.7 °C) (11/28/19 1111)  Pulse: 67 (11/28/19 1111)  Resp: 18 (11/28/19 1111)  BP: 118/63 (11/28/19 1111)  SpO2: 96 % (11/28/19 1111) Vital Signs (24h Range):  Temp:  [97.5 °F (36.4 °C)-98.6 °F (37 °C)] 98 °F (36.7 °C)  Pulse:  [66-75] 67  Resp:  [18] 18  SpO2:  [93 %-98 %] 96 %  BP: (118-146)/(63-78) 118/63     Weight: 78.1 kg (172 lb 2.9 oz)  Body mass index is 27.79 kg/m².    Intake/Output Summary (Last 24 hours) at 11/28/2019 1454  Last data filed at 11/27/2019 1600  Gross per 24 hour   Intake --   Output 1 ml   Net -1 ml      Physical Exam   Constitutional: She appears well-developed and well-nourished. No distress.   HENT:   Head: Atraumatic.   Eyes: Conjunctivae are normal. Right eye exhibits no discharge. Left eye exhibits no discharge.   Neck: Normal range of motion. Neck supple. No JVD present.   Cardiovascular: Normal rate, regular rhythm and intact distal pulses.   Murmur (ASM)  heard.  Pulmonary/Chest: Effort normal and breath sounds normal. No respiratory distress. She has no wheezes. She has no rales.   Lungs CTA bilaterally, currently on room air   Abdominal: Soft. Bowel sounds are normal. She exhibits no distension. There is no tenderness. There is no guarding.   Genitourinary:   Genitourinary Comments: Not examined   Musculoskeletal: Normal range of motion. She exhibits no edema.   Neurological: She is alert. No sensory deficit.   She is awake and Oriented to person  Forgetful, confused  Responds appropriately to simple commands at times   Skin: Skin is warm and dry. Capillary refill takes less than 2 seconds. She is not diaphoretic. No pallor.   Psychiatric: Her speech is delayed and slurred.   Calm and cooperative at this time   Nursing note and vitals reviewed.    Labs reviewed      Assessment/Plan:      * Acute ischemic stroke  [place] CVA- 3 areas noted  Telemetry  Orders as per Neurology  Remains on aspirin and statin  Physical therapy, occupational therapy, and speech therapy consulted  Fall, skin, aspiration precautions  Case management consulted to assist with discharge planning needs-patient previously lived at home with the son  Skilled consult pending    Patient had repeat MRI of the brain without contrast which showed unchanged appearance of acute infarctions in the anterior brooklynn.  No interval infarction or hemorrhage was noted. Patient ate 100% of her breakfast and lunch today and has participated with therapy and following simple commands correctly.  Plan discharge to skilled once accepted.        Hypomagnesemia  Monitor  Supplement as needed      Debility  Fall, skin, aspiration precautions  Turn q.2 hours  Continue physical therapy, occupational therapy, and speech therapy  SNF consult pending      DDD (degenerative disc disease), lumbar        DDD (degenerative disc disease), thoracic        Pulmonary nodules  Noted      Splenic artery aneurysm  Noted      Pancreatic  lesion  Unchanged from prior CT scan      Pancytopenia  Chronic  Noted in records reviewed from 2012      Bilateral carotid artery disease  Continue aspirin and statin      Delirium  Much improved over the past 2 days   Continue delirium precautions  Patient's Home Seroquel previously discontinued due to lethargy    Psych previously consulted    Hyperlipidemia  Continue statin      Port-A-Cath in place  Noted        Vascular dementia  Home Seroquel remains on hold at this time due to recent lethargy    Patient's mental status much improved over the past day or so  Continue to monitor        Moderate malnutrition  Patient underwent a modified barium swallow study on 11/26/2019 however patient done do well in was initially recommended NPO.  Patient had repeat modified barium swallow study done on 11/27/2019 once her mentation was improved and it was recommended patient could have pureed diet with thin liquids with no straw.      Staff reports patient ate 100% of her breakfast and lunch today  Dietitian previously consulted for calorie count            Acute cystitis without hematuria   IV Rocephin started on 11/22/2019- plan discontinue after today's dose  Urine culture currently no growth    Closed compression fracture of thoracic vertebra, T9 and old multiple lumbar compression fractures  Chronic.   Mild analgesics for pain    Osteoporosis  With history of pathological fractures and osteoporosis.   Continue [place] D  Vitamin-D level within normal limits      Hypothyroidism  Patient has chronic hypothyroidism.   Currently no home medications noted   TFTs reviewed-   Lab Results   Component Value Date    TSH 3.719 11/21/2019       Hypertension  Monitor  Continue current treatment      VTE Risk Mitigation (From admission, onward)         Ordered     enoxaparin injection 40 mg  Daily      11/21/19 1548     IP VTE HIGH RISK PATIENT  Once      11/21/19 1548                Time spent seeing patient( greater than 1/2 spent  in direct contact) : 35 minutes    GLORIA Diaz  Department of Hospital Medicine   [facility]

## 2019-11-28 NOTE — PLAN OF CARE
11/27/19 2027   Patient Assessment/Suction   Level of Consciousness (AVPU) alert   PRE-TX-O2   O2 Device (Oxygen Therapy) nasal cannula   Flow (L/min) 1   Oxygen Concentration (%) 24   SpO2 96 %   Pulse Oximetry Type Intermittent   Ready to Wean/Extubation Screen   FIO2<=50 (chart decimal) 0.24

## 2019-11-28 NOTE — ASSESSMENT & PLAN NOTE
Patient underwent a modified barium swallow study on 11/26/2019 however patient done do well in was initially recommended NPO.  Patient had repeat modified barium swallow study done on 11/27/2019 once her mentation was improved and it was recommended patient could have pureed diet with thin liquids with no straw.      Staff reports patient ate 100% of her breakfast and lunch today  Dietitian previously consulted for calorie count

## 2019-11-28 NOTE — ASSESSMENT & PLAN NOTE
[place] CVA- 3 areas noted  Telemetry  Orders as per Neurology  Remains on aspirin and statin  Physical therapy, occupational therapy, and speech therapy consulted  Fall, skin, aspiration precautions  Case management consulted to assist with discharge planning needs-patient previously lived at home with the son  Skilled consult pending    Patient had repeat MRI of the brain without contrast which showed unchanged appearance of acute infarctions in the anterior brooklynn.  No interval infarction or hemorrhage was noted. Patient ate 100% of her breakfast and lunch today and has participated with therapy and following simple commands correctly.  Plan discharge to skilled once accepted.

## 2019-11-29 PROBLEM — R41.0 DELIRIUM: Status: RESOLVED | Noted: 2019-11-23 | Resolved: 2019-11-29

## 2019-11-29 PROBLEM — N30.00 ACUTE CYSTITIS WITHOUT HEMATURIA: Status: RESOLVED | Noted: 2019-11-21 | Resolved: 2019-11-29

## 2019-11-29 PROCEDURE — 27000221 HC OXYGEN, UP TO 24 HOURS

## 2019-11-29 PROCEDURE — 25000003 PHARM REV CODE 250: Performed by: NURSE PRACTITIONER

## 2019-11-29 PROCEDURE — 94761 N-INVAS EAR/PLS OXIMETRY MLT: CPT

## 2019-11-29 PROCEDURE — 25000003 PHARM REV CODE 250: Performed by: INTERNAL MEDICINE

## 2019-11-29 PROCEDURE — 97110 THERAPEUTIC EXERCISES: CPT

## 2019-11-29 PROCEDURE — 97530 THERAPEUTIC ACTIVITIES: CPT

## 2019-11-29 PROCEDURE — 25000003 PHARM REV CODE 250: Performed by: HOSPITALIST

## 2019-11-29 PROCEDURE — G8982 BODY POS GOAL STATUS: HCPCS | Mod: CK

## 2019-11-29 PROCEDURE — 25000003 PHARM REV CODE 250: Performed by: FAMILY MEDICINE

## 2019-11-29 PROCEDURE — 11000001 HC ACUTE MED/SURG PRIVATE ROOM

## 2019-11-29 PROCEDURE — 63600175 PHARM REV CODE 636 W HCPCS: Performed by: INTERNAL MEDICINE

## 2019-11-29 PROCEDURE — G8981 BODY POS CURRENT STATUS: HCPCS | Mod: CM

## 2019-11-29 PROCEDURE — 63600175 PHARM REV CODE 636 W HCPCS: Performed by: NURSE PRACTITIONER

## 2019-11-29 RX ORDER — AMLODIPINE BESYLATE 5 MG/1
5 TABLET ORAL DAILY
Status: DISCONTINUED | OUTPATIENT
Start: 2019-11-29 | End: 2019-11-30 | Stop reason: HOSPADM

## 2019-11-29 RX ORDER — ASPIRIN 81 MG/1
81 TABLET ORAL DAILY
Qty: 30 TABLET | Refills: 0 | Status: ON HOLD
Start: 2019-11-30 | End: 2020-01-16 | Stop reason: HOSPADM

## 2019-11-29 RX ORDER — QUETIAPINE FUMARATE 25 MG/1
25 TABLET, FILM COATED ORAL DAILY
Qty: 30 TABLET | Refills: 0
Start: 2019-11-29 | End: 2019-12-29

## 2019-11-29 RX ORDER — ATORVASTATIN CALCIUM 40 MG/1
40 TABLET, FILM COATED ORAL DAILY
Qty: 30 TABLET | Refills: 0
Start: 2019-11-30 | End: 2020-01-26

## 2019-11-29 RX ORDER — POLYETHYLENE GLYCOL 3350 17 G/17G
17 POWDER, FOR SOLUTION ORAL DAILY
Qty: 30 EACH | Refills: 0
Start: 2019-11-30 | End: 2019-12-30

## 2019-11-29 RX ORDER — FERROUS SULFATE, DRIED 160(50) MG
1 TABLET, EXTENDED RELEASE ORAL DAILY
Qty: 30 TABLET | Refills: 0 | Status: ON HOLD | COMMUNITY
Start: 2019-11-30 | End: 2020-01-16 | Stop reason: HOSPADM

## 2019-11-29 RX ORDER — ACETAMINOPHEN 325 MG/1
650 TABLET ORAL EVERY 4 HOURS PRN
Refills: 0
Start: 2019-11-29 | End: 2019-12-29

## 2019-11-29 RX ORDER — AMLODIPINE BESYLATE 5 MG/1
5 TABLET ORAL DAILY
Qty: 30 TABLET | Refills: 0
Start: 2019-11-30 | End: 2020-01-26

## 2019-11-29 RX ORDER — BISACODYL 10 MG
10 SUPPOSITORY, RECTAL RECTAL DAILY PRN
Refills: 0 | COMMUNITY
Start: 2019-11-29

## 2019-11-29 RX ORDER — SYRING-NEEDL,DISP,INSUL,0.3 ML 29 G X1/2"
296 SYRINGE, EMPTY DISPOSABLE MISCELLANEOUS ONCE
Status: COMPLETED | OUTPATIENT
Start: 2019-11-29 | End: 2019-11-29

## 2019-11-29 RX ORDER — ENOXAPARIN SODIUM 100 MG/ML
40 INJECTION SUBCUTANEOUS DAILY
Qty: 12 ML | Refills: 0 | Status: ON HOLD
Start: 2019-11-29 | End: 2019-12-18 | Stop reason: HOSPADM

## 2019-11-29 RX ORDER — METOPROLOL TARTRATE 25 MG/1
25 TABLET, FILM COATED ORAL 2 TIMES DAILY
Qty: 60 TABLET | Refills: 0
Start: 2019-11-29 | End: 2020-01-26

## 2019-11-29 RX ADMIN — OYSTER SHELL CALCIUM WITH VITAMIN D 1 TABLET: 500; 200 TABLET, FILM COATED ORAL at 08:11

## 2019-11-29 RX ADMIN — MAGNESIUM CITRATE: 1.75 LIQUID ORAL at 04:11

## 2019-11-29 RX ADMIN — QUETIAPINE FUMARATE 25 MG: 25 TABLET ORAL at 09:11

## 2019-11-29 RX ADMIN — LORAZEPAM 0.5 MG: 2 INJECTION INTRAMUSCULAR; INTRAVENOUS at 02:11

## 2019-11-29 RX ADMIN — ATORVASTATIN CALCIUM 40 MG: 40 TABLET, FILM COATED ORAL at 08:11

## 2019-11-29 RX ADMIN — METOPROLOL TARTRATE 25 MG: 25 TABLET, FILM COATED ORAL at 08:11

## 2019-11-29 RX ADMIN — ASPIRIN 81 MG: 81 TABLET ORAL at 08:11

## 2019-11-29 RX ADMIN — LIDOCAINE 1 PATCH: 50 PATCH TOPICAL at 09:11

## 2019-11-29 RX ADMIN — MAGNESIUM CITRATE 296 ML: 1.75 LIQUID ORAL at 06:11

## 2019-11-29 RX ADMIN — AMLODIPINE BESYLATE 5 MG: 5 TABLET ORAL at 08:11

## 2019-11-29 RX ADMIN — ENOXAPARIN SODIUM 40 MG: 100 INJECTION SUBCUTANEOUS at 04:11

## 2019-11-29 RX ADMIN — POLYETHYLENE GLYCOL (3350) 17 G: 17 POWDER, FOR SOLUTION ORAL at 08:11

## 2019-11-29 RX ADMIN — TRAMADOL HYDROCHLORIDE 50 MG: 50 TABLET ORAL at 02:11

## 2019-11-29 RX ADMIN — METOPROLOL TARTRATE 25 MG: 25 TABLET, FILM COATED ORAL at 09:11

## 2019-11-29 NOTE — DISCHARGE SUMMARY
"[facility] Hospital Medicine  Discharge Summary    Patient Name: Ellen Maravilla  MRN: [id number]  Admission Date: 11/21/2019  Hospital Length of Stay: 8 days  Discharge Date and Time: No discharge date for patient encounter.  Attending Physician: Jerzy Storm MD   Discharging Provider: GLORIA Diaz  Primary Care Provider: Tien Mckeon MD    HPI:   This is a 86 y.o. female with a PMHx HTN, CAD, thyroid disease, uterine cancer, and dementia who presented to the emergency department increased confusion and slurred speech. History limited due to patient's baseline dementia and increased confusion. Patient's son and grandson are at the bedside and providing history. The son reports that the patient became confused yesterday morning and had slurred speech and generalized weakness. The family brought her to see her PCP, Dr. Mckeon, who referred her to the ED for further evaluation. The son reports she was unable to get up from the chair today, and typically she is able to ambulate with her walker. The patient also had decreased appetite that began last night, and had nausea and vomiting upon arrival to Dr. Mckeon's office. Per the son she had one occurrence of emesis that he described as bile. The patient is currently reporting "spasming" right knee pain, the son denies any previous complaints of knee pain. Son denies of any known falls. The patient reports her nausea is improved at this time after receiving medication in the ED. The patient denies any shortness of breath, abdominal pain, chest pain, headaches, or cough. The ED work up is significant for urinalysis revealing few bacteria and nitrite positive. Urine cx pending. CT scan of head and abd/pelvis show no acute process.  Patient was started  on IV rocephin.  Patient was placed in the hospital for further evaluation treatment.    * No surgery found *      Hospital Course:   Patient monitor closely during her stay. Patient was " placed on continuous telemetry monitoring. Patient presented with increased confusion and slurred speech. Urinalysis was significant for few bacteria and nitrate positive. Urine culture was obtained and patient was continued on IV Rocephin.  CT head without contrast showed  no  acute abnormality. CT abdomen and pelvis with contrast showed no acute abdominopelvic process. 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 Patient was also noted to have osteopenia with multiple chronic thoracolumbar compression fractures and a prior L1 vertebroplasty.  Neurology was consulted.  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 Neurology was consulted. Patient had an echocardiogram which showed EF of 55%. No PFO on saline bubble and color flow study was noted. A bilateral carotid ultrasound was done with bilateral carotid artery disease noted but no significant stenosis noted. Patient was continued on aspirin and statin.  Physical therapy, occupational therapy, and speech therapy were consulted.  She did complain of some left leg pain during her stay.  Ultrasound of left lower extremity was negative for DVT.  X-ray of the left hip showed no acute issues.  Patient continued with her increased confusion and also put periods of agitation.  She was noted to have a UTI.  A urine culture was sent to the lab and she was placed on IV Rocephin.  Psych was consulted and felt patient had delirium on top of her dementia.  The patient's Seroquel was increased.  She was noted to have improvement in confusion.  It was felt patient would benefit from further physical therapy Occupational therapy due to her debility.  Case management was consulted for discharge planning needs.  In the meantime, patient underwent modified barium swallow study which showed patient with significant  dysphagia and speech therapy recommended NPO status.  The patient's overall condition was discussed with her son, Jhonny Patino, who was to get with his stepbrother for a family meeting for further discussion of the patient's overall condition and discharge options.  Patient was later noted to be lethargic.  It was unsure if patient may have extended her stroke or if she was having affects from her Seroquel.  Her Seroquel was placed on hold. The next morning the patient was noted to be more alert with improvement in her mental status.  Patient was scheduled for a repeat modified barium swallow study with speech therapy.  Further discussion regarding the patient's overall status was had with her sons, Jhonny Patino and Loni blue. They reported their mother had a prior living will and her wishes were to be a do not resuscitate. Plan  was then for patient to have a  repeat modified barium swallow study and if  patient remained in decline, hospice was to be consulted. Patient did much better on the repeat modified barium swallow study and speech recommended patient could have a pureed diet with thin liquids but no straw with crushed medicines in pudding or applesauce.  Patient had a repeat MRI of the brain without contrast which showed unchanged appearance of recent acute infarctions in the anterior kathleen.  No interval infarction or hemorrhage was noted. The patient's overall condition continued to improve.  She was eating 100% of her meals and participating in therapy.  Case management was consulted to assist with discharge planning needs.  SNF was consulted.  Patient was accepted to [facility] for further physical therapy and Occupational therapy.        Consults:   Consults (From admission, onward)        Status Ordering Provider     Inpatient consult to Neurology  Once     Provider:  Fredrick Centeno MD    Completed CELESTE ALBERT     Inpatient consult to Registered Dietitian/Nutritionist         Completed  CELESTE ALBERT     Inpatient consult to Social Work/Case Management         Completed CELESTE ALBERT     Inpatient consult to Telemedicine - Psych          Acknowledged CELESTE ALBERT        Service: Hospital Medicine    Final Active Diagnoses:    Diagnosis Date Noted POA    PRINCIPAL PROBLEM:  Acute ischemic stroke [I63.9] 11/21/2019 Yes    Bilateral carotid artery disease [I73.9] 11/24/2019 Yes    Pancytopenia [D61.818] 11/24/2019 Yes    Pancreatic lesion [K86.9] 11/24/2019 Yes    Splenic artery aneurysm [I72.8] 11/24/2019 Yes    Pulmonary nodules [R91.8] 11/24/2019 Yes    DDD (degenerative disc disease), thoracic [M51.34] 11/24/2019 Yes    DDD (degenerative disc disease), lumbar [M51.36] 11/24/2019 Yes    Debility [R53.81] 11/24/2019 Yes    Hypomagnesemia [E83.42] 11/24/2019 Yes    Moderate malnutrition [E44.0] 11/21/2019 Yes    Vascular dementia [F01.50] 11/21/2019 Yes    Port-A-Cath in place [Z95.828] 11/21/2019 Not Applicable    Hyperlipidemia [E78.5] 11/21/2019 Yes    Closed compression fracture of thoracic vertebra, T9 and old multiple lumbar compression fractures [S22.000A] 12/11/2017 Yes    Hypertension [I10] 02/10/2015 Yes    Hypothyroidism [E03.9] 02/10/2015 Yes    Osteoporosis [M81.0] 02/10/2015 Yes      Problems Resolved During this Admission:    Diagnosis Date Noted Date Resolved POA    Hyponatremia [E87.1] 11/24/2019 11/24/2019 No    Delirium [R41.0] 11/23/2019 11/29/2019 No    Acute cystitis without hematuria [N30.00] 11/21/2019 11/29/2019 Yes       Discharged Condition: stable    Disposition: Skilled Nursing Facility-Guest House    Follow Up:  Follow-up Information     [facility].    Why:  Nursing Home, SNF  Contact information:  [address] [place] [address]           Tien Mckeon MD In 1 week.    Specialty:  Family Medicine  Contact information:  [address] [facility] [id number] [phone]             Fredrick Centeno MD In  2 weeks.    Specialties:  Vascular Neurology, Neurology  Contact information:  [address] [facility]  [address]  [place] [address]  [phone]                 Patient Instructions:     [facility] Transfer Orders        Admit to: Guest House  SNF    Diagnoses:   Active Hospital Problems    Diagnosis  POA    *Acute ischemic stroke [I63.9]  Yes    Bilateral carotid artery disease [I73.9]  Yes    Pancytopenia [D61.818]  Yes    Pancreatic lesion [K86.9]  Yes      A cystic lesion arising from the uncinate process of the pancreas measuring 2.5 cm, containing a few thin septations, and is unchanged in size.      Splenic artery aneurysm [I72.8]  Yes    Pulmonary nodules [R91.8]  Yes     A few small solid nodules are present the lung bases including a 3.5 mm solid right middle lobe nodule and a 4.8 mm solid left lower lobe nodule, both of which are without significant change and considered benign.      DDD (degenerative disc disease), thoracic [M51.34]  Yes    DDD (degenerative disc disease), lumbar [M51.36]  Yes    Debility [R53.81]  Yes    Hypomagnesemia [E83.42]  Yes    Delirium [R41.0]  No    Acute cystitis without hematuria [N30.00]  Yes    Moderate malnutrition [E44.0]  Yes    Vascular dementia [F01.50]  Yes    Port-A-Cath in place [Z95.828]  Not Applicable    Hyperlipidemia [E78.5]  Yes    Closed compression fracture of thoracic vertebra, T9 and old multiple lumbar compression fractures [S22.000A]  Yes    Hypertension [I10]  Yes    Hypothyroidism [E03.9]  Yes    Osteoporosis [M81.0]  Yes      Resolved Hospital Problems    Diagnosis Date Resolved POA    Hyponatremia [E87.1] 11/24/2019 No     Allergies: Review of patient's allergies indicates:  No Known Allergies    Code Status: DNR    Vitals: Routine       Diet: Pureed diet with thin liquids  No straw  Crush medications and place in apple sauce or pudding    Activity: Activity as tolerated  Fall, skin, and aspiration  precautions  Turn q 2 hours    Nursing Precautions: Aspiration , Fall, Seizure and Pressure ulcer prevention    Bed/Surface: Low Air Loss    Consults: PT to evaluate and treat- 5 times a week, OT to evaluate and treat- 5 times a week, ST to evaluate and treat- 5 times a week and Nutrition to evaluate and recommend diet    Oxygen: room air    Dialysis: Patient is not on dialysis.      Labs: Weekly CBC, BMP, prealbumin level, and magnesium level    Imaging: None    Miscellaneous Care:   Routine Skin for Bedridden Patients:  Apply moisture barrier cream to all    IV Access: None     Medications: Discontinue all previous medication orders, if any. See new list below.  Current Discharge Medication List      START taking these medications    Details   acetaminophen (TYLENOL) 325 MG tablet Take 2 tablets (650 mg total) by mouth every 4 (four) hours as needed.  Refills: 0      amLODIPine (NORVASC) 5 MG tablet Take 1 tablet (5 mg total) by mouth once daily.  Qty: 30 tablet, Refills: 0      aspirin (ECOTRIN) 81 MG EC tablet Take 1 tablet (81 mg total) by mouth once daily.  Qty: 30 tablet, Refills: 0      atorvastatin (LIPITOR) 40 MG tablet Take 1 tablet (40 mg total) by mouth once daily.  Qty: 30 tablet, Refills: 0      bisacodyl (DULCOLAX) 10 mg Supp Place 1 suppository (10 mg total) rectally daily as needed.  Refills: 0      calcium-vitamin D3 (OS-LILY 500 + D3) 500 mg(1,250mg) -200 unit per tablet Take 1 tablet by mouth once daily.  Qty: 30 tablet, Refills: 0      enoxaparin (LOVENOX) 40 mg/0.4 mL Syrg Inject 0.4 mLs (40 mg total) into the skin once daily.  Qty: 12 mL, Refills: 0      metoprolol tartrate (LOPRESSOR) 25 MG tablet Take 1 tablet (25 mg total) by mouth 2 (two) times daily.  Qty: 60 tablet, Refills: 0      polyethylene glycol (GLYCOLAX) 17 gram PwPk Take 17 g by mouth once daily.  Qty: 30 each, Refills: 0         CONTINUE these medications which have CHANGED    Details   QUEtiapine (SEROQUEL) 25 MG Tab Take 1  tablet (25 mg total) by mouth once daily. Everyday at 1800  Qty: 30 tablet, Refills: 0           Follow up:   Follow-up Information     [facility].    Why:  Nursing Home, SNF  Contact information:  [address] [facility] [address]           Tien Mckeon MD In 1 week.    Specialty:  Family Medicine  Contact information:  [address] [facility] [address]  [phone]             Fredrick Centeno MD In 2 weeks.    Specialties:  Vascular Neurology, Neurology  Contact information:  [address] [facility]  [address]  [facility] [address]  [phone]                 Significant Diagnostic Studies:     Lab Results   Component Value Date    WBC 2.75 (L) 11/27/2019    HGB 10.8 (L) 11/27/2019    HCT 32.6 (L) 11/27/2019     (H) 11/27/2019     (L) 11/27/2019         CMP  Sodium   Date Value Ref Range Status   11/27/2019 135 (L) 136 - 145 mmol/L Final     Potassium   Date Value Ref Range Status   11/27/2019 4.0 3.5 - 5.1 mmol/L Final     Chloride   Date Value Ref Range Status   11/27/2019 102 95 - 110 mmol/L Final     CO2   Date Value Ref Range Status   11/27/2019 28 23 - 29 mmol/L Final     Glucose   Date Value Ref Range Status   11/27/2019 94 70 - 110 mg/dL Final     BUN, Bld   Date Value Ref Range Status   11/27/2019 14 8 - 23 mg/dL Final     Creatinine   Date Value Ref Range Status   11/27/2019 0.5 0.5 - 1.4 mg/dL Final   05/06/2012 0.4 0.2 - 1.4 mg/dl Final     Calcium   Date Value Ref Range Status   11/27/2019 8.1 (L) 8.7 - 10.5 mg/dL Final   05/06/2012 8.6 8.6 - 10.2 mg/dl Final     Total Protein   Date Value Ref Range Status   11/23/2019 5.6 (L) 6.0 - 8.4 g/dL Final     Albumin   Date Value Ref Range Status   11/23/2019 2.7 (L) 3.5 - 5.2 g/dL Final     Total Bilirubin   Date Value Ref Range Status   11/23/2019 0.6 0.1 - 1.0 mg/dL Final     Comment:     For infants and newborns, interpretation of results should be based  on gestational age, weight and in agreement with  clinical  observations.  Premature Infant recommended reference ranges:  Up to 24 hours.............<8.0 mg/dL  Up to 48 hours............<12.0 mg/dL  3-5 days..................<15.0 mg/dL  6-29 days.................<15.0 mg/dL       Alkaline Phosphatase   Date Value Ref Range Status   11/23/2019 69 55 - 135 U/L Final     AST   Date Value Ref Range Status   11/23/2019 14 10 - 40 U/L Final     ALT   Date Value Ref Range Status   11/23/2019 8 (L) 10 - 44 U/L Final     Anion Gap   Date Value Ref Range Status   11/27/2019 5 (L) 8 - 16 mmol/L Final   05/06/2012 9 5 - 15 meq/L Final     eGFR if    Date Value Ref Range Status   11/27/2019 >60 >60 mL/min/1.73 m^2 Final     eGFR if non    Date Value Ref Range Status   11/27/2019 >60 >60 mL/min/1.73 m^2 Final     Comment:     Calculation used to obtain the estimated glomerular filtration  rate (eGFR) is the CKD-EPI equation.          PT 11.2 INR 1.1  PTT 27.0    Phosphorus 3.0  Magnesium 2.1    Ammonia 25    Lab Results   Component Value Date    CHOL 212 (H) 11/21/2019     Lab Results   Component Value Date    HDL 68 11/21/2019     Lab Results   Component Value Date    LDLCALC 132.0 11/21/2019     Lab Results   Component Value Date    TRIG 60 11/21/2019     Lab Results   Component Value Date    CHOLHDL 32.1 11/21/2019       BNP 15       CPK MB 1.7   MB% 1.4   Troponin 0.017    Vitamin D 25 hydroxy 33    HGB A1c 5.1    TSH 3.719      2D echocardiogram-  TRANSTHORACIC ECHO (TTE) COMPLETE W/ CONTRAST   Conclusion     · Concentric left ventricular remodeling.  · Small left ventricular cavity size.  · Normal left ventricular systolic function. The estimated ejection fraction is 55%  · Grade I (mild) left ventricular diastolic dysfunction consistent with impaired relaxation.  · Mild right ventricular enlargement.  · Normal right ventricular systolic function.  · Mild right atrial enlargement.  · Mild to moderate tricuspid  "regurgitation.  · Indeterminate central venous pressure. Estimated PA pressure is at least 19 mmHg.  · No PFO on saline bubble and color flow study      Performing Sonographer     Heaven Sloan    Reason for Exam   Priority: Routine   Dx: TIA (transient ischemic attack) [G45.9 (ICD-10-CM)]   Comments: To investigate for wall motion abnormalities      Vitals     Height Weight BMI (Calculated) BSA (Calculated - sq m) BP   5' 6" (1.676 m) 78.1 kg (172 lb 2.9 oz) 27.8 1.89 sq meters 148/66   PACS Images for Fitly Viewer      Show images for Echo Color Flow Doppler? Yes; Bubble Contrast? Yes   Result Image Hyperlink      Show images for Echo Color Flow Doppler? Yes; Bubble Contrast? Yes       Study Details     A complete echo was performed using complete 2D and color flow Doppler. During the study the apical, parasternal, subcostal and suprasternal views were captured. intravenous Saline (bubble) contrast was used during the study.This was a portable study performed at the patient's bedside.   Echocardiography Findings     Left Ventricle Normal ejection fraction at 55%. Small left ventricular cavity size. Concentric remodeling observed. Grade I (mild) left ventricular diastolic dysfunction consistent with impaired relaxation. Normal left atrial pressure.   Right Ventricle Normal systolic function. The right ventricle is mildly dilated.   Left Atrium The left atrium is normal. Patent foramen ovale not present.   Right Atrium There is mild right atrial enlargement.   Aortic Valve There is thickening of the aortic valve. There is no stenosis.   Mitral Valve Trace regurgitation.   Tricuspid Valve Mild to moderate regurgitation.   Pulmonic Valve No regurgitation.   IVC/SVC Inferior vena cava is not well visualized. Indeterminate central venous pressure. Estimated PA pressure is at least 19 mmHg.   2D Measurements     Dimensions   LVIDD 2.45 cm      LVIDS 1.79 cm      IVS 0.78 cm      PW 0.87 cm      FS 27 %      LA " "size 3.91 cm      LV mass 45.71 g       Dimensions   RVDD 4.1 cm      TAPSE 2.19 cm       Aortic Root - End Diastolic   Ao root annulus 2.91 cm           Doppler Measurements - Diastolic Filling     Diastolic Filling   E/A ratio 0.7       IVRT 0.12 msec      Mean e' 0.07 m/s       E wave decelartion time 315.85 msec      MV "A" wave duration 175 msec      Pulm vein "A" wave 152 msec      E/E' ratio 8.86 m/s         Doppler Measurements - Aortic Valve     Stenosis   LVOT diameter 2 cm      LVOT area 3.1 cm2      LVOT peak hernán 1.05 m/s      LVOT peak VTI 22 cm      Ao peak hernán 1.94 m/s      Ao VTI 32.95 cm      AV valve area 2.1 cm2      AV mean gradient 9 mmHg      AV peak gradient 15 mmHg      AV Velocity Ratio 0.54       AV index (prosthetic) 0.67             Doppler Measurements - Mitral Valve     PISA-MS   MV Peak E Hernán 0.62 m/s             Doppler Measurements - Shunt Ratio     Shunt Ratio   LVOT stroke volume 69.08 cm3          Doppler Measurements - Tricuspid Valve     PISA   TR Max Hernán 2.19 m/s            Signed     Electronically signed by Kuldip Garcia MD on 11/23/19 at 1602 CST       XR CHEST 1 VIEW-    CLINICAL HISTORY:  snf;    TECHNIQUE:  Single frontal view of the chest was performed.    COMPARISON:  12/09/2017    FINDINGS:  A right IJ Port-A-Cath has its tip in superior vena cava.  The cardiomediastinal silhouette is with normal limits.  There is calcification of the aortic arch.  There is mild relative elevation of left hemidiaphragm.  Patchy left basilar atelectasis is present.  No consolidation or pleural effusion.   Impression:       Right Port-A-Cath.  Atherosclerosis.  Left basilar atelectasis.      Electronically signed by: Carlos Wilkinson MD  Date: 11/29/2019  Time: 10:33   Fl Modified Barium Swallow Speech [[id number]] Resulted: 11/28/19 0912   Order Status: Completed Updated: 11/28/19 0914   Narrative:     EXAMINATION:  FL MODIFIED BARIUM SWALLOW SPEECH STUDY    CLINICAL " HISTORY:  dysphagia;    TECHNIQUE:  Speech therapy performed the study with administration of various consistencies liquid and solid food laced with barium while observing the patient fluoroscopically in a lateral projection.  Total fluoroscopy time was 2.5 minutes.    COMPARISON:  None    FINDINGS:  There is penetration without aspiration identified with thin liquid and nectar thick liquids.  Puree and soft solid food is handled without penetration or aspiration.   Impression:       A moderate dysphagia.  See speech therapy report.      Electronically signed by: Samir Campoverde MD  Date: 11/28/2019  Time: 09:12   MRI Brain Without Contrast [[id number]] Resulted: 11/27/19 2057   Order Status: Completed Updated: 11/27/19 2059   Narrative:     EXAMINATION:  MRI BRAIN WITHOUT CONTRAST    CLINICAL HISTORY:  Re-evaluate for recent stroke;    TECHNIQUE:  Multiplanar multisequence MR imaging of the brain was performed without contrast.  There are some motion limitations to the examination.    COMPARISON:  MRI of the brain dated 11/21/2019.    FINDINGS:  The craniocervical junction appears intact.  The intracranial flow voids are within normal limits.    There is unchanged appearance of diffusion restriction within the anterior aspect of the kathleen.  No new area of diffusion restriction is identified.  There is stable T2/FLAIR signal hyperintense signal corresponding to region of diffusion restriction in the anterior kathleen.    The ventricles and sulci are prominent, consistent with cerebral volume loss.  There are extensive T2/FLAIR signal hyperintense signal within the periventricular and subcortical white matter.  There are no extra-axial fluid collections.  There are old lacunar type infarctions in the bilateral thalami.  There is no evidence of intracranial hemorrhage.  There is no evidence of mass effect.    There are postoperative changes in the orbits.  The paranasal sinuses are within normal limits.  The mastoid air  cells are clear.  The calvarium is intact.   Impression:       Unchanged appearance of acute infarctions in the anterior kathleen.  No interval infarction or hemorrhage.    Changes of chronic small vessel ischemic disease and cerebral volume loss.      Electronically signed by: Yakov Abdi MD  Date: 11/27/2019  Time: 20:57   Fl Modified Barium Swallow Speech [[id number]] Resulted: 11/27/19 1005   Order Status: Completed Updated: 11/27/19 1007   Narrative:     EXAMINATION:  FL MODIFIED BARIUM SWALLOW SPEECH STUDY    CLINICAL HISTORY:  dysphagia;    TECHNIQUE:  The study is performed by the speech pathologist with the patient in a sitting erect lateral position    COMPARISON:  None    FINDINGS:  Thin consistency; decreased bolus control, premature spillage, vallecular stasis, aspiration which was not ejected despite effort    Nectar consistency decreased bolus control and premature spillage and vallecular stasis.  Silent aspiration.    Purees; slow oral transit time and anterior loss of bolus.  No aspiration or penetration   Impression:       Aspiration of the thin and nectar consistency.  Please also refer to the speech pathologist's notes.      Electronically signed by: Cee Boyd MD  Date: 11/27/2019  Time: 10:05   US Lower Extremity Veins Left [[id number]] Resulted: 11/24/19 1107   Order Status: Completed Updated: 11/24/19 1109   Narrative:     EXAMINATION:  US LOWER EXTREMITY VEINS LEFT    CLINICAL HISTORY:  left leg pain;    TECHNIQUE:  Duplex and color flow Doppler evaluation and graded compression of the left lower extremity veins was performed.    COMPARISON:  None    FINDINGS:  Left thigh veins: The common femoral, femoral, popliteal, upper greater saphenous, and deep femoral veins are patent and free of thrombus. The veins are normally compressible and have normal phasic flow and augmentation response.    Left calf veins: The visualized calf veins are patent.    Contralateral CFV: The contralateral (right)  common femoral vein is patent and free of thrombus.    Miscellaneous: None   Impression:       No evidence of deep venous thrombosis in the left lower extremity.      Electronically signed by: Carlos Weber  Date: 11/24/2019  Time: 11:07   X-Ray Hip 1 View Left [[id number]] Resulted: 11/24/19 0823   Order Status: Completed Updated: 11/24/19 0825   Narrative:     EXAMINATION:  XR HIP 1 VIEW LEFT    CLINICAL HISTORY:  hip pain;    TECHNIQUE:  Single AP view of the left hip.    COMPARISON:  06/05/2019.    FINDINGS:  No acute fracture or dislocation.  No significant soft tissue swelling.    Femoral head normally positioned within the native acetabulum.  The joint space is preserved.    Mild bone demineralization.   Impression:       1. No acute radiographic findings of the left hip on this limited single AP view.  2. Bone demineralization.      Electronically signed by: Carlos Weber  Date: 11/24/2019  Time: 08:23   MRA Brain without contrast [[id number]] Resulted: 11/22/19 1617   Order Status: Completed Updated: 11/22/19 1619   Narrative:     EXAMINATION:  MRA BRAIN WITHOUT CONTRAST    CLINICAL HISTORY:  Stroke; .    TECHNIQUE:  Noncontrast 3D time-of-flight intracranial MR angiography was performed through the [place] with MIP reformatting.    COMPARISON:  None.    FINDINGS:  The P1 segment of the left posterior cerebral artery is somewhat hypoplastic with perfusion of the P2 segment of the left posterior cerebral artery achieved via flow from a robust left posterior communicating artery.  The distal aspect of the P2 segment of the right posterior cerebral artery is asymmetrically small and irregular suggesting underlying atherosclerosis.  Otherwise, the [place] vasculature (including the basilar artery, posterior cerebral arteries, middle cerebral arteries, anterior cerebral arteries, and petrous,cavernous, and supraclinoid segments of the internal carotid arteries) is widely patent with no focal  areas of hemodynamically significant stenosis or vessel occlusion.  No aneurysm or vascular malformation appreciated.  There are co-dominant vertebral arteries.   Impression:       Asymmetrically small and irregular distal P 2 segment of the right posterior cerebral artery, likely due to underlying atherosclerosis.  Otherwise, normal magnetic resonance angiogram of the jrfsqa-zy-[id number] vasculature.      Electronically signed by: Subhash Maurer MD  Date: 11/22/2019  Time: 16:17       Pending Diagnostic Studies:     None         Medications:  Transfer Medications (for Discharge Readmit only):   Current Facility-Administered Medications   Medication Dose Route Frequency Provider Last Rate Last Dose    acetaminophen tablet 650 mg  650 mg Oral Q4H PRN Wen Escobar NP   650 mg at 11/23/19 1709    amLODIPine tablet 5 mg  5 mg Oral Daily JACK DixonP   5 mg at 11/29/19 0840    aspirin EC tablet 81 mg  81 mg Oral Daily Wen Escobar NP   81 mg at 11/29/19 0840    atorvastatin tablet 40 mg  40 mg Oral Daily Wen Escobar NP   40 mg at 11/29/19 0840    bisacodyl suppository 10 mg  10 mg Rectal Once Jerzy Storm MD        bisacodyl suppository 10 mg  10 mg Rectal Daily PRN Jerzy Storm MD        calcium-vitamin D3 500 mg(1,250mg) -200 unit per tablet 1 tablet  1 tablet Oral Daily JACK DixonP   1 tablet at 11/29/19 0840    dextrose 50% injection 12.5 g  12.5 g Intravenous PRN Wen Escobar NP        dextrose 50% injection 25 g  25 g Intravenous PRN Wen Escobar NP        enoxaparin injection 40 mg  40 mg Subcutaneous Daily Wen Escobar NP   40 mg at 11/28/19 1706    glucagon (human recombinant) injection 1 mg  1 mg Intramuscular PRN Wen Escobar NP        glucose chewable tablet 16 g  16 g Oral PRN Wen Escobar NP        glucose chewable tablet 24 g  24 g Oral PRN Wen Escobar NP        ibuprofen tablet 400 mg  400 mg Oral Q6H PRN  Wen Escobar, NP   400 mg at 11/24/19 1509    lidocaine 5 % patch 1 patch  1 patch Transdermal Q24H Yadira Frias MD   1 patch at 11/28/19 2103    lorazepam injection 0.5 mg  0.5 mg Intravenous BID PRN Brittanie Finn MD   0.5 mg at 11/29/19 0201    magnesium oxide tablet 800 mg  800 mg Oral PRN Wen Escobar NP   800 mg at 11/23/19 1718    magnesium oxide tablet 800 mg  800 mg Oral PRN Wen Escobar, NP   800 mg at 11/23/19 1709    metoprolol tartrate (LOPRESSOR) tablet 25 mg  25 mg Oral BID Wen Escobar NP   25 mg at 11/29/19 0839    ondansetron disintegrating tablet 8 mg  8 mg Oral Q8H PRN Wen Escobar NP        ondansetron injection 8 mg  8 mg Intravenous Q8H PRN Wen Escobar NP        polyethylene glycol packet 17 g  17 g Oral Daily Wen Escobar NP   17 g at 11/29/19 0839    potassium chloride 10% oral solution 40 mEq  40 mEq Oral PRN Wen Escobar NP        potassium chloride 10% oral solution 40 mEq  40 mEq Oral PRN Wen Escobar NP        potassium chloride 10% oral solution 60 mEq  60 mEq Oral PRN Wen Escobar NP        potassium, sodium phosphates 280-160-250 mg packet 2 packet  2 packet Oral PRN Wen Escobar NP        potassium, sodium phosphates 280-160-250 mg packet 2 packet  2 packet Oral PRN Wen Escobar NP        potassium, sodium phosphates 280-160-250 mg packet 2 packet  2 packet Oral PRN Wen Escobar NP        QUEtiapine tablet 25 mg  25 mg Oral QHS GLORIA Dixon   25 mg at 11/28/19 2103    sodium chloride 0.9% bolus 500 mL  500 mL Intravenous Continuous PRN Wen Escobar NP        sodium chloride 0.9% flush 10 mL  10 mL Intravenous PRN Wen Escobar, MISTY        traMADol tablet 50 mg  50 mg Oral Q6H PRN Brittanie Finn MD   50 mg at 11/29/19 0201       Indwelling Lines/Drains at time of discharge:   Lines/Drains/Airways     None                 Time spent on the discharge of  patient: 58 minutes  Patient was seen and examined on the date of discharge and determined to be suitable for discharge.       Jacquie Torres, GLORIA  Department of Hospital Medicine  [facility]

## 2019-11-29 NOTE — PLAN OF CARE
CM called pt's son Jhonny to notify him of pt transferring to [facility] today.        11/29/19 1203   Discharge Assessment   Assessment Type Discharge Planning Reassessment

## 2019-11-29 NOTE — PLAN OF CARE
I sent the pts discharge orders to [facility] for review. CM following. Magdal[facility] Obrien, [facility]     11/29/19 1125   Post-Acute Status   Post-Acute Authorization Placement   Post-Acute Placement Status Pending Post-Acute Medical Review

## 2019-11-29 NOTE — SUBJECTIVE & OBJECTIVE
Interval History:  Patient remains alert with some confusion.  Patient has baseline confusion secondary to her dementia prior to her stroke.  Patient is eating and following commands.  Plan to discharge to skilled facility when accepted.    Review of Systems   Constitutional: Positive for activity change. Negative for appetite change, chills and fever.   HENT: Negative for ear discharge, ear pain and facial swelling.    Eyes: Negative for pain and redness.   Respiratory: Negative for cough and shortness of breath.    Cardiovascular: Negative for leg swelling.   Gastrointestinal: Negative for abdominal distention, abdominal pain, constipation, diarrhea, nausea and vomiting.   Endocrine: Negative for polydipsia and polyphagia.   Genitourinary: Negative for difficulty urinating, dysuria, flank pain and hematuria.   Musculoskeletal: Negative for neck pain and neck stiffness.   Skin: Negative for color change.   Allergic/Immunologic: Negative for food allergies.   Neurological: Positive for weakness. Negative for seizures, facial asymmetry and speech difficulty.   Hematological: Does not bruise/bleed easily.   Psychiatric/Behavioral: Positive for confusion. Negative for hallucinations and suicidal ideas. The patient is not nervous/anxious.      Objective:     Vital Signs (Most Recent):  Temp: 96.9 °F (36.1 °C) (11/29/19 1115)  Pulse: 72 (11/29/19 1115)  Resp: 18 (11/29/19 0754)  BP: (!) 148/66 (11/29/19 1115)  SpO2: 100 % (11/29/19 1115) Vital Signs (24h Range):  Temp:  [96.2 °F (35.7 °C)-99.1 °F (37.3 °C)] 96.9 °F (36.1 °C)  Pulse:  [72-90] 72  Resp:  [16-18] 18  SpO2:  [94 %-100 %] 100 %  BP: (134-201)/(63-82) 148/66     Weight: 78.1 kg (172 lb 2.9 oz)  Body mass index is 27.79 kg/m².    Intake/Output Summary (Last 24 hours) at 11/29/2019 1204  Last data filed at 11/28/2019 1730  Gross per 24 hour   Intake 400 ml   Output --   Net 400 ml      Physical Exam   Constitutional: She appears well-developed and  well-nourished. No distress.   HENT:   Head: Atraumatic.   Eyes: Conjunctivae are normal. Right eye exhibits no discharge. Left eye exhibits no discharge.   Neck: Normal range of motion. Neck supple. No JVD present.   Cardiovascular: Normal rate, regular rhythm and intact distal pulses.   Murmur (ASM) heard.  Pulmonary/Chest: Effort normal and breath sounds normal. No respiratory distress. She has no wheezes. She has no rales.   Lungs CTA bilaterally, currently on room air   Abdominal: Soft. Bowel sounds are normal. She exhibits no distension. There is no tenderness. There is no guarding.   Genitourinary:   Genitourinary Comments: Not examined   Musculoskeletal: Normal range of motion. She exhibits no edema.   Neurological: She is alert. No sensory deficit.   She is awake and Oriented to person  Forgetful, confused  Responds appropriately to simple commands at times   Skin: Skin is warm and dry. Capillary refill takes less than 2 seconds. She is not diaphoretic. No pallor.   Psychiatric: Her speech is delayed and slurred.   Calm and cooperative at this time   Nursing note and vitals reviewed.    Labs reviewed

## 2019-11-29 NOTE — ASSESSMENT & PLAN NOTE
Patient underwent a modified barium swallow study on 11/26/2019 however patient done do well in was initially recommended NPO.  Patient had repeat modified barium swallow study done on 11/27/2019 once her mentation was improved and it was recommended patient could have pureed diet with thin liquids with no straw.      Patient currently tolerating pureed diet well with no signs of dysphagia with good intake noted.  Add boost pudding    Calorie count in progress

## 2019-11-29 NOTE — ASSESSMENT & PLAN NOTE
Staff reported some mild intermittent agitation yesterday evening-her home Seroquel was resumed

## 2019-11-29 NOTE — NURSING
Spoke with Jacquie Torres FNP, pt pulled out 3 IV's today and is combative, Jacquie said can keep IV out for now ordered Seroquil 25 mg nightly for agitation

## 2019-11-29 NOTE — PLAN OF CARE
11/29/19 1355   Final Note   Assessment Type Final Discharge Note   Anticipated Discharge Disposition SNF

## 2019-11-29 NOTE — PLAN OF CARE
Date Status/Notes Created By   · 11/29/2019 9:37:39 AM Note: ok We will need a new Auth.  [email or web address]  · 11/29/2019 9:31:55 AM Note: She is ready for discharge today!  Lisandra Fonseca  · 11/27/2019 11:03:34 AM Note: ok thanks  [email or web address]    I requested a chest xray for SNF admit from LÁZARO Fry CM. Lisandra is also calling N to get a new SNF auth. CM following. Magdalena Obrien, GRETEL     11/29/19 0948   Post-Acute Status   Post-Acute Authorization Placement   Post-Acute Placement Status Pending Payor Review

## 2019-11-29 NOTE — CARE UPDATE
11/29/19 0739   PRE-TX-O2   O2 Device (Oxygen Therapy) nasal cannula   $ Is the patient on Low Flow Oxygen? Yes   Flow (L/min) 1   SpO2 98 %   Pulse Oximetry Type Intermittent   $ Pulse Oximetry - Multiple Charge Pulse Oximetry - Multiple   Pulse 88   Resp 18

## 2019-11-29 NOTE — PROGRESS NOTES
"[facility]  Progress Note    Patient Name: Ellen Maravilla  MRN: [id number]  Patient Class: IP- Inpatient   Admission Date: 11/21/2019  Length of Stay: 8 days  Attending Physician: Jerzy Storm MD  Primary Care Provider: Tien Mckeon MD      Subjective:     Principal Problem:Acute ischemic stroke, dementia, debility, dysphagia, chronic pancytopenia    HPI:  This is a 86 y.o. female with a PMHx HTN, CAD, thyroid disease, uterine cancer, and dementia who presented to the emergency department increased confusion and slurred speech. History limited due to patient's baseline dementia and increased confusion. Patient's son and grandson are at the bedside and providing history. The son reports that the patient became confused yesterday morning and had slurred speech and generalized weakness. The family brought her to see her PCP, Dr. Mckeon, who referred her to the ED for further evaluation. The son reports she was unable to get up from the chair today, and typically she is able to ambulate with her walker. The patient also had decreased appetite that began last night, and had nausea and vomiting upon arrival to Dr. Mckeon's office. Per the son she had one occurrence of emesis that he described as bile. The patient is currently reporting "spasming" right knee pain, the son denies any previous complaints of knee pain. Son denies of any known falls. The patient reports her nausea is improved at this time after receiving medication in the ED. The patient denies any shortness of breath, abdominal pain, chest pain, headaches, or cough. The ED work up is significant for urinalysis revealing few bacteria and nitrite positive. Urine cx pending. CT scan of head and abd/pelvis show no acute process.  Patient was started  on IV rocephin.  Patient was placed in the hospital for further evaluation treatment.    Overview/Hospital Course:  Patient monitor closely during her stay. Patient was " placed on continuous telemetry monitoring. Patient presented with increased confusion and slurred speech. Urinalysis was significant for few bacteria and nitrate positive. Urine culture was obtained and patient was continued on IV Rocephin.  CT head without contrast showed  no  acute abnormality. CT abdomen and pelvis with contrast showed no acute abdominopelvic process. 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 Patient was also noted to have osteopenia with multiple chronic thoracolumbar compression fractures and a prior L1 vertebroplasty.  Neurology was consulted.  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 Neurology was consulted. Patient had an echocardiogram which showed EF of 55%. No PFO on saline bubble and color flow study was noted. A bilateral carotid ultrasound was done with bilateral carotid artery disease noted but no significant stenosis noted. Patient was continued on aspirin and statin.  Physical therapy, occupational therapy, and speech therapy were consulted.  She did complain of some left leg pain during her stay.  Ultrasound of left lower extremity was negative for DVT.  X-ray of the left hip showed no acute issues.  Patient continued with her increased confusion and also put periods of agitation.  She was noted to have a UTI.  A urine culture was sent to the lab and she was placed on IV Rocephin.  Psych was consulted and felt patient had delirium on top of her dementia.  The patient's Seroquel was increased.  She was noted to have improvement in confusion.  It was felt patient would benefit from further physical therapy Occupational therapy due to her debility.  Case management was consulted for discharge planning needs.  In the meantime, patient underwent modified barium swallow study which showed patient with significant  dysphagia and speech therapy recommended NPO status.  The patient's overall condition was discussed with her son, Jhonny Patino, who was to get with his stepbrother for a family meeting for further discussion of the patient's overall condition and discharge options.  Patient was later noted to be lethargic.  It was unsure if patient may have extended her stroke or if she was having affects from her Seroquel.  Her Seroquel was placed on hold. The next morning the patient was noted to be more alert with improvement in her mental status.  Patient was scheduled for a repeat modified barium swallow study with speech therapy.  Further discussion regarding the patient's overall status was had with her sons, Jhonny Patino and Loni blue. They reported their mother had a prior living will and her wishes were to be a do not resuscitate. Plan  was then for patient to have a  repeat modified barium swallow study and if  patient remained in decline, hospice was to be consulted. Patient did much better on the repeat modified barium swallow study and speech recommended patient could have a pureed diet with thin liquids but no straw with crushed medicines in pudding or applesauce.  Patient had a repeat MRI of the brain without contrast which showed unchanged appearance of recent acute infarctions in the anterior kathleen.  No interval infarction or hemorrhage was noted. The patient's overall condition continued to improve.  She was eating 100% of her meals and participating in therapy.  Case management was consulted to assist with discharge planning needs.  SNF was consulted.         Interval History:  Patient remains alert with some confusion.  Patient has baseline confusion secondary to her dementia prior to her stroke.  Patient is eating and following commands.  Plan to discharge to skilled facility when accepted.    Review of Systems   Constitutional: Positive for activity change. Negative for appetite change, chills and fever.    HENT: Negative for ear discharge, ear pain and facial swelling.    Eyes: Negative for pain and redness.   Respiratory: Negative for cough and shortness of breath.    Cardiovascular: Negative for leg swelling.   Gastrointestinal: Negative for abdominal distention, abdominal pain, constipation, diarrhea, nausea and vomiting.   Endocrine: Negative for polydipsia and polyphagia.   Genitourinary: Negative for difficulty urinating, dysuria, flank pain and hematuria.   Musculoskeletal: Negative for neck pain and neck stiffness.   Skin: Negative for color change.   Allergic/Immunologic: Negative for food allergies.   Neurological: Positive for weakness. Negative for seizures, facial asymmetry and speech difficulty.   Hematological: Does not bruise/bleed easily.   Psychiatric/Behavioral: Positive for confusion. Negative for hallucinations and suicidal ideas. The patient is not nervous/anxious.      Objective:     Vital Signs (Most Recent):  Temp: 96.9 °F (36.1 °C) (11/29/19 1115)  Pulse: 72 (11/29/19 1115)  Resp: 18 (11/29/19 0754)  BP: (!) 148/66 (11/29/19 1115)  SpO2: 100 % (11/29/19 1115) Vital Signs (24h Range):  Temp:  [96.2 °F (35.7 °C)-99.1 °F (37.3 °C)] 96.9 °F (36.1 °C)  Pulse:  [72-90] 72  Resp:  [16-18] 18  SpO2:  [94 %-100 %] 100 %  BP: (134-201)/(63-82) 148/66     Weight: 78.1 kg (172 lb 2.9 oz)  Body mass index is 27.79 kg/m².    Intake/Output Summary (Last 24 hours) at 11/29/2019 1204  Last data filed at 11/28/2019 1730  Gross per 24 hour   Intake 400 ml   Output --   Net 400 ml      Physical Exam   Constitutional: She appears well-developed and well-nourished. No distress.   HENT:   Head: Atraumatic.   Eyes: Conjunctivae are normal. Right eye exhibits no discharge. Left eye exhibits no discharge.   Neck: Normal range of motion. Neck supple. No JVD present.   Cardiovascular: Normal rate, regular rhythm and intact distal pulses.   Murmur (ASM) heard.  Pulmonary/Chest: Effort normal and breath sounds normal.  No respiratory distress. She has no wheezes. She has no rales.   Lungs CTA bilaterally, currently on room air   Abdominal: Soft. Bowel sounds are normal. She exhibits no distension. There is no tenderness. There is no guarding.   Genitourinary:   Genitourinary Comments: Not examined   Musculoskeletal: Normal range of motion. She exhibits no edema.   Neurological: She is alert. No sensory deficit.   She is awake and Oriented to person  Forgetful, confused  Responds appropriately to simple commands at times   Skin: Skin is warm and dry. Capillary refill takes less than 2 seconds. She is not diaphoretic. No pallor.   Psychiatric: Her speech is delayed and slurred.   Calm and cooperative at this time   Nursing note and vitals reviewed.    Labs reviewed      Assessment/Plan:      * Acute ischemic stroke  [place] CVA- 3 areas noted  Telemetry  Previously seen by Neurology  Remains on aspirin and statin  Continue Physical therapy, occupational therapy, and speech therapy   Continue Fall, skin, aspiration precautions  Case management consulted to assist with discharge planning needs-Skilled consult pending    Patient had repeat MRI of the brain without contrast which showed unchanged appearance of acute infarctions in the anterior [place].  No interval infarction or hemorrhage was noted.         Hypomagnesemia  Monitor  Supplement as needed      Debility  Fall, skin, aspiration precautions  Turn q.2 hours  Continue physical therapy, occupational therapy, and speech therapy  SNF consult pending      DDD (degenerative disc disease), lumbar        DDD (degenerative disc disease), thoracic        Pulmonary nodules  Noted      Splenic artery aneurysm  Noted      Pancreatic lesion  Unchanged from prior CT scan      Pancytopenia  Chronic  Noted in records reviewed from 2012      Bilateral carotid artery disease  Continue aspirin and statin      Delirium  Improved since admission  Continue delirium precautions  Patient's home Seroquel  continued last night    Psych previously consulted    Hyperlipidemia  Continue statin      Port-A-Cath in place  Noted        Vascular dementia  Staff reported some mild intermittent agitation yesterday evening-her home Seroquel was resumed            Moderate malnutrition  Patient underwent a modified barium swallow study on 11/26/2019 however patient done do well in was initially recommended NPO.  Patient had repeat modified barium swallow study done on 11/27/2019 once her mentation was improved and it was recommended patient could have pureed diet with thin liquids with no straw.      Patient currently tolerating pureed diet well with no signs of dysphagia with good intake noted.  Add boost pudding    Calorie count in progress          Acute cystitis without hematuria  Patient just completed regimen of IV Rocephin yesterday  Urine culture currently no growth    Closed compression fracture of thoracic vertebra, T9 and old multiple lumbar compression fractures  Chronic.   Mild analgesics for pain    Osteoporosis  With history of pathological fractures and osteoporosis.   Continue [place] D  Vitamin-D level within normal limits      Hypothyroidism  Patient has chronic hypothyroidism.   Currently no home medications noted   TFTs reviewed-   Lab Results   Component Value Date    TSH 3.719 11/21/2019       Hypertension  Monitor  Continue current treatment      VTE Risk Mitigation (From admission, onward)         Ordered     enoxaparin injection 40 mg  Daily      11/21/19 1548     IP VTE HIGH RISK PATIENT  Once      11/21/19 1548                Time spent seeing patient( greater than 1/2 spent in direct contact) :  32 minutes        GLORIA Diaz  Department of Hospital Medicine   [facility]

## 2019-11-29 NOTE — PT/OT/SLP PROGRESS
"Physical Therapy Treatment    Patient Name:  Ellen Maravilla   MRN:  [id number]    Recommendations:     Discharge Recommendations:  nursing facility, basic, nursing facility, skilled   Discharge Equipment Recommendations: (TBD)   Barriers to discharge: roral care    Assessment:     Ellen Maravilla is a 86 y.o. female admitted with a medical diagnosis of Acute ischemic stroke.  She presents with the following impairments/functional limitations:  decreased lower extremity function, impaired functional mobilty, impaired endurance, impaired balance  .    Rehab Prognosis: Fair; patient would benefit from acute skilled PT services to address these deficits and reach maximum level of function.    Recent Surgery: * No surgery found *      Plan:     During this hospitalization, patient to be seen daily to address the identified rehab impairments via therapeutic activities, therapeutic exercises and progress toward the following goals:    · Plan of Care Expires:  12/13/19    Subjective     Patient/Family Comments/goals: "I'm always tired."    Objective:     Communicated with nurse (Stefano) prior to session.  Patient found HOB elevated with oxygen, bed alarm upon PT entry to room.     General Precautions: Standard, fall   Orthopedic Precautions:N/A   Braces: N/A     Functional Mobility:  · Bed Mobility:     · Rolling Left:  maximal assistance  · Supine to Sit: maximal assistance  · Sit to Supine: maximal assistance      AM-PAC 6 CLICK MOBILITY  Turning over in bed (including adjusting bedclothes, sheets and blankets)?: 2  Sitting down on and standing up from a chair with arms (e.g., wheelchair, bedside commode, etc.): 1  Moving from lying on back to sitting on the side of the bed?: 2  Moving to and from a bed to a chair (including a wheelchair)?: 1  Need to walk in hospital room?: 1  Climbing 3-5 steps with a railing?: 1  Basic Mobility Total Score: 8       Therapeutic Activities and Exercises:   SUPINE: (with assist) SLR, hip " abd/add, x 15 reps x 2 sets each; gentle manual stretch B heelcords  Sit balance training at eob x 15 min with min/mod assist and cues      Patient left HOB elevated with call button in reach, bed alarm on and O2 to wall via nc..    GOALS:   Multidisciplinary Problems     Physical Therapy Goals        Problem: Physical Therapy Goal    Goal Priority Disciplines Outcome Goal Variances Interventions   Physical Therapy Goal     PT, PT/OT Ongoing, Progressing     Description:  Goals to be met by: 2019     Patient will increase functional independence with mobility by performin. Supine to sit with Contact Guard Assistance  2. Sit to supine with Contact Guard Assistance  3. Sit to stand transfer with Contact Guard Assistance  4. Bed to chair transfer with Contact Guard Assistance using Rolling Walker  5. Gait  x 20 feet with Minimal Assistance using Rolling Walker.   6. Lower extremity exercise program x10-20 reps per handout, with supervision                      Time Tracking:     PT Received On: 19  PT Start Time: 0950     PT Stop Time: 1013  PT Total Time (min): 23 min     Billable Minutes: Therapeutic Activity 15 and Therapeutic Exercise 8    Treatment Type: Treatment  PT/PTA: PT     PTA Visit Number: 0     Darren William, PT  2019

## 2019-11-29 NOTE — PLAN OF CARE
DEMARCUS spoke with Vangie at [facility] and requested new auth for SNF at [facility]. Per Vangie, same auth number (auth #[id number]) dates updated. DEMARCUS notified Jazzmine at [facility] via [facility]       11/29/19 1001   Post-Acute Status   Post-Acute Authorization Placement   Post-Acute Placement Status Authorization Obtained

## 2019-11-29 NOTE — PLAN OF CARE
Elevate HOB 30-45 degrees,turn Q2hr,O2 at 2L nasal cannula,telesitter at bedside,weigh daily,neuro checks Q4hr,monitor lab results,maintain dysphagia pureed diet,crush meds,continuous lactated ringers @50mL,NAD noted,safety maintained,bed low with wheels locked,call light in reach,will continue to monitor.

## 2019-11-29 NOTE — PLAN OF CARE
Date Status/Notes Created By   · 11/29/2019 11:57:06 AM Completed  Magdalena Micah  · 11/29/2019 11:55:53 AM Note: Need her CXR after I get that they can call report to Elaine on [facility]  [email or web address] [email or web address]  · 11/29/2019 11:24:51 AM Note: I sent discharge orders for review. Thanks  Magdalena Obrien  · 11/29/2019 10:07:41 AM Note: Ok Thanks  Jazzmine [email or web address]@[facility]  ·    I sent the pts chest xray and updated the pts nurse that report can be called to elaine on [facility] at [phone]. Pts discharge destination booked. Magdalena Obrien, GRETEL      11/29/19 1157   Post-Acute Status   Post-Acute Authorization Placement   Post-Acute Placement Status Set-up Complete

## 2019-11-29 NOTE — PLAN OF CARE
11/28/19 1937   Patient Assessment/Suction   Level of Consciousness (AVPU) alert   Respiratory Effort Unlabored   Expansion/Accessory Muscles/Retractions no use of accessory muscles   PRE-TX-O2   O2 Device (Oxygen Therapy) nasal cannula   $ Is the patient on Low Flow Oxygen? Yes   Flow (L/min) 2   Oxygen Concentration (%) 28   SpO2 (!) 94 %   Pulse Oximetry Type Intermittent   $ Pulse Oximetry - Multiple Charge Pulse Oximetry - Multiple   Pulse 80   Resp 17   Ready to Wean/Extubation Screen   FIO2<=50 (chart decimal) 0.28

## 2019-11-29 NOTE — DISCHARGE INSTRUCTIONS
[facility] Transfer Orders        Admit to: [facility]  SNF    Diagnoses:   Active Hospital Problems    Diagnosis  POA    *Acute ischemic stroke [I63.9]  Yes    Bilateral carotid artery disease [I73.9]  Yes    Pancytopenia [D61.818]  Yes    Pancreatic lesion [K86.9]  Yes      A cystic lesion arising from the uncinate process of the pancreas measuring 2.5 cm, containing a few thin septations, and is unchanged in size.      Splenic artery aneurysm [I72.8]  Yes    Pulmonary nodules [R91.8]  Yes     A few small solid nodules are present the lung bases including a 3.5 mm solid right middle lobe nodule and a 4.8 mm solid left lower lobe nodule, both of which are without significant change and considered benign.      DDD (degenerative disc disease), thoracic [M51.34]  Yes    DDD (degenerative disc disease), lumbar [M51.36]  Yes    Debility [R53.81]  Yes    Hypomagnesemia [E83.42]  Yes    Delirium [R41.0]  No    Acute cystitis without hematuria [N30.00]  Yes    Moderate malnutrition [E44.0]  Yes    Vascular dementia [F01.50]  Yes    Port-A-Cath in place [Z95.828]  Not Applicable    Hyperlipidemia [E78.5]  Yes    Closed compression fracture of thoracic vertebra, T9 and old multiple lumbar compression fractures [S22.000A]  Yes    Hypertension [I10]  Yes    Hypothyroidism [E03.9]  Yes    Osteoporosis [M81.0]  Yes      Resolved Hospital Problems    Diagnosis Date Resolved POA    Hyponatremia [E87.1] 11/24/2019 No     Allergies: Review of patient's allergies indicates:  No Known Allergies    Code Status: DNR    Vitals: Routine       Diet: Pureed diet with thin liquids  No straw  Crush medications and place in apple sauce or pudding    Activity: Activity as tolerated  Fall, skin, and aspiration precautions  Turn q 2 hours    Nursing Precautions: Aspiration , Fall, Seizure and Pressure ulcer prevention    Bed/Surface: Low Air Loss    Consults: PT to evaluate and treat- 5 times a  week, OT to evaluate and treat- 5 times a week, ST to evaluate and treat- 5 times a week and Nutrition to evaluate and recommend diet    Oxygen: room air    Dialysis: Patient is not on dialysis.      Labs: Weekly CBC, BMP, prealbumin level, and magnesium level    Imaging: None    Miscellaneous Care:   Routine Skin for Bedridden Patients:  Apply moisture barrier cream to all    IV Access: None     Medications: Discontinue all previous medication orders, if any. See new list below.  Current Discharge Medication List      START taking these medications    Details   acetaminophen (TYLENOL) 325 MG tablet Take 2 tablets (650 mg total) by mouth every 4 (four) hours as needed.  Refills: 0      amLODIPine (NORVASC) 5 MG tablet Take 1 tablet (5 mg total) by mouth once daily.  Qty: 30 tablet, Refills: 0      aspirin (ECOTRIN) 81 MG EC tablet Take 1 tablet (81 mg total) by mouth once daily.  Qty: 30 tablet, Refills: 0      atorvastatin (LIPITOR) 40 MG tablet Take 1 tablet (40 mg total) by mouth once daily.  Qty: 30 tablet, Refills: 0      bisacodyl (DULCOLAX) 10 mg Supp Place 1 suppository (10 mg total) rectally daily as needed.  Refills: 0      calcium-vitamin D3 (OS-LILY 500 + D3) 500 mg(1,250mg) -200 unit per tablet Take 1 tablet by mouth once daily.  Qty: 30 tablet, Refills: 0      enoxaparin (LOVENOX) 40 mg/0.4 mL Syrg Inject 0.4 mLs (40 mg total) into the skin once daily.  Qty: 12 mL, Refills: 0      metoprolol tartrate (LOPRESSOR) 25 MG tablet Take 1 tablet (25 mg total) by mouth 2 (two) times daily.  Qty: 60 tablet, Refills: 0      polyethylene glycol (GLYCOLAX) 17 gram PwPk Take 17 g by mouth once daily.  Qty: 30 each, Refills: 0         CONTINUE these medications which have CHANGED    Details   QUEtiapine (SEROQUEL) 25 MG Tab Take 1 tablet (25 mg total) by mouth once daily. Everyday at 1800  Qty: 30 tablet, Refills: 0           Follow up:   Follow-up Information     [facility].    Why:  Nursing Home,  [facility]  Contact information:  [address]  [facility] [address]           Tien Mckeon MD In 1 week.    Specialty:  Family Medicine  Contact information:  [address] [place] [address]  [phone]             Fredrick Centeno MD In 2 weeks.    Specialties:  Vascular Neurology, Neurology  Contact information:  [address] [facility]  [address]  [facility] [address]  [phone]

## 2019-11-29 NOTE — ASSESSMENT & PLAN NOTE
Brooklynn CVA- 3 areas noted  Telemetry  Previously seen by Neurology  Remains on aspirin and statin  Continue Physical therapy, occupational therapy, and speech therapy   Continue Fall, skin, aspiration precautions  Case management consulted to assist with discharge planning needs-Skilled consult pending    Patient had repeat MRI of the brain without contrast which showed unchanged appearance of acute infarctions in the anterior brooklynn.  No interval infarction or hemorrhage was noted.

## 2019-11-29 NOTE — ASSESSMENT & PLAN NOTE
Improved since admission  Continue delirium precautions  Patient's home Seroquel continued last night    Psych previously consulted

## 2019-11-30 VITALS
BODY MASS INDEX: 26.47 KG/M2 | TEMPERATURE: 100 F | RESPIRATION RATE: 18 BRPM | SYSTOLIC BLOOD PRESSURE: 117 MMHG | OXYGEN SATURATION: 96 % | HEIGHT: 66 IN | DIASTOLIC BLOOD PRESSURE: 55 MMHG | WEIGHT: 164.69 LBS | HEART RATE: 70 BPM

## 2019-11-30 PROCEDURE — 25000003 PHARM REV CODE 250: Performed by: NURSE PRACTITIONER

## 2019-11-30 PROCEDURE — 63600175 PHARM REV CODE 636 W HCPCS: Performed by: NURSE PRACTITIONER

## 2019-11-30 PROCEDURE — 94761 N-INVAS EAR/PLS OXIMETRY MLT: CPT

## 2019-11-30 RX ADMIN — POLYETHYLENE GLYCOL (3350) 17 G: 17 POWDER, FOR SOLUTION ORAL at 09:11

## 2019-11-30 RX ADMIN — ENOXAPARIN SODIUM 40 MG: 100 INJECTION SUBCUTANEOUS at 05:11

## 2019-11-30 RX ADMIN — OYSTER SHELL CALCIUM WITH VITAMIN D 1 TABLET: 500; 200 TABLET, FILM COATED ORAL at 09:11

## 2019-11-30 RX ADMIN — METOPROLOL TARTRATE 25 MG: 25 TABLET, FILM COATED ORAL at 09:11

## 2019-11-30 RX ADMIN — ATORVASTATIN CALCIUM 40 MG: 40 TABLET, FILM COATED ORAL at 09:11

## 2019-11-30 RX ADMIN — AMLODIPINE BESYLATE 5 MG: 5 TABLET ORAL at 09:11

## 2019-11-30 RX ADMIN — ASPIRIN 81 MG: 81 TABLET ORAL at 09:11

## 2019-11-30 NOTE — NURSING
Called report to Elaine at [facility]. Nurse refused Pt due to Pt not having a recent bM. KUB ordered, showed impaction. Mag citrate enema given. No BM. Mag citrate ordered po per Jacquie Torres NP.

## 2019-11-30 NOTE — CARE UPDATE
11/30/19 0701   PRE-TX-O2   O2 Device (Oxygen Therapy) room air   SpO2 96 %   Pulse Oximetry Type Intermittent   $ Pulse Oximetry - Multiple Charge Pulse Oximetry - Multiple   Pulse 84   Resp 16

## 2019-11-30 NOTE — PLAN OF CARE
[facility] attempted to contact [facility] (2x) re: pt d/c and return SNF.  AGA left voicemail at [phone].    14:08  AGA left message for Dir of Nursing, zeke Lowry/ [facility] to return call re: pt returning SNF, auth provided yesterday by PHN for SNF (same auth #).           11/30/19 1334   Post-Acute Status   Post-Acute Authorization Placement

## 2019-11-30 NOTE — PLAN OF CARE
AGA spoke w/ Ess[facility], Dir of Nursing, can accept pt today for SNF, states PHN auth provided is good through today only.  Their  is no longer available today, they are unable to secure transport.  Per [facility], [facility] will pay for transport either through [facility] or [facility].    AGA faxed d/c orders, AVS, Abd X Ray results to [facility] [phone].      Call report to Megan @ [facility] [phone].  AGA updated pt's nurse.        11/30/19 1517   Post-Acute Status   Post-Acute Authorization Placement   Post-Acute Placement Status Set-up Complete

## 2019-11-30 NOTE — PLAN OF CARE
Pt resting quietly at this time. Able to make some needs known. Incont b/b. Large bm this shift. Denies pain. Bed low and locked. Call light in reach.

## 2019-11-30 NOTE — NURSING
Report called to Megan at the [facility]. Son notified of transport. [facility] called for transport. ETA within the hour.

## 2019-11-30 NOTE — PLAN OF CARE
Pt resting quietly at this time. Able to make some needs known. Incont b/b. Denies pain. Bed low and locked. Call light in reach.

## 2019-12-01 NOTE — NURSING
Discharge information given to transport. PIV removed, catheter intact. Tele removed and returned. Pt transported via stretcher with o2 by [facility] transport off unit in stable condition.

## 2019-12-02 ENCOUNTER — TELEPHONE (OUTPATIENT)
Dept: MEDSURG UNIT | Facility: HOSPITAL | Age: 84
End: 2019-12-02

## 2019-12-13 ENCOUNTER — HOSPITAL ENCOUNTER (INPATIENT)
Facility: HOSPITAL | Age: 84
LOS: 3 days | Discharge: NURSING, REHAB OR LONG-TERM CARE | DRG: 391 | End: 2019-12-19
Attending: EMERGENCY MEDICINE | Admitting: HOSPITALIST
Payer: MEDICARE

## 2019-12-13 DIAGNOSIS — R11.2 INTRACTABLE VOMITING WITH NAUSEA, UNSPECIFIED VOMITING TYPE: ICD-10-CM

## 2019-12-13 DIAGNOSIS — I26.99 PULMONARY EMBOLISM: ICD-10-CM

## 2019-12-13 DIAGNOSIS — R11.10 VOMITING: ICD-10-CM

## 2019-12-13 DIAGNOSIS — R10.84 GENERALIZED ABDOMINAL PAIN: Primary | ICD-10-CM

## 2019-12-13 DIAGNOSIS — M79.89 LEG SWELLING: ICD-10-CM

## 2019-12-13 PROBLEM — I25.10 CORONARY ARTERY DISEASE: Status: ACTIVE | Noted: 2019-12-13

## 2019-12-13 PROBLEM — Z86.73 HISTORY OF CVA (CEREBROVASCULAR ACCIDENT): Status: ACTIVE | Noted: 2019-12-13

## 2019-12-13 PROBLEM — R93.89 ABNORMAL FINDING ON RADIOLOGY EXAM: Status: ACTIVE | Noted: 2019-12-13

## 2019-12-13 LAB
ALBUMIN SERPL BCP-MCNC: 2.8 G/DL (ref 3.5–5.2)
ALP SERPL-CCNC: 99 U/L (ref 55–135)
ALT SERPL W/O P-5'-P-CCNC: 29 U/L (ref 10–44)
AMYLASE SERPL-CCNC: 8 U/L (ref 20–110)
ANION GAP SERPL CALC-SCNC: 6 MMOL/L (ref 8–16)
APTT PPP: 28.7 SEC (ref 23.6–33.3)
AST SERPL-CCNC: 35 U/L (ref 10–40)
BACTERIA #/AREA URNS HPF: NORMAL /HPF
BASOPHILS # BLD AUTO: 0.01 K/UL (ref 0–0.2)
BASOPHILS NFR BLD: 0.2 % (ref 0–1.9)
BILIRUB SERPL-MCNC: 0.9 MG/DL (ref 0.1–1)
BILIRUB UR QL STRIP: NEGATIVE
BUN SERPL-MCNC: 17 MG/DL (ref 8–23)
CALCIUM SERPL-MCNC: 8.4 MG/DL (ref 8.7–10.5)
CHLORIDE SERPL-SCNC: 102 MMOL/L (ref 95–110)
CK MB SERPL-MCNC: 1.6 NG/ML (ref 0.1–6.5)
CK SERPL-CCNC: 64 U/L (ref 20–180)
CLARITY UR: CLEAR
CO2 SERPL-SCNC: 29 MMOL/L (ref 23–29)
COLOR UR: COLORLESS
CREAT SERPL-MCNC: 0.5 MG/DL (ref 0.5–1.4)
DIFFERENTIAL METHOD: ABNORMAL
EOSINOPHIL # BLD AUTO: 0.1 K/UL (ref 0–0.5)
EOSINOPHIL NFR BLD: 2.4 % (ref 0–8)
ERYTHROCYTE [DISTWIDTH] IN BLOOD BY AUTOMATED COUNT: 16.4 % (ref 11.5–14.5)
EST. GFR  (AFRICAN AMERICAN): >60 ML/MIN/1.73 M^2
EST. GFR  (NON AFRICAN AMERICAN): >60 ML/MIN/1.73 M^2
FOLATE SERPL-MCNC: 5.6 NG/ML (ref 4–24)
GLUCOSE SERPL-MCNC: 124 MG/DL (ref 70–110)
GLUCOSE UR QL STRIP: NEGATIVE
HCT VFR BLD AUTO: 29.6 % (ref 37–48.5)
HGB BLD-MCNC: 9.7 G/DL (ref 12–16)
HGB UR QL STRIP: ABNORMAL
IMM GRANULOCYTES # BLD AUTO: 0.03 K/UL (ref 0–0.04)
IMM GRANULOCYTES NFR BLD AUTO: 0.7 % (ref 0–0.5)
INR PPP: 1.1
KETONES UR QL STRIP: NEGATIVE
LEUKOCYTE ESTERASE UR QL STRIP: ABNORMAL
LIPASE SERPL-CCNC: 21 U/L (ref 4–60)
LYMPHOCYTES # BLD AUTO: 1.3 K/UL (ref 1–4.8)
LYMPHOCYTES NFR BLD: 31.3 % (ref 18–48)
MAGNESIUM SERPL-MCNC: 1.9 MG/DL (ref 1.6–2.6)
MCH RBC QN AUTO: 34.9 PG (ref 27–31)
MCHC RBC AUTO-ENTMCNC: 32.8 G/DL (ref 32–36)
MCV RBC AUTO: 107 FL (ref 82–98)
MICROSCOPIC COMMENT: NORMAL
MONOCYTES # BLD AUTO: 0.4 K/UL (ref 0.3–1)
MONOCYTES NFR BLD: 9.4 % (ref 4–15)
NEUTROPHILS # BLD AUTO: 2.3 K/UL (ref 1.8–7.7)
NEUTROPHILS NFR BLD: 56 % (ref 38–73)
NITRITE UR QL STRIP: NEGATIVE
NRBC BLD-RTO: 0 /100 WBC
PH UR STRIP: 7 [PH] (ref 5–8)
PLATELET # BLD AUTO: 193 K/UL (ref 150–350)
PMV BLD AUTO: 8.9 FL (ref 9.2–12.9)
POTASSIUM SERPL-SCNC: 4.4 MMOL/L (ref 3.5–5.1)
PROT SERPL-MCNC: 6 G/DL (ref 6–8.4)
PROT UR QL STRIP: NEGATIVE
PROTHROMBIN TIME: 13.8 SEC (ref 10.6–14.8)
RBC # BLD AUTO: 2.78 M/UL (ref 4–5.4)
RBC #/AREA URNS HPF: 1 /HPF (ref 0–4)
SODIUM SERPL-SCNC: 137 MMOL/L (ref 136–145)
SP GR UR STRIP: 1 (ref 1–1.03)
SQUAMOUS #/AREA URNS HPF: 2 /HPF
T4 FREE SERPL-MCNC: 0.95 NG/DL (ref 0.71–1.51)
TROPONIN I SERPL DL<=0.01 NG/ML-MCNC: <0.03 NG/ML (ref 0.02–0.04)
TSH SERPL DL<=0.005 MIU/L-ACNC: 6.14 UIU/ML (ref 0.34–5.6)
URN SPEC COLLECT METH UR: ABNORMAL
UROBILINOGEN UR STRIP-ACNC: NEGATIVE EU/DL
VIT B12 SERPL-MCNC: 83 PG/ML (ref 210–950)
WBC # BLD AUTO: 4.15 K/UL (ref 3.9–12.7)
WBC #/AREA URNS HPF: 2 /HPF (ref 0–5)

## 2019-12-13 PROCEDURE — 96375 TX/PRO/DX INJ NEW DRUG ADDON: CPT

## 2019-12-13 PROCEDURE — 82607 VITAMIN B-12: CPT

## 2019-12-13 PROCEDURE — 84439 ASSAY OF FREE THYROXINE: CPT

## 2019-12-13 PROCEDURE — 84443 ASSAY THYROID STIM HORMONE: CPT

## 2019-12-13 PROCEDURE — 84484 ASSAY OF TROPONIN QUANT: CPT | Mod: 91

## 2019-12-13 PROCEDURE — G0378 HOSPITAL OBSERVATION PER HR: HCPCS

## 2019-12-13 PROCEDURE — 82150 ASSAY OF AMYLASE: CPT

## 2019-12-13 PROCEDURE — 85025 COMPLETE CBC W/AUTO DIFF WBC: CPT

## 2019-12-13 PROCEDURE — 96361 HYDRATE IV INFUSION ADD-ON: CPT

## 2019-12-13 PROCEDURE — 99285 EMERGENCY DEPT VISIT HI MDM: CPT | Mod: 25

## 2019-12-13 PROCEDURE — 83690 ASSAY OF LIPASE: CPT

## 2019-12-13 PROCEDURE — 63600175 PHARM REV CODE 636 W HCPCS: Performed by: HOSPITALIST

## 2019-12-13 PROCEDURE — 82550 ASSAY OF CK (CPK): CPT

## 2019-12-13 PROCEDURE — 25000003 PHARM REV CODE 250: Performed by: HOSPITALIST

## 2019-12-13 PROCEDURE — 36415 COLL VENOUS BLD VENIPUNCTURE: CPT

## 2019-12-13 PROCEDURE — 63600175 PHARM REV CODE 636 W HCPCS: Performed by: EMERGENCY MEDICINE

## 2019-12-13 PROCEDURE — 94761 N-INVAS EAR/PLS OXIMETRY MLT: CPT

## 2019-12-13 PROCEDURE — 85610 PROTHROMBIN TIME: CPT

## 2019-12-13 PROCEDURE — 82553 CREATINE MB FRACTION: CPT

## 2019-12-13 PROCEDURE — 81001 URINALYSIS AUTO W/SCOPE: CPT

## 2019-12-13 PROCEDURE — 93005 ELECTROCARDIOGRAM TRACING: CPT

## 2019-12-13 PROCEDURE — 83735 ASSAY OF MAGNESIUM: CPT

## 2019-12-13 PROCEDURE — 51702 INSERT TEMP BLADDER CATH: CPT

## 2019-12-13 PROCEDURE — 85730 THROMBOPLASTIN TIME PARTIAL: CPT

## 2019-12-13 PROCEDURE — 84484 ASSAY OF TROPONIN QUANT: CPT

## 2019-12-13 PROCEDURE — 80053 COMPREHEN METABOLIC PANEL: CPT

## 2019-12-13 PROCEDURE — 82746 ASSAY OF FOLIC ACID SERUM: CPT

## 2019-12-13 PROCEDURE — 96374 THER/PROPH/DIAG INJ IV PUSH: CPT

## 2019-12-13 PROCEDURE — 25500020 PHARM REV CODE 255: Performed by: EMERGENCY MEDICINE

## 2019-12-13 RX ORDER — QUETIAPINE FUMARATE 25 MG/1
25 TABLET, FILM COATED ORAL 2 TIMES DAILY
Status: DISCONTINUED | OUTPATIENT
Start: 2019-12-13 | End: 2019-12-19 | Stop reason: HOSPADM

## 2019-12-13 RX ORDER — ONDANSETRON 2 MG/ML
4 INJECTION INTRAMUSCULAR; INTRAVENOUS EVERY 6 HOURS PRN
Status: DISCONTINUED | OUTPATIENT
Start: 2019-12-13 | End: 2019-12-19 | Stop reason: HOSPADM

## 2019-12-13 RX ORDER — FERROUS SULFATE, DRIED 160(50) MG
1 TABLET, EXTENDED RELEASE ORAL DAILY
Status: DISCONTINUED | OUTPATIENT
Start: 2019-12-13 | End: 2019-12-19 | Stop reason: HOSPADM

## 2019-12-13 RX ORDER — POLYETHYLENE GLYCOL 3350 17 G/17G
17 POWDER, FOR SOLUTION ORAL DAILY
Status: DISCONTINUED | OUTPATIENT
Start: 2019-12-13 | End: 2019-12-19 | Stop reason: HOSPADM

## 2019-12-13 RX ORDER — ACETAMINOPHEN 325 MG/1
650 TABLET ORAL EVERY 4 HOURS PRN
Status: DISCONTINUED | OUTPATIENT
Start: 2019-12-13 | End: 2019-12-19 | Stop reason: HOSPADM

## 2019-12-13 RX ORDER — LORAZEPAM 2 MG/ML
0.5 INJECTION INTRAMUSCULAR
Status: COMPLETED | OUTPATIENT
Start: 2019-12-13 | End: 2019-12-13

## 2019-12-13 RX ORDER — ASPIRIN 81 MG/1
81 TABLET ORAL DAILY
Status: DISCONTINUED | OUTPATIENT
Start: 2019-12-13 | End: 2019-12-19 | Stop reason: HOSPADM

## 2019-12-13 RX ORDER — METOPROLOL TARTRATE 25 MG/1
25 TABLET, FILM COATED ORAL 2 TIMES DAILY
Status: DISCONTINUED | OUTPATIENT
Start: 2019-12-13 | End: 2019-12-19 | Stop reason: HOSPADM

## 2019-12-13 RX ORDER — SODIUM CHLORIDE 0.9 % (FLUSH) 0.9 %
10 SYRINGE (ML) INJECTION
Status: DISCONTINUED | OUTPATIENT
Start: 2019-12-13 | End: 2019-12-19 | Stop reason: HOSPADM

## 2019-12-13 RX ORDER — ACETAMINOPHEN 325 MG/1
650 TABLET ORAL EVERY 6 HOURS PRN
Status: DISCONTINUED | OUTPATIENT
Start: 2019-12-13 | End: 2019-12-19 | Stop reason: HOSPADM

## 2019-12-13 RX ORDER — QUETIAPINE FUMARATE 25 MG/1
25 TABLET, FILM COATED ORAL 2 TIMES DAILY
COMMUNITY

## 2019-12-13 RX ORDER — AMLODIPINE BESYLATE 5 MG/1
5 TABLET ORAL DAILY
Status: DISCONTINUED | OUTPATIENT
Start: 2019-12-13 | End: 2019-12-19 | Stop reason: HOSPADM

## 2019-12-13 RX ORDER — ATORVASTATIN CALCIUM 40 MG/1
40 TABLET, FILM COATED ORAL DAILY
Status: DISCONTINUED | OUTPATIENT
Start: 2019-12-13 | End: 2019-12-19 | Stop reason: HOSPADM

## 2019-12-13 RX ORDER — ENOXAPARIN SODIUM 100 MG/ML
40 INJECTION SUBCUTANEOUS EVERY 24 HOURS
Status: DISCONTINUED | OUTPATIENT
Start: 2019-12-13 | End: 2019-12-14

## 2019-12-13 RX ORDER — PROMETHAZINE HYDROCHLORIDE 25 MG/ML
25 INJECTION, SOLUTION INTRAMUSCULAR; INTRAVENOUS ONCE AS NEEDED
Status: ON HOLD | COMMUNITY
End: 2019-12-18 | Stop reason: HOSPADM

## 2019-12-13 RX ORDER — ONDANSETRON 2 MG/ML
4 INJECTION INTRAMUSCULAR; INTRAVENOUS
Status: COMPLETED | OUTPATIENT
Start: 2019-12-13 | End: 2019-12-13

## 2019-12-13 RX ADMIN — QUETIAPINE 25 MG: 25 TABLET ORAL at 08:12

## 2019-12-13 RX ADMIN — ASPIRIN 81 MG: 81 TABLET, DELAYED RELEASE ORAL at 06:12

## 2019-12-13 RX ADMIN — ENOXAPARIN SODIUM 40 MG: 100 INJECTION SUBCUTANEOUS at 06:12

## 2019-12-13 RX ADMIN — ONDANSETRON 4 MG: 2 INJECTION INTRAMUSCULAR; INTRAVENOUS at 11:12

## 2019-12-13 RX ADMIN — SODIUM CHLORIDE 500 ML: 0.9 INJECTION, SOLUTION INTRAVENOUS at 11:12

## 2019-12-13 RX ADMIN — LORAZEPAM 0.5 MG: 2 INJECTION INTRAMUSCULAR; INTRAVENOUS at 11:12

## 2019-12-13 RX ADMIN — AMLODIPINE BESYLATE 5 MG: 5 TABLET ORAL at 06:12

## 2019-12-13 RX ADMIN — POLYETHYLENE GLYCOL 3350 17 G: 17 POWDER, FOR SOLUTION ORAL at 06:12

## 2019-12-13 RX ADMIN — IOHEXOL 100 ML: 350 INJECTION, SOLUTION INTRAVENOUS at 12:12

## 2019-12-13 RX ADMIN — Medication 1 TABLET: at 08:12

## 2019-12-13 RX ADMIN — METOPROLOL TARTRATE 25 MG: 25 TABLET ORAL at 08:12

## 2019-12-13 RX ADMIN — ATORVASTATIN CALCIUM 40 MG: 40 TABLET, FILM COATED ORAL at 06:12

## 2019-12-13 NOTE — ED PROVIDER NOTES
Encounter Date: 12/13/2019       History     Chief Complaint   Patient presents with    Abdominal Pain     Patient with relatively recent ischemic CVA.  Patient currently at nursing care facility.  Over the last 2 weeks patient is been having intermittent nausea vomiting with abdominal pain.  Reportedly patient had ultrasound that showed gallstones.  Symptoms seem to reside.  Today patient with emesis after physical therapy.  Currently patient denies any abdominal pain. Patient with no complaints.  Patient is somewhat anxious with dementia.        Review of patient's allergies indicates:  No Known Allergies  Past Medical History:   Diagnosis Date    Coronary artery disease     Dementia     Hypertension     Thyroid disease     Uterine cancer      Past Surgical History:   Procedure Laterality Date    APPENDECTOMY      BACK SURGERY      HYSTERECTOMY      KNEE SURGERY      TONSILLECTOMY       Family History   Problem Relation Age of Onset    No Known Problems Mother     Depression Father     Early death Father      Social History     Tobacco Use    Smoking status: Never Smoker    Smokeless tobacco: Never Used   Substance Use Topics    Alcohol use: No     Comment: rarely    Drug use: No     Review of Systems   Constitutional: Negative for chills and fever.   HENT: Negative for congestion.    Eyes: Negative for visual disturbance.   Respiratory: Negative for shortness of breath.    Cardiovascular: Negative for chest pain and palpitations.   Gastrointestinal: Positive for abdominal pain and vomiting.        No gastrointestinal bleeding   Genitourinary: Negative for dysuria.   Musculoskeletal: Negative for joint swelling.   Neurological: Negative for headaches.   Psychiatric/Behavioral:        No change in mental status       Physical Exam     Initial Vitals   BP Pulse Resp Temp SpO2   -- -- -- -- --      MAP       --         Physical Exam    Nursing note and vitals reviewed.  Constitutional: She is not  diaphoretic. No distress.   HENT:   Head: Normocephalic and atraumatic.   Dry mucous membranes   Eyes: Conjunctivae are normal.   Neck: Normal range of motion.   Cardiovascular: Normal rate.   Pulmonary/Chest: Breath sounds normal.   Abdominal: Soft. Bowel sounds are normal. There is no tenderness.   Abdomen completely nontender to deep palpation   Musculoskeletal: Normal range of motion.   Neurological: She is alert.   No gross deficits.  Patient moves all extremities equally.  Face is symmetric.  Sensation intact   Skin: No rash noted.   Psychiatric:   Alert, anxious, poor historian         ED Course   Procedures  Labs Reviewed   CBC W/ AUTO DIFFERENTIAL - Abnormal; Notable for the following components:       Result Value    RBC 2.78 (*)     Hemoglobin 9.7 (*)     Hematocrit 29.6 (*)     Mean Corpuscular Volume 107 (*)     Mean Corpuscular Hemoglobin 34.9 (*)     RDW 16.4 (*)     MPV 8.9 (*)     Immature Granulocytes 0.7 (*)     All other components within normal limits   APTT   AMYLASE   COMPREHENSIVE METABOLIC PANEL   LIPASE   MAGNESIUM   PROTIME-INR   TROPONIN I   URINALYSIS, REFLEX TO URINE CULTURE   CK-MB   CK          Imaging Results          X-Ray Chest AP Portable (No Result on File)                  Medical Decision Making:   History:   Old Medical Records: I decided to obtain old medical records.  Clinical Tests:   Lab Tests: Reviewed  Radiological Study: Reviewed  Medical Tests: Reviewed  ED Management:  Patient presents with continuing to not eat or drink in the emergency room.  She appears very volume depleted.  Given history of abdominal pain with intractable nausea vomiting abdominal CT scan obtain.  Cannot rule out pulmonary embolus.  Patient cannot get another dye load and till tomorrow.  Given continued gastrointestinal symptoms hospitalist consult for possible admission and continued hydration and evaluation.  Will check gallbladder ultrasound and leg ultrasound for possible DVT.                                  Clinical Impression:       ICD-10-CM ICD-9-CM   1. Generalized abdominal pain R10.84 789.07   2. Vomiting R11.10 787.03   3. Leg swelling M79.89 729.81   4. Intractable vomiting with nausea, unspecified vomiting type R11.2 536.2                             Tobias Metzger MD  12/13/19 1326       Tobias Metzger MD  12/13/19 1335

## 2019-12-13 NOTE — HPI
Patient is a 86-year-old female with a history of recent ischemic CVA resulting in slurred speech and generalized weakness.  .  Patient currently is at nursing care facility.  Over the last 2 weeks she has been having intermittent nausea vomiting with abdominal pain.  Reportedly patient had ultrasound that showed gallstones.   Today patient with emesis after physical therapy.  Currently patient denies any abdominal pain. Patient with no complaints.  Patient is somewhat anxious with dementia.  During ED workup, patient had CT the abdomen which showed no acute abdominal findings however it did show indeterminate findings for pulmonary embolism.  Patient denies chest pain or shortness of breath.

## 2019-12-13 NOTE — H&P
[facility] Medicine  History & Physical    Patient Name: Ellen Maravilla  MRN: [id number]  Admission Date: 12/13/2019  Attending Physician: Subhash Lock DO   Primary Care Provider: Tien Mckeon MD         Patient information was obtained from patient, relative(s) and ER recordsand ED physician.     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  .  Patient currently is at nursing care facility.  Over the last 2 weeks she has been having intermittent nausea vomiting with abdominal pain.  Reportedly patient had ultrasound that showed gallstones.   Today patient with emesis after physical therapy.  Currently patient denies any abdominal pain. Patient with no complaints.  Patient is somewhat anxious with dementia.  During ED workup, patient had CT the abdomen which showed no acute abdominal findings however it did show indeterminate findings for pulmonary embolism.  Patient denies chest pain or shortness of breath.    Past Medical History:   Diagnosis Date    Coronary artery disease     Dementia     Hypertension     Thyroid disease     Uterine cancer        Past Surgical History:   Procedure Laterality Date    APPENDECTOMY      BACK SURGERY      HYSTERECTOMY      KNEE SURGERY      TONSILLECTOMY         Review of patient's allergies indicates:  No Known Allergies    No current facility-administered medications on file prior to encounter.      Current Outpatient Medications on File Prior to Encounter   Medication Sig    acetaminophen (TYLENOL) 325 MG tablet Take 2 tablets (650 mg total) by mouth every 4 (four) hours as needed.    amLODIPine (NORVASC) 5 MG tablet Take 1 tablet (5 mg total) by mouth once daily.     aspirin (ECOTRIN) 81 MG EC tablet Take 1 tablet (81 mg total) by mouth once daily.    atorvastatin (LIPITOR) 40 MG tablet Take 1 tablet (40 mg total) by mouth once daily.    bisacodyl (DULCOLAX) 10 mg Supp Place 1 suppository (10 mg total) rectally daily as needed.    calcium-vitamin D3 (OS-LILY 500 + D3) 500 mg(1,250mg) -200 unit per tablet Take 1 tablet by mouth once daily.    enoxaparin (LOVENOX) 40 mg/0.4 mL Syrg Inject 0.4 mLs (40 mg total) into the skin once daily.    metoprolol tartrate (LOPRESSOR) 25 MG tablet Take 1 tablet (25 mg total) by mouth 2 (two) times daily.    polyethylene glycol (GLYCOLAX) 17 gram PwPk Take 17 g by mouth once daily.    QUEtiapine (SEROQUEL) 25 MG Tab Take 1 tablet (25 mg total) by mouth once daily. Everyday at 1800     Family History     Problem Relation (Age of Onset)    Depression Father    Early death Father    No Known Problems Mother        Tobacco Use    Smoking status: Never Smoker    Smokeless tobacco: Never Used   Substance and Sexual Activity    Alcohol use: No     Comment: rarely    Drug use: No    Sexual activity: Not on file     Review of Systems   Constitutional: Negative for chills, fatigue and fever.   HENT: Positive for trouble swallowing. Negative for sore throat.         Not clear if patient is having aspiration type symptoms she has soft diet at nursing home   Respiratory: Negative for chest tightness and shortness of breath.    Cardiovascular: Negative for chest pain and palpitations.   Gastrointestinal: Positive for abdominal pain, nausea and vomiting.   Genitourinary: Negative for dysuria and frequency.   Musculoskeletal: Positive for gait problem. Negative for neck pain.   Skin: Negative for rash.   Neurological: Positive for syncope and weakness. Negative for tremors and headaches.        Slurred speech residual from CVA   Psychiatric/Behavioral:        Patient with a history of underlying dementia  Confused at times which is baseline      Objective:     Vital Signs (Most Recent):  Temp: 98.1 °F (36.7 °C) (12/13/19 1100)  Pulse: 79 (12/13/19 1300)  Resp: 17 (12/13/19 1300)  BP: (!) 144/62 (12/13/19 1300)  SpO2: 100 % (12/13/19 1300) Vital Signs (24h Range):  Temp:  [98.1 °F (36.7 °C)] 98.1 °F (36.7 °C)  Pulse:  [78-85] 79  Resp:  [17-18] 17  SpO2:  [99 %-100 %] 100 %  BP: (125-144)/(56-81) 144/62        There is no height or weight on file to calculate BMI.    Physical Exam   Constitutional: She is oriented to person, place, and time.   Patient is lying on a gurney in the emergency department  Son is at bedside  She appears comfortable   Eyes: Pupils are equal, round, and reactive to light. Conjunctivae and EOM are normal. No scleral icterus.   Sclera non icteric   Neck: Normal range of motion. Neck supple.   Cardiovascular: Normal rate, regular rhythm and normal heart sounds.   Pulmonary/Chest: Effort normal and breath sounds normal.   Abdominal: Soft. Bowel sounds are normal. There is no tenderness. There is no rebound.   No right upper quadrant tenderness  No other tenderness appreciated   Genitourinary:   Genitourinary Comments: Tsai in place  Changed in the emergency department   Musculoskeletal: Normal range of motion.   1+ edema bilateral lower extremities   Neurological: She is alert and oriented to person, place, and time.   Patient is alert she is oriented to her name and son  Limited motor exam secondary to cooperation  She is moving all extremities  Slurred speech   Skin: Skin is warm. No rash noted.   Psychiatric:   Currently calm     Nursing note and vitals reviewed.       Significant Labs:   BMP:   Recent Labs   Lab 12/13/19  1100   *      K 4.4      CO2 29   BUN 17   CREATININE 0.5   CALCIUM 8.4*   MG 1.9     CBC:   Recent Labs   Lab 12/13/19  1100   WBC 4.15   HGB 9.7*   HCT 29.6*        CMP:   Recent Labs   Lab 12/13/19  1100      K 4.4      CO2 29   *   BUN 17   CREATININE 0.5    CALCIUM 8.4*   PROT 6.0   ALBUMIN 2.8*   BILITOT 0.9   ALKPHOS 99   AST 35   ALT 29   ANIONGAP 6*   EGFRNONAA >60.0       Significant Imaging:   CT abd   Impression       1.  Asymmetric decreased opacification of the pulmonary arteries in the right lower lobe, a nonspecific finding given the non targeted technique, possibly secondary to admixing, but one which would warrant correlation with D-dimer, and possible CTA of the chest.    2.  Barium within the colon without evidence of obstruction, intra-abdominal free air or abscess.    3.  The bladder is partially collapsed around a Tsai catheter balloon with gas in the bladder, consider correlation for recent instrumentation and possible urinalysis.    4.  Trace bilateral pleural effusions and adjacent atelectasis.    5.  Numerous additional incidental findings as noted above.     Chest x-ray read by me shows no acute cardiopulmonary process  Agree with report  Abdominal ultrasound pending  Lower extremity Doppler ultrasound pending  ECG read by me shows regular rhythm difficult to tell of wide complex rhythm versus sinus rhythm  Right bundle-branch block  Appears have no acute ST segment changes    Assessment/Plan:     * Nausea & vomiting  Patient with nausea/Vomiting.  Unclear etiology  Not certain if related to food.  Will try clears  Abdominal ultrasound is pending to rule out gallbladder disease        History of CVA (cerebrovascular accident)  History of CVA with slurred speech  and  generalized weakness  PT and speech eval      Coronary artery disease  History of coronary disease details unknown  Patient denies chest pain  Check serial enzymes      Abnormal finding on radiology exam  Patient with indeterminate finding of pulmonary embolism own abdominal CT scan  Ultrasound of lower extremities are pending  If negative patient will need CTA of the chest tomorrow.      VTE Risk Mitigation (From admission, onward)    None       Will try clear diet  Ultrasound  the abdomen pending/rule out gallbladder disease  Consider CT of the chest tomorrow  Clinically patient does not appear to have pulmonary embolism, however she would be at risk for DVT.  Ultrasound bilateral lower extremity Doppler is pending   Case discussed with patient's son at bedside    Subhash Lock DO  Department of Hospital Medicine   [facility]

## 2019-12-13 NOTE — ED NOTES
Pt presents to ED for evaluation of ongoing gallbladder issue with increased pain to right upper quad, pt is awake and alert, answering questions, baseline dementia noted, doesn't appear distressed at this time

## 2019-12-13 NOTE — ASSESSMENT & PLAN NOTE
Patient with nausea/Vomiting.  Unclear etiology  Not certain if related to food.  Will try clears  Abdominal ultrasound is pending to rule out gallbladder disease

## 2019-12-13 NOTE — PLAN OF CARE
12/13/19 1344   BLANCO Message   Medicare Outpatient and Observation Notification regarding financial responsibility Given to patient/caregiver;Explained to patient/caregiver;Signed/date by patient/caregiver   Date BLANCO was signed 12/13/19   Time BLANCO was signed 1341

## 2019-12-13 NOTE — ASSESSMENT & PLAN NOTE
Patient with indeterminate finding of pulmonary embolism own abdominal CT scan  Ultrasound of lower extremities are pending  If negative patient will need CTA of the chest tomorrow.

## 2019-12-13 NOTE — ED NOTES
Urinary cath changed out without difficulty.  Old cath removed, tubing intact and a new cath inserted and clear yellow urine immediately returned aprox 30cc

## 2019-12-13 NOTE — ED TRIAGE NOTES
Pt coming from [facility] has been having Nausea, vomiting, and abdominal pain for the past couple weeks. [facility] states they performed an U/S and diagnosed her with gallstones on 12/3 but her symptoms had improved but is coming here today because they were worsening again.

## 2019-12-13 NOTE — SUBJECTIVE & OBJECTIVE
Past Medical History:   Diagnosis Date    Coronary artery disease     Dementia     Hypertension     Thyroid disease     Uterine cancer        Past Surgical History:   Procedure Laterality Date    APPENDECTOMY      BACK SURGERY      HYSTERECTOMY      KNEE SURGERY      TONSILLECTOMY         Review of patient's allergies indicates:  No Known Allergies    No current facility-administered medications on file prior to encounter.      Current Outpatient Medications on File Prior to Encounter   Medication Sig    acetaminophen (TYLENOL) 325 MG tablet Take 2 tablets (650 mg total) by mouth every 4 (four) hours as needed.    amLODIPine (NORVASC) 5 MG tablet Take 1 tablet (5 mg total) by mouth once daily.    aspirin (ECOTRIN) 81 MG EC tablet Take 1 tablet (81 mg total) by mouth once daily.    atorvastatin (LIPITOR) 40 MG tablet Take 1 tablet (40 mg total) by mouth once daily.    bisacodyl (DULCOLAX) 10 mg Supp Place 1 suppository (10 mg total) rectally daily as needed.    calcium-vitamin D3 (OS-LILY 500 + D3) 500 mg(1,250mg) -200 unit per tablet Take 1 tablet by mouth once daily.    enoxaparin (LOVENOX) 40 mg/0.4 mL Syrg Inject 0.4 mLs (40 mg total) into the skin once daily.    metoprolol tartrate (LOPRESSOR) 25 MG tablet Take 1 tablet (25 mg total) by mouth 2 (two) times daily.    polyethylene glycol (GLYCOLAX) 17 gram PwPk Take 17 g by mouth once daily.    QUEtiapine (SEROQUEL) 25 MG Tab Take 1 tablet (25 mg total) by mouth once daily. Everyday at 1800     Family History     Problem Relation (Age of Onset)    Depression Father    Early death Father    No Known Problems Mother        Tobacco Use    Smoking status: Never Smoker    Smokeless tobacco: Never Used   Substance and Sexual Activity    Alcohol use: No     Comment: rarely    Drug use: No    Sexual activity: Not on file     Review of Systems   Constitutional: Negative for chills, fatigue and fever.   HENT: Positive for trouble swallowing. Negative  for sore throat.         Not clear if patient is having aspiration type symptoms she has soft diet at nursing home   Respiratory: Negative for chest tightness and shortness of breath.    Cardiovascular: Negative for chest pain and palpitations.   Gastrointestinal: Positive for abdominal pain, nausea and vomiting.   Genitourinary: Negative for dysuria and frequency.   Musculoskeletal: Positive for gait problem. Negative for neck pain.   Skin: Negative for rash.   Neurological: Positive for syncope and weakness. Negative for tremors and headaches.        Slurred speech residual from CVA   Psychiatric/Behavioral:        Patient with a history of underlying dementia  Confused at times which is baseline     Objective:     Vital Signs (Most Recent):  Temp: 98.1 °F (36.7 °C) (12/13/19 1100)  Pulse: 79 (12/13/19 1300)  Resp: 17 (12/13/19 1300)  BP: (!) 144/62 (12/13/19 1300)  SpO2: 100 % (12/13/19 1300) Vital Signs (24h Range):  Temp:  [98.1 °F (36.7 °C)] 98.1 °F (36.7 °C)  Pulse:  [78-85] 79  Resp:  [17-18] 17  SpO2:  [99 %-100 %] 100 %  BP: (125-144)/(56-81) 144/62        There is no height or weight on file to calculate BMI.    Physical Exam   Constitutional: She is oriented to person, place, and time.   Patient is lying on a gurney in the emergency department  Son is at bedside  She appears comfortable   Eyes: Pupils are equal, round, and reactive to light. Conjunctivae and EOM are normal. No scleral icterus.   Sclera non icteric   Neck: Normal range of motion. Neck supple.   Cardiovascular: Normal rate, regular rhythm and normal heart sounds.   Pulmonary/Chest: Effort normal and breath sounds normal.   Abdominal: Soft. Bowel sounds are normal. There is no tenderness. There is no rebound.   No right upper quadrant tenderness  No other tenderness appreciated   Genitourinary:   Genitourinary Comments: Tsai in place  Changed in the emergency department   Musculoskeletal: Normal range of motion.   1+ edema bilateral lower  extremities   Neurological: She is alert and oriented to person, place, and time.   Patient is alert she is oriented to her name and son  Limited motor exam secondary to cooperation  She is moving all extremities  Slurred speech   Skin: Skin is warm. No rash noted.   Psychiatric:   Currently calm     Nursing note and vitals reviewed.       Significant Labs:   BMP:   Recent Labs   Lab 12/13/19  1100   *      K 4.4      CO2 29   BUN 17   CREATININE 0.5   CALCIUM 8.4*   MG 1.9     CBC:   Recent Labs   Lab 12/13/19  1100   WBC 4.15   HGB 9.7*   HCT 29.6*        CMP:   Recent Labs   Lab 12/13/19  1100      K 4.4      CO2 29   *   BUN 17   CREATININE 0.5   CALCIUM 8.4*   PROT 6.0   ALBUMIN 2.8*   BILITOT 0.9   ALKPHOS 99   AST 35   ALT 29   ANIONGAP 6*   EGFRNONAA >60.0       Significant Imaging:   CT abd   Impression       1.  Asymmetric decreased opacification of the pulmonary arteries in the right lower lobe, a nonspecific finding given the non targeted technique, possibly secondary to admixing, but one which would warrant correlation with D-dimer, and possible CTA of the chest.    2.  Barium within the colon without evidence of obstruction, intra-abdominal free air or abscess.    3.  The bladder is partially collapsed around a Tsai catheter balloon with gas in the bladder, consider correlation for recent instrumentation and possible urinalysis.    4.  Trace bilateral pleural effusions and adjacent atelectasis.    5.  Numerous additional incidental findings as noted above.     Chest x-ray read by me shows no acute cardiopulmonary process  Agree with report  Abdominal ultrasound pending  Lower extremity Doppler ultrasound pending  ECG read by me shows regular rhythm difficult to tell of wide complex rhythm versus sinus rhythm  Right bundle-branch block  Appears have no acute ST segment changes

## 2019-12-14 LAB
ANION GAP SERPL CALC-SCNC: 4 MMOL/L (ref 8–16)
BUN SERPL-MCNC: 12 MG/DL (ref 8–23)
CALCIUM SERPL-MCNC: 8.4 MG/DL (ref 8.7–10.5)
CHLORIDE SERPL-SCNC: 106 MMOL/L (ref 95–110)
CO2 SERPL-SCNC: 30 MMOL/L (ref 23–29)
CREAT SERPL-MCNC: 0.4 MG/DL (ref 0.5–1.4)
ERYTHROCYTE [DISTWIDTH] IN BLOOD BY AUTOMATED COUNT: 16.6 % (ref 11.5–14.5)
EST. GFR  (AFRICAN AMERICAN): >60 ML/MIN/1.73 M^2
EST. GFR  (NON AFRICAN AMERICAN): >60 ML/MIN/1.73 M^2
GLUCOSE SERPL-MCNC: 107 MG/DL (ref 70–110)
HCT VFR BLD AUTO: 29.3 % (ref 37–48.5)
HGB BLD-MCNC: 9.5 G/DL (ref 12–16)
MAGNESIUM SERPL-MCNC: 1.7 MG/DL (ref 1.6–2.6)
MCH RBC QN AUTO: 34.4 PG (ref 27–31)
MCHC RBC AUTO-ENTMCNC: 32.4 G/DL (ref 32–36)
MCV RBC AUTO: 106 FL (ref 82–98)
PLATELET # BLD AUTO: 182 K/UL (ref 150–350)
PMV BLD AUTO: 9.3 FL (ref 9.2–12.9)
POTASSIUM SERPL-SCNC: 4.2 MMOL/L (ref 3.5–5.1)
RBC # BLD AUTO: 2.76 M/UL (ref 4–5.4)
SODIUM SERPL-SCNC: 140 MMOL/L (ref 136–145)
TROPONIN I SERPL DL<=0.01 NG/ML-MCNC: <0.03 NG/ML (ref 0.02–0.04)
TROPONIN I SERPL DL<=0.01 NG/ML-MCNC: <0.03 NG/ML (ref 0.02–0.04)
WBC # BLD AUTO: 3.52 K/UL (ref 3.9–12.7)

## 2019-12-14 PROCEDURE — 92610 EVALUATE SWALLOWING FUNCTION: CPT

## 2019-12-14 PROCEDURE — 36415 COLL VENOUS BLD VENIPUNCTURE: CPT

## 2019-12-14 PROCEDURE — 63600175 PHARM REV CODE 636 W HCPCS: Performed by: INTERNAL MEDICINE

## 2019-12-14 PROCEDURE — 83735 ASSAY OF MAGNESIUM: CPT

## 2019-12-14 PROCEDURE — G0378 HOSPITAL OBSERVATION PER HR: HCPCS

## 2019-12-14 PROCEDURE — 80048 BASIC METABOLIC PNL TOTAL CA: CPT

## 2019-12-14 PROCEDURE — 94761 N-INVAS EAR/PLS OXIMETRY MLT: CPT

## 2019-12-14 PROCEDURE — 84484 ASSAY OF TROPONIN QUANT: CPT

## 2019-12-14 PROCEDURE — 85027 COMPLETE CBC AUTOMATED: CPT

## 2019-12-14 PROCEDURE — 25000003 PHARM REV CODE 250: Performed by: HOSPITALIST

## 2019-12-14 PROCEDURE — 99900035 HC TECH TIME PER 15 MIN (STAT)

## 2019-12-14 PROCEDURE — 63600175 PHARM REV CODE 636 W HCPCS: Performed by: HOSPITALIST

## 2019-12-14 RX ORDER — ENOXAPARIN SODIUM 100 MG/ML
50 INJECTION SUBCUTANEOUS ONCE
Status: COMPLETED | OUTPATIENT
Start: 2019-12-14 | End: 2019-12-14

## 2019-12-14 RX ORDER — ENOXAPARIN SODIUM 100 MG/ML
1 INJECTION SUBCUTANEOUS
Status: DISCONTINUED | OUTPATIENT
Start: 2019-12-15 | End: 2019-12-17

## 2019-12-14 RX ADMIN — ONDANSETRON 4 MG: 2 INJECTION INTRAMUSCULAR; INTRAVENOUS at 07:12

## 2019-12-14 RX ADMIN — ATORVASTATIN CALCIUM 40 MG: 40 TABLET, FILM COATED ORAL at 09:12

## 2019-12-14 RX ADMIN — Medication 1 TABLET: at 09:12

## 2019-12-14 RX ADMIN — ENOXAPARIN SODIUM 50 MG: 100 INJECTION SUBCUTANEOUS at 08:12

## 2019-12-14 RX ADMIN — QUETIAPINE 25 MG: 25 TABLET ORAL at 09:12

## 2019-12-14 RX ADMIN — METOPROLOL TARTRATE 25 MG: 25 TABLET ORAL at 09:12

## 2019-12-14 RX ADMIN — ASPIRIN 81 MG: 81 TABLET, DELAYED RELEASE ORAL at 09:12

## 2019-12-14 RX ADMIN — ENOXAPARIN SODIUM 40 MG: 100 INJECTION SUBCUTANEOUS at 05:12

## 2019-12-14 RX ADMIN — AMLODIPINE BESYLATE 5 MG: 5 TABLET ORAL at 09:12

## 2019-12-14 RX ADMIN — METOPROLOL TARTRATE 25 MG: 25 TABLET ORAL at 08:12

## 2019-12-14 RX ADMIN — QUETIAPINE 25 MG: 25 TABLET ORAL at 08:12

## 2019-12-14 RX ADMIN — ACETAMINOPHEN 650 MG: 325 TABLET ORAL at 07:12

## 2019-12-14 NOTE — NURSING
Skin assessment revealed large, dark purple bruises to right and left side of patient back.  Pictures taken and are in the camera.

## 2019-12-14 NOTE — PLAN OF CARE
Swallow evaluation completed.  Continue current diet; advance as tolerated to previous pureed/thin liquid as per MD d/t N/V.

## 2019-12-14 NOTE — PT/OT/SLP EVAL
"Speech Language Pathology Evaluation  Bedside Swallow    Patient Name:  Ellen Maravilla   MRN:  [id number]  Admitting Diagnosis: Nausea & vomiting    Recommendations:                 General Recommendations:  Dysphagia therapy  Diet recommendations:  Puree(as per physician), thin liquid (as per physician)   Aspiration Precautions: 1 bite/sip at a time, Assistance with meals, Check for pocketing/oral residue, Chin tuck, Feed only when awake/alert, Meds crushed in puree, Small bites/sips and Strict aspiration precautions, as per most recent MBSS.  General Precautions: Standard, aspiration, pureed diet(once advanced per MD)  Communication strategies:  provide increased time to answer    History:     Past Medical History:   Diagnosis Date    Coronary artery disease     Dementia     Hypertension     Thyroid disease     Uterine cancer        Past Surgical History:   Procedure Laterality Date    APPENDECTOMY      BACK SURGERY      HYSTERECTOMY      KNEE SURGERY      TONSILLECTOMY         Social History: Patient lives with  and son per her report.    Prior Intubation HX:  n/a    Modified Barium Swallow: 11/26/2019 and 11/27/2019, during recent hospitalization 11/21/2019-11/29/2019  Per most recent results 11/27/2019:  "Impressions  · She presents with improved level of alertness, mental status and speech intelligibility therefore repeat MBSS completed.      · Pt presents with mild-moderate oropharyngeal dysphagia characterized by mildly reduced labial seal resulting in anterior loss on L x1, and reduced lingual strength. Decreased bolus control with premature loss of bolus to level of pyriform sinus resulting in trace penetration of thin liquids via cup across 2 of 4 trials and via straw across 2 of 4 trials. No aspiration noted. Chin tuck was effective in eliminating penetration of thin liquids.  Applesauce was consumed without penetration, aspiration or significant residue. Peaches were consumed with " "prolonged oral prep and inadequate mastication. Pt with improvement in swallow function compared to MBSS yesterday. REC initiating PO diet of Pureed textures (IDDSI 4) and thin liquids, no straws, chin tuck, crush meds, ONLY feed when alert. Initiate dysphagia therapy."    Chest X-Rays: Unremarkable    Prior diet: Pureed (IDDSI 4) and thin liquids.    Patient is currently on clear liquid diet d/t current admission for nausea and vomiting.  She was cleared for complete BSE including presentation of solids.    Subjective     Patient alert and upright in bed for assessment.  Currently admitted for nausea/vomiting and abdominal pain x 2-3 days.  Hx recent ischemic CVA, dysphagia  Patient goals: Patient unable to state     Pain/Comfort:  · Pain Rating 1: 0/10    Objective:     Oral Musculature Evaluation  · Oral Musculature: WFL  · Dentition: upper and lower dentures  · Secretion Management: adequate  · Mucosal Quality: good  · Mandibular Strength and Mobility: WFL  · Oral Labial Strength and Mobility: WFL  · Lingual Strength and Mobility: WFL  · Velar Elevation: WFL  · Buccal Strength and Mobility: WFL    Bedside Swallow Eval:   Consistencies Assessed:  · Thin liquids via straw  · Puree via teaspoon  · Solids self fed     Oral Phase:   · WFL    Pharyngeal Phase:   · decreased hyolaryngeal excursion to palpation  · no overt clinical signs/symptoms of aspiration  · no overt clinical signs/symptoms of pharyngeal dysphagia    Compensatory Strategies  · Chin tuck    Treatment: n/a    Assessment:     Ellen Maravilla is a 86 y.o. female with an SLP diagnosis of Dysphagia.  She presents with decreased overt s/s aspiration, however results of MBSS completed 11/27/2019, revealed decreased base of tongue retraction, decreased pharyngeal wall contraction, premature spillage, pyriform sinuses pooling, reduction in laryngeal elevation and vallecular stasis.  Penetration of thin liquids occurred as stated above with chin tuck " effective in eliminating penetration.    Patient is currently on clear liquid diet per medical management of n/v, may be advanced as tolerated to a maximum of pureed solids/thin liquids upon medical clearance.  Recommend 1 bite/sip at a time, Assistance with meals, Check for pocketing/oral residue, Chin tuck, Feed only when awake/alert, Meds crushed in puree, Small bites/sips and Strict aspiration precautions, as compensatory strategies per most recent MBSS.  Recommend continued dysphagia therapy for above stated pharyngeal deficits and f/u MBSS as indicated to assess improvement in swallow function and decreased risk for penetration/aspiration for potential of further diet advancement.    Goals:   Multidisciplinary Problems     SLP Goals        Problem: SLP Goal    Goal Priority Disciplines Outcome   SLP Goal   1.  Patient will follow compensatory swallow strategies, primarily chin tuck, with MIN assist  2.  Patient will perform laryngeal elevation exercises with MIN assist  3.  Patient will perform pharyngeal exercises with MIN assist  4.  Patient will tolerate soft solids (once cleared for trials) with minimal cues for effective/efficient mastication  SLP Ongoing, Progressing                   Plan:     · Patient to be seen:  3 x/week   · Plan of Care expires:  12/28/19  · Plan of Care reviewed with:      · SLP Follow-Up:  Yes       Discharge recommendations:  nursing facility, skilled   Barriers to Discharge:  Level of Skilled Assistance Needed requiring SNF    Time Tracking:     SLP Treatment Date:   12/14/19  Speech Start Time:  1027  Speech Stop Time:  1035     Speech Total Time (min):  8 min    Billable Minutes: Eval Swallow and Oral Function 8    Jeri Sotelo CCC-SLP  12/14/2019

## 2019-12-14 NOTE — PT/OT/SLP PROGRESS
Physical Therapy      Patient Name:  Ellen Maravilla   MRN:  [id number]    Patient not seen today secondary to Patient unwilling to participate(patient refused PT evaluation this morning even after much encouragement.). Will follow-up 12/15/19.    Chris MeGilligan, PT

## 2019-12-14 NOTE — PROGRESS NOTES
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  .  Patient currently is at nursing care facility.  Over the last 2 weeks she has been having intermittent nausea vomiting with abdominal pain.  Reportedly patient had ultrasound that showed gallstones.   Today patient with emesis after physical therapy.  Currently patient denies any abdominal pain. Patient with no complaints.  Patient is somewhat anxious with dementia.  During ED workup, patient had CT the abdomen which showed no acute abdominal findings however it did show indeterminate findings for pulmonary embolism.  Patient denies chest pain or shortness of breath.    Interval history:  The patient gives limited history secondary to confusion and baseline dementia; additional history obtained from chart review and nursing report.  Patient denies chest pain or shortness of breath.  Denies abdominal pain, nausea, or vomiting.  Afebrile.  Blood pressure stable.  No bleeding reported.  She worked with speech therapy and has recommendations for pureed diet.  CTA chest performed today as below.    Physical exam:  Vital signs reviewed  General:  Frail, nontoxic, comfortable in bed  Head and eyes:  Anicteric sclerae, no conjunctival discharge  ENT:  Moist mucous membranes  Cardiovascular:  2+ radial pulses, extremities warm and well perfused  GI:  Abdomen soft, minimally distended, nontender, no guarding or rebound  Pulmonary:  Comfortable work of  breathing  Skin:  Dry and warm no jaundice  Psych:  Mood is pleasantly confused, insight poor  Neuro:  Follows some simple commands, disoriented, confused speech    Laboratory data:  Hemoglobin 9.5  Hematocrit 29  Abdominal ultrasound reveals cholelithiasis but no acute cholecystitis    CTA chest impression:  1. Acute thrombus formation involving the posterior division of the right lower lobe pulmonary arterial tree.  2. Small reticulonodular opacity in the right upper lobe as well as the left lower lobe posteromedial basal segment likely due to active bronchiolitis.  These are intermixed amongst areas of air trapping.  3. Bilateral pleural effusions extending from base to apex.    Bilateral lower extremity venous Doppler ultrasound:  No DVT    Assessment/Plan:     Assessment:  Acute right lower lobe pulmonary embolus  Nausea and vomiting possible gastroenteritis  Cholelithiasis  Dysarthria/dysphagia due to recent CVA  Anemia likely due to chronic disease  Dementia  Hypertension  CAD  Right bundle branch block  History of uterine cancer    Plan:  Continue care  Start treatment dose Lovenox.  Check echocardiogram.  Transition to oral anticoagulant in 48 hr.    Continue speech therapy and advance diet as tolerated - currently recommended pureed diet  Continue antiemetics as needed.  Continue PT/OT  Repeat a.m. labs  Continue medications for chronic issues  Continue blood pressure regimen, currently stable.  Continue blood pressure monitoring and titrate regimen as needed  This patient is high risk secondary to acute illness with risk to life      VTE Risk Mitigation (From admission, onward)         Ordered     enoxaparin injection 40 mg  Daily      12/13/19 1352     IP VTE HIGH RISK PATIENT  Once      12/13/19 1352                Elliott Almeida MD  Department of Hospital Medicine   [facility]

## 2019-12-15 ENCOUNTER — CLINICAL SUPPORT (OUTPATIENT)
Dept: CARDIOLOGY | Facility: HOSPITAL | Age: 84
DRG: 391 | End: 2019-12-15
Attending: INTERNAL MEDICINE
Payer: MEDICARE

## 2019-12-15 LAB
ALBUMIN SERPL BCP-MCNC: 2.7 G/DL (ref 3.5–5.2)
ALP SERPL-CCNC: 97 U/L (ref 55–135)
ALT SERPL W/O P-5'-P-CCNC: 23 U/L (ref 10–44)
ANION GAP SERPL CALC-SCNC: 4 MMOL/L (ref 8–16)
AST SERPL-CCNC: 23 U/L (ref 10–40)
BILIRUB SERPL-MCNC: 1 MG/DL (ref 0.1–1)
BSA FOR ECHO PROCEDURE: 2 M2
BUN SERPL-MCNC: 14 MG/DL (ref 8–23)
CALCIUM SERPL-MCNC: 7.9 MG/DL (ref 8.7–10.5)
CHLORIDE SERPL-SCNC: 104 MMOL/L (ref 95–110)
CO2 SERPL-SCNC: 29 MMOL/L (ref 23–29)
CREAT SERPL-MCNC: 0.6 MG/DL (ref 0.5–1.4)
ERYTHROCYTE [DISTWIDTH] IN BLOOD BY AUTOMATED COUNT: 17.2 % (ref 11.5–14.5)
EST. GFR  (AFRICAN AMERICAN): >60 ML/MIN/1.73 M^2
EST. GFR  (NON AFRICAN AMERICAN): >60 ML/MIN/1.73 M^2
FRACTIONAL SHORTENING: 23 % (ref 28–44)
GLUCOSE SERPL-MCNC: 105 MG/DL (ref 70–110)
HCT VFR BLD AUTO: 28.4 % (ref 37–48.5)
HGB BLD-MCNC: 9.3 G/DL (ref 12–16)
LEFT INTERNAL DIMENSION IN SYSTOLE: 3.22 CM (ref 2.1–4)
LEFT VENTRICLE DIASTOLIC VOLUME INDEX: 29.52 ML/M2
LEFT VENTRICLE DIASTOLIC VOLUME: 57.8 ML
LEFT VENTRICLE SYSTOLIC VOLUME INDEX: 17.1 ML/M2
LEFT VENTRICLE SYSTOLIC VOLUME: 33.51 ML
LEFT VENTRICULAR INTERNAL DIMENSION IN DIASTOLE: 4.19 CM (ref 3.5–6)
MAGNESIUM SERPL-MCNC: 1.7 MG/DL (ref 1.6–2.6)
MCH RBC QN AUTO: 35.4 PG (ref 27–31)
MCHC RBC AUTO-ENTMCNC: 32.7 G/DL (ref 32–36)
MCV RBC AUTO: 108 FL (ref 82–98)
PISA TR MAX VEL: 2.64 M/S
PLATELET # BLD AUTO: 199 K/UL (ref 150–350)
PMV BLD AUTO: 9 FL (ref 9.2–12.9)
POTASSIUM SERPL-SCNC: 3.9 MMOL/L (ref 3.5–5.1)
PROT SERPL-MCNC: 5.6 G/DL (ref 6–8.4)
RA MAJOR: 4 CM
RA PRESSURE: 3 MMHG
RA WIDTH: 4 CM
RBC # BLD AUTO: 2.63 M/UL (ref 4–5.4)
RIGHT VENTRICULAR END-DIASTOLIC DIMENSION: 298 CM
RIGHT VENTRICULAR LENGTH IN DIASTOLE (APICAL 4-CHAMBER VIEW): 5 CM
SODIUM SERPL-SCNC: 137 MMOL/L (ref 136–145)
TR MAX PG: 28 MMHG
TRICUSPID ANNULAR PLANE SYSTOLIC EXCURSION: 1.84 CM
TV REST PULMONARY ARTERY PRESSURE: 31 MMHG
WBC # BLD AUTO: 5.2 K/UL (ref 3.9–12.7)

## 2019-12-15 PROCEDURE — 63600175 PHARM REV CODE 636 W HCPCS: Performed by: INTERNAL MEDICINE

## 2019-12-15 PROCEDURE — G0378 HOSPITAL OBSERVATION PER HR: HCPCS

## 2019-12-15 PROCEDURE — 80053 COMPREHEN METABOLIC PANEL: CPT

## 2019-12-15 PROCEDURE — 83735 ASSAY OF MAGNESIUM: CPT

## 2019-12-15 PROCEDURE — 94761 N-INVAS EAR/PLS OXIMETRY MLT: CPT

## 2019-12-15 PROCEDURE — 97163 PT EVAL HIGH COMPLEX 45 MIN: CPT

## 2019-12-15 PROCEDURE — 93321 DOPPLER ECHO F-UP/LMTD STD: CPT

## 2019-12-15 PROCEDURE — 93308 TTE F-UP OR LMTD: CPT

## 2019-12-15 PROCEDURE — 99900035 HC TECH TIME PER 15 MIN (STAT)

## 2019-12-15 PROCEDURE — 85027 COMPLETE CBC AUTOMATED: CPT

## 2019-12-15 PROCEDURE — 25000003 PHARM REV CODE 250: Performed by: INTERNAL MEDICINE

## 2019-12-15 PROCEDURE — 36415 COLL VENOUS BLD VENIPUNCTURE: CPT

## 2019-12-15 PROCEDURE — 25000003 PHARM REV CODE 250: Performed by: HOSPITALIST

## 2019-12-15 RX ORDER — CYANOCOBALAMIN 1000 UG/ML
1000 INJECTION, SOLUTION INTRAMUSCULAR; SUBCUTANEOUS ONCE
Status: DISCONTINUED | OUTPATIENT
Start: 2019-12-15 | End: 2019-12-19 | Stop reason: HOSPADM

## 2019-12-15 RX ORDER — LANOLIN ALCOHOL/MO/W.PET/CERES
1000 CREAM (GRAM) TOPICAL DAILY
Status: DISCONTINUED | OUTPATIENT
Start: 2019-12-15 | End: 2019-12-19 | Stop reason: HOSPADM

## 2019-12-15 RX ADMIN — AMLODIPINE BESYLATE 5 MG: 5 TABLET ORAL at 08:12

## 2019-12-15 RX ADMIN — ENOXAPARIN SODIUM 90 MG: 100 INJECTION SUBCUTANEOUS at 06:12

## 2019-12-15 RX ADMIN — METOPROLOL TARTRATE 25 MG: 25 TABLET ORAL at 08:12

## 2019-12-15 RX ADMIN — POLYETHYLENE GLYCOL 3350 17 G: 17 POWDER, FOR SOLUTION ORAL at 08:12

## 2019-12-15 RX ADMIN — ASPIRIN 81 MG: 81 TABLET, DELAYED RELEASE ORAL at 08:12

## 2019-12-15 RX ADMIN — QUETIAPINE 25 MG: 25 TABLET ORAL at 09:12

## 2019-12-15 RX ADMIN — METOPROLOL TARTRATE 25 MG: 25 TABLET ORAL at 09:12

## 2019-12-15 RX ADMIN — ATORVASTATIN CALCIUM 40 MG: 40 TABLET, FILM COATED ORAL at 08:12

## 2019-12-15 RX ADMIN — CYANOCOBALAMIN TAB 1000 MCG 1000 MCG: 1000 TAB at 02:12

## 2019-12-15 RX ADMIN — Medication 1 TABLET: at 08:12

## 2019-12-15 RX ADMIN — QUETIAPINE 25 MG: 25 TABLET ORAL at 08:12

## 2019-12-15 NOTE — PLAN OF CARE
Goals to be met by: discharge     Patient will increase functional independence with mobility by performing:    . Supine to sit with MInimal Assistance. Established 12/15  . Sit to supine with MInimal Assistance. Established 12/15  . Sit to stand transfer with Minimal Assistance. Established 12/15  . Bed to chair transfer with Moderate Assistance using Rolling Walker. Established 12/15  . Gait  x 5 feet with Moderate Assistance using Rolling Walker. . Established 12/15  . Wheelchair propulsion x25 feet with Supervision using bilateral uppper extremities. Established 12/15    Need clarification on PLOF as pt stating lives alone and can't walk and Nsg stating pt in Nursing home.

## 2019-12-15 NOTE — NURSING
Dr. Almeida notified of CTA result received from radiologist   RE: pulmonary embolus to the RLL pulmonary artery.

## 2019-12-15 NOTE — PROGRESS NOTES
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  .  Patient currently is at nursing care facility.  Over the last 2 weeks she has been having intermittent nausea vomiting with abdominal pain.  Reportedly patient had ultrasound that showed gallstones.   Today patient with emesis after physical therapy.  Currently patient denies any abdominal pain. Patient with no complaints.  Patient is somewhat anxious with dementia.  During ED workup, patient had CT the abdomen which showed no acute abdominal findings however it did show indeterminate findings for pulmonary embolism.  Patient denies chest pain or shortness of breath.    Interval history:  The patient gives limited history secondary to confusion and baseline dementia; additional history obtained from chart review and nursing report.  Patient denies chest pain or shortness of breath.  Denies abdominal pain, nausea, or vomiting.  Afebrile.  Blood pressure stable.  No bleeding reported.  Tolerated pureed diet.  Echocardiogram to be performed today.    Physical exam:  Vital signs reviewed  General:  Frail, nontoxic, comfortable in chair  Head and eyes:  Anicteric sclerae, no conjunctival discharge  ENT:  Moist mucous membranes  Cardiovascular:  2+ radial pulses, extremities warm and well perfused  GI:  Abdomen soft, minimally distended, nontender, no guarding or rebound  Pulmonary:  Comfortable work of breathing  Skin:  Dry and warm no jaundice  Psych:  Mood is  pleasantly confused, insight poor  Neuro:  Follows some simple commands, disoriented, confused speech    Laboratory data:  Hemoglobin 9  Hematocrit 28  B12 83  Abdominal ultrasound reveals cholelithiasis but no acute cholecystitis    CTA chest impression:  1. Acute thrombus formation involving the posterior division of the right lower lobe pulmonary arterial tree.  2. Small reticulonodular opacity in the right upper lobe as well as the left lower lobe posteromedial basal segment likely due to active bronchiolitis.  These are intermixed amongst areas of air trapping.  3. Bilateral pleural effusions extending from base to apex.    Bilateral lower extremity venous Doppler ultrasound:  No DVT    Assessment/Plan:     Assessment:  Acute right lower lobe pulmonary embolus  Nausea and vomiting possible gastroenteritis  Cholelithiasis  B12 deficiency  Dysarthria/dysphagia due to recent CVA  Anemia likely due to chronic disease  Dementia  Hypertension  CAD  Right bundle branch block  History of uterine cancer    Plan:  Continue care  Continue treatment dose Lovenox.  Follow-up echocardiogram.  Transition to oral anticoagulant in 24-48 hr.    Continue speech therapy and advance diet as tolerated - currently tolerating pureed diet  Continue antiemetics as needed.  IM B12 1000 mcg today followed by daily oral B12 thereafter  Continue PT/OT  Repeat a.m. labs  Continue medications for chronic issues  Continue blood pressure regimen, currently stable.  Continue blood pressure monitoring and titrate regimen as needed  This patient is high risk secondary to acute illness with risk to life      VTE Risk Mitigation (From admission, onward)         Ordered     enoxaparin injection 90 mg  Every 12 hours (non-standard times)      12/14/19 1932     IP VTE HIGH RISK PATIENT  Once      12/13/19 1352                Elliott Almeida MD  Department of Hospital Medicine   [facility]

## 2019-12-15 NOTE — PT/OT/SLP EVAL
"Physical Therapy Evaluation    Patient Name:  Ellen Maravilla   MRN:  [id number]    Recommendations:     Discharge Recommendations:  nursing facility, skilled   Discharge Equipment Recommendations: (TBD)   Barriers to discharge: None    Assessment:     Ellen Maravilla is a 86 y.o. female admitted with a medical diagnosis of Nausea & vomiting.  She presents with the following impairments/functional limitations:  weakness, impaired endurance, impaired sensation, impaired self care skills, impaired functional mobilty, gait instability, impaired balance, impaired cognition, decreased safety awareness, pain. Upon entering, pt resting comfortably in bed. PT asking history questions, answered by patient relatively quickly and assuredly. However, PT checking with nsg upon finishing, who informed PT pt lives in a nursing home and not home alone. Pt alert to name and location only and becoming very anxious upon PT moving pt towards edge of bed. "I need 2 people so I don't fall." With assistance of PT to only lift pt's legs, pt able to move legs to edge of bed, however, requiring max assist for supine to sit. Pt also requiring max A for bed to chair transfer, performing with stand pivot. Pt left up in chair with chair alarm on and door open to nurses station. Pt would benefit from further skilled PT to improve functional independence until PLOF can be clarified.    Rehab Prognosis: Fair; patient would benefit from acute skilled PT services to address these deficits and reach maximum level of function.    Recent Surgery: * No surgery found *      Plan:     During this hospitalization, patient to be seen 5 x/week to address the identified rehab impairments via gait training, therapeutic activities, therapeutic exercises, neuromuscular re-education, wheelchair management/training and progress toward the following goals:    · Plan of Care Expires:       Subjective     Chief Complaint: weakness; impaired functional mobility " "  Patient/Family Comments/goals: back to nursing home  Pain/Comfort:  · Pain Rating 1: (unable to objectify)  · Location - Side 1: Bilateral  · Location 1: leg  · Pain Addressed 1: Distraction, Reposition    Patients cultural, spiritual, Muslim conflicts given the current situation:      Living Environment:  Home alone per pt but nursing home per nsg  Prior to admission, patients level of function was "wheelchair bound".  Equipment used at home: wheelchair.  DME owned (not currently used): Unsure at this time.  Upon discharge, patient will have assistance from nursing home?.    Objective:     Communicated with nsg prior to session.  Patient found supine with peripheral IV, heck catheter  upon PT entry to room.    General Precautions: Standard, fall   Orthopedic Precautions:N/A   Braces: N/A     Exams:  · Cognitive Exam:  Patient is oriented to Person and Place  · Sensation:    · -       Intact  · RUE Strength: WFL  · LUE Strength: WFL  · RLE ROM: WFL  · RLE Strength: 3+/5  · LLE ROM: WFL  · LLE Strength: 3+/5    Functional Mobility:  · Bed Mobility:     · Supine to Sit: maximal assistance  · Transfers:     · Sit to Stand:  maximal assistance with no AD  · Bed to Chair: maximal assistance with  no AD  using  Stand Pivot  · Gait: unable  · Balance: sitting: good; standing: poor      Therapeutic Activities and Exercises:   none    AM-PAC 6 CLICK MOBILITY  Total Score:10     Patient left up in chair with all lines intact, call button in reach and chair alarm on.    GOALS:   Multidisciplinary Problems     Physical Therapy Goals        Problem: Physical Therapy Goal    Goal Priority Disciplines Outcome Goal Variances Interventions   Physical Therapy Goal     PT, PT/OT Ongoing, Progressing                     History:     Past Medical History:   Diagnosis Date    Coronary artery disease     Dementia     Hypertension     Thyroid disease     Uterine cancer        Past Surgical History:   Procedure Laterality Date "    APPENDECTOMY      BACK SURGERY      HYSTERECTOMY      KNEE SURGERY      TONSILLECTOMY         Time Tracking:     PT Received On: 12/15/19  PT Start Time: 0954     PT Stop Time: 1012  PT Total Time (min): 18 min     Billable Minutes: Evaluation 18 minutes       Edvin Wasserman, PT  12/15/2019

## 2019-12-15 NOTE — NURSING
Rounding with Dr. Almeida at patient bedside.  Brought to Dr. Almeida's attention patient bruises on arms, bilateral sides.  New orders for increase in SQ Lovenox.

## 2019-12-15 NOTE — CARE UPDATE
12/14/19 7413   Patient Assessment/Suction   Level of Consciousness (AVPU) alert   PRE-TX-O2   O2 Device (Oxygen Therapy) room air   SpO2 (!) 93 %   Pulse Oximetry Type Intermittent   $ Pulse Oximetry - Multiple Charge Pulse Oximetry - Multiple   Pulse 80   Resp 18   Respiratory Evaluation   $ Care Plan Tech Time 15 min   Evaluation For New Orders

## 2019-12-15 NOTE — PLAN OF CARE
Problem: Fall Injury Risk  Goal: Absence of Fall and Fall-Related Injury  Outcome: Ongoing, Progressing     Problem: Infection  Goal: Infection Symptom Resolution  Outcome: Ongoing, Progressing     Problem: Adult Inpatient Plan of Care  Goal: Plan of Care Review  Outcome: Ongoing, Progressing  Goal: Patient-Specific Goal (Individualization)  Outcome: Ongoing, Progressing  Goal: Absence of Hospital-Acquired Illness or Injury  Outcome: Ongoing, Progressing  Goal: Optimal Comfort and Wellbeing  Outcome: Ongoing, Progressing  Goal: Readiness for Transition of Care  Outcome: Ongoing, Progressing  Goal: Rounds/Family Conference  Outcome: Ongoing, Progressing     Problem: Skin Injury Risk Increased  Goal: Skin Health and Integrity  Outcome: Ongoing, Progressing

## 2019-12-16 LAB
ALBUMIN SERPL BCP-MCNC: 2.3 G/DL (ref 3.5–5.2)
ALP SERPL-CCNC: 83 U/L (ref 55–135)
ALT SERPL W/O P-5'-P-CCNC: 18 U/L (ref 10–44)
ANION GAP SERPL CALC-SCNC: 4 MMOL/L (ref 8–16)
AST SERPL-CCNC: 19 U/L (ref 10–40)
BILIRUB SERPL-MCNC: 0.9 MG/DL (ref 0.1–1)
BUN SERPL-MCNC: 19 MG/DL (ref 8–23)
CALCIUM SERPL-MCNC: 7.7 MG/DL (ref 8.7–10.5)
CHLORIDE SERPL-SCNC: 103 MMOL/L (ref 95–110)
CO2 SERPL-SCNC: 30 MMOL/L (ref 23–29)
CREAT SERPL-MCNC: 0.5 MG/DL (ref 0.5–1.4)
ERYTHROCYTE [DISTWIDTH] IN BLOOD BY AUTOMATED COUNT: 17.1 % (ref 11.5–14.5)
EST. GFR  (AFRICAN AMERICAN): >60 ML/MIN/1.73 M^2
EST. GFR  (NON AFRICAN AMERICAN): >60 ML/MIN/1.73 M^2
GLUCOSE SERPL-MCNC: 101 MG/DL (ref 70–110)
HCT VFR BLD AUTO: 24.2 % (ref 37–48.5)
HGB BLD-MCNC: 7.8 G/DL (ref 12–16)
MAGNESIUM SERPL-MCNC: 1.7 MG/DL (ref 1.6–2.6)
MCH RBC QN AUTO: 34.7 PG (ref 27–31)
MCHC RBC AUTO-ENTMCNC: 32.2 G/DL (ref 32–36)
MCV RBC AUTO: 108 FL (ref 82–98)
PLATELET # BLD AUTO: 159 K/UL (ref 150–350)
PMV BLD AUTO: 9.3 FL (ref 9.2–12.9)
POTASSIUM SERPL-SCNC: 3.8 MMOL/L (ref 3.5–5.1)
PROT SERPL-MCNC: 4.9 G/DL (ref 6–8.4)
RBC # BLD AUTO: 2.25 M/UL (ref 4–5.4)
SODIUM SERPL-SCNC: 137 MMOL/L (ref 136–145)
WBC # BLD AUTO: 3.98 K/UL (ref 3.9–12.7)

## 2019-12-16 PROCEDURE — 97530 THERAPEUTIC ACTIVITIES: CPT

## 2019-12-16 PROCEDURE — 85027 COMPLETE CBC AUTOMATED: CPT

## 2019-12-16 PROCEDURE — 25000003 PHARM REV CODE 250: Performed by: HOSPITALIST

## 2019-12-16 PROCEDURE — 80053 COMPREHEN METABOLIC PANEL: CPT

## 2019-12-16 PROCEDURE — 63600175 PHARM REV CODE 636 W HCPCS: Performed by: INTERNAL MEDICINE

## 2019-12-16 PROCEDURE — 99900035 HC TECH TIME PER 15 MIN (STAT)

## 2019-12-16 PROCEDURE — 83735 ASSAY OF MAGNESIUM: CPT

## 2019-12-16 PROCEDURE — 36415 COLL VENOUS BLD VENIPUNCTURE: CPT

## 2019-12-16 PROCEDURE — 97110 THERAPEUTIC EXERCISES: CPT

## 2019-12-16 PROCEDURE — 12000002 HC ACUTE/MED SURGE SEMI-PRIVATE ROOM

## 2019-12-16 PROCEDURE — 25000003 PHARM REV CODE 250: Performed by: INTERNAL MEDICINE

## 2019-12-16 PROCEDURE — 94761 N-INVAS EAR/PLS OXIMETRY MLT: CPT

## 2019-12-16 RX ADMIN — QUETIAPINE 25 MG: 25 TABLET ORAL at 09:12

## 2019-12-16 RX ADMIN — ACETAMINOPHEN 650 MG: 325 TABLET ORAL at 09:12

## 2019-12-16 RX ADMIN — CYANOCOBALAMIN TAB 1000 MCG 1000 MCG: 1000 TAB at 09:12

## 2019-12-16 RX ADMIN — AMLODIPINE BESYLATE 5 MG: 5 TABLET ORAL at 09:12

## 2019-12-16 RX ADMIN — ASPIRIN 81 MG: 81 TABLET, DELAYED RELEASE ORAL at 09:12

## 2019-12-16 RX ADMIN — METOPROLOL TARTRATE 25 MG: 25 TABLET ORAL at 09:12

## 2019-12-16 RX ADMIN — POLYETHYLENE GLYCOL 3350 17 G: 17 POWDER, FOR SOLUTION ORAL at 09:12

## 2019-12-16 RX ADMIN — ENOXAPARIN SODIUM 90 MG: 100 INJECTION SUBCUTANEOUS at 05:12

## 2019-12-16 RX ADMIN — ENOXAPARIN SODIUM: 100 INJECTION SUBCUTANEOUS at 05:12

## 2019-12-16 RX ADMIN — Medication 1 TABLET: at 09:12

## 2019-12-16 RX ADMIN — ATORVASTATIN CALCIUM 40 MG: 40 TABLET, FILM COATED ORAL at 09:12

## 2019-12-16 NOTE — CARE UPDATE
12/15/19 2799   Patient Assessment/Suction   Level of Consciousness (AVPU) alert   PRE-TX-O2   O2 Device (Oxygen Therapy) room air   SpO2 (!) 94 %   Pulse Oximetry Type Intermittent   $ Pulse Oximetry - Multiple Charge Pulse Oximetry - Multiple   Pulse 74   Resp 16   Respiratory Evaluation   $ Care Plan Tech Time 15 min   Evaluation For New Orders

## 2019-12-16 NOTE — PT/OT/SLP PROGRESS
"Speech Language Pathology Treatment    Patient Name:  Ellen Maravilla   MRN:  [id number]  Admitting Diagnosis: Nausea & vomiting    Recommendations:                 General Recommendations:  Follow-up not indicated  Diet recommendations:  Mechanical soft, Finely chopped meat, No Bread, Liquid Diet Level: Thin   Aspiration Precautions: Alternating bites/sips, Check for pocketing/oral residue, HOB to 90 degrees and oral hygiene 2x/day  General Precautions: Standard, aspiration, fall    Subjective     "i've been eating good"  Patient goals: none stated      Pain/Comfort:  · Pain Rating 1: 0/10    Objective:     Has the patient been evaluated by SLP for swallowing?   Yes  Keep patient NPO? No   Current Respiratory Status: room air      Pt seen with mechanical soft lunchtray. Additional assistance provided to achieve appropriate textures. Pt with adequate oral containment w/ all trials. Slightly prolonged but functional mastication. Scattered oral residue noted which was cleared with cued liquid wash. Reinforced benefits of alternating bites and sips. No overt s/s of airway compromise appreciated t/o meal (solid x6 and thin liquid via straw x5).     Assessment:     Ellen Maravilla is a 86 y.o. female with an SLP diagnosis of basline oral Dysphagia and Dysarthria.  No further SLP services warranted at this time.     Goals:   Multidisciplinary Problems     SLP Goals        Problem: SLP Goal    Goal Priority Disciplines Outcome   SLP Goal     SLP Ongoing, Progressing   Description:    1.  Patient will follow compensatory swallow strategies, primarily chin tuck, with MIN assist  2.  Patient will perform laryngeal elevation exercises with MIN assist  3.  Patient will perform pharyngeal exercises with MIN assist  4.  Patient will tolerate soft solids (once cleared for trials) with minimal cues for effective/efficient mastication                    Plan:     · Patient to be seen:  3 x/week   · Plan of Care expires:  " 12/28/19  · Plan of Care reviewed with:  patient   · SLP Follow-Up:  No       Discharge recommendations:  nursing facility, skilled     Time Tracking:     SLP Treatment Date:   12/16/19  Speech Start Time:  1204  Speech Stop Time:  1212     Speech Total Time (min):  8 min    Billable Minutes: Treatment Swallowing Dysfunction 8    Dagoberto Singh CCC-SLP  12/16/2019

## 2019-12-16 NOTE — PT/OT/SLP PROGRESS
Physical Therapy Treatment    Patient Name:  Ellen Maravilla   MRN:  [id number]    Recommendations:     Discharge Recommendations:  nursing facility, skilled   Discharge Equipment Recommendations: (TBD at next level of care)   Barriers to discharge: None    Assessment:     Ellen Maravilla is a 86 y.o. female admitted with a medical diagnosis of Nausea & vomiting.  She presents with the following impairments/functional limitations:  weakness, impaired endurance, impaired sensation, impaired self care skills, impaired functional mobilty, gait instability, impaired balance, impaired cognition, decreased safety awareness, pain. Pt continues with significant weakness today requiring max assist for supine > sit and mod assist x 2 for sit > stand x 3 attempts and mod assist x 2 for side steps to HOB.Pt and son educated on supine therex for pt to perform to maintain BLE strength. Continue with PT and POC.    Rehab Prognosis: Good; patient would benefit from acute skilled PT services to address these deficits and reach maximum level of function.    Recent Surgery: * No surgery found *      Plan:     During this hospitalization, patient to be seen 5 x/week to address the identified rehab impairments via gait training, therapeutic activities, therapeutic exercises and progress toward the following goals:    · Plan of Care Expires:       Subjective     Chief Complaint: Weakness  Patient/Family Comments/goals:   Pain/Comfort:  · Pain Rating 1: 0/10  · Pain Rating Post-Intervention 1: 0/10      Objective:     Communicated with nursing prior to session.  Patient found HOB elevated with heck catheter upon PT entry to room.     General Precautions: Standard, fall   Orthopedic Precautions:N/A   Braces:       Functional Mobility:  · Bed Mobility:     · Scooting: total assistance and of 2 persons  · Supine to Sit: maximal assistance  · Sit to Supine: maximal assistance and of 2 persons  · Transfers:     · Sit to Stand:  moderate  assistance and of 2 persons with rolling walker. Pt required verbal cueing for hand placement and to perform rocking technique to get nose over toes prior to standing. Pt also required verbal cueing once in standing to facilitate upright posture. Pt with noted B knee buckling occassionally in standing.  · Gait: 3 side steps to HOB with RW and Mod Assist x 2      AM-PAC 6 CLICK MOBILITY          Therapeutic Activities and Exercises:   bed mobility; sitting EOB for trunk control and midline orientation; sit <> stands; transfer training; gait training    Pt performed the following exercises while laying with HOB elevated with BLEs x 5 reps each:  -ankle pumps  -quad sets  -heel slides  -hip abduction/adduction      Patient left HOB elevated with all lines intact, call button in reach, bed alarm on and pt's son present..    GOALS:   Multidisciplinary Problems     Physical Therapy Goals        Problem: Physical Therapy Goal    Goal Priority Disciplines Outcome Goal Variances Interventions   Physical Therapy Goal     PT, PT/OT Ongoing, Progressing                     Time Tracking:     PT Received On: 12/16/19  PT Start Time: 1112     PT Stop Time: 1135  PT Total Time (min): 23 min     Billable Minutes: Therapeutic Activity 13 and Therapeutic Exercise 10    Treatment Type: Treatment  PT/PTA: PTA     PTA Visit Number: 2     Abigail Wood, PTA  12/16/2019

## 2019-12-17 PROBLEM — I26.99 PE (PULMONARY THROMBOEMBOLISM): Status: ACTIVE | Noted: 2019-12-17

## 2019-12-17 LAB
ALBUMIN SERPL BCP-MCNC: 2.3 G/DL (ref 3.5–5.2)
ALP SERPL-CCNC: 92 U/L (ref 55–135)
ALT SERPL W/O P-5'-P-CCNC: 16 U/L (ref 10–44)
ANION GAP SERPL CALC-SCNC: 5 MMOL/L (ref 8–16)
AST SERPL-CCNC: 18 U/L (ref 10–40)
BILIRUB SERPL-MCNC: 1 MG/DL (ref 0.1–1)
BUN SERPL-MCNC: 17 MG/DL (ref 8–23)
CALCIUM SERPL-MCNC: 8.1 MG/DL (ref 8.7–10.5)
CHLORIDE SERPL-SCNC: 103 MMOL/L (ref 95–110)
CO2 SERPL-SCNC: 30 MMOL/L (ref 23–29)
CREAT SERPL-MCNC: 0.5 MG/DL (ref 0.5–1.4)
ERYTHROCYTE [DISTWIDTH] IN BLOOD BY AUTOMATED COUNT: 16.9 % (ref 11.5–14.5)
EST. GFR  (AFRICAN AMERICAN): >60 ML/MIN/1.73 M^2
EST. GFR  (NON AFRICAN AMERICAN): >60 ML/MIN/1.73 M^2
GLUCOSE SERPL-MCNC: 105 MG/DL (ref 70–110)
HCT VFR BLD AUTO: 24.3 % (ref 37–48.5)
HGB BLD-MCNC: 7.9 G/DL (ref 12–16)
IRON SERPL-MCNC: 51 UG/DL (ref 30–160)
MAGNESIUM SERPL-MCNC: 1.7 MG/DL (ref 1.6–2.6)
MCH RBC QN AUTO: 34.8 PG (ref 27–31)
MCHC RBC AUTO-ENTMCNC: 32.5 G/DL (ref 32–36)
MCV RBC AUTO: 107 FL (ref 82–98)
PLATELET # BLD AUTO: 142 K/UL (ref 150–350)
PMV BLD AUTO: 9 FL (ref 9.2–12.9)
POTASSIUM SERPL-SCNC: 3.8 MMOL/L (ref 3.5–5.1)
PROT SERPL-MCNC: 4.9 G/DL (ref 6–8.4)
RBC # BLD AUTO: 2.27 M/UL (ref 4–5.4)
SATURATED IRON: 24 % (ref 20–50)
SODIUM SERPL-SCNC: 138 MMOL/L (ref 136–145)
TOTAL IRON BINDING CAPACITY: 217 UG/DL (ref 250–450)
TRANSFERRIN SERPL-MCNC: 155 MG/DL (ref 200–375)
WBC # BLD AUTO: 3.44 K/UL (ref 3.9–12.7)

## 2019-12-17 PROCEDURE — 25000003 PHARM REV CODE 250: Performed by: INTERNAL MEDICINE

## 2019-12-17 PROCEDURE — 36415 COLL VENOUS BLD VENIPUNCTURE: CPT

## 2019-12-17 PROCEDURE — 80053 COMPREHEN METABOLIC PANEL: CPT

## 2019-12-17 PROCEDURE — 85027 COMPLETE CBC AUTOMATED: CPT

## 2019-12-17 PROCEDURE — 83735 ASSAY OF MAGNESIUM: CPT

## 2019-12-17 PROCEDURE — 94761 N-INVAS EAR/PLS OXIMETRY MLT: CPT

## 2019-12-17 PROCEDURE — 83540 ASSAY OF IRON: CPT

## 2019-12-17 PROCEDURE — 12000002 HC ACUTE/MED SURGE SEMI-PRIVATE ROOM

## 2019-12-17 PROCEDURE — 25000003 PHARM REV CODE 250: Performed by: HOSPITALIST

## 2019-12-17 PROCEDURE — 63600175 PHARM REV CODE 636 W HCPCS: Performed by: INTERNAL MEDICINE

## 2019-12-17 RX ADMIN — ATORVASTATIN CALCIUM 40 MG: 40 TABLET, FILM COATED ORAL at 10:12

## 2019-12-17 RX ADMIN — QUETIAPINE 25 MG: 25 TABLET ORAL at 09:12

## 2019-12-17 RX ADMIN — METOPROLOL TARTRATE 25 MG: 25 TABLET ORAL at 09:12

## 2019-12-17 RX ADMIN — APIXABAN 10 MG: 5 TABLET, FILM COATED ORAL at 09:12

## 2019-12-17 RX ADMIN — CYANOCOBALAMIN TAB 1000 MCG 1000 MCG: 1000 TAB at 10:12

## 2019-12-17 RX ADMIN — METOPROLOL TARTRATE 25 MG: 25 TABLET ORAL at 10:12

## 2019-12-17 RX ADMIN — AMLODIPINE BESYLATE 5 MG: 5 TABLET ORAL at 10:12

## 2019-12-17 RX ADMIN — QUETIAPINE 25 MG: 25 TABLET ORAL at 10:12

## 2019-12-17 RX ADMIN — POLYETHYLENE GLYCOL 3350 17 G: 17 POWDER, FOR SOLUTION ORAL at 10:12

## 2019-12-17 RX ADMIN — ENOXAPARIN SODIUM 90 MG: 100 INJECTION SUBCUTANEOUS at 05:12

## 2019-12-17 RX ADMIN — ASPIRIN 81 MG: 81 TABLET, DELAYED RELEASE ORAL at 10:12

## 2019-12-17 RX ADMIN — Medication 1 TABLET: at 10:12

## 2019-12-17 NOTE — PLAN OF CARE
Problem: Physical Therapy Goal  Goal: Physical Therapy Goal  Outcome: Ongoing, Progressing   Pt progressing to reach goals.

## 2019-12-17 NOTE — PLAN OF CARE
Pt remains free of injury, in NAD, VSS. Pt refused pt, ot today. Tsai in place, draining clear yellow urine without difficulty. Pt becomes confused at random times, easily redirected.    0830 pt coughing stating she is choking. Sat patient up in bed instructed deep breaths, applied 2L via NC and pt was able to catch breath. Egg noted in pt's mouth. Breath sounds clear and equal bilaterally, SPO2 stable.     Will continue to monitor and report to oncoming RN.     Monique Purdy  12/17/2019  4:16 PM

## 2019-12-17 NOTE — PLAN OF CARE
12/16/19 2300   Patient Assessment/Suction   Level of Consciousness (AVPU) alert   Respiratory Effort Unlabored   PRE-TX-O2   O2 Device (Oxygen Therapy) room air   SpO2 96 %   Pulse Oximetry Type Intermittent   $ Pulse Oximetry - Multiple Charge Pulse Oximetry - Multiple   Pulse 80   Respiratory Evaluation   $ Care Plan Tech Time 15 min   Evaluation For Re-Eval 3 day

## 2019-12-17 NOTE — PLAN OF CARE
12/17/19 1051   Discharge Reassessment   Assessment Type Discharge Planning Reassessment   Anticipated Discharge Disposition SNF     AGA filling in for [facility] responding to SNF consult. AGA spoke with pt's son Jhonny at [phone]. Per Jhonny, family would like pt to return to [facility]. Discussed Medicare freedom of choice and Jhonny provided verbal consent. AGA contacted [employer] at [facility] ([phone]) and informed [employer] that pt is anticipated to return and AGA/DEMARCUS will provide updates on d.c date when known. AGA will send updated clinical information via right fax.    11:10 Per Dr. Deluna, pt may d/c tomorrow.    15:27 AGA informed pt's son Jhonny of possible d/c tomorrow.

## 2019-12-17 NOTE — PROGRESS NOTES
"[facility]  Adult Nutrition   Progress Note (Initial Assessment)     SUMMARY     Recommendations  Recommendation/Intervention: 1.) Continue current diet texture as tolerated by pt 2.) Meal assist provided as needed   Goals: 1.) Pt to meet >75% EEN via PO diet 2.) Tolerance of least restrictive diet texture  Nutrition Goal Status: new  Communication of RD Recs: reviewed with RN    Reason for Assessment    Reason For Assessment: length of stay  Diagnosis: gastrointestinal disease(N/V)  Relevant Medical History: CVA, NH resident, HTN, dementia, uterine cancer     Nutrition Risk Screen    Nutrition Risk Screen: no indicators present     MST Score: 0  Have you recently lost weight without trying?: No  Weight loss score: 0  Have you been eating poorly because of a decreased appetite?: No  Appetite score: 0       Nutrition/Diet History    Spiritual, Cultural Beliefs, Druze Practices, Values that Affect Care: yes([facility] )  Food Allergies: NKFA  Factors Affecting Nutritional Intake: difficulty/impaired swallowing    Anthropometrics    Temp: 97.7 °F (36.5 °C)  Height: 5' 6" (167.6 cm)  Height (inches): 66 in  Weight Method: Bed Scale  Weight: 86.3 kg (190 lb 4.1 oz)  Weight (lb): 190.26 lb  Ideal Body Weight (IBW), Female: 130 lb  % Ideal Body Weight, Female (lb): 146.35 lb  BMI (Calculated): 30.7  BMI Grade: 30 - 34.9- obesity - grade I       Weight History:  Wt Readings from Last 10 Encounters:   12/17/19 86.3 kg (190 lb 4.1 oz)   11/30/19 74.7 kg (164 lb 10.9 oz)   08/15/19 77.1 kg (170 lb)   06/05/19 99.8 kg (220 lb)   07/09/18 75.3 kg (166 lb)   12/10/17 75.7 kg (166 lb 12.8 oz)   05/09/17 72.6 kg (160 lb)   08/03/16 77.1 kg (170 lb)   07/22/16 77.1 kg (170 lb)   08/12/15 69 kg (152 lb 3 oz)       Lab/Procedures/Meds: Pertinent Labs Reviewed  Clinical Chemistry:  Recent Labs   Lab 12/13/19  1100  12/17/19  0524      < > 138   K 4.4   < > 3.8      < > 103   CO2 29   < > 30*   GLU " 124*   < > 105   BUN 17   < > 17   CREATININE 0.5   < > 0.5   CALCIUM 8.4*   < > 8.1*   PROT 6.0   < > 4.9*   ALBUMIN 2.8*   < > 2.3*   BILITOT 0.9   < > 1.0   ALKPHOS 99   < > 92   AST 35   < > 18   ALT 29   < > 16   ANIONGAP 6*   < > 5*   ESTGFRAFRICA >60.0   < > >60.0   EGFRNONAA >60.0   < > >60.0   MG 1.9   < > 1.7   AMYLASE 8*  --   --    LIPASE 21  --   --     < > = values in this interval not displayed.     CBC:   Recent Labs   Lab 12/17/19  0524   WBC 3.44*   RBC 2.27*   HGB 7.9*   HCT 24.3*   *   *   MCH 34.8*   MCHC 32.5     Cardiac Profile:  Recent Labs   Lab 12/13/19  1100  12/13/19  2105 12/14/19  0305 12/14/19  0914   CPK 64  --   --   --   --    CPKMB 1.6  --   --   --   --    TROPONINI <0.030   < > <0.030 <0.030 <0.030    < > = values in this interval not displayed.     Thyroid & Parathyroid:  Recent Labs   Lab 12/13/19  1521   TSH 6.140*   FREET4 0.95     Vitamins:  Recent Labs   Lab 12/13/19  1521   [id number] 83*       Medications: Pertinent Medications reviewed  Scheduled Meds:   amLODIPine  5 mg Oral Daily    aspirin  81 mg Oral Daily    atorvastatin  40 mg Oral Daily    calcium-vitamin D3  1 tablet Oral Daily    cyanocobalamin  1,000 mcg Intramuscular Once    cyanocobalamin  1,000 mcg Oral Daily    enoxparin  1 mg/kg Subcutaneous Q12H    metoprolol tartrate  25 mg Oral BID    polyethylene glycol  17 g Oral Daily    QUEtiapine  25 mg Oral BID     Continuous Infusions:  PRN Meds:.acetaminophen, acetaminophen, ondansetron, sodium chloride 0.9%    Estimated/Assessed Needs    Weight Used For Calorie Calculations: 86.3 kg (190 lb 4.1 oz)  Energy Calorie Requirements (kcal): 4786-1712 (20-25 kcal/kg)  Energy Need Method: Kcal/kg  Protein Requirements: 88 g (1.5 g/kg) per IBW  Weight Used For Protein Calculations: 59 kg (130 lb 1.1 oz)(IBW)     Estimated Fluid Requirement Method: RDA Method  RDA Method (mL): 0446       Nutrition Prescription Ordered    Current Diet Order: blue  mechanical soft     Evaluation of Received Nutrient/Fluid Intake    Energy Calories Required: meeting needs  Protein Required: meeting needs  Fluid Required: meeting needs  Tolerance: tolerating     Intake/Output Summary (Last 24 hours) at 12/17/2019 1027  Last data filed at 12/17/2019 0700  Gross per 24 hour   Intake 500 ml   Output 700 ml   Net -200 ml        % Meal Intake: Other: 75% per staff    Dietitian Rounds Brief    Pt assessed 2' LOS. Tolerating diet per staff. Pt with dementia, unable to interview pt. No family present at time of rounds. Staff reports tolerating texture for most part; some difficulty with scrambled eggs this AM, but not yesterday. Noted ST following. LBM 12/13 per chart--miralax being administered.    Nutrition Risk    Level of Risk/Frequency of Follow-up: moderate       Monitor and Evaluation    Food and Nutrient Intake: food and beverage intake, energy intake  Food and Nutrient Adminstration: diet order  Physical Activity and Function: nutrition-related ADLs and IADLs  Anthropometric Measurements: weight, weight change  Biochemical Data, Medical Tests and Procedures: gastrointestinal profile, electrolyte and renal panel, glucose/endocrine profile  Nutrition-Focused Physical Findings: overall appearance     Nutrition Follow-Up    RD Follow-up?: Yes    Emerita Dominguez RD 12/17/2019 10:27 AM

## 2019-12-18 PROBLEM — R11.2 NAUSEA & VOMITING: Status: RESOLVED | Noted: 2019-12-13 | Resolved: 2019-12-18

## 2019-12-18 PROBLEM — R10.84 GENERALIZED ABDOMINAL PAIN: Status: RESOLVED | Noted: 2019-12-13 | Resolved: 2019-12-18

## 2019-12-18 LAB
ALBUMIN SERPL BCP-MCNC: 2.3 G/DL (ref 3.5–5.2)
ALP SERPL-CCNC: 105 U/L (ref 55–135)
ALT SERPL W/O P-5'-P-CCNC: 16 U/L (ref 10–44)
ANION GAP SERPL CALC-SCNC: 3 MMOL/L (ref 8–16)
AST SERPL-CCNC: 24 U/L (ref 10–40)
BILIRUB SERPL-MCNC: 0.9 MG/DL (ref 0.1–1)
BUN SERPL-MCNC: 19 MG/DL (ref 8–23)
CALCIUM SERPL-MCNC: 7.7 MG/DL (ref 8.7–10.5)
CHLORIDE SERPL-SCNC: 103 MMOL/L (ref 95–110)
CO2 SERPL-SCNC: 30 MMOL/L (ref 23–29)
CREAT SERPL-MCNC: 0.4 MG/DL (ref 0.5–1.4)
ERYTHROCYTE [DISTWIDTH] IN BLOOD BY AUTOMATED COUNT: 17.1 % (ref 11.5–14.5)
EST. GFR  (AFRICAN AMERICAN): >60 ML/MIN/1.73 M^2
EST. GFR  (NON AFRICAN AMERICAN): >60 ML/MIN/1.73 M^2
GLUCOSE SERPL-MCNC: 97 MG/DL (ref 70–110)
HCT VFR BLD AUTO: 25.9 % (ref 37–48.5)
HGB BLD-MCNC: 8.3 G/DL (ref 12–16)
MAGNESIUM SERPL-MCNC: 1.7 MG/DL (ref 1.6–2.6)
MCH RBC QN AUTO: 34.4 PG (ref 27–31)
MCHC RBC AUTO-ENTMCNC: 32 G/DL (ref 32–36)
MCV RBC AUTO: 108 FL (ref 82–98)
PLATELET # BLD AUTO: 157 K/UL (ref 150–350)
PMV BLD AUTO: 9.2 FL (ref 9.2–12.9)
POTASSIUM SERPL-SCNC: 4 MMOL/L (ref 3.5–5.1)
PROT SERPL-MCNC: 4.9 G/DL (ref 6–8.4)
RBC # BLD AUTO: 2.41 M/UL (ref 4–5.4)
SODIUM SERPL-SCNC: 136 MMOL/L (ref 136–145)
WBC # BLD AUTO: 3.87 K/UL (ref 3.9–12.7)

## 2019-12-18 PROCEDURE — 97530 THERAPEUTIC ACTIVITIES: CPT

## 2019-12-18 PROCEDURE — 25000003 PHARM REV CODE 250: Performed by: HOSPITALIST

## 2019-12-18 PROCEDURE — 80053 COMPREHEN METABOLIC PANEL: CPT

## 2019-12-18 PROCEDURE — 85027 COMPLETE CBC AUTOMATED: CPT

## 2019-12-18 PROCEDURE — 12000002 HC ACUTE/MED SURGE SEMI-PRIVATE ROOM

## 2019-12-18 PROCEDURE — 94760 N-INVAS EAR/PLS OXIMETRY 1: CPT

## 2019-12-18 PROCEDURE — 36415 COLL VENOUS BLD VENIPUNCTURE: CPT

## 2019-12-18 PROCEDURE — 83735 ASSAY OF MAGNESIUM: CPT

## 2019-12-18 PROCEDURE — 25000003 PHARM REV CODE 250: Performed by: INTERNAL MEDICINE

## 2019-12-18 RX ORDER — DOCUSATE SODIUM 100 MG/1
100 CAPSULE, LIQUID FILLED ORAL 2 TIMES DAILY
Status: DISCONTINUED | OUTPATIENT
Start: 2019-12-18 | End: 2019-12-19 | Stop reason: HOSPADM

## 2019-12-18 RX ORDER — BISACODYL 10 MG
10 SUPPOSITORY, RECTAL RECTAL DAILY
Status: DISCONTINUED | OUTPATIENT
Start: 2019-12-18 | End: 2019-12-19 | Stop reason: HOSPADM

## 2019-12-18 RX ORDER — LACTULOSE 10 G/15ML
20 SOLUTION ORAL EVERY 4 HOURS
Status: DISCONTINUED | OUTPATIENT
Start: 2019-12-18 | End: 2019-12-19 | Stop reason: HOSPADM

## 2019-12-18 RX ORDER — LANOLIN ALCOHOL/MO/W.PET/CERES
1000 CREAM (GRAM) TOPICAL DAILY
Start: 2019-12-18

## 2019-12-18 RX ADMIN — CYANOCOBALAMIN TAB 1000 MCG 1000 MCG: 1000 TAB at 10:12

## 2019-12-18 RX ADMIN — METOPROLOL TARTRATE 25 MG: 25 TABLET ORAL at 10:12

## 2019-12-18 RX ADMIN — POLYETHYLENE GLYCOL 3350 17 G: 17 POWDER, FOR SOLUTION ORAL at 10:12

## 2019-12-18 RX ADMIN — DOCUSATE SODIUM 100 MG: 100 CAPSULE, LIQUID FILLED ORAL at 08:12

## 2019-12-18 RX ADMIN — LACTULOSE 20 G: 20 SOLUTION ORAL at 06:12

## 2019-12-18 RX ADMIN — ATORVASTATIN CALCIUM 40 MG: 40 TABLET, FILM COATED ORAL at 10:12

## 2019-12-18 RX ADMIN — LACTULOSE 20 G: 20 SOLUTION ORAL at 02:12

## 2019-12-18 RX ADMIN — QUETIAPINE 25 MG: 25 TABLET ORAL at 10:12

## 2019-12-18 RX ADMIN — ASPIRIN 81 MG: 81 TABLET, DELAYED RELEASE ORAL at 10:12

## 2019-12-18 RX ADMIN — LACTULOSE 20 G: 20 SOLUTION ORAL at 11:12

## 2019-12-18 RX ADMIN — AMLODIPINE BESYLATE 5 MG: 5 TABLET ORAL at 10:12

## 2019-12-18 RX ADMIN — QUETIAPINE 25 MG: 25 TABLET ORAL at 08:12

## 2019-12-18 RX ADMIN — BISACODYL 10 MG: 10 SUPPOSITORY RECTAL at 02:12

## 2019-12-18 RX ADMIN — APIXABAN 10 MG: 5 TABLET, FILM COATED ORAL at 10:12

## 2019-12-18 RX ADMIN — METOPROLOL TARTRATE 25 MG: 25 TABLET ORAL at 08:12

## 2019-12-18 RX ADMIN — Medication 1 TABLET: at 10:12

## 2019-12-18 RX ADMIN — APIXABAN 10 MG: 5 TABLET, FILM COATED ORAL at 08:12

## 2019-12-18 NOTE — CARE UPDATE
12/18/19 0946   PRE-TX-O2   O2 Device (Oxygen Therapy) room air   SpO2 96 %   Pulse Oximetry Type Intermittent   $ Pulse Oximetry - Single Charge Pulse Oximetry - Single   Pulse 79   Resp 16   DISC RT POX CK - PT RA, NO O2 ORDER

## 2019-12-18 NOTE — PLAN OF CARE
12/18/19 1013   Discharge Reassessment   Assessment Type Discharge Planning Reassessment   Anticipated Discharge Disposition SNF     D/C orders are in for pt's return to [facility]. AGA called  at [phone] and informed Jazzmine. SW also sent d/c orders and updated labs as requested via right fax. AGA sent SNF order to [facility] for a new authorization via right fax. AGA contacted Hawa at [facility] ([phone]) to inform her of pt's d/c and incoming order. Awaiting information for report from  and new authorization number from [facility].    10:24 Updates above were provided to pt's nurse.    11:01 AGA called Hawa at [facility] and confirmed they have received SNF order. SW awaiting auth.     11:25 SW provided updates to pt's son Jhonny.    12:07 Hawa provided SW with [facility] auth #[id number].    12:14 AGA left report for for pt's nurse Yareli and provided updates to son Jhonny.    16:50 AGA was informed by pt's nurse at 12:14 that pt had not had a BM since 12/13/19, and so she didn't know if  would take her back today but was going to discuss with them during report. AGA shared this with Dr. Deluna and charge nurse Lashaun at 13:00 huddle meeting, and was told by charge nurse that they would take care of it. AGA received a call from Jazzmine at  at 16:32 asking what was going on with pt. Per Jazzmine, it is now too late for pt to return tonight even if she has BM. Per Dr. Deluna, stat KUB was ordered to try to prove pt is not obstructed. AGA called back  but admissions staff had left. SW left a msg for nursing asking them to call [facility] nurses station regarding whether or not pt could still be taken tonight depending on results of KUB. AGA informed Dr. Deluna, charge nurse, and pt's nurse Yareli. Pt has had no case mgmt needs since 12:14 this afternoon. AGA will call and update pt's son.

## 2019-12-18 NOTE — SUBJECTIVE & OBJECTIVE
Interval History: Abdominal pain POA- diffuse, persistent, moderately severe, associated with N/V.  Patient reports resolution of abdominal pain with no N/V.  She is tolerating her diet.  Denies blood in stool, melena.    Review of Systems   Constitutional: Negative.    HENT: Negative.    Eyes: Negative.    Respiratory: Negative.    Cardiovascular: Negative.    Gastrointestinal: Negative.    Endocrine: Negative.    Genitourinary: Negative.    Musculoskeletal: Negative.    Skin: Negative.    Allergic/Immunologic: Negative.    Neurological: Positive for weakness.   Hematological: Negative.    Psychiatric/Behavioral: Negative.      Objective:     Vital Signs (Most Recent):  Temp: 98 °F (36.7 °C) (12/17/19 2003)  Pulse: 74 (12/17/19 2003)  Resp: 16 (12/17/19 2003)  BP: 114/70 (12/17/19 2003)  SpO2: 97 % (12/17/19 2003) Vital Signs (24h Range):  Temp:  [97.4 °F (36.3 °C)-99.3 °F (37.4 °C)] 98 °F (36.7 °C)  Pulse:  [69-84] 74  Resp:  [16-18] 16  SpO2:  [95 %-97 %] 97 %  BP: (114-127)/(57-74) 114/70     Weight: 86.3 kg (190 lb 4.1 oz)  Body mass index is 30.71 kg/m².    Intake/Output Summary (Last 24 hours) at 12/17/2019 2039  Last data filed at 12/17/2019 1800  Gross per 24 hour   Intake 500 ml   Output 1200 ml   Net -700 ml      Physical Exam   Constitutional: She is oriented to person, place, and time. She appears well-developed. No distress.   HENT:   Head: Normocephalic and atraumatic.   Mouth/Throat: Oropharynx is clear and moist.   Eyes: Pupils are equal, round, and reactive to light. EOM are normal.   Neck: Normal range of motion.   Cardiovascular: Regular rhythm.   No murmur heard.  Pulmonary/Chest: Effort normal and breath sounds normal. She has no wheezes.   Abdominal: Soft. She exhibits no distension. There is no tenderness. There is no rebound and no guarding.   Musculoskeletal: Normal range of motion.   Neurological: She is alert and oriented to person, place, and time. No cranial nerve deficit or sensory  deficit.   Skin: No rash noted.   Psychiatric: She has a normal mood and affect.   Nursing note and vitals reviewed.      Significant Labs:   CBC:   Recent Labs   Lab 12/16/19  0508 12/17/19  0524   WBC 3.98 3.44*   HGB 7.8* 7.9*   HCT 24.2* 24.3*    142*     CMP:   Recent Labs   Lab 12/16/19  0508 12/17/19  0524    138   K 3.8 3.8    103   CO2 30* 30*    105   BUN 19 17   CREATININE 0.5 0.5   CALCIUM 7.7* 8.1*   PROT 4.9* 4.9*   ALBUMIN 2.3* 2.3*   BILITOT 0.9 1.0   ALKPHOS 83 92   AST 19 18   ALT 18 16   ANIONGAP 4* 5*   EGFRNONAA >60.0 >60.0       Significant Imaging: I have reviewed and interpreted all pertinent imaging results/findings within the past 24 hours.

## 2019-12-18 NOTE — ASSESSMENT & PLAN NOTE
Imaging consistent with gallstones.  No evidence of acute cholecystitis.  GI symptom shave appeared to be resolved.  She can follow up with general surgery as outpatient if she wishes.  No current surgical needs.

## 2019-12-18 NOTE — PT/OT/SLP PROGRESS
Physical Therapy Treatment    Patient Name:  Ellen Maravilla   MRN:  [id number]    Recommendations:     Discharge Recommendations:  nursing facility, skilled   Discharge Equipment Recommendations: (TBD at next level of care)   Barriers to discharge: None    Assessment:     Ellen Maravilla is a 86 y.o. female admitted with a medical diagnosis of Nausea & vomiting.  She presents with the following impairments/functional limitations:  weakness, impaired functional mobilty .    Rehab Prognosis: Poor; patient would benefit from acute skilled PT services to address these deficits and reach maximum level of function.    Recent Surgery: * No surgery found *      Plan:     During this hospitalization, patient to be seen 5 x/week to address the identified rehab impairments via gait training, therapeutic activities, therapeutic exercises and progress toward the following goals:    · Plan of Care Expires:       Subjective     Chief Complaint: none  Patient/Family Comments/goals: home  Pain/Comfort:  ·        Objective:     Communicated with NSG prior to session.  Patient found supine with   upon PT entry to room.     General Precautions: Standard, fall   Orthopedic Precautions:N/A   Braces:       Functional Mobility:      AM-PAC 6 CLICK MOBILITY          Therapeutic Activities and Exercises:   Pt performed supine to sit max assist. Pt sit eob for midline orientation and trunk control with supervision. Sit to  walker mod assist Pt amb 5 steps with walker max assist. Pt to sit in chair. Performed bilat le ex in sit 10 x    Patient left up in chair with call button in reach..    GOALS:   Multidisciplinary Problems     Physical Therapy Goals        Problem: Physical Therapy Goal    Goal Priority Disciplines Outcome Goal Variances Interventions   Physical Therapy Goal     PT, PT/OT Ongoing, Progressing            Problem: Physical Therapy Goal    Goal Priority Disciplines Outcome Goal Variances Interventions   Physical Therapy  Goal     PT, PT/OT Ongoing, Progressing                     Time Tracking:     PT Received On: 12/18/19  PT Start Time: 1055     PT Stop Time: 1118  PT Total Time (min): 23 min     Billable Minutes: Therapeutic Activity 23min    Treatment Type: Treatment  PT/PTA: PTA     PTA Visit Number: 2     Jey Barriga PTA  12/18/2019

## 2019-12-18 NOTE — ASSESSMENT & PLAN NOTE
Eliquis started 12/17 after H/H has demonstrated to be stable.  Lovenox stopped.  Plan to repeat H/H in the AM.

## 2019-12-18 NOTE — HOSPITAL COURSE
The patient was admitted to the hospital for workup and treatment including antiemetics, pain control, and IV fluids.  The patient's abdominal pain and GI symptoms quickly resolved after admission.  Abdominal ultrasound revealed cholelithiasis without cholecystitis.  Initial CT scan of abdomen had findings concerning for potential pulmonary embolus in lower lobe.  Therefore CTA chest PE protocol was performed which confirmed pulmonary embolus.  LE US negative for DVT. Therefore the patient was started on full-dose Lovenox anticoagulation.  The patient is tolerating Lovenox well without bleeding symptoms. She had a drop in her H/H but no signs of bleeding seen.  H/H has been stable over t 48 hours and thus lovenox stopped and eliquis started.  She had echocardiogram as documented below which was negative for signs of right overload/failure.  The patient is slowly improving with medical treatment.  She is working with speech therapy as well as PT/OT. H/H has been stable on eliquis and thus she will be discharged back to SNF.

## 2019-12-18 NOTE — PROGRESS NOTES
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  .  Patient currently is at nursing care facility.  Over the last 2 weeks she has been having intermittent nausea vomiting with abdominal pain.  Reportedly patient had ultrasound that showed gallstones.   Today patient with emesis after physical therapy.  Currently patient denies any abdominal pain. Patient with no complaints.  Patient is somewhat anxious with dementia.  During ED workup, patient had CT the abdomen which showed no acute abdominal findings however it did show indeterminate findings for pulmonary embolism.  Patient denies chest pain or shortness of breath.    Overview/Hospital Course:  The patient was admitted to the hospital for workup and treatment including antiemetics, pain control, and IV fluids.  The patient's abdominal pain and GI symptoms quickly resolved after admission.  Abdominal ultrasound revealed cholelithiasis without cholecystitis.  Initial CT scan of abdomen had findings concerning for potential pulmonary embolus in lower lobe.  Therefore CTA chest PE protocol was performed which confirmed pulmonary embolus.   LE US negative for DVT. Therefore the patient was started on full-dose Lovenox anticoagulation.  The patient is tolerating Lovenox well without bleeding symptoms. She had a drop in her H/H but no signs of bleeding seen.  H/H has been stable over last 48 hours and thus lovenox stopped and eliquis started.  She had echocardiogram as documented below which was negative for signs of right overload/failure.  The  patient is slowly improving with medical treatment.  She is working with speech therapy as well as PT/OT. She will return to SNF once medically cleared for discharge.    Interval History: Abdominal pain POA- diffuse, persistent, moderately severe, associated with N/V.  Patient reports resolution of abdominal pain with no N/V.  She is tolerating her diet.  Denies blood in stool, melena.    Review of Systems   Constitutional: Negative.    HENT: Negative.    Eyes: Negative.    Respiratory: Negative.    Cardiovascular: Negative.    Gastrointestinal: Negative.    Endocrine: Negative.    Genitourinary: Negative.    Musculoskeletal: Negative.    Skin: Negative.    Allergic/Immunologic: Negative.    Neurological: Positive for weakness.   Hematological: Negative.    Psychiatric/Behavioral: Negative.      Objective:     Vital Signs (Most Recent):  Temp: 98 °F (36.7 °C) (12/17/19 2003)  Pulse: 74 (12/17/19 2003)  Resp: 16 (12/17/19 2003)  BP: 114/70 (12/17/19 2003)  SpO2: 97 % (12/17/19 2003) Vital Signs (24h Range):  Temp:  [97.4 °F (36.3 °C)-99.3 °F (37.4 °C)] 98 °F (36.7 °C)  Pulse:  [69-84] 74  Resp:  [16-18] 16  SpO2:  [95 %-97 %] 97 %  BP: (114-127)/(57-74) 114/70     Weight: 86.3 kg (190 lb 4.1 oz)  Body mass index is 30.71 kg/m².    Intake/Output Summary (Last 24 hours) at 12/17/2019 2039  Last data filed at 12/17/2019 1800  Gross per 24 hour   Intake 500 ml   Output 1200 ml   Net -700 ml      Physical Exam   Constitutional: She is oriented to person, place, and time. She appears well-developed. No distress.   HENT:   Head: Normocephalic and atraumatic.   Mouth/Throat: Oropharynx is clear and moist.   Eyes: Pupils are equal, round, and reactive to light. EOM are normal.   Neck: Normal range of motion.   Cardiovascular: Regular rhythm.   No murmur heard.  Pulmonary/Chest: Effort normal and breath sounds normal. She has no wheezes.   Abdominal: Soft. She exhibits no distension. There is no tenderness. There is no rebound  and no guarding.   Musculoskeletal: Normal range of motion.   Neurological: She is alert and oriented to person, place, and time. No cranial nerve deficit or sensory deficit.   Skin: No rash noted.   Psychiatric: She has a normal mood and affect.   Nursing note and vitals reviewed.      Significant Labs:   CBC:   Recent Labs   Lab 12/16/19  0508 12/17/19  0524   WBC 3.98 3.44*   HGB 7.8* 7.9*   HCT 24.2* 24.3*    142*     CMP:   Recent Labs   Lab 12/16/19  0508 12/17/19  0524    138   K 3.8 3.8    103   CO2 30* 30*    105   BUN 19 17   CREATININE 0.5 0.5   CALCIUM 7.7* 8.1*   PROT 4.9* 4.9*   ALBUMIN 2.3* 2.3*   BILITOT 0.9 1.0   ALKPHOS 83 92   AST 19 18   ALT 18 16   ANIONGAP 4* 5*   EGFRNONAA >60.0 >60.0       Significant Imaging: I have reviewed and interpreted all pertinent imaging results/findings within the past 24 hours.      Assessment/Plan:      * Nausea & vomiting  Resolved.  Tolerating diet without issue.        PE (pulmonary thromboembolism)  Eliquis started 12/17 after H/H has demonstrated to be stable.  Lovenox stopped.  Plan to repeat H/H in the AM.       History of CVA (cerebrovascular accident)  History of CVA with slurred speech  and  generalized weakness  PT and speech eval      Coronary artery disease  Chronic medical condition.  Continuing home medication.  Monitoring.        Abnormal finding on radiology exam  CTA chest confirms PE  LE US negative for DVT      Generalized abdominal pain  Imaging consistent with gallstones.  No evidence of acute cholecystitis.  GI symptom shave appeared to be resolved.  She can follow up with general surgery as outpatient if she wishes.  No current surgical needs.      Anemia  No signs of GI bleed.  Following serial H/H checks.         VTE Risk Mitigation (From admission, onward)         Ordered     apixaban tablet 10 mg  2 times daily      12/17/19 1051     IP VTE HIGH RISK PATIENT  Once      12/13/19 1352                      Nadya  LUISA Deluna MD  Department of Hospital Medicine   [facility]

## 2019-12-19 VITALS
BODY MASS INDEX: 30.58 KG/M2 | WEIGHT: 190.25 LBS | OXYGEN SATURATION: 98 % | SYSTOLIC BLOOD PRESSURE: 108 MMHG | HEIGHT: 66 IN | RESPIRATION RATE: 18 BRPM | HEART RATE: 76 BPM | TEMPERATURE: 99 F | DIASTOLIC BLOOD PRESSURE: 67 MMHG

## 2019-12-19 PROCEDURE — 25000003 PHARM REV CODE 250: Performed by: INTERNAL MEDICINE

## 2019-12-19 PROCEDURE — 25000003 PHARM REV CODE 250: Performed by: HOSPITALIST

## 2019-12-19 PROCEDURE — 97530 THERAPEUTIC ACTIVITIES: CPT

## 2019-12-19 RX ADMIN — CYANOCOBALAMIN TAB 1000 MCG 1000 MCG: 1000 TAB at 09:12

## 2019-12-19 RX ADMIN — ASPIRIN 81 MG: 81 TABLET, DELAYED RELEASE ORAL at 09:12

## 2019-12-19 RX ADMIN — AMLODIPINE BESYLATE 5 MG: 5 TABLET ORAL at 09:12

## 2019-12-19 RX ADMIN — METOPROLOL TARTRATE 25 MG: 25 TABLET ORAL at 09:12

## 2019-12-19 RX ADMIN — ATORVASTATIN CALCIUM 40 MG: 40 TABLET, FILM COATED ORAL at 09:12

## 2019-12-19 RX ADMIN — Medication 1 TABLET: at 09:12

## 2019-12-19 RX ADMIN — APIXABAN 10 MG: 5 TABLET, FILM COATED ORAL at 09:12

## 2019-12-19 RX ADMIN — QUETIAPINE 25 MG: 25 TABLET ORAL at 09:12

## 2019-12-19 RX ADMIN — DOCUSATE SODIUM 100 MG: 100 CAPSULE, LIQUID FILLED ORAL at 09:12

## 2019-12-19 NOTE — PLAN OF CARE
12/19/19 1437   Final Note   Assessment Type Final Discharge Note   Anticipated Discharge Disposition SNF   Sent discharge orders to Nikhil. Told Lashaun SESAY to let Yareli SESAY know to call report to jeffery Cox on [address] at [phone]. Nikhil will come get patient in patient van.

## 2019-12-19 NOTE — PT/OT/SLP PROGRESS
Physical Therapy Treatment    Patient Name:  Ellen Maravilla   MRN:  [id number]    Recommendations:     Discharge Recommendations:  nursing facility, skilled   Discharge Equipment Recommendations: (TBD at next level of care)   Barriers to discharge: None    Assessment:     Ellen Maravilla is a 86 y.o. female admitted with a medical diagnosis of Nausea & vomiting.  She presents with the following impairments/functional limitations:  weakness, impaired endurance, impaired self care skills, impaired functional mobilty, decreased coordination, gait instability, decreased lower extremity function. Pt agreeable to therapy while needing constant encouragement and assurance that she would not fall while sitting EOB.     Rehab Prognosis: Fair; patient would benefit from acute skilled PT services to address these deficits and reach maximum level of function.    Recent Surgery: * No surgery found *      Plan:     During this hospitalization, patient to be seen 5 x/week to address the identified rehab impairments via gait training, therapeutic exercises, therapeutic activities and progress toward the following goals:    · Plan of Care Expires:       Subjective     Chief Complaint: Fear of falling.   Patient/Family Comments/goals: To rest and go home.   Pain/Comfort:  · Pain Rating 1: 0/10      Objective:     Communicated with nurse Downs prior to session.  Patient found HOB elevated with heck catheter, bed alarm upon PT entry to room.     General Precautions: Standard, fall   Orthopedic Precautions:N/A   Braces: N/A     Functional Mobility:  · Bed Mobility:     · Rolling Left:  maximal assistance  · Rolling Right: maximal assistance  · Supine to Sit: maximal assistance  · Sit to Supine: maximal assistance      AM-PAC 6 CLICK MOBILITY          Therapeutic Activities and Exercises:   Sitting EOB ~ 8 min. Required constant encouragement and assurance that she would not fall.   Pt declined standing or walking. Pt reported being  dizzy and wanted to return to supine.     Patient left HOB elevated with all lines intact, call button in reach, bed alarm on and nurse Yareli present..    GOALS:   Multidisciplinary Problems     Physical Therapy Goals     Not on file          Multidisciplinary Problems (Resolved)        Problem: Physical Therapy Goal    Goal Priority Disciplines Outcome Goal Variances Interventions   Physical Therapy Goal   (Resolved)     PT, PT/OT Met            Problem: Physical Therapy Goal    Goal Priority Disciplines Outcome Goal Variances Interventions   Physical Therapy Goal   (Resolved)     PT, PT/OT Met                     Time Tracking:     PT Received On: 12/19/19  PT Start Time: 0941     PT Stop Time: 0954  PT Total Time (min): 13 min     Billable Minutes: Therapeutic Activity 13 minutes    Treatment Type: Treatment  PT/PTA: PTA     PTA Visit Number: 3     Brooklyn Mac, PTA  12/19/2019

## 2019-12-19 NOTE — NURSING
Pt discharged with facility transportation. Discharge paper work and RX given to . No distress c/o at that time

## 2019-12-19 NOTE — DISCHARGE SUMMARY
[facility] Medicine  Discharge Summary      Patient Name: Ellen Maravilla  MRN: [id number]  Admission Date: 12/13/2019  Hospital Length of Stay: 2 days  Discharge Date and Time: No discharge date for patient encounter.  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  .  Patient currently is at nursing care facility.  Over the last 2 weeks she has been having intermittent nausea vomiting with abdominal pain.  Reportedly patient had ultrasound that showed gallstones.   Today patient with emesis after physical therapy.  Currently patient denies any abdominal pain. Patient with no complaints.  Patient is somewhat anxious with dementia.  During ED workup, patient had CT the abdomen which showed no acute abdominal findings however it did show indeterminate findings for pulmonary embolism.  Patient denies chest pain or shortness of breath.    * No surgery found *      Hospital Course:   The patient was admitted to the hospital for workup and treatment including antiemetics, pain control, and IV fluids.  The patient's abdominal pain and GI symptoms quickly resolved after admission.  Abdominal ultrasound revealed cholelithiasis without cholecystitis.  Initial CT scan of abdomen had findings concerning for potential pulmonary embolus in lower lobe.  Therefore CTA chest PE protocol was performed which confirmed pulmonary embolus.   LE US negative for DVT. Therefore the patient was started on full-dose Lovenox anticoagulation.  The patient is tolerating Lovenox well without bleeding symptoms. She had a drop in her H/H but no signs of bleeding seen.  H/H has been stable over t 48 hours and thus lovenox stopped and eliquis started.  She had echocardiogram as documented below which was  negative for signs of right overload/failure.  The patient is slowly improving with medical treatment.  She is working with speech therapy as well as PT/OT.  H/H has been stable on eliquis and thus she will be discharged back to SNF.     Consults:   Consults (From admission, onward)        Status Ordering Provider     Inpatient consult to Hospitalist  Once     Provider:  Subhash Lock, SUBHASH Glez consult to case management  Once     Provider:  (Not yet assigned)    THEA Owen          No new Assessment & Plan notes have been filed under this hospital service since the last note was generated.  Service: Hospital Medicine    Final Active Diagnoses:    Diagnosis Date Noted POA    PE (pulmonary thromboembolism) [I26.99] 12/17/2019 Yes    Abnormal finding on radiology exam [R93.89] 12/13/2019 Yes    Coronary artery disease [I25.10] 12/13/2019 Yes    History of CVA (cerebrovascular accident) [Z86.73] 12/13/2019 Not Applicable    Anemia [D64.9] 02/10/2015 Yes      Problems Resolved During this Admission:    Diagnosis Date Noted Date Resolved POA    PRINCIPAL PROBLEM:  Nausea & vomiting [R11.2] 12/13/2019 12/18/2019 Yes    Generalized abdominal pain [R10.84] 12/13/2019 12/18/2019 Yes       Discharged Condition: good    Disposition: Skilled Nursing Facility    Follow Up:  Follow-up Information     Tien Mckeon MD In 2 weeks.    Specialty:  Family Medicine  Contact information:  [address] [facility] [address]  [phone]                 Patient Instructions:      Diet Adult Regular     Notify your health care provider if you experience any of the following:  temperature >100.4     Notify your health care provider if you experience any of the following:  persistent nausea and vomiting or diarrhea     Notify your health care provider if you experience any of the following:  severe uncontrolled pain     Notify your health care provider if you experience any of  the following:  increased confusion or weakness     Activity as tolerated       Significant Diagnostic Studies: Labs:   CMP   Recent Labs   Lab 12/17/19  0524 12/18/19  0521    136   K 3.8 4.0    103   CO2 30* 30*    97   BUN 17 19   CREATININE 0.5 0.4*   CALCIUM 8.1* 7.7*   PROT 4.9* 4.9*   ALBUMIN 2.3* 2.3*   BILITOT 1.0 0.9   ALKPHOS 92 105   AST 18 24   ALT 16 16   ANIONGAP 5* 3*   ESTGFRAFRICA >60.0 >60.0   EGFRNONAA >60.0 >60.0    and CBC   Recent Labs   Lab 12/17/19  0524 12/18/19  0521   WBC 3.44* 3.87*   HGB 7.9* 8.3*   HCT 24.3* 25.9*   * 157       Pending Diagnostic Studies:     None         Medications:  Reconciled Home Medications:      Medication List      START taking these medications    apixaban 5 mg Tab  Commonly known as:  ELIQUIS  Take 1 tablet (5 mg total) by mouth 2 (two) times daily. 10mg (2 tabs) po bid for 6 days and then 5mg (1tab) po bid thereafter     cyanocobalamin 1000 MCG tablet  Commonly known as:  VITAMIN B-12  Take 1 tablet (1,000 mcg total) by mouth once daily.        CONTINUE taking these medications    acetaminophen 325 MG tablet  Commonly known as:  TYLENOL  Take 2 tablets (650 mg total) by mouth every 4 (four) hours as needed.     amLODIPine 5 MG tablet  Commonly known as:  NORVASC  Take 1 tablet (5 mg total) by mouth once daily.     aspirin 81 MG EC tablet  Commonly known as:  ECOTRIN  Take 1 tablet (81 mg total) by mouth once daily.     atorvastatin 40 MG tablet  Commonly known as:  LIPITOR  Take 1 tablet (40 mg total) by mouth once daily.     bisacodyl 10 mg Supp  Commonly known as:  DULCOLAX  Place 1 suppository (10 mg total) rectally daily as needed.     calcium-vitamin D3 500 mg(1,250mg) -200 unit per tablet  Commonly known as:  OS-LILY 500 + D3  Take 1 tablet by mouth once daily.     metoprolol tartrate 25 MG tablet  Commonly known as:  LOPRESSOR  Take 1 tablet (25 mg total) by mouth 2 (two) times daily.     polyethylene glycol 17 gram  Pwpk  Commonly known as:  GLYCOLAX  Take 17 g by mouth once daily.     * QUEtiapine 25 MG Tab  Commonly known as:  SEROQUEL  Take 25 mg by mouth 2 (two) times daily.     * QUEtiapine 25 MG Tab  Commonly known as:  SEROQUEL  Take 1 tablet (25 mg total) by mouth once daily. Everyday at 1800         * This list has 2 medication(s) that are the same as other medications prescribed for you. Read the directions carefully, and ask your doctor or other care provider to review them with you.            STOP taking these medications    enoxaparin 40 mg/0.4 mL Syrg  Commonly known as:  LOVENOX     promethazine 25 mg/mL injection  Commonly known as:  PHENERGAN            Indwelling Lines/Drains at time of discharge:   Lines/Drains/Airways     Drain                 Urethral Catheter 12/13/19 1200 Non-latex 16 Fr. 5 days                Time spent on the discharge of patient: 32 minutes  Patient was seen and examined on the date of discharge and determined to be suitable for discharge.         Nadya Deluna MD  Department of Hospital Medicine  [facility]

## 2019-12-20 NOTE — PROGRESS NOTES
[facility] Medicine  Progress Note    Patient Name: Ellen Maravilla  MRN: [id number]  Patient Class: IP- Inpatient   Admission Date: 12/13/2019  Length of Stay: 3 days  Attending Physician: No att. 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  .  Patient currently is at nursing care facility.  Over the last 2 weeks she has been having intermittent nausea vomiting with abdominal pain.  Reportedly patient had ultrasound that showed gallstones.   Today patient with emesis after physical therapy.  Currently patient denies any abdominal pain. Patient with no complaints.  Patient is somewhat anxious with dementia.  During ED workup, patient had CT the abdomen which showed no acute abdominal findings however it did show indeterminate findings for pulmonary embolism.  Patient denies chest pain or shortness of breath.    Overview/Hospital Course:  The patient was admitted to the hospital for workup and treatment including antiemetics, pain control, and IV fluids.  The patient's abdominal pain and GI symptoms quickly resolved after admission.  Abdominal ultrasound revealed cholelithiasis without cholecystitis.  Initial CT scan of abdomen had findings concerning for potential pulmonary embolus in lower lobe.  Therefore CTA chest PE protocol was performed which confirmed pulmonary embolus.   LE US negative for DVT. Therefore the patient was started on full-dose Lovenox anticoagulation.  The patient is tolerating Lovenox well without bleeding symptoms. She had a drop in her H/H but no signs of bleeding seen.  H/H has been stable over t 48 hours and thus lovenox stopped and eliquis started.  She had echocardiogram as documented below which was negative for signs of right overload/failure.  The  patient is slowly improving with medical treatment.  She is working with speech therapy as well as PT/OT.  H/H has been stable on eliquis and thus she will be discharged back to SNF.    Interval History: Patient resting comfortably.  Discharge held 12/18 as she had not had a BM.  Patient had 2 BMs overnight.  KUB 12/18 significant for stool in colon but no obstruction.  Patient with abdominal pain on presentation- diffuse, persistent, moderately severe.  Pain has since resolved and she has tolerated diet without issue.      Review of Systems  Objective:     Vital Signs (Most Recent):  Temp: 98.6 °F (37 °C) (12/19/19 1201)  Pulse: 76 (12/19/19 1201)  Resp: 18 (12/19/19 1201)  BP: 108/67 (12/19/19 1201)  SpO2: 98 % (12/19/19 1201) Vital Signs (24h Range):  Temp:  [97.9 °F (36.6 °C)-98.6 °F (37 °C)] 98.6 °F (37 °C)  Pulse:  [76-81] 76  Resp:  [15-18] 18  SpO2:  [96 %-100 %] 98 %  BP: (107-133)/(60-71) 108/67     Weight: 86.3 kg (190 lb 4.1 oz)  Body mass index is 30.71 kg/m².    Intake/Output Summary (Last 24 hours) at 12/19/2019 1836  Last data filed at 12/19/2019 0300  Gross per 24 hour   Intake 240 ml   Output 1600 ml   Net -1360 ml      Physical Exam    Significant Labs:   CBC:   Recent Labs   Lab 12/18/19  0521   WBC 3.87*   HGB 8.3*   HCT 25.9*        CMP:   Recent Labs   Lab 12/18/19  0521      K 4.0      CO2 30*   GLU 97   BUN 19   CREATININE 0.4*   CALCIUM 7.7*   PROT 4.9*   ALBUMIN 2.3*   BILITOT 0.9   ALKPHOS 105   AST 24   ALT 16   ANIONGAP 3*   EGFRNONAA >60.0       Significant Imaging: I have reviewed and interpreted all pertinent imaging results/findings within the past 24 hours.      Assessment/Plan:      PE (pulmonary thromboembolism)  Eliquis started 12/17 after H/H has demonstrated to be stable.  Lovenox stopped. Discharging to SNF.       History of CVA (cerebrovascular accident)  History of CVA with slurred speech  and  generalized weakness  PT and speech eval      Coronary artery  disease  Chronic medical condition.  Continuing home medication.  Monitoring.        Abnormal finding on radiology exam  CTA chest confirms PE  LE US negative for DVT  Anticoagulant started after H/H demonstrated stability.  Will need outpatient following.      Anemia  No signs of GI bleed.  H/H has been stable.  Discharging to SNF on anticoagulant.         VTE Risk Mitigation (From admission, onward)         Ordered     IP VTE HIGH RISK PATIENT  Once      12/13/19 6704                      Nadya Deluna MD  Department of Hospital Medicine   [facility]

## 2019-12-20 NOTE — ASSESSMENT & PLAN NOTE
Eliquis started 12/17 after H/H has demonstrated to be stable.  Lovenox stopped. Discharging to SNF.

## 2019-12-20 NOTE — SUBJECTIVE & OBJECTIVE
Interval History: Patient resting comfortably.  Discharge held 12/18 as she had not had a BM.  Patient had 2 BMs overnight.  KUB 12/18 significant for stool in colon but no obstruction.  Patient with abdominal pain on presentation- diffuse, persistent, moderately severe.  Pain has since resolved and she has tolerated diet without issue.      Review of Systems  Objective:     Vital Signs (Most Recent):  Temp: 98.6 °F (37 °C) (12/19/19 1201)  Pulse: 76 (12/19/19 1201)  Resp: 18 (12/19/19 1201)  BP: 108/67 (12/19/19 1201)  SpO2: 98 % (12/19/19 1201) Vital Signs (24h Range):  Temp:  [97.9 °F (36.6 °C)-98.6 °F (37 °C)] 98.6 °F (37 °C)  Pulse:  [76-81] 76  Resp:  [15-18] 18  SpO2:  [96 %-100 %] 98 %  BP: (107-133)/(60-71) 108/67     Weight: 86.3 kg (190 lb 4.1 oz)  Body mass index is 30.71 kg/m².    Intake/Output Summary (Last 24 hours) at 12/19/2019 1836  Last data filed at 12/19/2019 0300  Gross per 24 hour   Intake 240 ml   Output 1600 ml   Net -1360 ml      Physical Exam    Significant Labs:   CBC:   Recent Labs   Lab 12/18/19  0521   WBC 3.87*   HGB 8.3*   HCT 25.9*        CMP:   Recent Labs   Lab 12/18/19  0521      K 4.0      CO2 30*   GLU 97   BUN 19   CREATININE 0.4*   CALCIUM 7.7*   PROT 4.9*   ALBUMIN 2.3*   BILITOT 0.9   ALKPHOS 105   AST 24   ALT 16   ANIONGAP 3*   EGFRNONAA >60.0       Significant Imaging: I have reviewed and interpreted all pertinent imaging results/findings within the past 24 hours.

## 2019-12-20 NOTE — ASSESSMENT & PLAN NOTE
CTA chest confirms PE  LE US negative for DVT  Anticoagulant started after H/H demonstrated stability.  Will need outpatient following.

## 2020-01-01 ENCOUNTER — LAB VISIT (OUTPATIENT)
Dept: LAB | Facility: HOSPITAL | Age: 85
End: 2020-01-01
Attending: FAMILY MEDICINE
Payer: MEDICARE

## 2020-01-01 DIAGNOSIS — D64.9 ANEMIA, UNSPECIFIED: Primary | ICD-10-CM

## 2020-01-01 LAB
BASOPHILS # BLD AUTO: 0.03 K/UL (ref 0–0.2)
BASOPHILS NFR BLD: 0.6 % (ref 0–1.9)
DIFFERENTIAL METHOD: ABNORMAL
EOSINOPHIL # BLD AUTO: 0.2 K/UL (ref 0–0.5)
EOSINOPHIL NFR BLD: 3.2 % (ref 0–8)
ERYTHROCYTE [DISTWIDTH] IN BLOOD BY AUTOMATED COUNT: 17.2 % (ref 11.5–14.5)
HCT VFR BLD AUTO: 27.8 % (ref 37–48.5)
HGB BLD-MCNC: 8.7 G/DL (ref 12–16)
IMM GRANULOCYTES # BLD AUTO: 0.03 K/UL (ref 0–0.04)
IMM GRANULOCYTES NFR BLD AUTO: 0.6 % (ref 0–0.5)
LYMPHOCYTES # BLD AUTO: 1.5 K/UL (ref 1–4.8)
LYMPHOCYTES NFR BLD: 31.9 % (ref 18–48)
MCH RBC QN AUTO: 34.4 PG (ref 27–31)
MCHC RBC AUTO-ENTMCNC: 31.3 G/DL (ref 32–36)
MCV RBC AUTO: 110 FL (ref 82–98)
MONOCYTES # BLD AUTO: 0.4 K/UL (ref 0.3–1)
MONOCYTES NFR BLD: 7.7 % (ref 4–15)
NEUTROPHILS # BLD AUTO: 2.6 K/UL (ref 1.8–7.7)
NEUTROPHILS NFR BLD: 56 % (ref 38–73)
NRBC BLD-RTO: 0 /100 WBC
PLATELET # BLD AUTO: 287 K/UL (ref 150–350)
PMV BLD AUTO: 8.8 FL (ref 9.2–12.9)
RBC # BLD AUTO: 2.53 M/UL (ref 4–5.4)
WBC # BLD AUTO: 4.67 K/UL (ref 3.9–12.7)

## 2020-01-01 PROCEDURE — 85025 COMPLETE CBC W/AUTO DIFF WBC: CPT

## 2020-01-01 PROCEDURE — 36415 COLL VENOUS BLD VENIPUNCTURE: CPT

## 2020-01-13 ENCOUNTER — HOSPITAL ENCOUNTER (INPATIENT)
Facility: HOSPITAL | Age: 85
LOS: 2 days | Discharge: NURSING, REHAB OR LONG-TERM CARE | DRG: 379 | End: 2020-01-16
Attending: EMERGENCY MEDICINE | Admitting: INTERNAL MEDICINE
Payer: MEDICARE

## 2020-01-13 DIAGNOSIS — R53.1 WEAKNESS: ICD-10-CM

## 2020-01-13 DIAGNOSIS — D64.9 SYMPTOMATIC ANEMIA: ICD-10-CM

## 2020-01-13 DIAGNOSIS — D64.9 ANEMIA, UNSPECIFIED TYPE: Primary | ICD-10-CM

## 2020-01-13 DIAGNOSIS — I10 ESSENTIAL HYPERTENSION: ICD-10-CM

## 2020-01-13 DIAGNOSIS — K92.2 GASTROINTESTINAL HEMORRHAGE, UNSPECIFIED GASTROINTESTINAL HEMORRHAGE TYPE: ICD-10-CM

## 2020-01-13 PROBLEM — Z79.01 LONG TERM (CURRENT) USE OF ANTICOAGULANTS: Status: ACTIVE | Noted: 2020-01-13

## 2020-01-13 PROBLEM — Z66 DNR (DO NOT RESUSCITATE): Status: ACTIVE | Noted: 2020-01-13

## 2020-01-13 LAB
ABO + RH BLD: NORMAL
ALBUMIN SERPL BCP-MCNC: 2.4 G/DL (ref 3.5–5.2)
ALP SERPL-CCNC: 102 U/L (ref 55–135)
ALT SERPL W/O P-5'-P-CCNC: 11 U/L (ref 10–44)
ANION GAP SERPL CALC-SCNC: 9 MMOL/L (ref 8–16)
APTT PPP: 39.5 SEC (ref 23.6–33.3)
AST SERPL-CCNC: 16 U/L (ref 10–40)
BACTERIA #/AREA URNS HPF: NEGATIVE /HPF
BASOPHILS # BLD AUTO: 0.01 K/UL (ref 0–0.2)
BASOPHILS NFR BLD: 0.2 % (ref 0–1.9)
BILIRUB SERPL-MCNC: 1 MG/DL (ref 0.1–1)
BILIRUB UR QL STRIP: NEGATIVE
BLD GP AB SCN CELLS X3 SERPL QL: NORMAL
BUN SERPL-MCNC: 23 MG/DL (ref 8–23)
CALCIUM SERPL-MCNC: 7.7 MG/DL (ref 8.7–10.5)
CHLORIDE SERPL-SCNC: 102 MMOL/L (ref 95–110)
CK MB SERPL-MCNC: 1.6 NG/ML (ref 0.1–6.5)
CK SERPL-CCNC: 44 U/L (ref 20–180)
CLARITY UR: ABNORMAL
CO2 SERPL-SCNC: 25 MMOL/L (ref 23–29)
COLOR UR: YELLOW
CREAT SERPL-MCNC: <0.3 MG/DL (ref 0.5–1.4)
DIFFERENTIAL METHOD: ABNORMAL
EOSINOPHIL # BLD AUTO: 0.1 K/UL (ref 0–0.5)
EOSINOPHIL NFR BLD: 0.9 % (ref 0–8)
ERYTHROCYTE [DISTWIDTH] IN BLOOD BY AUTOMATED COUNT: 16.1 % (ref 11.5–14.5)
EST. GFR  (AFRICAN AMERICAN): >60 ML/MIN/1.73 M^2
EST. GFR  (NON AFRICAN AMERICAN): >60 ML/MIN/1.73 M^2
FOLATE SERPL-MCNC: 5.5 NG/ML (ref 4–24)
GLUCOSE SERPL-MCNC: 130 MG/DL (ref 70–110)
GLUCOSE UR QL STRIP: NEGATIVE
HCT VFR BLD AUTO: 18.6 % (ref 37–48.5)
HGB BLD-MCNC: 5.6 G/DL (ref 12–16)
HGB UR QL STRIP: ABNORMAL
HYALINE CASTS #/AREA URNS LPF: 39 /LPF
HYPOCHROMIA BLD QL SMEAR: ABNORMAL
IMM GRANULOCYTES # BLD AUTO: 0.01 K/UL (ref 0–0.04)
IMM GRANULOCYTES NFR BLD AUTO: 0.2 % (ref 0–0.5)
IRON SERPL-MCNC: 16 UG/DL (ref 30–160)
KETONES UR QL STRIP: NEGATIVE
LEUKOCYTE ESTERASE UR QL STRIP: ABNORMAL
LYMPHOCYTES # BLD AUTO: 1 K/UL (ref 1–4.8)
LYMPHOCYTES NFR BLD: 18 % (ref 18–48)
MAGNESIUM SERPL-MCNC: 1.6 MG/DL (ref 1.6–2.6)
MCH RBC QN AUTO: 32.7 PG (ref 27–31)
MCHC RBC AUTO-ENTMCNC: 30.1 G/DL (ref 32–36)
MCV RBC AUTO: 109 FL (ref 82–98)
MICROSCOPIC COMMENT: ABNORMAL
MONOCYTES # BLD AUTO: 0.4 K/UL (ref 0.3–1)
MONOCYTES NFR BLD: 7 % (ref 4–15)
NEUTROPHILS # BLD AUTO: 4.1 K/UL (ref 1.8–7.7)
NEUTROPHILS NFR BLD: 73.7 % (ref 38–73)
NITRITE UR QL STRIP: NEGATIVE
NRBC BLD-RTO: 0 /100 WBC
OB PNL STL: POSITIVE
PH UR STRIP: 6 [PH] (ref 5–8)
PLATELET # BLD AUTO: 210 K/UL (ref 150–350)
PMV BLD AUTO: 9.1 FL (ref 9.2–12.9)
POLYCHROMASIA BLD QL SMEAR: ABNORMAL
POTASSIUM SERPL-SCNC: 3.5 MMOL/L (ref 3.5–5.1)
PROT SERPL-MCNC: 4.9 G/DL (ref 6–8.4)
PROT UR QL STRIP: ABNORMAL
RBC # BLD AUTO: 1.71 M/UL (ref 4–5.4)
RBC #/AREA URNS HPF: 1 /HPF (ref 0–4)
RETICS/RBC NFR AUTO: 6.4 % (ref 0.5–2.5)
SATURATED IRON: 6 % (ref 20–50)
SODIUM SERPL-SCNC: 136 MMOL/L (ref 136–145)
SP GR UR STRIP: 1.02 (ref 1–1.03)
SQUAMOUS #/AREA URNS HPF: 13 /HPF
TOTAL IRON BINDING CAPACITY: 252 UG/DL (ref 250–450)
TRANSFERRIN SERPL-MCNC: 180 MG/DL (ref 200–375)
TROPONIN I SERPL DL<=0.01 NG/ML-MCNC: <0.03 NG/ML
URN SPEC COLLECT METH UR: ABNORMAL
UROBILINOGEN UR STRIP-ACNC: ABNORMAL EU/DL
VIT B12 SERPL-MCNC: 159 PG/ML (ref 210–950)
WBC # BLD AUTO: 5.57 K/UL (ref 3.9–12.7)
WBC #/AREA URNS HPF: 3 /HPF (ref 0–5)

## 2020-01-13 PROCEDURE — 82550 ASSAY OF CK (CPK): CPT

## 2020-01-13 PROCEDURE — 85025 COMPLETE CBC W/AUTO DIFF WBC: CPT

## 2020-01-13 PROCEDURE — G0378 HOSPITAL OBSERVATION PER HR: HCPCS

## 2020-01-13 PROCEDURE — 86901 BLOOD TYPING SEROLOGIC RH(D): CPT

## 2020-01-13 PROCEDURE — 93005 ELECTROCARDIOGRAM TRACING: CPT

## 2020-01-13 PROCEDURE — 86920 COMPATIBILITY TEST SPIN: CPT

## 2020-01-13 PROCEDURE — 85045 AUTOMATED RETICULOCYTE COUNT: CPT

## 2020-01-13 PROCEDURE — P9016 RBC LEUKOCYTES REDUCED: HCPCS

## 2020-01-13 PROCEDURE — 96374 THER/PROPH/DIAG INJ IV PUSH: CPT

## 2020-01-13 PROCEDURE — 81001 URINALYSIS AUTO W/SCOPE: CPT

## 2020-01-13 PROCEDURE — 51702 INSERT TEMP BLADDER CATH: CPT

## 2020-01-13 PROCEDURE — 36430 TRANSFUSION BLD/BLD COMPNT: CPT

## 2020-01-13 PROCEDURE — 83735 ASSAY OF MAGNESIUM: CPT

## 2020-01-13 PROCEDURE — 82272 OCCULT BLD FECES 1-3 TESTS: CPT

## 2020-01-13 PROCEDURE — 84484 ASSAY OF TROPONIN QUANT: CPT

## 2020-01-13 PROCEDURE — 63600175 PHARM REV CODE 636 W HCPCS: Performed by: INTERNAL MEDICINE

## 2020-01-13 PROCEDURE — 83540 ASSAY OF IRON: CPT

## 2020-01-13 PROCEDURE — C9113 INJ PANTOPRAZOLE SODIUM, VIA: HCPCS | Performed by: INTERNAL MEDICINE

## 2020-01-13 PROCEDURE — 99285 EMERGENCY DEPT VISIT HI MDM: CPT | Mod: 25

## 2020-01-13 PROCEDURE — 82607 VITAMIN B-12: CPT

## 2020-01-13 PROCEDURE — 80053 COMPREHEN METABOLIC PANEL: CPT

## 2020-01-13 PROCEDURE — 82553 CREATINE MB FRACTION: CPT

## 2020-01-13 PROCEDURE — 85730 THROMBOPLASTIN TIME PARTIAL: CPT

## 2020-01-13 PROCEDURE — 82746 ASSAY OF FOLIC ACID SERUM: CPT

## 2020-01-13 RX ORDER — POTASSIUM CHLORIDE 7.45 MG/ML
20 INJECTION INTRAVENOUS
Status: DISCONTINUED | OUTPATIENT
Start: 2020-01-13 | End: 2020-01-16 | Stop reason: HOSPADM

## 2020-01-13 RX ORDER — MAGNESIUM SULFATE HEPTAHYDRATE 40 MG/ML
4 INJECTION, SOLUTION INTRAVENOUS
Status: DISCONTINUED | OUTPATIENT
Start: 2020-01-13 | End: 2020-01-16 | Stop reason: HOSPADM

## 2020-01-13 RX ORDER — POTASSIUM CHLORIDE 7.45 MG/ML
40 INJECTION INTRAVENOUS
Status: DISCONTINUED | OUTPATIENT
Start: 2020-01-13 | End: 2020-01-16 | Stop reason: HOSPADM

## 2020-01-13 RX ORDER — CALCIUM CHLORIDE IN 0.9 % NACL 1 G/100 ML
1 INTRAVENOUS SOLUTION, PIGGYBACK (ML) INTRAVENOUS
Status: DISCONTINUED | OUTPATIENT
Start: 2020-01-13 | End: 2020-01-16 | Stop reason: HOSPADM

## 2020-01-13 RX ORDER — SODIUM CHLORIDE 0.9 % (FLUSH) 0.9 %
10 SYRINGE (ML) INJECTION
Status: DISCONTINUED | OUTPATIENT
Start: 2020-01-13 | End: 2020-01-16 | Stop reason: HOSPADM

## 2020-01-13 RX ORDER — POTASSIUM CHLORIDE 20 MEQ/1
40 TABLET, EXTENDED RELEASE ORAL
Status: DISCONTINUED | OUTPATIENT
Start: 2020-01-13 | End: 2020-01-16 | Stop reason: HOSPADM

## 2020-01-13 RX ORDER — MAGNESIUM SULFATE HEPTAHYDRATE 40 MG/ML
2 INJECTION, SOLUTION INTRAVENOUS
Status: DISCONTINUED | OUTPATIENT
Start: 2020-01-13 | End: 2020-01-16 | Stop reason: HOSPADM

## 2020-01-13 RX ORDER — LANOLIN ALCOHOL/MO/W.PET/CERES
800 CREAM (GRAM) TOPICAL
Status: DISCONTINUED | OUTPATIENT
Start: 2020-01-13 | End: 2020-01-16 | Stop reason: HOSPADM

## 2020-01-13 RX ORDER — METOPROLOL TARTRATE 25 MG/1
25 TABLET, FILM COATED ORAL 2 TIMES DAILY
Status: DISCONTINUED | OUTPATIENT
Start: 2020-01-13 | End: 2020-01-16 | Stop reason: HOSPADM

## 2020-01-13 RX ORDER — FUROSEMIDE 10 MG/ML
20 INJECTION INTRAMUSCULAR; INTRAVENOUS ONCE
Status: COMPLETED | OUTPATIENT
Start: 2020-01-13 | End: 2020-01-14

## 2020-01-13 RX ORDER — POLYETHYLENE GLYCOL 3350 17 G/17G
17 POWDER, FOR SOLUTION ORAL DAILY
COMMUNITY

## 2020-01-13 RX ORDER — POTASSIUM CHLORIDE 20 MEQ/1
20 TABLET, EXTENDED RELEASE ORAL
Status: DISCONTINUED | OUTPATIENT
Start: 2020-01-13 | End: 2020-01-16 | Stop reason: HOSPADM

## 2020-01-13 RX ORDER — HYDROCODONE BITARTRATE AND ACETAMINOPHEN 500; 5 MG/1; MG/1
TABLET ORAL
Status: DISCONTINUED | OUTPATIENT
Start: 2020-01-13 | End: 2020-01-16 | Stop reason: HOSPADM

## 2020-01-13 RX ORDER — MAGNESIUM SULFATE 1 G/100ML
1 INJECTION INTRAVENOUS
Status: DISCONTINUED | OUTPATIENT
Start: 2020-01-13 | End: 2020-01-16 | Stop reason: HOSPADM

## 2020-01-13 RX ORDER — LANOLIN ALCOHOL/MO/W.PET/CERES
1000 CREAM (GRAM) TOPICAL DAILY
Status: DISCONTINUED | OUTPATIENT
Start: 2020-01-14 | End: 2020-01-14

## 2020-01-13 RX ORDER — ONDANSETRON 2 MG/ML
4 INJECTION INTRAMUSCULAR; INTRAVENOUS EVERY 8 HOURS PRN
Status: DISCONTINUED | OUTPATIENT
Start: 2020-01-13 | End: 2020-01-16 | Stop reason: HOSPADM

## 2020-01-13 RX ORDER — QUETIAPINE FUMARATE 25 MG/1
25 TABLET, FILM COATED ORAL 2 TIMES DAILY
Status: DISCONTINUED | OUTPATIENT
Start: 2020-01-13 | End: 2020-01-16 | Stop reason: HOSPADM

## 2020-01-13 RX ORDER — ATORVASTATIN CALCIUM 40 MG/1
40 TABLET, FILM COATED ORAL NIGHTLY
Status: DISCONTINUED | OUTPATIENT
Start: 2020-01-13 | End: 2020-01-16 | Stop reason: HOSPADM

## 2020-01-13 RX ORDER — BISACODYL 10 MG
10 SUPPOSITORY, RECTAL RECTAL DAILY PRN
Status: DISCONTINUED | OUTPATIENT
Start: 2020-01-13 | End: 2020-01-16 | Stop reason: HOSPADM

## 2020-01-13 RX ORDER — ACETAMINOPHEN 325 MG/1
650 TABLET ORAL EVERY 4 HOURS PRN
Status: DISCONTINUED | OUTPATIENT
Start: 2020-01-13 | End: 2020-01-16 | Stop reason: HOSPADM

## 2020-01-13 RX ORDER — PANTOPRAZOLE SODIUM 40 MG/10ML
40 INJECTION, POWDER, LYOPHILIZED, FOR SOLUTION INTRAVENOUS DAILY
Status: DISCONTINUED | OUTPATIENT
Start: 2020-01-14 | End: 2020-01-16 | Stop reason: HOSPADM

## 2020-01-13 RX ORDER — TRAMADOL HYDROCHLORIDE 50 MG/1
25 TABLET ORAL EVERY 8 HOURS PRN
COMMUNITY

## 2020-01-13 RX ORDER — PANTOPRAZOLE SODIUM 40 MG/10ML
80 INJECTION, POWDER, LYOPHILIZED, FOR SOLUTION INTRAVENOUS
Status: COMPLETED | OUTPATIENT
Start: 2020-01-13 | End: 2020-01-13

## 2020-01-13 RX ORDER — ACETAMINOPHEN 325 MG/1
650 TABLET ORAL EVERY 4 HOURS PRN
COMMUNITY

## 2020-01-13 RX ORDER — ACETAMINOPHEN 325 MG/1
650 TABLET ORAL EVERY 8 HOURS PRN
Status: DISCONTINUED | OUTPATIENT
Start: 2020-01-13 | End: 2020-01-13

## 2020-01-13 RX ORDER — POLYETHYLENE GLYCOL 3350 17 G/17G
17 POWDER, FOR SOLUTION ORAL DAILY
Status: DISCONTINUED | OUTPATIENT
Start: 2020-01-14 | End: 2020-01-16 | Stop reason: HOSPADM

## 2020-01-13 RX ORDER — AMLODIPINE BESYLATE 5 MG/1
5 TABLET ORAL DAILY
Status: DISCONTINUED | OUTPATIENT
Start: 2020-01-13 | End: 2020-01-16 | Stop reason: HOSPADM

## 2020-01-13 RX ORDER — IBUPROFEN 200 MG
24 TABLET ORAL
Status: DISCONTINUED | OUTPATIENT
Start: 2020-01-13 | End: 2020-01-16 | Stop reason: HOSPADM

## 2020-01-13 RX ORDER — IBUPROFEN 200 MG
16 TABLET ORAL
Status: DISCONTINUED | OUTPATIENT
Start: 2020-01-13 | End: 2020-01-16 | Stop reason: HOSPADM

## 2020-01-13 RX ADMIN — PANTOPRAZOLE SODIUM 80 MG: 40 INJECTION, POWDER, FOR SOLUTION INTRAVENOUS at 08:01

## 2020-01-13 NOTE — H&P
[facility] Medicine History & Physical Examination   Patient Name: Ellen Maravilla  MRN: [id number]  Patient Class: OP- Observation   Admission Date: 1/13/2020  2:46 PM  Length of Stay: 0  Attending Physician: Robby Valdez MD  Primary Care Provider: Tien Mckeon MD  Face-to-Face encounter date: 01/13/2020  Code Status: DNR per son  Chief Complaint: Weakness        Patient information was obtained from past medical records and ER records.     HISTORY OF PRESENT ILLNESS:   Ellen Maravilla is a 87 y.o. white female who  has a past medical history of Coronary artery disease, Dementia, Hypertension, Pulmonary embolism, Stroke, Thyroid disease, and Uterine cancer., CVA, Recent diagnosis of PE on Eliquis since 12/2019. The patient presented to [facility] on 1/13/2020 with a primary complaint of Weakness    History was obtained from ER physician, skilled nursing home records as well as previous admission at our facility.  Patient per chart review has dementia, is a poor historian.  I tried to talk to the patient but she did not answered my questions.  Just repeated multiple times that she is weak.  Son was not in the room to obtain history from.  Patient did not let me do a detailed exam as she was cold and refused to be uncovered    In the ER, upon arrival her blood pressure was 130/62, respiration 22 pulse 84, temperature 98°, O2 sat 100% on room air and body weight of 86.2 kg  Workup in the ER showed H&H of 5.6/18.6, WBC 5.57, platelet 210.    Retic count 6.4, retic index calculated to be 1.1, in adequate bone marrow response to anemia  CMP shows potassium 3.5, BUN creatinine 23/< 0.3, calcium 7.7, alk-phos 102, albumin 2.4(L)  Magnesium 1.6(L)  CPK 44, CK-MB 1.6, troponin less than 0.03  FOBT positive in the ER  X-ray chest, personally reviewed showed no acute cardiopulmonary process    Will admit pt, ER already ordered 2 units PRBCs to transfusion    Dr Metzger, Myself  signed the Blood transfusion forms as pt's son was not in the room but per ER Physician note, he did spoke to the son regarding blood transfusion      PAST MEDICAL HISTORY:     Past Medical History:   Diagnosis Date    Coronary artery disease     Dementia     Hypertension     Pulmonary embolism     Stroke     history of CVA    Thyroid disease     Uterine cancer        PAST SURGICAL HISTORY:     Past Surgical History:   Procedure Laterality Date    APPENDECTOMY      BACK SURGERY      HYSTERECTOMY      KNEE SURGERY      TONSILLECTOMY         ALLERGIES:   Patient has no known allergies.    FAMILY HISTORY:     Family History   Problem Relation Age of Onset    No Known Problems Mother     Depression Father     Early death Father        SOCIAL HISTORY:     Social History     Tobacco Use    Smoking status: Never Smoker    Smokeless tobacco: Never Used   Substance Use Topics    Alcohol use: No     Comment: rarely        Social History     Substance and Sexual Activity   Sexual Activity Not on file        HOME MEDICATIONS:     Prior to Admission medications    Medication Sig Start Date End Date Taking? Authorizing Provider   acetaminophen (TYLENOL) 325 MG tablet Take 650 mg by mouth every 4 (four) hours as needed for Pain.   Yes Historical Provider, MD   amLODIPine (NORVASC) 5 MG tablet Take 1 tablet (5 mg total) by mouth once daily. 11/30/19 1/13/20 Yes GLORIA Dixon   apixaban (ELIQUIS) 5 mg Tab Take 1 tablet (5 mg total) by mouth 2 (two) times daily. 10mg (2 tabs) po bid for 6 days and then 5mg (1tab) po bid thereafter 12/18/19  Yes Nadya Deluna MD   aspirin (ECOTRIN) 81 MG EC tablet Take 1 tablet (81 mg total) by mouth once daily. 11/30/19 1/13/20 Yes GLORIA Dixon   atorvastatin (LIPITOR) 40 MG tablet Take 1 tablet (40 mg total) by mouth once daily. 11/30/19 1/13/20 Yes GLORIA Dixon   bisacodyl (DULCOLAX) 10 mg Supp Place 1 suppository (10 mg total) rectally daily as  "needed. 11/29/19  Yes GLORIA Dixon   calcium-vitamin D3 (OS-LILY 500 + D3) 500 mg(1,250mg) -200 unit per tablet Take 1 tablet by mouth once daily. 11/30/19 1/13/20 Yes GLORIA Dixon   cyanocobalamin (VITAMIN B-12) 1000 MCG tablet Take 1 tablet (1,000 mcg total) by mouth once daily. 12/18/19  Yes Nadya Deluna MD   epoetin ronald (PROCRIT INJ) Inject 4,325 Units as directed every 7 days.   Yes Historical Provider, MD   metoprolol tartrate (LOPRESSOR) 25 MG tablet Take 1 tablet (25 mg total) by mouth 2 (two) times daily. 11/29/19 1/13/20 Yes GLORIA Dixon   polyethylene glycol (GLYCOLAX) 17 gram/dose powder Take 17 g by mouth once daily.   Yes Historical Provider, MD   QUEtiapine (SEROQUEL) 25 MG Tab Take 25 mg by mouth 2 (two) times daily.   Yes Historical Provider, MD   traMADol (ULTRAM) 50 mg tablet Take 25 mg by mouth every 8 (eight) hours as needed for Pain.   Yes Historical Provider, MD       REVIEW OF SYSTEMS:   Unable to Review as no family at bedside and pt only responds with weakness     PHYSICAL EXAM:   BP (!) 122/59   Pulse 84   Temp 98 °F (36.7 °C) (Oral)   Resp 16   Ht 5' 6" (1.676 m)   Wt 86.2 kg (190 lb)   LMP  (LMP Unknown)   SpO2 97%   BMI 30.67 kg/m²      Vitals Reviewed  General appearance: Elderly, flail female in no apparent distress.  HENT: Atraumatic head. Moist mucous membranes of oral cavity.  Eyes: Normal extraocular movements.   Neck: Supple. No evidence of lymphadenopathy.   Lungs: Clear to auscultation bilaterally anteriorly. No wheezing present.   Heart: Regular rate and rhythm. S1 and S2 present with no murmurs/gallop/rub. 3+ bilat LE Edema, with lymphedema.  Abdomen: Soft, non-distended, non-tender. No rebound tenderness/guarding. No masses or organomegaly. Bowel sounds are normal. Tsai in place with dark yellow/ brownish urine  Extremities: 3+ B.L LE edema  Skin: Bruising noted on ant chest (on blood thinner)  Neuro:  Unable to assess "   Psych/mental status: Alert.  Rest unable to assess    EMERGENCY DEPARTMENT LABS AND IMAGING:     Labs Reviewed   APTT - Abnormal; Notable for the following components:       Result Value    aPTT 39.5 (*)     All other components within normal limits   CBC W/ AUTO DIFFERENTIAL - Abnormal; Notable for the following components:    RBC 1.71 (*)     Hemoglobin 5.6 (*)     Hematocrit 18.6 (*)     Mean Corpuscular Volume 109 (*)     Mean Corpuscular Hemoglobin 32.7 (*)     Mean Corpuscular Hemoglobin Conc 30.1 (*)     RDW 16.1 (*)     MPV 9.1 (*)     Gran% 73.7 (*)     All other components within normal limits    Narrative:       H&H critical result(s) called and verbal readback obtained from   Irvin Head RN ER.  by CW1 01/13/2020 16:24   COMPREHENSIVE METABOLIC PANEL - Abnormal; Notable for the following components:    Glucose 130 (*)     Creatinine <0.3 (*)     Calcium 7.7 (*)     Total Protein 4.9 (*)     Albumin 2.4 (*)     All other components within normal limits   RETICULOCYTES - Abnormal; Notable for the following components:    Retic 6.4 (*)     All other components within normal limits   OCCULT BLOOD X 1, STOOL - Abnormal; Notable for the following components:    Occult Blood Positive (*)     All other components within normal limits   TROPONIN I   CK-MB   CK   MAGNESIUM   URINALYSIS, REFLEX TO URINE CULTURE   IRON AND TIBC   VITAMIN B12   FOLATE   TYPE & SCREEN   PREPARE RBC SOFT       X-Ray Chest AP Portable   Final Result      No acute pulmonary process         Electronically signed by: Ramya Chance MD   Date:    01/13/2020   Time:    15:28          ASSESSMENT & PLAN:   Ellen Maravilla is a 87 y.o. female admitted for    Symptomatic anemia, GI bleed with positive FOBT  H&H 5.6/18.6 with , on chart review noted patient last H&H on 12/18/2019 was 8.3/25.9  Reticulocyte count 6.4, retic index calculated to be 1.1 which is in adequate bone marrow response to anemia  Ordered iron profile, vitamin  B levels, folate levels before blood transfusion  Transfuse 2 units of packed red blood cells  I will order Lasix 20 mg in between the 2 transfusions as patient has 3+ lower extremity edema possibly due to 3rd spacing due to hypoalbuminemia  Repeat H&H 4 hr after completion of transfusion  NPO  IV Protonix 40 mg b.i.d.  Consult Gastroenterology, Dr Dee.  I will keep the patient NPO in case he wants to do EGD in the morning  Hold Eliquis    History of PE diagnosed in December 2019, on Eliquis since then  Hold Eliquis  I did not personally spoke to the son as he was not in the room but ER note reports son expressed concerns about Eliquis as it is causing extreme bruising to the patient    DNR status  As per patient chart    DVT Prophylaxis: will be placed on SCD/compression device for DVT prophylaxis and will be advised to be as mobile as possible and sit in a chair as tolerated.   ________________________________________________________________________________    Discharge Planning and Disposition: No mobility needs. Ambulating well. Good social support system. Patient will be discharged in   Face-to-Face encounter date: 01/13/2020  Encounter included review of the medical records, interviewing and examining the patient face-to-face, discussion with family and other health care providers including emergency medicine physician, admission orders, interpreting lab/test results and formulating a plan of care.   Medical Decision Making during this encounter was  [_] Low Complexity  [x] Moderate Complexity  [  ] High Complexity  _________________________________________________________________________________    INPATIENT LIST OF MEDICATIONS     Current Facility-Administered Medications:     0.9%  NaCl infusion (for blood administration), , Intravenous, Q24H PRN, Robby Valdez MD    magnesium oxide tablet 800 mg, 800 mg, Oral, PRN, Robby Valdez MD    [START ON 1/14/2020] pantoprazole injection 40 mg, 40 mg, Intravenous,  Daily, Robby Valdez MD    pantoprazole injection 80 mg, 80 mg, Intravenous, ED 1 Time, Robby Valdez MD    QUEtiapine tablet 25 mg, 25 mg, Oral, BID, Robby Valdez MD    tramadol split tablet 25 mg, 25 mg, Oral, Q8H PRN, Robby Valdez MD    Current Outpatient Medications:     acetaminophen (TYLENOL) 325 MG tablet, Take 650 mg by mouth every 4 (four) hours as needed for Pain., Disp: , Rfl:     amLODIPine (NORVASC) 5 MG tablet, Take 1 tablet (5 mg total) by mouth once daily., Disp: 30 tablet, Rfl: 0    apixaban (ELIQUIS) 5 mg Tab, Take 1 tablet (5 mg total) by mouth 2 (two) times daily. 10mg (2 tabs) po bid for 6 days and then 5mg (1tab) po bid thereafter, Disp: , Rfl:     aspirin (ECOTRIN) 81 MG EC tablet, Take 1 tablet (81 mg total) by mouth once daily., Disp: 30 tablet, Rfl: 0    atorvastatin (LIPITOR) 40 MG tablet, Take 1 tablet (40 mg total) by mouth once daily., Disp: 30 tablet, Rfl: 0    bisacodyl (DULCOLAX) 10 mg Supp, Place 1 suppository (10 mg total) rectally daily as needed., Disp: , Rfl: 0    calcium-vitamin D3 (OS-LILY 500 + D3) 500 mg(1,250mg) -200 unit per tablet, Take 1 tablet by mouth once daily., Disp: 30 tablet, Rfl: 0    cyanocobalamin (VITAMIN B-12) 1000 MCG tablet, Take 1 tablet (1,000 mcg total) by mouth once daily., Disp: , Rfl:     epoetin ronald (PROCRIT INJ), Inject 4,325 Units as directed every 7 days., Disp: , Rfl:     metoprolol tartrate (LOPRESSOR) 25 MG tablet, Take 1 tablet (25 mg total) by mouth 2 (two) times daily., Disp: 60 tablet, Rfl: 0    polyethylene glycol (GLYCOLAX) 17 gram/dose powder, Take 17 g by mouth once daily., Disp: , Rfl:     QUEtiapine (SEROQUEL) 25 MG Tab, Take 25 mg by mouth 2 (two) times daily., Disp: , Rfl:     traMADol (ULTRAM) 50 mg tablet, Take 25 mg by mouth every 8 (eight) hours as needed for Pain., Disp: , Rfl:       Scheduled Meds:   [START ON 1/14/2020] pantoprazole  40 mg Intravenous Daily    pantoprazole  80 mg Intravenous ED 1 Time     QUEtiapine  25 mg Oral BID     Continuous Infusions:  PRN Meds:.sodium chloride, magnesium oxide, traMADol      Robby José Miguel  [facility] Hospitalist  01/13/2020

## 2020-01-13 NOTE — ED PROVIDER NOTES
Encounter Date: 1/13/2020       History     Chief Complaint   Patient presents with    Weakness     Patient was a complicated recent past medical history.  Patient have ischemic CVA several months ago.  She presented to the emergency room with abdominal pain with nausea vomiting. She was diagnosed with pulmonary embolism on imaging.  She started Lovenox.  Patient reportedly with low hemoglobin on outpatient labs.  Patient does have a history dementia and current resident of nursing home.  Patient is a do not resuscitate.  There is no fever or chills. No reported GI bleeding.  Patient is demented unable to give history.  No family members at bedside for further history.        Review of patient's allergies indicates:  No Known Allergies  Past Medical History:   Diagnosis Date    Coronary artery disease     Dementia     Hypertension     Thyroid disease     Uterine cancer      Past Surgical History:   Procedure Laterality Date    APPENDECTOMY      BACK SURGERY      HYSTERECTOMY      KNEE SURGERY      TONSILLECTOMY       Family History   Problem Relation Age of Onset    No Known Problems Mother     Depression Father     Early death Father      Social History     Tobacco Use    Smoking status: Never Smoker    Smokeless tobacco: Never Used   Substance Use Topics    Alcohol use: No     Comment: rarely    Drug use: No     Review of Systems   Unable to perform ROS: Dementia       Physical Exam     Initial Vitals [01/13/20 1505]   BP Pulse Resp Temp SpO2   130/62 84 (!) 22 98 °F (36.7 °C) 100 %      MAP       --         Physical Exam    Nursing note and vitals reviewed.  Constitutional: She is not diaphoretic. No distress.   HENT:   Head: Normocephalic and atraumatic.   Eyes: Conjunctivae are normal.   Neck: Normal range of motion.   Cardiovascular: Normal rate.   Pulmonary/Chest: Breath sounds normal.   Bilateral breast and anterior chest wall with scattered ecchymoses and tenderness.   Abdominal: Soft.  There is no tenderness.   Soft dark brown stool in rectal vault   Musculoskeletal: Normal range of motion.   Neurological:   Patient sensation intact. Cranial nerves 2-12 grossly intact. Patient has 4/5 symmetric strength all extremities.   Skin: No rash noted.   Psychiatric:   Patient is not answer any questions with any useful answers.  Patient is slow to respond to questioning.         ED Course   Procedures  Labs Reviewed   APTT   CBC W/ AUTO DIFFERENTIAL   COMPREHENSIVE METABOLIC PANEL   TROPONIN I   CK-MB   CK   MAGNESIUM   URINALYSIS, REFLEX TO URINE CULTURE   RETICULOCYTES   OCCULT BLOOD X 1, STOOL   TYPE & SCREEN          Imaging Results          X-Ray Chest AP Portable (In process)                  Medical Decision Making:   History:   Old Medical Records: I decided to obtain old medical records.  Clinical Tests:   Lab Tests: Reviewed  Radiological Study: Reviewed  Medical Tests: Reviewed  ED Management:  Patient presents with anemia.  Likely occult GI bleed as etiology especially with anticoagulation.  Will give Protonix.  Son is now at bedside.  He reports patient is been having problems with a lot of bruising with anticoagulation.  He thinks his mother read better off without anticoagulation.  I discussed with him risk and benefits of blood transfusion.  Son agrees to consent for blood transfusion.  Hospitalist consult for admission.                                 Clinical Impression:       ICD-10-CM ICD-9-CM   1. Anemia, unspecified type D64.9 285.9   2. Weakness R53.1 780.79   3. Gastrointestinal hemorrhage, unspecified gastrointestinal hemorrhage type K92.2 578.9                             Tobias Metzger MD  01/13/20 2521

## 2020-01-14 PROBLEM — E87.6 HYPOKALEMIA: Status: ACTIVE | Noted: 2020-01-14

## 2020-01-14 LAB
ALBUMIN SERPL BCP-MCNC: 2.1 G/DL (ref 3.5–5.2)
ALP SERPL-CCNC: 81 U/L (ref 55–135)
ALT SERPL W/O P-5'-P-CCNC: 12 U/L (ref 10–44)
ANION GAP SERPL CALC-SCNC: 5 MMOL/L (ref 8–16)
AST SERPL-CCNC: 17 U/L (ref 10–40)
BASOPHILS # BLD AUTO: 0.03 K/UL (ref 0–0.2)
BASOPHILS NFR BLD: 0.6 % (ref 0–1.9)
BILIRUB SERPL-MCNC: 1.9 MG/DL (ref 0.1–1)
BLD PROD TYP BPU: NORMAL
BLOOD UNIT EXPIRATION DATE: NORMAL
BLOOD UNIT TYPE CODE: 600
BLOOD UNIT TYPE CODE: 600
BLOOD UNIT TYPE CODE: 9500
BLOOD UNIT TYPE CODE: 9500
BLOOD UNIT TYPE: NORMAL
BUN SERPL-MCNC: 22 MG/DL (ref 8–23)
C DIFF GDH STL QL: NEGATIVE
C DIFF TOX A+B STL QL IA: NEGATIVE
CALCIUM SERPL-MCNC: 7.5 MG/DL (ref 8.7–10.5)
CHLORIDE SERPL-SCNC: 106 MMOL/L (ref 95–110)
CO2 SERPL-SCNC: 26 MMOL/L (ref 23–29)
CODING SYSTEM: NORMAL
CREAT SERPL-MCNC: 0.3 MG/DL (ref 0.5–1.4)
DIFFERENTIAL METHOD: ABNORMAL
DISPENSE STATUS: NORMAL
EOSINOPHIL # BLD AUTO: 0 K/UL (ref 0–0.5)
EOSINOPHIL NFR BLD: 0.8 % (ref 0–8)
ERYTHROCYTE [DISTWIDTH] IN BLOOD BY AUTOMATED COUNT: 19.3 % (ref 11.5–14.5)
EST. GFR  (AFRICAN AMERICAN): >60 ML/MIN/1.73 M^2
EST. GFR  (NON AFRICAN AMERICAN): >60 ML/MIN/1.73 M^2
GLUCOSE SERPL-MCNC: 110 MG/DL (ref 70–110)
HCT VFR BLD AUTO: 21 % (ref 37–48.5)
HGB BLD-MCNC: 6.7 G/DL (ref 12–16)
IMM GRANULOCYTES # BLD AUTO: 0.02 K/UL (ref 0–0.04)
IMM GRANULOCYTES NFR BLD AUTO: 0.4 % (ref 0–0.5)
LYMPHOCYTES # BLD AUTO: 1.2 K/UL (ref 1–4.8)
LYMPHOCYTES NFR BLD: 25.8 % (ref 18–48)
MAGNESIUM SERPL-MCNC: 1.9 MG/DL (ref 1.6–2.6)
MCH RBC QN AUTO: 31.3 PG (ref 27–31)
MCHC RBC AUTO-ENTMCNC: 31.9 G/DL (ref 32–36)
MCV RBC AUTO: 98 FL (ref 82–98)
MONOCYTES # BLD AUTO: 0.5 K/UL (ref 0.3–1)
MONOCYTES NFR BLD: 11.3 % (ref 4–15)
MRSA SCREEN BY PCR: NEGATIVE
NEUTROPHILS # BLD AUTO: 2.9 K/UL (ref 1.8–7.7)
NEUTROPHILS NFR BLD: 61.1 % (ref 38–73)
NRBC BLD-RTO: 0 /100 WBC
NUM UNITS TRANS PACKED RBC: NORMAL
PHOSPHATE SERPL-MCNC: 3.1 MG/DL (ref 2.7–4.5)
PLATELET # BLD AUTO: 186 K/UL (ref 150–350)
PMV BLD AUTO: 9.2 FL (ref 9.2–12.9)
POTASSIUM SERPL-SCNC: 3.1 MMOL/L (ref 3.5–5.1)
PROT SERPL-MCNC: 4.5 G/DL (ref 6–8.4)
RBC # BLD AUTO: 2.14 M/UL (ref 4–5.4)
SODIUM SERPL-SCNC: 137 MMOL/L (ref 136–145)
WBC # BLD AUTO: 4.77 K/UL (ref 3.9–12.7)

## 2020-01-14 PROCEDURE — P9011 BLOOD SPLIT UNIT: HCPCS

## 2020-01-14 PROCEDURE — P9040 RBC LEUKOREDUCED IRRADIATED: HCPCS

## 2020-01-14 PROCEDURE — P9016 RBC LEUKOCYTES REDUCED: HCPCS

## 2020-01-14 PROCEDURE — 87641 MR-STAPH DNA AMP PROBE: CPT

## 2020-01-14 PROCEDURE — 63600175 PHARM REV CODE 636 W HCPCS: Performed by: INTERNAL MEDICINE

## 2020-01-14 PROCEDURE — 85025 COMPLETE CBC W/AUTO DIFF WBC: CPT

## 2020-01-14 PROCEDURE — C9113 INJ PANTOPRAZOLE SODIUM, VIA: HCPCS | Performed by: INTERNAL MEDICINE

## 2020-01-14 PROCEDURE — 25000003 PHARM REV CODE 250: Performed by: INTERNAL MEDICINE

## 2020-01-14 PROCEDURE — 36430 TRANSFUSION BLD/BLD COMPNT: CPT

## 2020-01-14 PROCEDURE — 12000002 HC ACUTE/MED SURGE SEMI-PRIVATE ROOM

## 2020-01-14 PROCEDURE — 87324 CLOSTRIDIUM AG IA: CPT

## 2020-01-14 PROCEDURE — 87449 NOS EACH ORGANISM AG IA: CPT

## 2020-01-14 PROCEDURE — 83735 ASSAY OF MAGNESIUM: CPT

## 2020-01-14 PROCEDURE — 80053 COMPREHEN METABOLIC PANEL: CPT

## 2020-01-14 PROCEDURE — 94761 N-INVAS EAR/PLS OXIMETRY MLT: CPT

## 2020-01-14 PROCEDURE — 36415 COLL VENOUS BLD VENIPUNCTURE: CPT

## 2020-01-14 PROCEDURE — 86985 SPLIT BLOOD OR PRODUCTS: CPT

## 2020-01-14 PROCEDURE — 84100 ASSAY OF PHOSPHORUS: CPT

## 2020-01-14 RX ORDER — FUROSEMIDE 10 MG/ML
20 INJECTION INTRAMUSCULAR; INTRAVENOUS ONCE
Status: COMPLETED | OUTPATIENT
Start: 2020-01-14 | End: 2020-01-14

## 2020-01-14 RX ORDER — HYDROCODONE BITARTRATE AND ACETAMINOPHEN 500; 5 MG/1; MG/1
TABLET ORAL
Status: DISCONTINUED | OUTPATIENT
Start: 2020-01-14 | End: 2020-01-16 | Stop reason: HOSPADM

## 2020-01-14 RX ORDER — CYANOCOBALAMIN 1000 UG/ML
1000 INJECTION, SOLUTION INTRAMUSCULAR; SUBCUTANEOUS
Status: COMPLETED | OUTPATIENT
Start: 2020-01-15 | End: 2020-01-15

## 2020-01-14 RX ADMIN — QUETIAPINE 25 MG: 25 TABLET ORAL at 10:01

## 2020-01-14 RX ADMIN — CYANOCOBALAMIN TAB 1000 MCG 1000 MCG: 1000 TAB at 10:01

## 2020-01-14 RX ADMIN — AMLODIPINE BESYLATE 5 MG: 5 TABLET ORAL at 10:01

## 2020-01-14 RX ADMIN — FUROSEMIDE 20 MG: 10 INJECTION, SOLUTION INTRAMUSCULAR; INTRAVENOUS at 10:01

## 2020-01-14 RX ADMIN — METOPROLOL TARTRATE 25 MG: 25 TABLET ORAL at 10:01

## 2020-01-14 RX ADMIN — PANTOPRAZOLE SODIUM 40 MG: 40 INJECTION, POWDER, FOR SOLUTION INTRAVENOUS at 10:01

## 2020-01-14 RX ADMIN — QUETIAPINE 25 MG: 25 TABLET ORAL at 12:01

## 2020-01-14 RX ADMIN — MAGNESIUM SULFATE 2 G: 2 INJECTION INTRAVENOUS at 06:01

## 2020-01-14 RX ADMIN — ATORVASTATIN CALCIUM 40 MG: 40 TABLET, FILM COATED ORAL at 12:01

## 2020-01-14 RX ADMIN — METOPROLOL TARTRATE 25 MG: 25 TABLET ORAL at 12:01

## 2020-01-14 RX ADMIN — ATORVASTATIN CALCIUM 40 MG: 40 TABLET, FILM COATED ORAL at 10:01

## 2020-01-14 RX ADMIN — FUROSEMIDE 20 MG: 10 INJECTION, SOLUTION INTRAMUSCULAR; INTRAVENOUS at 12:01

## 2020-01-14 NOTE — HPI
Ellen Maravilla is a 87 y.o. white female who  has a past medical history of Coronary artery disease, Dementia, Hypertension, Pulmonary embolism, Stroke, Thyroid disease, and Uterine cancer., CVA, Recent diagnosis of PE on Eliquis since 12/2019. The patient presented to [facility] on 1/13/2020 with a primary complaint of Weakness     History was obtained from ER physician, skilled nursing home records as well as previous admission at our facility.  Patient per chart review has dementia, is a poor historian.  I tried to talk to the patient but she did not answered my questions.  Just repeated multiple times that she is weak.  Son was not in the room to obtain history from.  Patient did not let me do a detailed exam as she was cold and refused to be uncovered     In the ER, upon arrival her blood pressure was 130/62, respiration 22 pulse 84, temperature 98°, O2 sat 100% on room air and body weight of 86.2 kg  Workup in the ER showed H&H of 5.6/18.6, WBC 5.57, platelet 210.    Retic count 6.4, retic index calculated to be 1.1, in adequate bone marrow response to anemia  CMP shows potassium 3.5, BUN creatinine 23/< 0.3, calcium 7.7, alk-phos 102, albumin 2.4(L)  Magnesium 1.6(L)  CPK 44, CK-MB 1.6, troponin less than 0.03  FOBT positive in the ER  X-ray chest, personally reviewed showed no acute cardiopulmonary process     Will admit pt, ER already ordered 2 units PRBCs to transfusion     Dr Metzger, Myself signed the Blood transfusion forms as pt's son was not in the room but per ER Physician note, he did spoke to the son regarding blood transfusion

## 2020-01-14 NOTE — CONSULTS
Chief Complaint:  anemia    HPI:    87 year old fmelae with history of CAD, PE, CVA on eliquis 12/2019 here for evaluation of weakness and found to have progressive severe macrocytic anemia with iron deficiency.  Prior b12 deficiency in 2012 (see below).  No overt bleeding per charts. Pt has dementia and is a poor historian.  Received 2 units prbc this admission.  Hgb 6.7.       FOBT positive in the ER    CHART REVIEW  2015 EGD  Findings:       The examined esophagus was normal.       The entire examined stomach was normal.       The cardia and gastric fundus were normal on retroflexion.       A small hiatus hernia was present.       The examined duodenum was normal.     2012 Colon   Impression:          - Non-thrombosed external hemorrhoids.                       - The examined portion of the ileum was normal. This                        was biopsied.                       - Diverticulosis in the sigmoid colon and in the                        descending colon.                       - External and internal hemorrhoids.  Recommendation:      - Use fiber, for example Citrucel, Fibercon, Konsyl                        or Metamucil.                       - Repeat colonoscopy for surveillance based on                        pathology results.    Review of Systems:   Complete ROS performed and negative unless stated above in HPI    Past Medical History:   Diagnosis Date    Coronary artery disease     Dementia     Hypertension     Pulmonary embolism     Stroke     history of CVA    Thyroid disease     Uterine cancer        Past Surgical History:   Procedure Laterality Date    APPENDECTOMY      BACK SURGERY      HYSTERECTOMY      KNEE SURGERY      TONSILLECTOMY         Family History   Problem Relation Age of Onset    No Known Problems Mother     Depression Father     Early death Father        Social History     Socioeconomic History    Marital status:      Spouse name: Not on file    Number of  children: Not on file    Years of education: Not on file    Highest education level: Not on file   Occupational History    Not on file   Social Needs    Financial resource strain: Not on file    Food insecurity:     Worry: Not on file     Inability: Not on file    Transportation needs:     Medical: Not on file     Non-medical: Not on file   Tobacco Use    Smoking status: Never Smoker    Smokeless tobacco: Never Used   Substance and Sexual Activity    Alcohol use: No     Comment: rarely    Drug use: No    Sexual activity: Not on file   Lifestyle    Physical activity:     Days per week: Not on file     Minutes per session: Not on file    Stress: Not on file   Relationships    Social connections:     Talks on phone: Not on file     Gets together: Not on file     Attends Gnosticism service: Not on file     Active member of club or organization: Not on file     Attends meetings of clubs or organizations: Not on file     Relationship status: Not on file   Other Topics Concern    Not on file   Social History Narrative    Not on file       Review of patient's allergies indicates:  No Known Allergies    No current facility-administered medications on file prior to encounter.      Current Outpatient Medications on File Prior to Encounter   Medication Sig Dispense Refill    acetaminophen (TYLENOL) 325 MG tablet Take 650 mg by mouth every 4 (four) hours as needed for Pain.      amLODIPine (NORVASC) 5 MG tablet Take 1 tablet (5 mg total) by mouth once daily. 30 tablet 0    apixaban (ELIQUIS) 5 mg Tab Take 1 tablet (5 mg total) by mouth 2 (two) times daily. 10mg (2 tabs) po bid for 6 days and then 5mg (1tab) po bid thereafter      aspirin (ECOTRIN) 81 MG EC tablet Take 1 tablet (81 mg total) by mouth once daily. 30 tablet 0    atorvastatin (LIPITOR) 40 MG tablet Take 1 tablet (40 mg total) by mouth once daily. 30 tablet 0    bisacodyl (DULCOLAX) 10 mg Supp Place 1 suppository (10 mg total) rectally daily as  "needed.  0    calcium-vitamin D3 (OS-LILY 500 + D3) 500 mg(1,250mg) -200 unit per tablet Take 1 tablet by mouth once daily. 30 tablet 0    cyanocobalamin (VITAMIN B-12) 1000 MCG tablet Take 1 tablet (1,000 mcg total) by mouth once daily.      epoetin ronald (PROCRIT INJ) Inject 4,325 Units as directed every 7 days.      metoprolol tartrate (LOPRESSOR) 25 MG tablet Take 1 tablet (25 mg total) by mouth 2 (two) times daily. 60 tablet 0    polyethylene glycol (GLYCOLAX) 17 gram/dose powder Take 17 g by mouth once daily.      QUEtiapine (SEROQUEL) 25 MG Tab Take 25 mg by mouth 2 (two) times daily.      traMADol (ULTRAM) 50 mg tablet Take 25 mg by mouth every 8 (eight) hours as needed for Pain.         Objective:  /67   Pulse 82   Temp 98.4 °F (36.9 °C)   Resp 18   Ht 5' 6" (1.676 m)   Wt 98.2 kg (216 lb 7.9 oz)   LMP  (LMP Unknown)   SpO2 96%   Breastfeeding? No   BMI 34.94 kg/m²   General: wd, wn, nad  HE: ncat, perrl, eomi  ENT: op pink and moist without lesions or exudates  CV: +s1s2, no mrg, rrr  Resp: ctapb, no wrr  GI: bs active, abd soft, nt, nd  Skin: no lesions, no rash  Neuro: cn 2-12 in tact, no focal deficits, no asterixis  Psych: regular rate speech, normal affect    Labs:  Results for JAMILA MCFARLANE (MRN [id number]) as of 1/14/2020 13:42   Ref. Range 12/15/2019 11:02 12/16/2019 05:08 12/17/2019 05:24 12/18/2019 05:21 1/1/2020 13:00 1/13/2020 15:54 1/13/2020 18:21 1/14/2020 10:20   Hemoglobin Latest Ref Range: 12.0 - 16.0 g/dL 9.3 (L) 7.8 (L) 7.9 (L) 8.3 (L) 8.7 (L) 5.6 (LL)  6.7 (LL)   Hematocrit Latest Ref Range: 37.0 - 48.5 % 28.4 (L) 24.2 (L) 24.3 (L) 25.9 (L) 27.8 (L) 18.6 (LL)  21.0 (L)     MCV 98  Iron 16,  Folate 5  B12 159   2012 B12 <60  Retic 6.4        Assessment:  87 year old female with history of pernicious anemia (b12 < 60 in 2012 with + intrinsic ab) now with acute drop over past 2-3 weeks without overt bleeding     Plan:  Push enteroscopy tomorrow with mac  Transfuse " another 1-2 units

## 2020-01-14 NOTE — PLAN OF CARE
01/14/20 1026   BLANCO Message   Medicare Outpatient and Observation Notification regarding financial responsibility Given to patient/caregiver;Signed/date by patient/caregiver;Explained to patient/caregiver   Date BLANCO was signed 01/14/20   Time BLANCO was signed 0830       Introduced self to patient and asked if ok to take a few min to discuss Medicare form, and ok with patient.  Explained that pt in observation status and Medicare wants to inform them of BLANCO form, pt verbalized an understanding to form, form signed and scanned into CM notes.

## 2020-01-14 NOTE — ASSESSMENT & PLAN NOTE
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 81F PMHx dementia, CKD (childhood renal atrophy R>L), neurogenic bladder requiring self cath (twice daily), htn here with sepsis due to uti. Hypercalcemia.

## 2020-01-14 NOTE — PROGRESS NOTES
[facility] Medicine  Progress Note    Patient Name: Ellen Maravilla  MRN: [id number]  Patient Class: OP- Observation   Admission Date: 1/13/2020  Length of Stay: 0 days  Attending Physician: Susi Chambers MD  Primary Care Provider: Tien Mckeon MD        Subjective:     Principal Problem:Symptomatic anemia        HPI:  Ellen Maravilla is a 87 y.o. white female who  has a past medical history of Coronary artery disease, Dementia, Hypertension, Pulmonary embolism, Stroke, Thyroid disease, and Uterine cancer., CVA, Recent diagnosis of PE on Eliquis since 12/2019. The patient presented to [facility] on 1/13/2020 with a primary complaint of Weakness     History was obtained from ER physician, skilled nursing home records as well as previous admission at our facility.  Patient per chart review has dementia, is a poor historian.  I tried to talk to the patient but she did not answered my questions.  Just repeated multiple times that she is weak.  Son was not in the room to obtain history from.  Patient did not let me do a detailed exam as she was cold and refused to be uncovered     In the ER, upon arrival her blood pressure was 130/62, respiration 22 pulse 84, temperature 98°, O2 sat 100% on room air and body weight of 86.2 kg  Workup in the ER showed H&H of 5.6/18.6, WBC 5.57, platelet 210.    Retic count 6.4, retic index calculated to be 1.1, in adequate bone marrow response to anemia  CMP shows potassium 3.5, BUN creatinine 23/< 0.3, calcium 7.7, alk-phos 102, albumin 2.4(L)  Magnesium 1.6(L)  CPK 44, CK-MB 1.6, troponin less than 0.03  FOBT positive in the ER  X-ray chest, personally reviewed showed no acute cardiopulmonary process     Will admit pt, ER already ordered 2 units PRBCs to transfusion     Dr Metzger, Myself signed the Blood transfusion forms as pt's son was not in the room but per ER Physician note, he did spoke to the son regarding blood  transfusion    Overview/Hospital Course:  She received 2 units of pRBC and her post transfusion Hb is 6.7     Interval History: she is s/p transfusion of 2 units pRBC yesterday. Post transfusion H&H is only 6.7.  She reports mild improvement in her dyspnea, however she has not been out of bed.     Review of Systems   Constitutional: Negative for chills and fever.   HENT: Negative for congestion.    Respiratory: Negative for shortness of breath and wheezing.    Cardiovascular: Negative for chest pain and palpitations.   Gastrointestinal: Negative for constipation, diarrhea, nausea and vomiting.   Genitourinary: Negative for dysuria, flank pain, urgency and vaginal discharge.   Musculoskeletal: Negative for back pain and myalgias.   Neurological: Positive for weakness.     Objective:     Vital Signs (Most Recent):  Temp: 98.4 °F (36.9 °C) (01/14/20 1217)  Pulse: 82 (01/14/20 1217)  Resp: 18 (01/14/20 1217)  BP: 118/67 (01/14/20 1217)  SpO2: 96 % (01/14/20 1217) Vital Signs (24h Range):  Temp:  [98 °F (36.7 °C)-98.9 °F (37.2 °C)] 98.4 °F (36.9 °C)  Pulse:  [82-97] 82  Resp:  [16-22] 18  SpO2:  [95 %-100 %] 96 %  BP: (109-152)/(41-76) 118/67     Weight: 98.2 kg (216 lb 7.9 oz)  Body mass index is 34.94 kg/m².    Intake/Output Summary (Last 24 hours) at 1/14/2020 1346  Last data filed at 1/14/2020 0630  Gross per 24 hour   Intake 638.42 ml   Output 950 ml   Net -311.58 ml      Physical Exam   Constitutional: She is oriented to person, place, and time. No distress.   Eyes: Pupils are equal, round, and reactive to light. No scleral icterus.   Cardiovascular: Normal rate and regular rhythm.   No murmur heard.  Pulmonary/Chest: Breath sounds normal. She has no wheezes. She has no rales.   Abdominal: Soft. She exhibits no distension. There is no tenderness.   Neurological: She is alert and oriented to person, place, and time.   Skin: Skin is warm. Capillary refill takes less than 2 seconds. No rash noted.   Psychiatric: She  has a normal mood and affect.   Nursing note and vitals reviewed.      Significant Labs:   BMP:   Recent Labs   Lab 01/14/20  1020         K 3.1*      CO2 26   BUN 22   CREATININE 0.3*   CALCIUM 7.5*   MG 1.9     CBC:   Recent Labs   Lab 01/13/20  1554 01/14/20  1020   WBC 5.57 4.77   HGB 5.6* 6.7*   HCT 18.6* 21.0*    186     Magnesium:   Recent Labs   Lab 01/13/20  1554 01/14/20  1020   MG 1.6 1.9       Significant Imaging: I have reviewed all pertinent imaging results/findings within the past 24 hours.  nonne      Assessment/Plan:      * Symptomatic anemia  inappriopriate response to transfusion.   Will transfuse another 2 units pRBC  Anemia panel shows iron deficiency and vit b12 deficiency. Will start B12 shots.       GI bleed  Check H&H more frequently  For EGD tomorrow morning after transfusion of additional blood units  Continue IV protonix      Hypokalemia  Replace with oral potassium      PE (pulmonary thromboembolism)        Long term (current) use of anticoagulants- On eliquis since 12/2019 post PE disgnosis          VTE Risk Mitigation (From admission, onward)         Ordered     IP VTE HIGH RISK PATIENT  Once      01/13/20 2127     Place ISMAEL hose  Until discontinued      01/13/20 1749     Place sequential compression device  Until discontinued      01/13/20 1749                      Susi Chambers MD  Department of Hospital Medicine   [facility]

## 2020-01-14 NOTE — ASSESSMENT & PLAN NOTE
inappriopriate response to transfusion.   Will transfuse another 2 units pRBC  Anemia panel shows iron deficiency and vit b12 deficiency. Will start B12 shots.

## 2020-01-14 NOTE — HOSPITAL COURSE
"She received 2 units of pRBC and her post transfusion Hb is 6.7   01/15 : Labs personally reviewed:anemia has improved post transfusion; has hypokalemia (on sliding scale). Had eneroscopy this AM reportedly had cautery and clip placed. Patient feels "Good", no CP/SOB. Resting quietly in bed.  1/16 Patient more alert today, but still pleasantly demented. Denies any discomfort. H/H improving. NO further evidence of bleed  VSS  Lungs: decreased entry without adventitious sounds  Heart: S1S2 reg  Abdo soft.  "

## 2020-01-14 NOTE — SUBJECTIVE & OBJECTIVE
Interval History: she is s/p transfusion of 2 units pRBC yesterday. Post transfusion H&H is only 6.7.  She reports mild improvement in her dyspnea, however she has not been out of bed.     Review of Systems   Constitutional: Negative for chills and fever.   HENT: Negative for congestion.    Respiratory: Negative for shortness of breath and wheezing.    Cardiovascular: Negative for chest pain and palpitations.   Gastrointestinal: Negative for constipation, diarrhea, nausea and vomiting.   Genitourinary: Negative for dysuria, flank pain, urgency and vaginal discharge.   Musculoskeletal: Negative for back pain and myalgias.   Neurological: Positive for weakness.     Objective:     Vital Signs (Most Recent):  Temp: 98.4 °F (36.9 °C) (01/14/20 1217)  Pulse: 82 (01/14/20 1217)  Resp: 18 (01/14/20 1217)  BP: 118/67 (01/14/20 1217)  SpO2: 96 % (01/14/20 1217) Vital Signs (24h Range):  Temp:  [98 °F (36.7 °C)-98.9 °F (37.2 °C)] 98.4 °F (36.9 °C)  Pulse:  [82-97] 82  Resp:  [16-22] 18  SpO2:  [95 %-100 %] 96 %  BP: (109-152)/(41-76) 118/67     Weight: 98.2 kg (216 lb 7.9 oz)  Body mass index is 34.94 kg/m².    Intake/Output Summary (Last 24 hours) at 1/14/2020 1346  Last data filed at 1/14/2020 0630  Gross per 24 hour   Intake 638.42 ml   Output 950 ml   Net -311.58 ml      Physical Exam   Constitutional: She is oriented to person, place, and time. No distress.   Eyes: Pupils are equal, round, and reactive to light. No scleral icterus.   Cardiovascular: Normal rate and regular rhythm.   No murmur heard.  Pulmonary/Chest: Breath sounds normal. She has no wheezes. She has no rales.   Abdominal: Soft. She exhibits no distension. There is no tenderness.   Neurological: She is alert and oriented to person, place, and time.   Skin: Skin is warm. Capillary refill takes less than 2 seconds. No rash noted.   Psychiatric: She has a normal mood and affect.   Nursing note and vitals reviewed.      Significant Labs:   BMP:   Recent Labs    Lab 01/14/20  1020         K 3.1*      CO2 26   BUN 22   CREATININE 0.3*   CALCIUM 7.5*   MG 1.9     CBC:   Recent Labs   Lab 01/13/20  1554 01/14/20  1020   WBC 5.57 4.77   HGB 5.6* 6.7*   HCT 18.6* 21.0*    186     Magnesium:   Recent Labs   Lab 01/13/20  1554 01/14/20  1020   MG 1.6 1.9       Significant Imaging: I have reviewed all pertinent imaging results/findings within the past 24 hours.  veto

## 2020-01-15 ENCOUNTER — ANESTHESIA (OUTPATIENT)
Dept: SURGERY | Facility: HOSPITAL | Age: 85
DRG: 379 | End: 2020-01-15
Payer: MEDICARE

## 2020-01-15 ENCOUNTER — ANESTHESIA EVENT (OUTPATIENT)
Dept: SURGERY | Facility: HOSPITAL | Age: 85
DRG: 379 | End: 2020-01-15
Payer: MEDICARE

## 2020-01-15 LAB
ALBUMIN SERPL BCP-MCNC: 2.4 G/DL (ref 3.5–5.2)
ALP SERPL-CCNC: 89 U/L (ref 55–135)
ALT SERPL W/O P-5'-P-CCNC: 12 U/L (ref 10–44)
ANION GAP SERPL CALC-SCNC: 8 MMOL/L (ref 8–16)
AST SERPL-CCNC: 16 U/L (ref 10–40)
BASOPHILS # BLD AUTO: 0.03 K/UL (ref 0–0.2)
BASOPHILS NFR BLD: 0.6 % (ref 0–1.9)
BILIRUB SERPL-MCNC: 2 MG/DL (ref 0.1–1)
BUN SERPL-MCNC: 22 MG/DL (ref 8–23)
CALCIUM SERPL-MCNC: 7.3 MG/DL (ref 8.7–10.5)
CHLORIDE SERPL-SCNC: 103 MMOL/L (ref 95–110)
CO2 SERPL-SCNC: 28 MMOL/L (ref 23–29)
CREAT SERPL-MCNC: 0.3 MG/DL (ref 0.5–1.4)
DIFFERENTIAL METHOD: ABNORMAL
EOSINOPHIL # BLD AUTO: 0.2 K/UL (ref 0–0.5)
EOSINOPHIL NFR BLD: 3.5 % (ref 0–8)
ERYTHROCYTE [DISTWIDTH] IN BLOOD BY AUTOMATED COUNT: 19.8 % (ref 11.5–14.5)
EST. GFR  (AFRICAN AMERICAN): >60 ML/MIN/1.73 M^2
EST. GFR  (NON AFRICAN AMERICAN): >60 ML/MIN/1.73 M^2
GLUCOSE SERPL-MCNC: 101 MG/DL (ref 70–110)
HCT VFR BLD AUTO: 25.6 % (ref 37–48.5)
HGB BLD-MCNC: 8.4 G/DL (ref 12–16)
IMM GRANULOCYTES # BLD AUTO: 0.03 K/UL (ref 0–0.04)
IMM GRANULOCYTES NFR BLD AUTO: 0.6 % (ref 0–0.5)
LYMPHOCYTES # BLD AUTO: 1.3 K/UL (ref 1–4.8)
LYMPHOCYTES NFR BLD: 28.7 % (ref 18–48)
MAGNESIUM SERPL-MCNC: 1.7 MG/DL (ref 1.6–2.6)
MCH RBC QN AUTO: 30.9 PG (ref 27–31)
MCHC RBC AUTO-ENTMCNC: 32.8 G/DL (ref 32–36)
MCV RBC AUTO: 94 FL (ref 82–98)
MONOCYTES # BLD AUTO: 0.4 K/UL (ref 0.3–1)
MONOCYTES NFR BLD: 9.1 % (ref 4–15)
NEUTROPHILS # BLD AUTO: 2.7 K/UL (ref 1.8–7.7)
NEUTROPHILS NFR BLD: 57.5 % (ref 38–73)
NRBC BLD-RTO: 0 /100 WBC
PHOSPHATE SERPL-MCNC: 3 MG/DL (ref 2.7–4.5)
PLATELET # BLD AUTO: 159 K/UL (ref 150–350)
PMV BLD AUTO: 9.1 FL (ref 9.2–12.9)
POTASSIUM SERPL-SCNC: 3 MMOL/L (ref 3.5–5.1)
PROT SERPL-MCNC: 4.7 G/DL (ref 6–8.4)
RBC # BLD AUTO: 2.72 M/UL (ref 4–5.4)
SODIUM SERPL-SCNC: 139 MMOL/L (ref 136–145)
WBC # BLD AUTO: 4.63 K/UL (ref 3.9–12.7)

## 2020-01-15 PROCEDURE — 43255 EGD CONTROL BLEEDING ANY: CPT | Performed by: INTERNAL MEDICINE

## 2020-01-15 PROCEDURE — 37000009 HC ANESTHESIA EA ADD 15 MINS: Performed by: INTERNAL MEDICINE

## 2020-01-15 PROCEDURE — 12000002 HC ACUTE/MED SURGE SEMI-PRIVATE ROOM

## 2020-01-15 PROCEDURE — 63600175 PHARM REV CODE 636 W HCPCS: Performed by: INTERNAL MEDICINE

## 2020-01-15 PROCEDURE — 27200997: Performed by: INTERNAL MEDICINE

## 2020-01-15 PROCEDURE — 25000003 PHARM REV CODE 250: Performed by: INTERNAL MEDICINE

## 2020-01-15 PROCEDURE — 36430 TRANSFUSION BLD/BLD COMPNT: CPT

## 2020-01-15 PROCEDURE — 85025 COMPLETE CBC W/AUTO DIFF WBC: CPT

## 2020-01-15 PROCEDURE — C9113 INJ PANTOPRAZOLE SODIUM, VIA: HCPCS | Performed by: INTERNAL MEDICINE

## 2020-01-15 PROCEDURE — 94761 N-INVAS EAR/PLS OXIMETRY MLT: CPT

## 2020-01-15 PROCEDURE — 63600175 PHARM REV CODE 636 W HCPCS: Performed by: NURSE ANESTHETIST, CERTIFIED REGISTERED

## 2020-01-15 PROCEDURE — 37000008 HC ANESTHESIA 1ST 15 MINUTES: Performed by: INTERNAL MEDICINE

## 2020-01-15 PROCEDURE — 27202049 HC PROBE, APC ERBE: Performed by: INTERNAL MEDICINE

## 2020-01-15 PROCEDURE — 84100 ASSAY OF PHOSPHORUS: CPT

## 2020-01-15 PROCEDURE — 83735 ASSAY OF MAGNESIUM: CPT

## 2020-01-15 PROCEDURE — 80053 COMPREHEN METABOLIC PANEL: CPT

## 2020-01-15 RX ORDER — SODIUM CHLORIDE 9 MG/ML
INJECTION, SOLUTION INTRAVENOUS CONTINUOUS PRN
Status: DISCONTINUED | OUTPATIENT
Start: 2020-01-15 | End: 2020-01-15

## 2020-01-15 RX ORDER — PROPOFOL 10 MG/ML
INJECTION, EMULSION INTRAVENOUS
Status: DISCONTINUED | OUTPATIENT
Start: 2020-01-15 | End: 2020-01-15

## 2020-01-15 RX ADMIN — PROPOFOL 20 MG: 10 INJECTION, EMULSION INTRAVENOUS at 09:01

## 2020-01-15 RX ADMIN — PROPOFOL 50 MG: 10 INJECTION, EMULSION INTRAVENOUS at 09:01

## 2020-01-15 RX ADMIN — METOPROLOL TARTRATE 25 MG: 25 TABLET ORAL at 08:01

## 2020-01-15 RX ADMIN — ATORVASTATIN CALCIUM 40 MG: 40 TABLET, FILM COATED ORAL at 08:01

## 2020-01-15 RX ADMIN — PANTOPRAZOLE SODIUM 40 MG: 40 INJECTION, POWDER, FOR SOLUTION INTRAVENOUS at 10:01

## 2020-01-15 RX ADMIN — MAGNESIUM SULFATE 1 G: 1 INJECTION INTRAVENOUS at 07:01

## 2020-01-15 RX ADMIN — QUETIAPINE 25 MG: 25 TABLET ORAL at 08:01

## 2020-01-15 RX ADMIN — QUETIAPINE 25 MG: 25 TABLET ORAL at 11:01

## 2020-01-15 RX ADMIN — CYANOCOBALAMIN 1000 MCG: 1000 INJECTION, SOLUTION INTRAMUSCULAR at 10:01

## 2020-01-15 RX ADMIN — PROPOFOL 10 MG: 10 INJECTION, EMULSION INTRAVENOUS at 09:01

## 2020-01-15 RX ADMIN — METOPROLOL TARTRATE 25 MG: 25 TABLET ORAL at 11:01

## 2020-01-15 RX ADMIN — SODIUM CHLORIDE: 900 INJECTION, SOLUTION INTRAVENOUS at 09:01

## 2020-01-15 RX ADMIN — AMLODIPINE BESYLATE 5 MG: 5 TABLET ORAL at 11:01

## 2020-01-15 RX ADMIN — POTASSIUM CHLORIDE 40 MEQ: 7.46 INJECTION, SOLUTION INTRAVENOUS at 07:01

## 2020-01-15 NOTE — PROGRESS NOTES
[facility] Medicine  Progress Note    Patient Name: Ellen Maravilla  MRN: [id number]  Patient Class: IP- Inpatient   Admission Date: 1/13/2020  Length of Stay: 1 days  Attending Physician: Gibran Moncada MD  Primary Care Provider: Tien Mckeon MD        Subjective:     Principal Problem:Symptomatic anemia        HPI:  Ellen Maravilla is a 87 y.o. white female who  has a past medical history of Coronary artery disease, Dementia, Hypertension, Pulmonary embolism, Stroke, Thyroid disease, and Uterine cancer., CVA, Recent diagnosis of PE on Eliquis since 12/2019. The patient presented to [facility] on 1/13/2020 with a primary complaint of Weakness     History was obtained from ER physician, skilled nursing home records as well as previous admission at our facility.  Patient per chart review has dementia, is a poor historian.  I tried to talk to the patient but she did not answered my questions.  Just repeated multiple times that she is weak.  Son was not in the room to obtain history from.  Patient did not let me do a detailed exam as she was cold and refused to be uncovered     In the ER, upon arrival her blood pressure was 130/62, respiration 22 pulse 84, temperature 98°, O2 sat 100% on room air and body weight of 86.2 kg  Workup in the ER showed H&H of 5.6/18.6, WBC 5.57, platelet 210.    Retic count 6.4, retic index calculated to be 1.1, in adequate bone marrow response to anemia  CMP shows potassium 3.5, BUN creatinine 23/< 0.3, calcium 7.7, alk-phos 102, albumin 2.4(L)  Magnesium 1.6(L)  CPK 44, CK-MB 1.6, troponin less than 0.03  FOBT positive in the ER  X-ray chest, personally reviewed showed no acute cardiopulmonary process     Will admit pt, ER already ordered 2 units PRBCs to transfusion     Dr Metzger, Myself signed the Blood transfusion forms as pt's son was not in the room but per ER Physician note, he did spoke to the son regarding blood  "transfusion    Overview/Hospital Course:  She received 2 units of pRBC and her post transfusion Hb is 6.7   01/15 : Labs personally reviewed:anemia has improved post transfusion; has hypokalemia (on sliding scale). Had eneroscopy this AM reportedly had cautery and clip placed. Patient feels "Good", no CP/SOB. Resting quietly in bed    Interval History: no further bleeding evidence    Review of Systems   Constitutional: Negative.    HENT: Negative.    Eyes: Negative.    Respiratory: Negative.    Cardiovascular: Negative.    Gastrointestinal: Negative.    Endocrine: Negative.    Genitourinary: Negative.    Musculoskeletal: Negative.    Skin: Negative.    Allergic/Immunologic: Negative.    Neurological: Negative.    Hematological: Negative.    All other systems reviewed and are negative.    Objective:     Vital Signs (Most Recent):  Temp: 97.5 °F (36.4 °C) (01/15/20 0953)  Pulse: 81 (01/15/20 1115)  Resp: 20 (01/15/20 1115)  BP: 123/68 (01/15/20 1115)  SpO2: 98 % (01/15/20 1115) Vital Signs (24h Range):  Temp:  [97.5 °F (36.4 °C)-99.7 °F (37.6 °C)] 97.5 °F (36.4 °C)  Pulse:  [75-93] 81  Resp:  [16-22] 20  SpO2:  [93 %-99 %] 98 %  BP: (108-129)/(46-68) 123/68     Weight: 98.2 kg (216 lb 7.9 oz)  Body mass index is 34.94 kg/m².    Intake/Output Summary (Last 24 hours) at 1/15/2020 1509  Last data filed at 1/15/2020 0934  Gross per 24 hour   Intake 858.33 ml   Output 1150 ml   Net -291.67 ml      Physical Exam   Constitutional: She is oriented to person, place, and time. She appears well-developed and well-nourished.   HENT:   Head: Normocephalic and atraumatic.   Eyes: Pupils are equal, round, and reactive to light. Conjunctivae and EOM are normal.   Neck: Normal range of motion. Neck supple.   Cardiovascular: Normal rate, regular rhythm, normal heart sounds and intact distal pulses.   Bilateral pedal edema   Pulmonary/Chest: Effort normal and breath sounds normal.   Abdominal: Soft. Bowel sounds are normal. "   Musculoskeletal: Normal range of motion.   Neurological: She is alert and oriented to person, place, and time.   Skin: Skin is warm and dry. Capillary refill takes less than 2 seconds.   Psychiatric: She has a normal mood and affect. Her behavior is normal. Judgment and thought content normal.   Nursing note and vitals reviewed.      Significant Labs:   BMP:   Recent Labs   Lab 01/15/20  0538         K 3.0*      CO2 28   BUN 22   CREATININE 0.3*   CALCIUM 7.3*   MG 1.7     CBC:   Recent Labs   Lab 01/13/20  1554 01/14/20  1020 01/15/20  0538   WBC 5.57 4.77 4.63   HGB 5.6* 6.7* 8.4*   HCT 18.6* 21.0* 25.6*    186 159       Significant Imaging: I have reviewed and interpreted all pertinent imaging results/findings within the past 24 hours.      Assessment/Plan:      * Symptomatic anemia  Transfusion.       Hypokalemia  Sliding scale      DNR (do not resuscitate)        Long term (current) use of anticoagulants- On eliquis since 12/2019 post PE disgnosis        PE (pulmonary thromboembolism)        Anemia  Improved after transfusion      GI bleed  Continue current course        VTE Risk Mitigation (From admission, onward)         Ordered     IP VTE HIGH RISK PATIENT  Once      01/13/20 2127     Place ISMAEL hose  Until discontinued      01/13/20 1749     Place sequential compression device  Until discontinued      01/13/20 1749                      Gibran Moncada MD  Department of Hospital Medicine   [facility]

## 2020-01-15 NOTE — SUBJECTIVE & OBJECTIVE
Interval History: no further bleeding evidence    Review of Systems   Constitutional: Negative.    HENT: Negative.    Eyes: Negative.    Respiratory: Negative.    Cardiovascular: Negative.    Gastrointestinal: Negative.    Endocrine: Negative.    Genitourinary: Negative.    Musculoskeletal: Negative.    Skin: Negative.    Allergic/Immunologic: Negative.    Neurological: Negative.    Hematological: Negative.    All other systems reviewed and are negative.    Objective:     Vital Signs (Most Recent):  Temp: 97.5 °F (36.4 °C) (01/15/20 0953)  Pulse: 81 (01/15/20 1115)  Resp: 20 (01/15/20 1115)  BP: 123/68 (01/15/20 1115)  SpO2: 98 % (01/15/20 1115) Vital Signs (24h Range):  Temp:  [97.5 °F (36.4 °C)-99.7 °F (37.6 °C)] 97.5 °F (36.4 °C)  Pulse:  [75-93] 81  Resp:  [16-22] 20  SpO2:  [93 %-99 %] 98 %  BP: (108-129)/(46-68) 123/68     Weight: 98.2 kg (216 lb 7.9 oz)  Body mass index is 34.94 kg/m².    Intake/Output Summary (Last 24 hours) at 1/15/2020 1509  Last data filed at 1/15/2020 0934  Gross per 24 hour   Intake 858.33 ml   Output 1150 ml   Net -291.67 ml      Physical Exam   Constitutional: She is oriented to person, place, and time. She appears well-developed and well-nourished.   HENT:   Head: Normocephalic and atraumatic.   Eyes: Pupils are equal, round, and reactive to light. Conjunctivae and EOM are normal.   Neck: Normal range of motion. Neck supple.   Cardiovascular: Normal rate, regular rhythm, normal heart sounds and intact distal pulses.   Bilateral pedal edema   Pulmonary/Chest: Effort normal and breath sounds normal.   Abdominal: Soft. Bowel sounds are normal.   Musculoskeletal: Normal range of motion.   Neurological: She is alert and oriented to person, place, and time.   Skin: Skin is warm and dry. Capillary refill takes less than 2 seconds.   Psychiatric: She has a normal mood and affect. Her behavior is normal. Judgment and thought content normal.   Nursing note and vitals reviewed.      Significant  Labs:   BMP:   Recent Labs   Lab 01/15/20  0538         K 3.0*      CO2 28   BUN 22   CREATININE 0.3*   CALCIUM 7.3*   MG 1.7     CBC:   Recent Labs   Lab 01/13/20  1554 01/14/20  1020 01/15/20  0538   WBC 5.57 4.77 4.63   HGB 5.6* 6.7* 8.4*   HCT 18.6* 21.0* 25.6*    186 159       Significant Imaging: I have reviewed and interpreted all pertinent imaging results/findings within the past 24 hours.

## 2020-01-15 NOTE — ANESTHESIA PREPROCEDURE EVALUATION
01/15/2020  Ellen Maravilla is a 87 y.o., female.    Patient Active Problem List   Diagnosis    Back pain    Abdominal pain, other specified site    Acute appendicitis    Neck infection    Hypertension    Anemia    GI bleeding    Hypothyroidism    Osteoporosis    GI bleed    Anemia    Thrombocytopenia    Multiple falls    Discogenic thoracic pain    Closed compression fracture of thoracic vertebra, T9 and old multiple lumbar compression fractures    Acute ischemic stroke    Moderate malnutrition    Vascular dementia    Port-A-Cath in place    Hyperlipidemia    Bilateral carotid artery disease    Pancytopenia    Pancreatic lesion    Splenic artery aneurysm    Pulmonary nodules    DDD (degenerative disc disease), thoracic    DDD (degenerative disc disease), lumbar    Debility    Hypomagnesemia    Abnormal finding on radiology exam    Coronary artery disease    History of CVA (cerebrovascular accident)    PE (pulmonary thromboembolism)    Symptomatic anemia    Long term (current) use of anticoagulants- On eliquis since 12/2019 post PE disgnosis    DNR (do not resuscitate)    Hypokalemia       Past Surgical History:   Procedure Laterality Date    APPENDECTOMY      BACK SURGERY      HYSTERECTOMY      KNEE SURGERY      TONSILLECTOMY          Tobacco Use:  The patient  reports that she has never smoked. She has never used smokeless tobacco.     Results for orders placed or performed during the hospital encounter of 01/13/20   EKG 12-lead    Collection Time: 01/13/20  3:05 PM    Narrative    Test Reason : R53.1,    Vent. Rate : 085 BPM     Atrial Rate : 085 BPM     P-R Int : 000 ms          QRS Dur : 124 ms      QT Int : 404 ms       P-R-T Axes : 000 051 009 degrees     QTc Int : 480 ms    Wide QRS rhythm  Right bundle branch block  Abnormal ECG  When compared with ECG of  13-DEC-2019 11:08,  No significant change was found    Referred By: [email or web address]   SELF           Confirmed By:         Imaging Results          X-Ray Chest AP Portable (Final result)  Result time 01/13/20 15:28:57    Final result by Ramya Chance MD (01/13/20 15:28:57)                 Impression:      No acute pulmonary process      Electronically signed by: Ramya Chance MD  Date:    01/13/2020  Time:    15:28             Narrative:    EXAMINATION:  XR CHEST AP PORTABLE    CLINICAL HISTORY:  Weakness;    FINDINGS:  Portable chest at 15:08 hours is compared to 12/13/2019 shows normal cardiomediastinal silhouette. There is a right IJ catheter with tip at the atrial caval junction.    Lungs are clear. Pulmonary vasculature is normal. No acute osseous abnormality.                                 Lab Results   Component Value Date    WBC 4.77 01/14/2020    HGB 6.7 (LL) 01/14/2020    HCT 21.0 (L) 01/14/2020    MCV 98 01/14/2020     01/14/2020     BMP  Lab Results   Component Value Date     01/14/2020    K 3.1 (L) 01/14/2020     01/14/2020    CO2 26 01/14/2020    BUN 22 01/14/2020    CREATININE 0.3 (L) 01/14/2020    CALCIUM 7.5 (L) 01/14/2020    ANIONGAP 5 (L) 01/14/2020    ESTGFRAFRICA >60.0 01/14/2020    EGFRNONAA >60.0 01/14/2020             Pre-op Assessment    I have reviewed the Patient Summary Reports.     I have reviewed the Nursing Notes.   I have reviewed the Medications.     Review of Systems  Anesthesia Hx:  No problems with previous Anesthesia  History of prior surgery of interest to airway management or planning: Denies Family Hx of Anesthesia complications.   Denies Personal Hx of Anesthesia complications.   Hematology/Oncology:         -- Anemia: Hematology Comments: Hx of PE   Cardiovascular:   Hypertension, well controlled CAD asymptomatic     Musculoskeletal:   Arthritis (lumbar and thoracic degenerative disease)   Spine Disorders: lumbar and thoracic Degenerative disease     Neurological:   CVA (hx of ischemic CVA)    Endocrine:   Hypothyroidism    Psych:   Psychiatric History (dementia, vascular/ischemia)          Physical Exam  General:  Well nourished    Airway/Jaw/Neck:  Airway Findings: Mouth Opening: Normal Tongue: Normal  General Airway Assessment: Adult  Mallampati: III  TM Distance: Normal, at least 6 cm  Jaw/Neck Findings:  Neck ROM: Normal ROM       Chest/Lungs:  Chest/Lungs Findings: Clear to auscultation, Normal Respiratory Rate     Heart/Vascular:  Heart Findings: Rate: Normal  Rhythm: Regular Rhythm  Sounds: Normal     Abdomen:  Abdomen Findings: Normal    Musculoskeletal:  Musculoskeletal Findings: Normal   Skin:  Skin Findings: Normal    Mental Status:  Mental Status Findings:  Cooperative, Alert and Oriented         Anesthesia Plan  Type of Anesthesia, risks & benefits discussed:  Anesthesia Type:  MAC  Patient's Preference:   Intra-op Monitoring Plan: standard ASA monitors  Intra-op Monitoring Plan Comments:   Post Op Pain Control Plan: per primary service following discharge from PACU  Post Op Pain Control Plan Comments:   Induction:    Beta Blocker:         Informed Consent: Patient understands risks and agrees with Anesthesia plan.  Questions answered. Anesthesia consent signed with patient.  ASA Score: 3     Day of Surgery Review of History & Physical:

## 2020-01-15 NOTE — [employer] PATIENT INSTRUCTIONS
Discharge Summary/Instructions after an Endoscopic Procedure  Patient Name: Ellen Maravilla  Patient MRN: [id number]  Patient YOB: 1932  Wednesday, January 15, 2020  Kyle Collins MD  RESTRICTIONS:  During your procedure today, you received medications for sedation.  These   medications may affect your judgment, balance and coordination.  Therefore,   for 24 hours, you have the following restrictions:   - DO NOT drive a car, operate machinery, make legal/financial decisions,   sign important papers or drink alcohol.    ACTIVITY:  Today: no heavy lifting, straining or running due to procedural   sedation/anesthesia.  The following day: return to full activity including work.  DIET:  Eat and drink normally unless instructed otherwise.     TREATMENT FOR COMMON SIDE EFFECTS:  - Mild abdominal pain, nausea, belching, bloating or excessive gas:  rest,   eat lightly and use a heating pad.  - Sore Throat: treat with throat lozenges and/or gargle with warm salt   water.  - Because air was used during the procedure, expelling large amounts of air   from your rectum or belching is normal.  - If a bowel prep was taken, you may not have a bowel movement for 1-3 days.    This is normal.  SYMPTOMS TO WATCH FOR AND REPORT TO YOUR PHYSICIAN:  1. Abdominal pain or bloating, other than gas cramps.  2. Chest pain.  3. Back pain.  4. Signs of infection such as: chills or fever occurring within 24 hours   after the procedure.  5. Rectal bleeding, which would show as bright red, maroon, or black stools.   (A tablespoon of blood from the rectum is not serious, especially if   hemorrhoids are present.)  6. Vomiting.  7. Weakness or dizziness.  GO DIRECTLY TO THE NEAREST EMERGENCY ROOM IF YOU HAVE ANY OF THE FOLLOWING:      Difficulty breathing              Chills and/or fever over 101 F   Persistent vomiting and/or vomiting blood   Severe abdominal pain   Severe chest pain   Black, tarry stools   Bleeding- more than one  tablespoon   Any other symptom or condition that you feel may need urgent attention  Your doctor recommends these additional instructions:  If any biopsies were taken, your doctors clinic will contact you in 1 to 2   weeks with any results.  - Patient has a contact number available for emergencies.  The signs and   symptoms of potential delayed complications were discussed with the   patient.  Return to hospital anduajr  Consider outpatient capsule endoscopy if h/h continue to fall in future  Recommend iron supplemenation and can consider injectafer if not tolerating   po iron.   - Return patient to hospital andujar for ongoing care.  For questions, problems or results please call your physician - Kyle Collins MD at Work:  [phone].  [facility], EMERGENCY ROOM PHONE NUMBER: [phone]  IF A COMPLICATION OR EMERGENCY SITUATION ARISES AND YOU ARE UNABLE TO REACH   YOUR PHYSICIAN - GO DIRECTLY TO THE EMERGENCY ROOM.  MD Kyle Pak MD  1/15/2020 9:42:45 AM  This report has been verified and signed electronically.  [employer]

## 2020-01-15 NOTE — PLAN OF CARE
Problem: Infection  Goal: Infection Symptom Resolution  Outcome: Ongoing, Progressing     Problem: Adult Inpatient Plan of Care  Goal: Plan of Care Review  Outcome: Ongoing, Progressing  Goal: Patient-Specific Goal (Individualization)  Outcome: Ongoing, Progressing  Goal: Absence of Hospital-Acquired Illness or Injury  Outcome: Ongoing, Progressing  Goal: Optimal Comfort and Wellbeing  Outcome: Ongoing, Progressing  Goal: Readiness for Transition of Care  Outcome: Ongoing, Progressing  Goal: Rounds/Family Conference  Outcome: Ongoing, Progressing     Problem: Fall Injury Risk  Goal: Absence of Fall and Fall-Related Injury  Outcome: Ongoing, Progressing     Problem: Skin Injury Risk Increased  Goal: Skin Health and Integrity  Outcome: Ongoing, Progressing

## 2020-01-15 NOTE — TRANSFER OF CARE
"Anesthesia Transfer of Care Note    Patient: Ellen Maravilla    Procedure(s) Performed: Procedure(s) (LRB):  ENTEROSCOPY (Left)    Patient location: Other:    Anesthesia Type: MAC    Transport from OR: Transported from OR on 2-3 L/min O2 by NC with adequate spontaneous ventilation    Post pain: adequate analgesia    Post assessment: no apparent anesthetic complications    Post vital signs: stable    Level of consciousness: awake    Complications: none    Transfer of care protocol was followed      Last vitals:   Visit Vitals  BP (!) 124/51   Pulse 81   Temp (!) 32.3 °C (90.1 °F) (Oral)   Resp (!) 22   Ht 5' 6" (1.676 m)   Wt 98.2 kg (216 lb 7.9 oz)   LMP  (LMP Unknown)   SpO2 99%   Breastfeeding? No   BMI 34.94 kg/m²     "

## 2020-01-15 NOTE — ANESTHESIA POSTPROCEDURE EVALUATION
Anesthesia Post Evaluation    Patient: Ellen Maravilla    Procedure(s) Performed: Procedure(s) (LRB):  ENTEROSCOPY (Left)    Final Anesthesia Type: MAC    Patient location during evaluation: GI PACU  Patient participation: Yes- Able to Participate  Level of consciousness: awake and alert and oriented  Post-procedure vital signs: reviewed and stable  Pain management: adequate  Airway patency: patent    PONV status at discharge: No PONV  Anesthetic complications: no      Cardiovascular status: blood pressure returned to baseline and hemodynamically stable  Respiratory status: unassisted, spontaneous ventilation and room air  Hydration status: euvolemic  Follow-up not needed.      Mental status returned to baseline, pt awake and responding appropriately.  [facility]    Vitals Value Taken Time   /51 1/15/2020  9:50 AM   Temp 37.1 °C (98.8 °F) 1/15/2020  9:50 AM   Pulse 79 1/15/2020  9:50 AM   Resp 22 1/15/2020  9:50 AM   SpO2 99 % 1/15/2020  9:07 AM         No case tracking events are documented in the log.      Pain/Ileana Score: Ileana Score: 10 (1/15/2020  9:49 AM)

## 2020-01-16 VITALS
HEART RATE: 73 BPM | HEIGHT: 66 IN | OXYGEN SATURATION: 95 % | RESPIRATION RATE: 20 BRPM | SYSTOLIC BLOOD PRESSURE: 136 MMHG | DIASTOLIC BLOOD PRESSURE: 80 MMHG | TEMPERATURE: 99 F | BODY MASS INDEX: 34.79 KG/M2 | WEIGHT: 216.5 LBS

## 2020-01-16 PROBLEM — D64.9 SYMPTOMATIC ANEMIA: Status: RESOLVED | Noted: 2020-01-13 | Resolved: 2020-01-16

## 2020-01-16 PROBLEM — E87.6 HYPOKALEMIA: Status: RESOLVED | Noted: 2020-01-14 | Resolved: 2020-01-16

## 2020-01-16 LAB
ALBUMIN SERPL BCP-MCNC: 2.3 G/DL (ref 3.5–5.2)
ALP SERPL-CCNC: 89 U/L (ref 55–135)
ALT SERPL W/O P-5'-P-CCNC: 11 U/L (ref 10–44)
ANION GAP SERPL CALC-SCNC: 7 MMOL/L (ref 8–16)
AST SERPL-CCNC: 17 U/L (ref 10–40)
BASOPHILS # BLD AUTO: 0.02 K/UL (ref 0–0.2)
BASOPHILS NFR BLD: 0.4 % (ref 0–1.9)
BILIRUB SERPL-MCNC: 2.2 MG/DL (ref 0.1–1)
BUN SERPL-MCNC: 16 MG/DL (ref 8–23)
CALCIUM SERPL-MCNC: 7.5 MG/DL (ref 8.7–10.5)
CHLORIDE SERPL-SCNC: 106 MMOL/L (ref 95–110)
CO2 SERPL-SCNC: 27 MMOL/L (ref 23–29)
CREAT SERPL-MCNC: 0.3 MG/DL (ref 0.5–1.4)
DIFFERENTIAL METHOD: ABNORMAL
EOSINOPHIL # BLD AUTO: 0.2 K/UL (ref 0–0.5)
EOSINOPHIL NFR BLD: 4.9 % (ref 0–8)
ERYTHROCYTE [DISTWIDTH] IN BLOOD BY AUTOMATED COUNT: 19.9 % (ref 11.5–14.5)
EST. GFR  (AFRICAN AMERICAN): >60 ML/MIN/1.73 M^2
EST. GFR  (NON AFRICAN AMERICAN): >60 ML/MIN/1.73 M^2
GLUCOSE SERPL-MCNC: 87 MG/DL (ref 70–110)
HCT VFR BLD AUTO: 27.7 % (ref 37–48.5)
HGB BLD-MCNC: 8.9 G/DL (ref 12–16)
IMM GRANULOCYTES # BLD AUTO: 0.01 K/UL (ref 0–0.04)
IMM GRANULOCYTES NFR BLD AUTO: 0.2 % (ref 0–0.5)
LYMPHOCYTES # BLD AUTO: 1 K/UL (ref 1–4.8)
LYMPHOCYTES NFR BLD: 21.5 % (ref 18–48)
MAGNESIUM SERPL-MCNC: 1.9 MG/DL (ref 1.6–2.6)
MCH RBC QN AUTO: 30.7 PG (ref 27–31)
MCHC RBC AUTO-ENTMCNC: 32.1 G/DL (ref 32–36)
MCV RBC AUTO: 96 FL (ref 82–98)
MONOCYTES # BLD AUTO: 0.3 K/UL (ref 0.3–1)
MONOCYTES NFR BLD: 7.2 % (ref 4–15)
NEUTROPHILS # BLD AUTO: 3.1 K/UL (ref 1.8–7.7)
NEUTROPHILS NFR BLD: 65.8 % (ref 38–73)
NRBC BLD-RTO: 0 /100 WBC
PHOSPHATE SERPL-MCNC: 2.6 MG/DL (ref 2.7–4.5)
PLATELET # BLD AUTO: 157 K/UL (ref 150–350)
PMV BLD AUTO: 9 FL (ref 9.2–12.9)
POTASSIUM SERPL-SCNC: 3.4 MMOL/L (ref 3.5–5.1)
PROT SERPL-MCNC: 4.9 G/DL (ref 6–8.4)
RBC # BLD AUTO: 2.9 M/UL (ref 4–5.4)
SODIUM SERPL-SCNC: 140 MMOL/L (ref 136–145)
WBC # BLD AUTO: 4.7 K/UL (ref 3.9–12.7)

## 2020-01-16 PROCEDURE — 94761 N-INVAS EAR/PLS OXIMETRY MLT: CPT

## 2020-01-16 PROCEDURE — 84100 ASSAY OF PHOSPHORUS: CPT

## 2020-01-16 PROCEDURE — 83735 ASSAY OF MAGNESIUM: CPT

## 2020-01-16 PROCEDURE — 85025 COMPLETE CBC W/AUTO DIFF WBC: CPT

## 2020-01-16 PROCEDURE — C9113 INJ PANTOPRAZOLE SODIUM, VIA: HCPCS | Performed by: INTERNAL MEDICINE

## 2020-01-16 PROCEDURE — 80053 COMPREHEN METABOLIC PANEL: CPT

## 2020-01-16 PROCEDURE — 25000003 PHARM REV CODE 250: Performed by: INTERNAL MEDICINE

## 2020-01-16 PROCEDURE — 63600175 PHARM REV CODE 636 W HCPCS: Performed by: INTERNAL MEDICINE

## 2020-01-16 RX ADMIN — AMLODIPINE BESYLATE 5 MG: 5 TABLET ORAL at 10:01

## 2020-01-16 RX ADMIN — PANTOPRAZOLE SODIUM 40 MG: 40 INJECTION, POWDER, FOR SOLUTION INTRAVENOUS at 10:01

## 2020-01-16 RX ADMIN — METOPROLOL TARTRATE 25 MG: 25 TABLET ORAL at 10:01

## 2020-01-16 RX ADMIN — QUETIAPINE 25 MG: 25 TABLET ORAL at 10:01

## 2020-01-16 NOTE — NURSING
Called report to Megan at [facility]. [facility] transported patient by wheelchair. Evans SESAY

## 2020-01-16 NOTE — PLAN OF CARE
01/16/20 1045   Post-Acute Status   Post-Acute Authorization Placement       CM spoke with Jazzmine at [facility] ([phone]) PT ok to return at D/C.    12:20  CM sent d/c order sent  via rt fax [phone]). CM to follow.

## 2020-01-16 NOTE — PLAN OF CARE
01/16/20 0831   Patient Assessment/Suction   Level of Consciousness (AVPU) alert   Respiratory Effort Normal;Unlabored   Expansion/Accessory Muscles/Retractions no use of accessory muscles;no retractions   Rhythm/Pattern, Respiratory unlabored;pattern regular;depth regular   PRE-TX-O2   O2 Device (Oxygen Therapy) room air   SpO2 97 %   Pulse Oximetry Type Intermittent   $ Pulse Oximetry - Multiple Charge Pulse Oximetry - Multiple   Pulse 73   Resp 18

## 2020-01-16 NOTE — DISCHARGE SUMMARY
[facility]  Discharge Summary      Patient Name: Ellen Maravilla  MRN: [id number]  Admission Date: 1/13/2020  Hospital Length of Stay: 2 days  Discharge Date and Time:  01/16/2020 11:55 AM  Attending Physician: Gibran Moncada MD   Discharging Provider: Gibran Moncada MD  Primary Care Provider: Tien Mckeon MD      HPI:   Ellen Maravilla is a 87 y.o. white female who  has a past medical history of Coronary artery disease, Dementia, Hypertension, Pulmonary embolism, Stroke, Thyroid disease, and Uterine cancer., CVA, Recent diagnosis of PE on Eliquis since 12/2019. The patient presented to [facility] on 1/13/2020 with a primary complaint of Weakness     History was obtained from ER physician, skilled nursing home records as well as previous admission at our facility.  Patient per chart review has dementia, is a poor historian.  I tried to talk to the patient but she did not answered my questions.  Just repeated multiple times that she is weak.  Son was not in the room to obtain history from.  Patient did not let me do a detailed exam as she was cold and refused to be uncovered     In the ER, upon arrival her blood pressure was 130/62, respiration 22 pulse 84, temperature 98°, O2 sat 100% on room air and body weight of 86.2 kg  Workup in the ER showed H&H of 5.6/18.6, WBC 5.57, platelet 210.    Retic count 6.4, retic index calculated to be 1.1, in adequate bone marrow response to anemia  CMP shows potassium 3.5, BUN creatinine 23/< 0.3, calcium 7.7, alk-phos 102, albumin 2.4(L)  Magnesium 1.6(L)  CPK 44, CK-MB 1.6, troponin less than 0.03  FOBT positive in the ER  X-ray chest, personally reviewed showed no acute cardiopulmonary process     Will admit pt, ER already ordered 2 units PRBCs to transfusion     Dr Metzger, Myself signed the Blood transfusion forms as pt's son was not in the room but per ER Physician note, he did spoke to the son regarding blood  "transfusion    Procedure(s) (LRB):  ENTEROSCOPY (Left)      Hospital Course:   She received 2 units of pRBC and her post transfusion Hb is 6.7   01/15 : Labs personally reviewed:anemia has improved post transfusion; has hypokalemia (on sliding scale). Had eneroscopy this AM reportedly had cautery and clip placed. Patient feels "Good", no CP/SOB. Resting quietly in bed.  1/16 Patient more alert today, but still pleasantly demented. Denies any discomfort. H/H improving. NO further evidence of bleed  VSS  Lungs: decreased entry without adventitious sounds  Heart: S1S2 reg  Abdo soft.   Apixaban was initial included with her discharge medication inadvertently. It was discontinued and this note is to document that change  Consults:   Consults (From admission, onward)        Status Ordering Provider     Inpatient consult to Gastroenterology  Once     Provider:  Rosendo Dee MD    Completed EDDIE PATE     Inpatient consult to Hospitalist  Once     Provider:  Eddie Pate MD    Acknowledged EDDIE PATE          DNR (do not resuscitate)        Long term (current) use of anticoagulants- On eliquis since 12/2019 post PE disgnosis        PE (pulmonary thromboembolism)    Continue apixaban    Anemia  Improved after transfusion        Final Active Diagnoses:    Diagnosis Date Noted POA    Long term (current) use of anticoagulants- On eliquis since 12/2019 post PE disgnosis [Z79.01] 01/13/2020 Not Applicable    DNR (do not resuscitate) [Z66] 01/13/2020 Yes    PE (pulmonary thromboembolism) [I26.99] 12/17/2019 Yes    Anemia [D64.9]  Yes      Problems Resolved During this Admission:    Diagnosis Date Noted Date Resolved POA    PRINCIPAL PROBLEM:  Symptomatic anemia [D64.9] 01/13/2020 01/16/2020 Yes    Hypokalemia [E87.6] 01/14/2020 01/16/2020 No    GI bleed [K92.2]  01/16/2020 Yes       Discharged Condition: good    Disposition: Long Term Care    Follow Up:    Patient Instructions:      Diet Cardiac     Activity as " tolerated       Significant Diagnostic Studies: Labs:   BMP:   Recent Labs   Lab 01/15/20  0538 01/16/20  0531    87    140   K 3.0* 3.4*    106   CO2 28 27   BUN 22 16   CREATININE 0.3* 0.3*   CALCIUM 7.3* 7.5*   MG 1.7 1.9    and CBC   Recent Labs   Lab 01/15/20  0538 01/16/20  0531   WBC 4.63 4.70   HGB 8.4* 8.9*   HCT 25.6* 27.7*    157       Pending Diagnostic Studies:     None         Medications:  Reconciled Home Medications:      Medication List      CONTINUE taking these medications    acetaminophen 325 MG tablet  Commonly known as:  TYLENOL  Take 650 mg by mouth every 4 (four) hours as needed for Pain.     amLODIPine 5 MG tablet  Commonly known as:  NORVASC  Take 1 tablet (5 mg total) by mouth once daily.     apixaban 5 mg Tab  Commonly known as:  ELIQUIS  Take 1 tablet (5 mg total) by mouth 2 (two) times daily. 10mg (2 tabs) po bid for 6 days and then 5mg (1tab) po bid thereafter     atorvastatin 40 MG tablet  Commonly known as:  LIPITOR  Take 1 tablet (40 mg total) by mouth once daily.     bisacodyl 10 mg Supp  Commonly known as:  DULCOLAX  Place 1 suppository (10 mg total) rectally daily as needed.     cyanocobalamin 1000 MCG tablet  Commonly known as:  VITAMIN B-12  Take 1 tablet (1,000 mcg total) by mouth once daily.     metoprolol tartrate 25 MG tablet  Commonly known as:  LOPRESSOR  Take 1 tablet (25 mg total) by mouth 2 (two) times daily.     polyethylene glycol 17 gram/dose powder  Commonly known as:  GLYCOLAX  Take 17 g by mouth once daily.     PROCRIT INJ  Inject 4,325 Units as directed every 7 days.     QUEtiapine 25 MG Tab  Commonly known as:  SEROQUEL  Take 25 mg by mouth 2 (two) times daily.     traMADol 50 mg tablet  Commonly known as:  ULTRAM  Take 25 mg by mouth every 8 (eight) hours as needed for Pain.        STOP taking these medications    aspirin 81 MG EC tablet  Commonly known as:  ECOTRIN     calcium-vitamin D3 500 mg(1,250mg) -200 unit per tablet  Commonly  known as:  OS-LILY 500 + D3            Indwelling Lines/Drains at time of discharge:   Lines/Drains/Airways     Central Venous Catheter Line                 Port A Cath Single Lumen 01/13/20 1604 right subclavian 2 days          Drain                 Urethral Catheter 01/13/20 2048 Non-latex 2 days                Time spent on the discharge of patient: 32 minutes  Patient was seen and examined on the date of discharge and determined to be suitable for discharge.         Gibran Moncada MD  Department of Hospital Medicine  [facility]

## 2020-01-16 NOTE — PLAN OF CARE
01/16/20 1509   Final Note   Assessment Type Final Discharge Note   Anticipated Discharge Disposition care home Nu       Nurse can  Call report to Megan [phone]) at [facility].

## 2020-01-26 ENCOUNTER — HOSPITAL ENCOUNTER (INPATIENT)
Facility: HOSPITAL | Age: 85
LOS: 3 days | Discharge: HOME HEALTH | DRG: 291 | End: 2020-01-30
Attending: EMERGENCY MEDICINE | Admitting: INTERNAL MEDICINE
Payer: MEDICARE

## 2020-01-26 DIAGNOSIS — R07.9 CHEST PAIN: ICD-10-CM

## 2020-01-26 DIAGNOSIS — I50.9 CONGESTIVE HEART FAILURE, UNSPECIFIED HF CHRONICITY, UNSPECIFIED HEART FAILURE TYPE: Primary | ICD-10-CM

## 2020-01-26 DIAGNOSIS — R06.02 SOB (SHORTNESS OF BREATH): ICD-10-CM

## 2020-01-26 DIAGNOSIS — M79.89 LEG SWELLING: ICD-10-CM

## 2020-01-26 PROCEDURE — 99285 EMERGENCY DEPT VISIT HI MDM: CPT | Mod: 25

## 2020-01-26 PROCEDURE — 85379 FIBRIN DEGRADATION QUANT: CPT

## 2020-01-26 PROCEDURE — 93005 ELECTROCARDIOGRAM TRACING: CPT

## 2020-01-26 PROCEDURE — 85025 COMPLETE CBC W/AUTO DIFF WBC: CPT

## 2020-01-26 PROCEDURE — 80053 COMPREHEN METABOLIC PANEL: CPT

## 2020-01-26 PROCEDURE — 84484 ASSAY OF TROPONIN QUANT: CPT

## 2020-01-26 PROCEDURE — 83880 ASSAY OF NATRIURETIC PEPTIDE: CPT

## 2020-01-26 PROCEDURE — 85610 PROTHROMBIN TIME: CPT

## 2020-01-26 PROCEDURE — 96365 THER/PROPH/DIAG IV INF INIT: CPT

## 2020-01-26 PROCEDURE — 96375 TX/PRO/DX INJ NEW DRUG ADDON: CPT

## 2020-01-26 PROCEDURE — 87040 BLOOD CULTURE FOR BACTERIA: CPT

## 2020-01-26 PROCEDURE — 81001 URINALYSIS AUTO W/SCOPE: CPT

## 2020-01-26 RX ORDER — MULTIVITAMIN
1 TABLET ORAL DAILY
COMMUNITY

## 2020-01-26 RX ORDER — ASPIRIN 325 MG
325 TABLET ORAL
Status: DISCONTINUED | OUTPATIENT
Start: 2020-01-26 | End: 2020-01-26

## 2020-01-26 RX ORDER — ZINC SULFATE 50(220)MG
220 CAPSULE ORAL DAILY
COMMUNITY

## 2020-01-26 RX ORDER — POTASSIUM CHLORIDE 3 G/15ML
40 SOLUTION ORAL ONCE
COMMUNITY

## 2020-01-26 RX ORDER — ASPIRIN 81 MG/1
81 TABLET ORAL DAILY
COMMUNITY

## 2020-01-26 RX ORDER — ASCORBIC ACID 500 MG
500 TABLET ORAL 2 TIMES DAILY
COMMUNITY

## 2020-01-26 RX ORDER — ALBUTEROL SULFATE 0.83 MG/ML
2.5 SOLUTION RESPIRATORY (INHALATION) EVERY 6 HOURS PRN
COMMUNITY

## 2020-01-27 PROBLEM — I50.9 CONGESTIVE HEART FAILURE: Chronic | Status: ACTIVE | Noted: 2020-01-27

## 2020-01-27 PROBLEM — Z79.01 LONG TERM (CURRENT) USE OF ANTICOAGULANTS: Chronic | Status: ACTIVE | Noted: 2020-01-13

## 2020-01-27 PROBLEM — R06.03 ACUTE RESPIRATORY DISTRESS: Status: ACTIVE | Noted: 2020-01-27

## 2020-01-27 PROBLEM — R06.02 SHORTNESS OF BREATH: Status: ACTIVE | Noted: 2020-01-27

## 2020-01-27 PROBLEM — Z66 DNR (DO NOT RESUSCITATE): Chronic | Status: ACTIVE | Noted: 2020-01-13

## 2020-01-27 PROBLEM — I50.9 CONGESTIVE HEART FAILURE: Status: ACTIVE | Noted: 2020-01-27

## 2020-01-27 LAB
ALBUMIN SERPL BCP-MCNC: 2.5 G/DL (ref 3.5–5.2)
ALBUMIN SERPL BCP-MCNC: 2.6 G/DL (ref 3.5–5.2)
ALP SERPL-CCNC: 92 U/L (ref 55–135)
ALP SERPL-CCNC: 92 U/L (ref 55–135)
ALT SERPL W/O P-5'-P-CCNC: 11 U/L (ref 10–44)
ALT SERPL W/O P-5'-P-CCNC: 11 U/L (ref 10–44)
ANION GAP SERPL CALC-SCNC: 11 MMOL/L (ref 8–16)
ANION GAP SERPL CALC-SCNC: 9 MMOL/L (ref 8–16)
AST SERPL-CCNC: 17 U/L (ref 10–40)
AST SERPL-CCNC: 17 U/L (ref 10–40)
BACTERIA #/AREA URNS HPF: ABNORMAL /HPF
BACTERIA #/AREA URNS HPF: ABNORMAL /HPF
BASOPHILS # BLD AUTO: 0.02 K/UL (ref 0–0.2)
BASOPHILS # BLD AUTO: 0.02 K/UL (ref 0–0.2)
BASOPHILS NFR BLD: 0.3 % (ref 0–1.9)
BASOPHILS NFR BLD: 0.3 % (ref 0–1.9)
BILIRUB SERPL-MCNC: 1.7 MG/DL (ref 0.1–1)
BILIRUB SERPL-MCNC: 1.9 MG/DL (ref 0.1–1)
BILIRUB UR QL STRIP: NEGATIVE
BILIRUB UR QL STRIP: NEGATIVE
BNP SERPL-MCNC: 206 PG/ML (ref 0–99)
BNP SERPL-MCNC: 206 PG/ML (ref 0–99)
BUN SERPL-MCNC: 11 MG/DL (ref 8–23)
BUN SERPL-MCNC: 13 MG/DL (ref 8–23)
CALCIUM SERPL-MCNC: 7.8 MG/DL (ref 8.7–10.5)
CALCIUM SERPL-MCNC: 7.8 MG/DL (ref 8.7–10.5)
CHLORIDE SERPL-SCNC: 102 MMOL/L (ref 95–110)
CHLORIDE SERPL-SCNC: 105 MMOL/L (ref 95–110)
CK MB SERPL-MCNC: 1.1 NG/ML (ref 0.1–6.5)
CK MB SERPL-MCNC: 1.1 NG/ML (ref 0.1–6.5)
CLARITY UR: CLEAR
CLARITY UR: CLEAR
CO2 SERPL-SCNC: 26 MMOL/L (ref 23–29)
CO2 SERPL-SCNC: 27 MMOL/L (ref 23–29)
COLOR UR: YELLOW
COLOR UR: YELLOW
CREAT SERPL-MCNC: 0.3 MG/DL (ref 0.5–1.4)
CREAT SERPL-MCNC: 0.4 MG/DL (ref 0.5–1.4)
D DIMER PPP IA.FEU-MCNC: 2.98 UG/ML FEU
DIFFERENTIAL METHOD: ABNORMAL
DIFFERENTIAL METHOD: ABNORMAL
EOSINOPHIL # BLD AUTO: 0.1 K/UL (ref 0–0.5)
EOSINOPHIL # BLD AUTO: 0.1 K/UL (ref 0–0.5)
EOSINOPHIL NFR BLD: 1.2 % (ref 0–8)
EOSINOPHIL NFR BLD: 1.2 % (ref 0–8)
ERYTHROCYTE [DISTWIDTH] IN BLOOD BY AUTOMATED COUNT: 18.2 % (ref 11.5–14.5)
ERYTHROCYTE [DISTWIDTH] IN BLOOD BY AUTOMATED COUNT: 18.4 % (ref 11.5–14.5)
EST. GFR  (AFRICAN AMERICAN): >60 ML/MIN/1.73 M^2
EST. GFR  (AFRICAN AMERICAN): >60 ML/MIN/1.73 M^2
EST. GFR  (NON AFRICAN AMERICAN): >60 ML/MIN/1.73 M^2
EST. GFR  (NON AFRICAN AMERICAN): >60 ML/MIN/1.73 M^2
GLUCOSE SERPL-MCNC: 103 MG/DL (ref 70–110)
GLUCOSE SERPL-MCNC: 120 MG/DL (ref 70–110)
GLUCOSE SERPL-MCNC: 144 MG/DL (ref 70–110)
GLUCOSE UR QL STRIP: NEGATIVE
GLUCOSE UR QL STRIP: NEGATIVE
HCT VFR BLD AUTO: 27.2 % (ref 37–48.5)
HCT VFR BLD AUTO: 28.5 % (ref 37–48.5)
HGB BLD-MCNC: 8.4 G/DL (ref 12–16)
HGB BLD-MCNC: 9 G/DL (ref 12–16)
HGB UR QL STRIP: ABNORMAL
HGB UR QL STRIP: ABNORMAL
HYALINE CASTS #/AREA URNS LPF: 10 /LPF
HYALINE CASTS #/AREA URNS LPF: 6 /LPF
IMM GRANULOCYTES # BLD AUTO: 0.03 K/UL (ref 0–0.04)
IMM GRANULOCYTES # BLD AUTO: 0.06 K/UL (ref 0–0.04)
IMM GRANULOCYTES NFR BLD AUTO: 0.4 % (ref 0–0.5)
IMM GRANULOCYTES NFR BLD AUTO: 0.9 % (ref 0–0.5)
INR PPP: 1.4
KETONES UR QL STRIP: NEGATIVE
KETONES UR QL STRIP: NEGATIVE
LACTATE SERPL-SCNC: 0.9 MMOL/L (ref 0.5–1.9)
LEUKOCYTE ESTERASE UR QL STRIP: ABNORMAL
LEUKOCYTE ESTERASE UR QL STRIP: ABNORMAL
LYMPHOCYTES # BLD AUTO: 0.8 K/UL (ref 1–4.8)
LYMPHOCYTES # BLD AUTO: 1 K/UL (ref 1–4.8)
LYMPHOCYTES NFR BLD: 11.5 % (ref 18–48)
LYMPHOCYTES NFR BLD: 13.9 % (ref 18–48)
MAGNESIUM SERPL-MCNC: 1.5 MG/DL (ref 1.6–2.6)
MCH RBC QN AUTO: 30.4 PG (ref 27–31)
MCH RBC QN AUTO: 30.4 PG (ref 27–31)
MCHC RBC AUTO-ENTMCNC: 30.9 G/DL (ref 32–36)
MCHC RBC AUTO-ENTMCNC: 31.6 G/DL (ref 32–36)
MCV RBC AUTO: 96 FL (ref 82–98)
MCV RBC AUTO: 99 FL (ref 82–98)
MICROSCOPIC COMMENT: ABNORMAL
MICROSCOPIC COMMENT: ABNORMAL
MONOCYTES # BLD AUTO: 0.5 K/UL (ref 0.3–1)
MONOCYTES # BLD AUTO: 0.6 K/UL (ref 0.3–1)
MONOCYTES NFR BLD: 7.1 % (ref 4–15)
MONOCYTES NFR BLD: 7.6 % (ref 4–15)
NEUTROPHILS # BLD AUTO: 5.4 K/UL (ref 1.8–7.7)
NEUTROPHILS # BLD AUTO: 5.6 K/UL (ref 1.8–7.7)
NEUTROPHILS NFR BLD: 76.6 % (ref 38–73)
NEUTROPHILS NFR BLD: 79 % (ref 38–73)
NITRITE UR QL STRIP: POSITIVE
NITRITE UR QL STRIP: POSITIVE
NRBC BLD-RTO: 0 /100 WBC
NRBC BLD-RTO: 0 /100 WBC
PH UR STRIP: 6 [PH] (ref 5–8)
PH UR STRIP: 6 [PH] (ref 5–8)
PHOSPHATE SERPL-MCNC: 3.4 MG/DL (ref 2.7–4.5)
PLATELET # BLD AUTO: 229 K/UL (ref 150–350)
PLATELET # BLD AUTO: 245 K/UL (ref 150–350)
PMV BLD AUTO: 8.8 FL (ref 9.2–12.9)
PMV BLD AUTO: 8.9 FL (ref 9.2–12.9)
POTASSIUM SERPL-SCNC: 3 MMOL/L (ref 3.5–5.1)
POTASSIUM SERPL-SCNC: 3 MMOL/L (ref 3.5–5.1)
PROT SERPL-MCNC: 5.7 G/DL (ref 6–8.4)
PROT SERPL-MCNC: 5.7 G/DL (ref 6–8.4)
PROT UR QL STRIP: NEGATIVE
PROT UR QL STRIP: NEGATIVE
PROTHROMBIN TIME: 16.6 SEC (ref 10.6–14.8)
RBC # BLD AUTO: 2.76 M/UL (ref 4–5.4)
RBC # BLD AUTO: 2.96 M/UL (ref 4–5.4)
RBC #/AREA URNS HPF: 2 /HPF (ref 0–4)
RBC #/AREA URNS HPF: 2 /HPF (ref 0–4)
SODIUM SERPL-SCNC: 140 MMOL/L (ref 136–145)
SODIUM SERPL-SCNC: 140 MMOL/L (ref 136–145)
SP GR UR STRIP: 1.01 (ref 1–1.03)
SP GR UR STRIP: 1.02 (ref 1–1.03)
SQUAMOUS #/AREA URNS HPF: 2 /HPF
SQUAMOUS #/AREA URNS HPF: 2 /HPF
TROPONIN I SERPL DL<=0.01 NG/ML-MCNC: 0.03 NG/ML
TROPONIN I SERPL DL<=0.01 NG/ML-MCNC: 0.03 NG/ML
TROPONIN I SERPL DL<=0.01 NG/ML-MCNC: <0.03 NG/ML
URN SPEC COLLECT METH UR: ABNORMAL
URN SPEC COLLECT METH UR: ABNORMAL
UROBILINOGEN UR STRIP-ACNC: 1 EU/DL
UROBILINOGEN UR STRIP-ACNC: 1 EU/DL
WBC # BLD AUTO: 6.8 K/UL (ref 3.9–12.7)
WBC # BLD AUTO: 7.36 K/UL (ref 3.9–12.7)
WBC #/AREA URNS HPF: 14 /HPF (ref 0–5)
WBC #/AREA URNS HPF: 16 /HPF (ref 0–5)

## 2020-01-27 PROCEDURE — 85025 COMPLETE CBC W/AUTO DIFF WBC: CPT

## 2020-01-27 PROCEDURE — 25500020 PHARM REV CODE 255: Performed by: INTERNAL MEDICINE

## 2020-01-27 PROCEDURE — 63600175 PHARM REV CODE 636 W HCPCS: Performed by: FAMILY MEDICINE

## 2020-01-27 PROCEDURE — 25000003 PHARM REV CODE 250: Performed by: NURSE PRACTITIONER

## 2020-01-27 PROCEDURE — 25000003 PHARM REV CODE 250: Performed by: EMERGENCY MEDICINE

## 2020-01-27 PROCEDURE — 81001 URINALYSIS AUTO W/SCOPE: CPT

## 2020-01-27 PROCEDURE — 87186 SC STD MICRODIL/AGAR DIL: CPT

## 2020-01-27 PROCEDURE — 84484 ASSAY OF TROPONIN QUANT: CPT | Mod: 91

## 2020-01-27 PROCEDURE — 84100 ASSAY OF PHOSPHORUS: CPT

## 2020-01-27 PROCEDURE — 36415 COLL VENOUS BLD VENIPUNCTURE: CPT

## 2020-01-27 PROCEDURE — 93005 ELECTROCARDIOGRAM TRACING: CPT

## 2020-01-27 PROCEDURE — 25000003 PHARM REV CODE 250: Performed by: FAMILY MEDICINE

## 2020-01-27 PROCEDURE — 87086 URINE CULTURE/COLONY COUNT: CPT

## 2020-01-27 PROCEDURE — 87077 CULTURE AEROBIC IDENTIFY: CPT

## 2020-01-27 PROCEDURE — 63600175 PHARM REV CODE 636 W HCPCS: Performed by: NURSE PRACTITIONER

## 2020-01-27 PROCEDURE — 82553 CREATINE MB FRACTION: CPT

## 2020-01-27 PROCEDURE — 21400001 HC TELEMETRY ROOM

## 2020-01-27 PROCEDURE — 83735 ASSAY OF MAGNESIUM: CPT

## 2020-01-27 PROCEDURE — 80053 COMPREHEN METABOLIC PANEL: CPT

## 2020-01-27 PROCEDURE — 63600175 PHARM REV CODE 636 W HCPCS: Performed by: EMERGENCY MEDICINE

## 2020-01-27 PROCEDURE — 83605 ASSAY OF LACTIC ACID: CPT

## 2020-01-27 PROCEDURE — 87040 BLOOD CULTURE FOR BACTERIA: CPT

## 2020-01-27 PROCEDURE — 99900035 HC TECH TIME PER 15 MIN (STAT)

## 2020-01-27 RX ORDER — METOPROLOL TARTRATE 25 MG/1
25 TABLET, FILM COATED ORAL 2 TIMES DAILY
Status: DISCONTINUED | OUTPATIENT
Start: 2020-01-27 | End: 2020-01-30 | Stop reason: HOSPADM

## 2020-01-27 RX ORDER — FUROSEMIDE 10 MG/ML
20 INJECTION INTRAMUSCULAR; INTRAVENOUS 2 TIMES DAILY
Status: DISCONTINUED | OUTPATIENT
Start: 2020-01-27 | End: 2020-01-27

## 2020-01-27 RX ORDER — POTASSIUM CHLORIDE 20 MEQ/1
40 TABLET, EXTENDED RELEASE ORAL ONCE
Status: COMPLETED | OUTPATIENT
Start: 2020-01-27 | End: 2020-01-27

## 2020-01-27 RX ORDER — POTASSIUM CHLORIDE 20 MEQ/1
20 TABLET, EXTENDED RELEASE ORAL DAILY
Status: DISCONTINUED | OUTPATIENT
Start: 2020-01-27 | End: 2020-01-29

## 2020-01-27 RX ORDER — AMLODIPINE BESYLATE 5 MG/1
5 TABLET ORAL DAILY
Status: DISCONTINUED | OUTPATIENT
Start: 2020-01-27 | End: 2020-01-30 | Stop reason: HOSPADM

## 2020-01-27 RX ORDER — ATORVASTATIN CALCIUM 40 MG/1
40 TABLET, FILM COATED ORAL DAILY
Status: DISCONTINUED | OUTPATIENT
Start: 2020-01-27 | End: 2020-01-27 | Stop reason: SDUPTHER

## 2020-01-27 RX ORDER — ATORVASTATIN CALCIUM 40 MG/1
40 TABLET, FILM COATED ORAL NIGHTLY
Status: DISCONTINUED | OUTPATIENT
Start: 2020-01-27 | End: 2020-01-30 | Stop reason: HOSPADM

## 2020-01-27 RX ORDER — ASCORBIC ACID 500 MG
500 TABLET ORAL 2 TIMES DAILY
Status: DISCONTINUED | OUTPATIENT
Start: 2020-01-27 | End: 2020-01-30 | Stop reason: HOSPADM

## 2020-01-27 RX ORDER — ZINC SULFATE 50(220)MG
220 CAPSULE ORAL DAILY
Status: DISCONTINUED | OUTPATIENT
Start: 2020-01-27 | End: 2020-01-30 | Stop reason: HOSPADM

## 2020-01-27 RX ORDER — MORPHINE SULFATE 2 MG/ML
1 INJECTION, SOLUTION INTRAMUSCULAR; INTRAVENOUS EVERY 6 HOURS PRN
Status: DISCONTINUED | OUTPATIENT
Start: 2020-01-27 | End: 2020-01-30 | Stop reason: HOSPADM

## 2020-01-27 RX ORDER — BISACODYL 10 MG
10 SUPPOSITORY, RECTAL RECTAL DAILY PRN
Status: DISCONTINUED | OUTPATIENT
Start: 2020-01-27 | End: 2020-01-30 | Stop reason: HOSPADM

## 2020-01-27 RX ORDER — QUETIAPINE FUMARATE 25 MG/1
25 TABLET, FILM COATED ORAL 2 TIMES DAILY
Status: DISCONTINUED | OUTPATIENT
Start: 2020-01-27 | End: 2020-01-30 | Stop reason: HOSPADM

## 2020-01-27 RX ORDER — POTASSIUM CHLORIDE 20 MEQ/15ML
40 SOLUTION ORAL ONCE
Status: DISCONTINUED | OUTPATIENT
Start: 2020-01-27 | End: 2020-01-27

## 2020-01-27 RX ORDER — FUROSEMIDE 10 MG/ML
40 INJECTION INTRAMUSCULAR; INTRAVENOUS 2 TIMES DAILY
Status: DISCONTINUED | OUTPATIENT
Start: 2020-01-28 | End: 2020-01-28

## 2020-01-27 RX ORDER — FUROSEMIDE 10 MG/ML
20 INJECTION INTRAMUSCULAR; INTRAVENOUS
Status: COMPLETED | OUTPATIENT
Start: 2020-01-27 | End: 2020-01-27

## 2020-01-27 RX ORDER — FUROSEMIDE 10 MG/ML
20 INJECTION INTRAMUSCULAR; INTRAVENOUS DAILY
Status: DISCONTINUED | OUTPATIENT
Start: 2020-01-27 | End: 2020-01-27

## 2020-01-27 RX ORDER — ASPIRIN 81 MG/1
81 TABLET ORAL DAILY
Status: DISCONTINUED | OUTPATIENT
Start: 2020-01-27 | End: 2020-01-30 | Stop reason: HOSPADM

## 2020-01-27 RX ORDER — ASPIRIN 325 MG
325 TABLET, DELAYED RELEASE (ENTERIC COATED) ORAL ONCE
Status: DISCONTINUED | OUTPATIENT
Start: 2020-01-27 | End: 2020-01-27

## 2020-01-27 RX ORDER — SODIUM CHLORIDE 0.9 % (FLUSH) 0.9 %
10 SYRINGE (ML) INJECTION
Status: DISCONTINUED | OUTPATIENT
Start: 2020-01-27 | End: 2020-01-30 | Stop reason: HOSPADM

## 2020-01-27 RX ADMIN — METOPROLOL TARTRATE 25 MG: 25 TABLET ORAL at 08:01

## 2020-01-27 RX ADMIN — METOPROLOL TARTRATE 25 MG: 25 TABLET ORAL at 09:01

## 2020-01-27 RX ADMIN — IOHEXOL 100 ML: 350 INJECTION, SOLUTION INTRAVENOUS at 04:01

## 2020-01-27 RX ADMIN — OXYCODONE HYDROCHLORIDE AND ACETAMINOPHEN 500 MG: 500 TABLET ORAL at 09:01

## 2020-01-27 RX ADMIN — AMLODIPINE BESYLATE 5 MG: 5 TABLET ORAL at 09:01

## 2020-01-27 RX ADMIN — ATORVASTATIN CALCIUM 40 MG: 40 TABLET, FILM COATED ORAL at 08:01

## 2020-01-27 RX ADMIN — THERA TABS 1 TABLET: TAB at 09:01

## 2020-01-27 RX ADMIN — FUROSEMIDE 20 MG: 10 INJECTION, SOLUTION INTRAMUSCULAR; INTRAVENOUS at 02:01

## 2020-01-27 RX ADMIN — POTASSIUM CHLORIDE 20 MEQ: 20 TABLET, EXTENDED RELEASE ORAL at 09:01

## 2020-01-27 RX ADMIN — FUROSEMIDE 20 MG: 20 INJECTION, SOLUTION INTRAMUSCULAR; INTRAVENOUS at 09:01

## 2020-01-27 RX ADMIN — ZINC SULFATE 220 MG (50 MG) CAPSULE 220 MG: CAPSULE at 09:01

## 2020-01-27 RX ADMIN — ASPIRIN 81 MG: 81 TABLET, DELAYED RELEASE ORAL at 09:01

## 2020-01-27 RX ADMIN — OXYCODONE HYDROCHLORIDE AND ACETAMINOPHEN 500 MG: 500 TABLET ORAL at 08:01

## 2020-01-27 RX ADMIN — POTASSIUM CHLORIDE 40 MEQ: 1500 TABLET, EXTENDED RELEASE ORAL at 03:01

## 2020-01-27 RX ADMIN — CEFTRIAXONE SODIUM 1 G: 1 INJECTION, POWDER, FOR SOLUTION INTRAMUSCULAR; INTRAVENOUS at 03:01

## 2020-01-27 RX ADMIN — QUETIAPINE 25 MG: 25 TABLET ORAL at 08:01

## 2020-01-27 RX ADMIN — FUROSEMIDE 20 MG: 20 INJECTION, SOLUTION INTRAMUSCULAR; INTRAVENOUS at 05:01

## 2020-01-27 RX ADMIN — QUETIAPINE 25 MG: 25 TABLET ORAL at 09:01

## 2020-01-27 NOTE — H&P
[facility] Medicine History & Physical Examination   Patient Name: Ellen Maravilla  MRN: [id number]  Patient Class: Emergency   Admission Date: 1/26/2020 10:51 PM  Length of Stay: 0  Attending Physician:   Primary Care Provider: Tien Mckeon MD  Face-to-Face encounter date: 01/27/2020  Code Status:DNR  MPOA: Son  Chief Complaint: Shortness of Breath (sob started last night, increase edema noted to lower extremities got worse throughout the day, given lasix 40 mg IM and po potassium )        Patient information was obtained from patient, past medical records and ER records.   HISTORY OF PRESENT ILLNESS:   Ellen Maravilla is a 87 y.o. old  female who  has a past medical history of Coronary artery disease, Dementia, Hypertension, Pulmonary embolism, Stroke, Thyroid disease, and Uterine cancer.. The patient presented to [facility] on 1/26/2020 with a primary complaint of Shortness of Breath (sob started last night, increase edema noted to lower extremities got worse throughout the day, given lasix 40 mg IM and po potassium )  .   87-year-old  female presents emergency room with shortness of breath.  According to the family the patient is a resident and [facility].  The nursing home called the family seen at the patient was short of breath and she needed to come to the emergency room for further evaluation.  This patient was recently discharged from our facility secondary to symptomatic anemia a EGD was performed and the lesion was cauterized.  The patient has a history of PEs and at that time was taken off of her Eliquis.  Also on her prior admit it was noted that she had bruising to her anterior chest wall prior to arrival.  Today on my exam the patient has with appears to be a large hematoma to the right anterior chest wall near her Port-A-Cath.  She was also noted to have extensive swelling to the righy upper extremity    REVIEW OF SYSTEMS:   10 Point Review of System  was performed and was found to be negative except for that mentioned already in the HPI and   Review of Systems (Negative unless checked off)  Review of Systems   Constitutional: Positive for malaise/fatigue.   HENT: Negative.    Eyes: Negative.    Respiratory: Positive for shortness of breath.    Cardiovascular: Negative.    Gastrointestinal: Negative.    Genitourinary: Positive for frequency.   Musculoskeletal: Negative.    Skin:        Old bruising to right anterior chest wall and to xiphoid process region.  Large palpable mass to right chest wall possible Hematoma   Neurological: Positive for weakness.   Endo/Heme/Allergies: Negative.    Psychiatric/Behavioral: Positive for memory loss.           PAST MEDICAL HISTORY:     Past Medical History:   Diagnosis Date    Coronary artery disease     Dementia     Hypertension     Pulmonary embolism     Stroke     history of CVA    Thyroid disease     Uterine cancer        PAST SURGICAL HISTORY:     Past Surgical History:   Procedure Laterality Date    APPENDECTOMY      BACK SURGERY      HYSTERECTOMY      KNEE SURGERY      SMALL BOWEL ENTEROSCOPY Left 1/15/2020    Procedure: ENTEROSCOPY;  Surgeon: Kyle Collins MD;  Location: [facility];  Service: Endoscopy;  Laterality: Left;    TONSILLECTOMY         ALLERGIES:   Patient has no known allergies.    FAMILY HISTORY:     Family History   Problem Relation Age of Onset    No Known Problems Mother     Depression Father     Early death Father        SOCIAL HISTORY:     Social History     Tobacco Use    Smoking status: Never Smoker    Smokeless tobacco: Never Used   Substance Use Topics    Alcohol use: No     Comment: rarely        Social History     Substance and Sexual Activity   Sexual Activity Not on file        HOME MEDICATIONS:     Prior to Admission medications    Medication Sig Start Date End Date Taking? Authorizing Provider   albuterol (PROVENTIL) 2.5 mg /3 mL (0.083 %) nebulizer solution Take 2.5 mg  by nebulization every 6 (six) hours as needed for Wheezing. Rescue   Yes Historical Provider, MD   amLODIPine (NORVASC) 5 MG tablet Take 1 tablet (5 mg total) by mouth once daily. 11/30/19 1/26/20 Yes GLORIA Dixon   ascorbic acid, vitamin C, (VITAMIN C) 500 MG tablet Take 500 mg by mouth 2 (two) times daily.   Yes Historical Provider, MD   aspirin (ECOTRIN) 81 MG EC tablet Take 81 mg by mouth once daily.   Yes Historical Provider, MD   atorvastatin (LIPITOR) 40 MG tablet Take 1 tablet (40 mg total) by mouth once daily.  Patient taking differently: Take 40 mg by mouth every evening.  11/30/19 1/26/20 Yes GLORIA Dixon   cyanocobalamin (VITAMIN B-12) 1000 MCG tablet Take 1 tablet (1,000 mcg total) by mouth once daily. 12/18/19  Yes Nadya Deluna MD   epoetin ronald (PROCRIT INJ) Inject 4,325 Units as directed Every Friday.    Yes Historical Provider, MD   metoprolol tartrate (LOPRESSOR) 25 MG tablet Take 1 tablet (25 mg total) by mouth 2 (two) times daily. 11/29/19 1/26/20 Yes GLORIA Dixon   multivitamin (THERAGRAN) per tablet Take 1 tablet by mouth once daily.   Yes Historical Provider, MD   polyethylene glycol (GLYCOLAX) 17 gram/dose powder Take 17 g by mouth once daily.   Yes Historical Provider, MD   potassium chloride 40 mEq/15 mL Liqd Take 40 mEq by mouth once.   Yes Historical Provider, MD   protein supplement (PROMOD PROTEIN) Liqd Take 30 mLs by mouth 2 (two) times daily.   Yes Historical Provider, MD   QUEtiapine (SEROQUEL) 25 MG Tab Take 25 mg by mouth 2 (two) times daily.   Yes Historical Provider, MD   zinc sulfate (ZINCATE) 220 (50) mg capsule Take 220 mg by mouth once daily.   Yes Historical Provider, MD   acetaminophen (TYLENOL) 325 MG tablet Take 650 mg by mouth every 4 (four) hours as needed for Pain.    Historical Provider, MD   bisacodyl (DULCOLAX) 10 mg Supp Place 1 suppository (10 mg total) rectally daily as needed. 11/29/19   GLORIA Dixon   traMADol  "(ULTRAM) 50 mg tablet Take 25 mg by mouth every 8 (eight) hours as needed for Pain.    Historical Provider, MD         PHYSICAL EXAM:   /62   Pulse 95   Temp 100 °F (37.8 °C) (Rectal)   Resp (!) 22   Ht 5' 6" (1.676 m)   Wt 90.7 kg (200 lb)   LMP  (LMP Unknown)   SpO2 96%   BMI 32.28 kg/m²   Vitals Reviewed  General appearance: Well-developed, well-nourished female in no apparent distress.  Skin:  Old bruising to the anterior chest wall also bruising noted to the right upper anterior chest wall that appears to be old.  She has a large firm mass to the area to her right breast region with increased warmth will evaluate for hematoma   Neuro: Motor and sensory exams grossly intact. Good tone. Power in all 4 extremities 5/5.   HENT: Atraumatic head. Moist mucous membranes of oral cavity.  Eyes: Normal extraocular movements.   Neck: Supple. No evidence of lymphadenopathy. No thyroidomegaly.  Lungs:  Moderate tachypnea with some pursed lip breathing.  Heart: Regular rate and rhythm. S1 and S2 present with no murmurs/gallop/rub. No pedal edema. No JVD present.   Abdomen: Soft, non-distended, non-tender. No rebound tenderness/guarding. No masses or organomegaly. Bowel sounds are normal. Bladder is not palpable.   Extremities: No cyanosis, clubbing, or edema.  Psych/mental status: Alert confused with a history of dementia does not Respond to questions.   EMERGENCY DEPARTMENT LABS AND IMAGING:   Following labs were Reviewed   Recent Labs   Lab 01/26/20  2336   WBC 7.36   HGB 8.4*   HCT 27.2*      CALCIUM 7.8*   ALBUMIN 2.6*   PROT 5.7*      K 3.0*   CO2 26      BUN 13   CREATININE 0.4*   ALKPHOS 92   ALT 11   AST 17   BILITOT 1.9*         BMP:   Recent Labs   Lab 01/26/20  2336   *      K 3.0*      CO2 26   BUN 13   CREATININE 0.4*   CALCIUM 7.8*   , CMP   Recent Labs   Lab 01/26/20  2336      K 3.0*      CO2 26   *   BUN 13   CREATININE 0.4*   CALCIUM " 7.8*   PROT 5.7*   ALBUMIN 2.6*   BILITOT 1.9*   ALKPHOS 92   AST 17   ALT 11   ANIONGAP 9   ESTGFRAFRICA >60.0   EGFRNONAA >60.0   , CBC   Recent Labs   Lab 01/26/20  2336   WBC 7.36   HGB 8.4*   HCT 27.2*      , INR   Lab Results   Component Value Date    INR 1.4 01/26/2020    INR 1.1 12/13/2019    INR 1.1 11/22/2019   , Lipid Panel   Lab Results   Component Value Date    CHOL 212 (H) 11/21/2019    HDL 68 11/21/2019    LDLCALC 132.0 11/21/2019    TRIG 60 11/21/2019    CHOLHDL 32.1 11/21/2019   , Troponin   Recent Labs   Lab 01/26/20  2336   TROPONINI <0.030   , A1C:   Recent Labs   Lab 11/21/19  1750   HGBA1C 5.1    and All labs within the past 24 hours have been reviewed  Microbiology Results (last 7 days)     Procedure Component Value Units Date/Time    Blood culture #2 **CANNOT BE ORDERED STAT** [[id number]] Collected:  01/27/20 0218    Order Status:  Sent Specimen:  Blood from Peripheral, Antecubital, Left Updated:  01/27/20 0222    Blood culture #1 **CANNOT BE ORDERED STAT** [[id number]] Collected:  01/26/20 2328    Order Status:  Sent Specimen:  Blood from Peripheral, Forearm, Right Updated:  01/27/20 0208        X-Ray Chest AP Portable    (Results Pending)   US Upper Extremity Veins Right    (Results Pending)    Twelve lead EKG reveals a right bundle branch block with normal axis this significant ST depression and T-wave inversion in V 3 and some ST depression in V4 through V6 rate 99  milliseconds    ASSESSMENT & PLAN:   Ellen Maravilla is a 87 y.o. female admitted for dyspnea    1.  Dyspnea acute respiratory distress  -possibly secondary to acute heart failure  -patient with new EKG changes with significant ST depression and T-wave inversion in V3 and some ST depression in V4 through V6  -given Lasix in the ED  -accurate I&Os  -trend cardiac enzymes and troponin  -2D echo complete  -consult Cardiology  -cardioprotective medications    2 urinary tract infection   -urine cultures sent in the  ED  -started on Rocephin    3.  Large mass to right anterior chest wall near Port-A-Cath  -possibly hematoma  -will get CT of the chest    4.  Recent GI bleed taken off Eliquis  -had EGD per with cauterization    5.  Hyperlipidemia    6 history of PE  -taken off Eliquis after GI bleed 12/2019    7.  Advanced dementia      DVT Prophylaxis: will be placed on Heparin/Lovenox for DVT prophylaxis and will be advised to be as mobile as possible and sit in a chair as tolerated.   ________________________________________________________________________________    Discharge Planning and Disposition: No mobility needs. Ambulating well. Good social support system. Patient will be discharged in   Face-to-Face encounter date: 01/27/2020  Encounter included review of the medical records, interviewing and examining the patient face-to-face, discussion with family and other health care providers including emergency medicine physician, admission orders, interpreting lab/test results and formulating a plan of care.   Medical Decision Making during this encounter was  [_] Low Complexity  [_] Moderate Complexity  [x] High Complexity  _________________________________________________________________________________    INPATIENT LIST OF MEDICATIONS     Current Facility-Administered Medications:     cefTRIAXone (ROCEPHIN) 1 g in dextrose 5 % 50 mL IVPB, 1 g, Intravenous, Once, Avril Parish MD    furosemide injection 20 mg, 20 mg, Intravenous, ED 1 Time, Avril Parish MD    potassium chloride SA CR tablet 40 mEq, 40 mEq, Oral, Once, Avril Parish MD    Current Outpatient Medications:     albuterol (PROVENTIL) 2.5 mg /3 mL (0.083 %) nebulizer solution, Take 2.5 mg by nebulization every 6 (six) hours as needed for Wheezing. Rescue, Disp: , Rfl:     amLODIPine (NORVASC) 5 MG tablet, Take 1 tablet (5 mg total) by mouth once daily., Disp: 30 tablet, Rfl: 0    ascorbic acid, vitamin C, (VITAMIN C) 500 MG tablet, Take 500 mg  by mouth 2 (two) times daily., Disp: , Rfl:     aspirin (ECOTRIN) 81 MG EC tablet, Take 81 mg by mouth once daily., Disp: , Rfl:     atorvastatin (LIPITOR) 40 MG tablet, Take 1 tablet (40 mg total) by mouth once daily. (Patient taking differently: Take 40 mg by mouth every evening. ), Disp: 30 tablet, Rfl: 0    cyanocobalamin (VITAMIN B-12) 1000 MCG tablet, Take 1 tablet (1,000 mcg total) by mouth once daily., Disp: , Rfl:     epoetin ronald (PROCRIT INJ), Inject 4,325 Units as directed Every Friday. , Disp: , Rfl:     metoprolol tartrate (LOPRESSOR) 25 MG tablet, Take 1 tablet (25 mg total) by mouth 2 (two) times daily., Disp: 60 tablet, Rfl: 0    multivitamin (THERAGRAN) per tablet, Take 1 tablet by mouth once daily., Disp: , Rfl:     polyethylene glycol (GLYCOLAX) 17 gram/dose powder, Take 17 g by mouth once daily., Disp: , Rfl:     potassium chloride 40 mEq/15 mL Liqd, Take 40 mEq by mouth once., Disp: , Rfl:     protein supplement (PROMOD PROTEIN) Liqd, Take 30 mLs by mouth 2 (two) times daily., Disp: , Rfl:     QUEtiapine (SEROQUEL) 25 MG Tab, Take 25 mg by mouth 2 (two) times daily., Disp: , Rfl:     zinc sulfate (ZINCATE) 220 (50) mg capsule, Take 220 mg by mouth once daily., Disp: , Rfl:     acetaminophen (TYLENOL) 325 MG tablet, Take 650 mg by mouth every 4 (four) hours as needed for Pain., Disp: , Rfl:     bisacodyl (DULCOLAX) 10 mg Supp, Place 1 suppository (10 mg total) rectally daily as needed., Disp: , Rfl: 0    traMADol (ULTRAM) 50 mg tablet, Take 25 mg by mouth every 8 (eight) hours as needed for Pain., Disp: , Rfl:       Scheduled Meds:   cefTRIAXone (ROCEPHIN) IVPB  1 g Intravenous Once    furosemide  20 mg Intravenous ED 1 Time    potassium chloride SA  40 mEq Oral Once     Continuous Infusions:  PRN Meds:.      Lupe Garcia  [facility] Hospitalist NP  01/27/2020

## 2020-01-27 NOTE — ED NOTES
While rolling patient to check rectal temperature, pt had moderate amount of liquid stool produced. Pt cleaned, linens changed, new brief applied.

## 2020-01-27 NOTE — PROGRESS NOTES
87-year-old female history of dementia, PE from [facility] admitted for shortness of breath.  Patient had EGD recently that was taken off Eliquis.  Since admission, patient has not been hypoxic.  CT chest found no evidence of PE, found mild pleural effusion bilaterally with underlying lung collapse.  Per nursing, ED noted that lung collapse was too small to place a chest tube.  Patient's shortness of breath has been improved with diuresis.  Trending cardiac enzymes.  Cardiology on board.  Patient is breathing comfortably on room air.  Denies any chest pain. Reports of mild shortness of breath.  Currently eating without difficulty in speaking in complete sentences.    General: Patient resting comfortably in no acute distress. Appears as stated age. Calm  Eyes: EOM intact. No conjunctivae injection. No scleral icterus.  ENT: Hearing grossly intact. No discharge from ears. No nasal discharge.   CVS: RRR. Murmur over 2nd right intercostal space. +3 pitting edema up to calf  Lungs: CTA BL, no wheezing or crackles. Good breath sounds. No accessory muscle use. No acute respiratory distress  Neuro: Alert. Moves all extremities equally. Follows commands. Responds appropriately

## 2020-01-27 NOTE — ED PROVIDER NOTES
Encounter Date: 1/26/2020       History     Chief Complaint   Patient presents with    Shortness of Breath     sob started last night, increase edema noted to lower extremities got worse throughout the day, given lasix 40 mg IM and po potassium      Chief complaint is leg swelling. At the present time she appears to have no shortness of breath.  She does have dementia.  She is unsure of the year president.  She does recognize her 2 children in the room.  According to the 2 children her legs are never this swollen.  She is lying flat without any respiratory distress. She does have a past medical history of pulmonary embolism stroke ovarian cancer hypertension current artery disease and thyroid disease.        Review of patient's allergies indicates:  No Known Allergies  Past Medical History:   Diagnosis Date    Coronary artery disease     Dementia     Hypertension     Pulmonary embolism     Stroke     history of CVA    Thyroid disease     Uterine cancer      Past Surgical History:   Procedure Laterality Date    APPENDECTOMY      BACK SURGERY      HYSTERECTOMY      KNEE SURGERY      SMALL BOWEL ENTEROSCOPY Left 1/15/2020    Procedure: ENTEROSCOPY;  Surgeon: Kyle Collins MD;  Location: [facility];  Service: Endoscopy;  Laterality: Left;    TONSILLECTOMY       Family History   Problem Relation Age of Onset    No Known Problems Mother     Depression Father     Early death Father      Social History     Tobacco Use    Smoking status: Never Smoker    Smokeless tobacco: Never Used   Substance Use Topics    Alcohol use: No     Comment: rarely    Drug use: No     Review of Systems   Constitutional: Negative for chills and fever.   HENT: Negative for ear pain, rhinorrhea and sore throat.    Eyes: Negative for pain and visual disturbance.   Respiratory: Positive for shortness of breath. Negative for cough.    Cardiovascular: Negative for chest pain and palpitations.   Gastrointestinal: Negative for  abdominal pain, constipation, diarrhea, nausea and vomiting.   Genitourinary: Negative for dysuria, frequency, hematuria and urgency.   Musculoskeletal: Negative for back pain, joint swelling and myalgias.        Leg swelling bilaterally   Skin: Negative for rash.   Neurological: Negative for dizziness, seizures, weakness and headaches.   Psychiatric/Behavioral: Negative for dysphoric mood. The patient is not nervous/anxious.        Physical Exam     Initial Vitals [01/26/20 2255]   BP Pulse Resp Temp SpO2   119/66 106 20 99 °F (37.2 °C) 96 %      MAP       --         Physical Exam    Nursing note and vitals reviewed.  Constitutional: She appears well-developed and well-nourished.   HENT:   Head: Normocephalic and atraumatic.   Eyes: Conjunctivae, EOM and lids are normal. Pupils are equal, round, and reactive to light.   Neck: Trachea normal and normal range of motion. Neck supple. No thyroid mass and no thyromegaly present.   Cardiovascular: Normal rate and regular rhythm.   Murmur heard.  Patient has systolic ejection murmur right 2nd intercostal space   Pulmonary/Chest: Effort normal and breath sounds normal.   Abdominal: Soft. Normal appearance and bowel sounds are normal. There is no tenderness.   Musculoskeletal: Normal range of motion.   Neurological: She is alert. She has normal reflexes. No cranial nerve deficit or sensory deficit.   The patient is disoriented.  She does have a history of dementia.  She is oriented to person not to place or situation.  She is unsure who the president is.  She does recognize her 2 children and is able to name them.   Skin: Skin is warm and dry.   Patient with swelling to the right upper arm bruise to the right anterior chest near her breast area on the right.  She has bruising to her right lower back as well as left iliac area.  The patient has swelling to both lower extremities.   Psychiatric: She has a normal mood and affect. Her speech is normal and behavior is normal.  Judgment and thought content normal.         ED Course   Procedures  Labs Reviewed   CBC W/ AUTO DIFFERENTIAL   COMPREHENSIVE METABOLIC PANEL   TROPONIN I   B-TYPE NATRIURETIC PEPTIDE   PROTIME-INR     EKG Readings: (Independently Interpreted)   EKG reveals undetermined rhythm right bundle branch block no ST elevation definite ST depression in V2 V3       Imaging Results          X-Ray Chest AP Portable (In process)                                                  Clinical Impression:       ICD-10-CM ICD-9-CM   1. Congestive heart failure, unspecified HF chronicity, unspecified heart failure type I50.9 428.0   2. SOB (shortness of breath) R06.02 786.05   3. Chest pain R07.9 786.50   4. Leg swelling M79.89 729.81                             Avril Parish MD  01/27/20 0236

## 2020-01-28 PROBLEM — E88.09 HYPOALBUMINEMIA: Status: ACTIVE | Noted: 2020-01-28

## 2020-01-28 LAB
ALBUMIN SERPL BCP-MCNC: 2.3 G/DL (ref 3.5–5.2)
ALP SERPL-CCNC: 77 U/L (ref 55–135)
ALT SERPL W/O P-5'-P-CCNC: 10 U/L (ref 10–44)
ANION GAP SERPL CALC-SCNC: 10 MMOL/L (ref 8–16)
ANION GAP SERPL CALC-SCNC: 10 MMOL/L (ref 8–16)
AST SERPL-CCNC: 22 U/L (ref 10–40)
BILIRUB SERPL-MCNC: 1.2 MG/DL (ref 0.1–1)
BUN SERPL-MCNC: 14 MG/DL (ref 8–23)
BUN SERPL-MCNC: 14 MG/DL (ref 8–23)
CALCIUM SERPL-MCNC: 7.6 MG/DL (ref 8.7–10.5)
CALCIUM SERPL-MCNC: 7.6 MG/DL (ref 8.7–10.5)
CHLORIDE SERPL-SCNC: 102 MMOL/L (ref 95–110)
CHLORIDE SERPL-SCNC: 102 MMOL/L (ref 95–110)
CO2 SERPL-SCNC: 25 MMOL/L (ref 23–29)
CO2 SERPL-SCNC: 25 MMOL/L (ref 23–29)
CREAT SERPL-MCNC: <0.3 MG/DL (ref 0.5–1.4)
CREAT SERPL-MCNC: <0.3 MG/DL (ref 0.5–1.4)
EST. GFR  (AFRICAN AMERICAN): >60 ML/MIN/1.73 M^2
EST. GFR  (AFRICAN AMERICAN): >60 ML/MIN/1.73 M^2
EST. GFR  (NON AFRICAN AMERICAN): >60 ML/MIN/1.73 M^2
EST. GFR  (NON AFRICAN AMERICAN): >60 ML/MIN/1.73 M^2
GLUCOSE SERPL-MCNC: 104 MG/DL (ref 70–110)
GLUCOSE SERPL-MCNC: 104 MG/DL (ref 70–110)
POTASSIUM SERPL-SCNC: 3.4 MMOL/L (ref 3.5–5.1)
POTASSIUM SERPL-SCNC: 3.4 MMOL/L (ref 3.5–5.1)
PROT SERPL-MCNC: 5.3 G/DL (ref 6–8.4)
SODIUM SERPL-SCNC: 137 MMOL/L (ref 136–145)
SODIUM SERPL-SCNC: 137 MMOL/L (ref 136–145)

## 2020-01-28 PROCEDURE — 63600175 PHARM REV CODE 636 W HCPCS: Performed by: FAMILY MEDICINE

## 2020-01-28 PROCEDURE — 80053 COMPREHEN METABOLIC PANEL: CPT

## 2020-01-28 PROCEDURE — 25000003 PHARM REV CODE 250: Performed by: NURSE PRACTITIONER

## 2020-01-28 PROCEDURE — 25000003 PHARM REV CODE 250: Performed by: FAMILY MEDICINE

## 2020-01-28 PROCEDURE — 94761 N-INVAS EAR/PLS OXIMETRY MLT: CPT

## 2020-01-28 PROCEDURE — 97165 OT EVAL LOW COMPLEX 30 MIN: CPT

## 2020-01-28 PROCEDURE — 63600175 PHARM REV CODE 636 W HCPCS: Performed by: SPECIALIST

## 2020-01-28 PROCEDURE — 36415 COLL VENOUS BLD VENIPUNCTURE: CPT

## 2020-01-28 PROCEDURE — 21400001 HC TELEMETRY ROOM

## 2020-01-28 PROCEDURE — 25000003 PHARM REV CODE 250: Performed by: INTERNAL MEDICINE

## 2020-01-28 PROCEDURE — 97535 SELF CARE MNGMENT TRAINING: CPT

## 2020-01-28 RX ORDER — FUROSEMIDE 10 MG/ML
60 INJECTION INTRAMUSCULAR; INTRAVENOUS 2 TIMES DAILY
Status: DISCONTINUED | OUTPATIENT
Start: 2020-01-28 | End: 2020-01-30 | Stop reason: HOSPADM

## 2020-01-28 RX ORDER — FUROSEMIDE 10 MG/ML
20 INJECTION INTRAMUSCULAR; INTRAVENOUS ONCE
Status: COMPLETED | OUTPATIENT
Start: 2020-01-28 | End: 2020-01-28

## 2020-01-28 RX ORDER — ENOXAPARIN SODIUM 100 MG/ML
1 INJECTION SUBCUTANEOUS
Status: DISCONTINUED | OUTPATIENT
Start: 2020-01-28 | End: 2020-01-29

## 2020-01-28 RX ORDER — POTASSIUM CHLORIDE 20 MEQ/1
40 TABLET, EXTENDED RELEASE ORAL ONCE
Status: COMPLETED | OUTPATIENT
Start: 2020-01-28 | End: 2020-01-28

## 2020-01-28 RX ADMIN — ATORVASTATIN CALCIUM 40 MG: 40 TABLET, FILM COATED ORAL at 09:01

## 2020-01-28 RX ADMIN — NITROGLYCERIN 1 INCH: 20 OINTMENT TOPICAL at 09:01

## 2020-01-28 RX ADMIN — POTASSIUM CHLORIDE 40 MEQ: 20 TABLET, EXTENDED RELEASE ORAL at 05:01

## 2020-01-28 RX ADMIN — CEFTRIAXONE 1 G: 1 INJECTION, SOLUTION INTRAVENOUS at 10:01

## 2020-01-28 RX ADMIN — METOPROLOL TARTRATE 25 MG: 25 TABLET ORAL at 09:01

## 2020-01-28 RX ADMIN — ENOXAPARIN SODIUM 90 MG: 100 INJECTION SUBCUTANEOUS at 02:01

## 2020-01-28 RX ADMIN — ZINC SULFATE 220 MG (50 MG) CAPSULE 220 MG: CAPSULE at 09:01

## 2020-01-28 RX ADMIN — OXYCODONE HYDROCHLORIDE AND ACETAMINOPHEN 500 MG: 500 TABLET ORAL at 09:01

## 2020-01-28 RX ADMIN — FUROSEMIDE 40 MG: 10 INJECTION, SOLUTION INTRAMUSCULAR; INTRAVENOUS at 08:01

## 2020-01-28 RX ADMIN — POTASSIUM CHLORIDE 20 MEQ: 20 TABLET, EXTENDED RELEASE ORAL at 08:01

## 2020-01-28 RX ADMIN — AMLODIPINE BESYLATE 5 MG: 5 TABLET ORAL at 08:01

## 2020-01-28 RX ADMIN — OXYCODONE HYDROCHLORIDE AND ACETAMINOPHEN 500 MG: 500 TABLET ORAL at 08:01

## 2020-01-28 RX ADMIN — ASPIRIN 81 MG: 81 TABLET, DELAYED RELEASE ORAL at 08:01

## 2020-01-28 RX ADMIN — FUROSEMIDE 60 MG: 10 INJECTION, SOLUTION INTRAMUSCULAR; INTRAVENOUS at 06:01

## 2020-01-28 RX ADMIN — QUETIAPINE 25 MG: 25 TABLET ORAL at 08:01

## 2020-01-28 RX ADMIN — FUROSEMIDE 20 MG: 20 INJECTION, SOLUTION INTRAMUSCULAR; INTRAVENOUS at 02:01

## 2020-01-28 RX ADMIN — METOPROLOL TARTRATE 25 MG: 25 TABLET ORAL at 08:01

## 2020-01-28 RX ADMIN — QUETIAPINE 25 MG: 25 TABLET ORAL at 09:01

## 2020-01-28 RX ADMIN — THERA TABS 1 TABLET: TAB at 08:01

## 2020-01-28 NOTE — PT/OT/SLP EVAL
"Occupational Therapy   Evaluation    Name: Ellen Maravilla  MRN: [id number]  Admitting Diagnosis:  Shortness of breath      Recommendations:     Discharge Recommendations: nursing facility, skilled  Discharge Equipment Recommendations:  (TBD next level of care)  Barriers to discharge:  None    Assessment:     Ellen Maravilla is a 87 y.o. female with a medical diagnosis of Shortness of breath.  Pt agreeable to OT eval this AM. Performance deficits affecting function: weakness, impaired endurance, impaired self care skills, impaired functional mobilty, impaired cognition, decreased safety awareness, decreased coordination, decreased upper extremity function, decreased ROM, edema, impaired cardiopulmonary response to activity. Pt oriented to person with no family at bedside. Pt jenna historian, but states she does not recall the last time she has walked and has not been getting out of bed at the NH. Pt does report she can perform g/h tasks by herself. Per chart, pt's son stated she was walking with AD ~2 weeks ago. PLOF unclear at this time. Pt with O2 sats at 95% and HR at 83 at end of eval. Rec SNF at d/c.     Rehab Prognosis: Fair; patient would benefit from acute skilled OT services to address these deficits and reach maximum level of function.       Plan:     Patient to be seen 5 x/week to address the above listed problems via self-care/home management, therapeutic activities, therapeutic exercises  · Plan of Care Expires: 02/28/20  · Plan of Care Reviewed with: patient    Subjective     Chief Complaint: "I'm feeling tired"  Patient/Family Comments/goals: to sleep    Occupational Profile:  Living Environment: Pt is a resident at [facility]  Previous level of function: Unclear at this time, no family at bedside  Equipment Used at Home:  (Pt jenna historian, does not know)  Assistance upon Discharge: from NH staff    Pain/Comfort:  · Pain Rating 1: 0/10    Patients cultural, spiritual, Cheondoism conflicts given the " current situation:      Objective:     Communicated with: nursing prior to session.  Patient found HOB elevated with bed alarm, telemetry, heck catheter upon OT entry to room.    General Precautions: Standard, fall   Orthopedic Precautions:N/A   Braces: N/A     Occupational Performance:    Activities of Daily Living:  · Feeding:  nurse extender reports that patient can start to feed self during meals, but then becomes tired and requires asssitance to finish meals    · Grooming: setup assistance to brush teeth and wash face bed level      Cognitive/Visual Perceptual:  Cognitive/Psychosocial Skills:     -       Oriented to: Person   -       Follows Commands/attention:Follows two-step commands  -       Communication: clear/fluent  -       Memory: Deficits (pt with history of dementia)  -       Mood/Affect/Coping skills/emotional control: Appropriate to situation, Cooperative, Flat affect, Lethargic     Physical Exam:  Edema:  Noted in RUE  Upper Extremity Range of Motion:     -       Right Upper Extremity: ~75 degrees shoulder flex; WFL distally  -       Left Upper Extremity: ~90 degrees shoulder flex, WFL distally  Upper Extremity Strength:    -       Right/Left Upper Extremity: NT 2/2 pt unable to understand/follow commands to formally test   Strength:    -       Right Upper Extremity: poor   -       Left Upper Extremity: poor   Fine Motor Coordination:    -       Intact    AMPAC 6 Click ADL:  AMPAC Total Score: 12    Treatment & Education:  Pt educated on role of OT/POC; ADL training  Education:    Patient left HOB elevated with all lines intact, call button in reach, bed alarm on and nurse extender present    GOALS:   Multidisciplinary Problems     Occupational Therapy Goals        Problem: Occupational Therapy Goal    Goal Priority Disciplines Outcome Interventions   Occupational Therapy Goal     OT, PT/OT     Description:  Goals to be met by: discharge    Patient will increase functional independence  with ADLs by performing:    Feeding with Set-up Assistance.  Grooming while EOB with Stand-by Assistance.  Sitting at edge of bed x15 minutes with Stand-by Assistance.  Supine to sit with Moderate Assistance.    Further goals TBD as patient tolerates                      History:     Past Medical History:   Diagnosis Date    Coronary artery disease     Dementia     Hypertension     Pulmonary embolism     Stroke     history of CVA    Thyroid disease     Uterine cancer        Past Surgical History:   Procedure Laterality Date    APPENDECTOMY      BACK SURGERY      HYSTERECTOMY      KNEE SURGERY      SMALL BOWEL ENTEROSCOPY Left 1/15/2020    Procedure: ENTEROSCOPY;  Surgeon: Kyle Collins MD;  Location: [facility];  Service: Endoscopy;  Laterality: Left;    TONSILLECTOMY         Time Tracking:     OT Date of Treatment: 01/28/20  OT Start Time: 1058  OT Stop Time: 1122  OT Total Time (min): 24 min    Billable Minutes:Evaluation 10  Self Care/Home Management 14    Josi Palma OT  1/28/2020

## 2020-01-28 NOTE — ASSESSMENT & PLAN NOTE
Suspect from acute HFpEF (Grade 1 diastolic, mild/mod tricuspid regurge) vs Hypoalbuminemia  Recent CT abdomen found no evidence of liver disease.  Liver enzymes unremarkable including INR  CTA chest neg for PE on admit  Lasix IV b.i.d., fluid restriction  Strict I/O, daily weight  On protein supplementation  Cardiology on board  TSH/T4    UTI  UCx G- hernando  Rocephin    History PE  Will need to be started Eliquis if H&H is stable for history of PE  Therapeutic Lovenox  Monitor H/H    DNR    PT/OT  cw home meds

## 2020-01-28 NOTE — SUBJECTIVE & OBJECTIVE
Interval History:  Afebrile the past 24 hr on IV antibiotics.  Renal function stable.  Urine culture grew gram-negative rods.    Reports she feels tired.  Denies any chest pain, shortness of breath.  Breathing comfortablyon room air.  Per sitting at bedside, son reports that patient was ambulating with a cane/walker 2 weeks ago.  She went to [facility] and has not been able to ambulate since then.  PT/OT ordered.  Started on therapeutic Lovenox and monitor for any bleeds with history of bleeding on Eliquis and we see EGD.  Placed on fluid restriction.  Increased IV Lasix. Protein supplementation.  Review of recent CT abdomen found no evidence of liver disease.  INR stable, and liver enzymes unremarkable.  Ultrasound right upper extremity.    Review of Systems   Constitutional: Negative for chills, fatigue, fever and unexpected weight change.   HENT: Negative for sore throat.    Eyes: Negative for visual disturbance.   Respiratory: Negative for cough, chest tightness and shortness of breath.    Cardiovascular: Negative for chest pain.   Gastrointestinal: Negative for abdominal pain, constipation, diarrhea, nausea and vomiting.   Endocrine: Negative for polyuria.   Genitourinary: Negative for dysuria and hematuria.   Neurological: Negative for headaches.     Objective:     Vital Signs (Most Recent):  Temp: 98.4 °F (36.9 °C) (01/28/20 1100)  Pulse: 82 (01/28/20 1100)  Resp: 18 (01/28/20 1100)  BP: (!) 120/57 (01/28/20 1100)  SpO2: 97 % (01/28/20 1100) Vital Signs (24h Range):  Temp:  [98.4 °F (36.9 °C)-98.8 °F (37.1 °C)] 98.4 °F (36.9 °C)  Pulse:  [73-94] 82  Resp:  [14-20] 18  SpO2:  [94 %-99 %] 97 %  BP: (110-134)/(56-62) 120/57     Weight: 88.3 kg (194 lb 10.7 oz)  Body mass index is 31.42 kg/m².    Intake/Output Summary (Last 24 hours) at 1/28/2020 1322  Last data filed at 1/28/2020 0956  Gross per 24 hour   Intake 100 ml   Output 1750 ml   Net -1650 ml      Physical Exam   Constitutional: She is oriented to  person, place, and time. She appears well-developed and well-nourished. No distress.   HENT:   Right Ear: External ear normal.   Left Ear: External ear normal.   Eyes: Conjunctivae and EOM are normal. Right eye exhibits no discharge. Left eye exhibits no discharge.   Neck: Normal range of motion.   Cardiovascular: Normal rate, regular rhythm and normal heart sounds. Exam reveals no gallop and no friction rub.   No murmur heard.  Anasarca, 3+ Right upper and BL lower extremities   Pulmonary/Chest: Effort normal and breath sounds normal. No respiratory distress. She has no wheezes. She has no rales. She exhibits no tenderness.   Genitourinary:   Genitourinary Comments: Tsai cath   Musculoskeletal: She exhibits no tenderness.   Neurological: She is alert and oriented to person, place, and time.   Skin: Skin is warm. She is not diaphoretic.   Psychiatric: She has a normal mood and affect. Her behavior is normal. Judgment and thought content normal.   Nursing note and vitals reviewed.      Significant Labs:   CBC:   Recent Labs   Lab 01/26/20 2336 01/27/20  0829   WBC 7.36 6.80   HGB 8.4* 9.0*   HCT 27.2* 28.5*    229     CMP:   Recent Labs   Lab 01/26/20 2336 01/27/20  0829 01/28/20  0439    140 137  137   K 3.0* 3.0* 3.4*  3.4*    102 102  102   CO2 26 27 25  25   * 103 104  104   BUN 13 11 14  14   CREATININE 0.4* 0.3* <0.3*  <0.3*   CALCIUM 7.8* 7.8* 7.6*  7.6*   PROT 5.7* 5.7* 5.3*   ALBUMIN 2.6* 2.5* 2.3*   BILITOT 1.9* 1.7* 1.2*   ALKPHOS 92 92 77   AST 17 17 22   ALT 11 11 10   ANIONGAP 9 11 10  10   EGFRNONAA >60.0 >60.0 >60.0  >60.0       Significant Imaging: I have reviewed all pertinent imaging results/findings within the past 24 hours.

## 2020-01-28 NOTE — CONSULTS
[facility]  Cardiology consult    Patient Name: Ellen Maravilla  MRN: [id number]  Patient Class: Emergency   Admission Date: 1/26/2020 10:51 PM  Length of Stay: 0  Attending Physician:   Primary Care Provider: Tien Mckeon MD  Face-to-Face encounter date: 01/27/2020  Code Status:DNR  MPOA: Son  Chief Complaint: Shortness of Breath (sob started last night, increase edema noted to lower extremities got worse throughout the day, given lasix 40 mg IM and po potassium )        Patient information was obtained from patient, past medical records and ER records.   HISTORY OF PRESENT ILLNESS:   Ellen Maravilla is a 87 y.o. old  female who  has a past medical history of Coronary artery disease, Dementia, Hypertension, Pulmonary embolism, Stroke, Thyroid disease, and Uterine cancer.. The patient presented to [facility] on 1/26/2020 with a primary complaint of Shortness of Breath (sob started last night, increase edema noted to lower extremities got worse throughout the day, given lasix 40 mg IM and po potassium )  .   87-year-old  female presents emergency room with shortness of breath.  According to the family the patient is a [facility] the [facility].  The nursing home called the family seen at the patient was short of breath and she needed to come to the emergency room for further evaluation.  This patient was recently discharged from our facility secondary to symptomatic anemia a EGD was performed and the lesion was cauterized.  The patient has a history of PEs and at that time was taken off of her Eliquis.  Also on her prior admit it was noted that she had bruising to her anterior chest wall prior to arrival.  Today on my exam the patient has with appears to be a large hematoma to the right anterior chest wall near her Port-A-Cath.  She was also noted to have extensive swelling to the righy upper extremity     REVIEW OF SYSTEMS:   10 Point Review of System was  performed and was found to be negative except for that mentioned already in the HPI and   Review of Systems (Negative unless checked off)  Review of Systems   Constitutional: Positive for malaise/fatigue.   HENT: Negative.    Eyes: Negative.    Respiratory: Positive for shortness of breath.    Cardiovascular: Negative.    Gastrointestinal: Negative.    Genitourinary: Positive for frequency.   Musculoskeletal: Negative.    Skin:        Old bruising to right anterior chest wall and to xiphoid process region.  Large palpable mass to right chest wall possible Hematoma   Neurological: Positive for weakness.   Endo/Heme/Allergies: Negative.    Psychiatric/Behavioral: Positive for memory loss.            PAST MEDICAL HISTORY:           Past Medical History:   Diagnosis Date    Coronary artery disease      Dementia      Hypertension      Pulmonary embolism      Stroke       history of CVA    Thyroid disease      Uterine cancer          Macular degeneration     PAST SURGICAL HISTORY:            Past Surgical History:   Procedure Laterality Date    APPENDECTOMY        BACK SURGERY        HYSTERECTOMY        KNEE SURGERY        SMALL BOWEL ENTEROSCOPY Left 1/15/2020     Procedure: ENTEROSCOPY;  Surgeon: Kyle Collins MD;  Location: [facility];  Service: Endoscopy;  Laterality: Left;    TONSILLECTOMY             ALLERGIES:   Patient has no known allergies.  MSG.     FAMILY HISTORY:            Family History   Problem Relation Age of Onset    Nursing home Mother      Depression Father      Stroke Father           SOCIAL HISTORY:      Social History            Tobacco Use    Smoking status: Never Smoker    Smokeless tobacco: Never Used   Substance Use Topics    Alcohol use: No       Comment: rarely     She was a      Social History          Substance and Sexual Activity   Sexual Activity Not on file         HOME MEDICATIONS:      Prior to Admission medications    Medication Sig Start Date End  Date Taking? Authorizing Provider   albuterol (PROVENTIL) 2.5 mg /3 mL (0.083 %) nebulizer solution Take 2.5 mg by nebulization every 6 (six) hours as needed for Wheezing. Rescue     Yes Historical Provider, MD   amLODIPine (NORVASC) 5 MG tablet Take 1 tablet (5 mg total) by mouth once daily. 11/30/19 1/26/20 Yes GLORIA Dixon   ascorbic acid, vitamin C, (VITAMIN C) 500 MG tablet Take 500 mg by mouth 2 (two) times daily.     Yes Historical Provider, MD   aspirin (ECOTRIN) 81 MG EC tablet Take 81 mg by mouth once daily.     Yes Historical Provider, MD   atorvastatin (LIPITOR) 40 MG tablet Take 1 tablet (40 mg total) by mouth once daily.  Patient taking differently: Take 40 mg by mouth every evening.  11/30/19 1/26/20 Yes GLORIA Dixon   cyanocobalamin (VITAMIN B-12) 1000 MCG tablet Take 1 tablet (1,000 mcg total) by mouth once daily. 12/18/19   Yes Nadya Deluna MD   epoetin ronald (PROCRIT INJ) Inject 4,325 Units as directed Every Friday.      Yes Historical Provider, MD   metoprolol tartrate (LOPRESSOR) 25 MG tablet Take 1 tablet (25 mg total) by mouth 2 (two) times daily. 11/29/19 1/26/20 Yes GLORIA Dixon   multivitamin (THERAGRAN) per tablet Take 1 tablet by mouth once daily.     Yes Historical Provider, MD   polyethylene glycol (GLYCOLAX) 17 gram/dose powder Take 17 g by mouth once daily.     Yes Historical Provider, MD   potassium chloride 40 mEq/15 mL Liqd Take 40 mEq by mouth once.     Yes Historical Provider, MD   protein supplement (PROMOD PROTEIN) Liqd Take 30 mLs by mouth 2 (two) times daily.     Yes Historical Provider, MD   QUEtiapine (SEROQUEL) 25 MG Tab Take 25 mg by mouth 2 (two) times daily.     Yes Historical Provider, MD   zinc sulfate (ZINCATE) 220 (50) mg capsule Take 220 mg by mouth once daily.     Yes Historical Provider, MD   acetaminophen (TYLENOL) 325 MG tablet Take 650 mg by mouth every 4 (four) hours as needed for Pain.       Historical Provider, MD  "  bisacodyl (DULCOLAX) 10 mg Supp Place 1 suppository (10 mg total) rectally daily as needed. 11/29/19     Jacquie Torres, GLORIA   traMADol (ULTRAM) 50 mg tablet Take 25 mg by mouth every 8 (eight) hours as needed for Pain.       Historical Provider, MD            PHYSICAL EXAM:   /62   Pulse 95   Temp 100 °F (37.8 °C) (Rectal)   Resp (!) 22   Ht 5' 6" (1.676 m)   Wt 90.7 kg (200 lb)   LMP  (LMP Unknown)   SpO2 96%   BMI 32.28 kg/m²   Vitals Reviewed  General appearance: Well-developed, well-nourished female in no apparent distress.  Anasarca  Skin:  Old bruising to the anterior chest wall also bruising noted to the right upper anterior chest wall that appears to be old.  She has a large firm mass to the area to her right breast region with increased warmth will evaluate for hematoma   Neuro: Motor and sensory exams grossly intact. Good tone. Power in all 4 extremities 5/5.   HENT: Atraumatic head. Moist mucous membranes of oral cavity.  Eyes: Normal extraocular movements.   Neck: Supple. No evidence of lymphadenopathy. No thyroidomegaly.  Lungs:  Moderate tachypnea with some pursed lip breathing.  Heart: Regular rate and rhythm. S1 and S2 present with no murmurs/gallop/rub. No pedal edema. No JVD present.   Abdomen: Soft, non-distended, non-tender. No rebound tenderness/guarding. No masses or organomegaly. Bowel sounds are normal. Bladder is not palpable.   Extremities: No cyanosis, clubbing, or edema.  Psych/mental status: Alert confused with a history of dementia does not Respond to questions.   EMERGENCY DEPARTMENT LABS AND IMAGING:   Following labs were Reviewed       Recent Labs   Lab 01/26/20  2336   WBC 7.36   HGB 8.4*   HCT 27.2*      CALCIUM 7.8*   ALBUMIN 2.6*   PROT 5.7*      K 3.0*   CO2 26      BUN 13   CREATININE 0.4*   ALKPHOS 92   ALT 11   AST 17   BILITOT 1.9*            BMP:       Recent Labs   Lab 01/26/20  2336   *      K 3.0*      CO2 26 "   BUN 13   CREATININE 0.4*   CALCIUM 7.8*   , CMP       Recent Labs   Lab 01/26/20  2336      K 3.0*      CO2 26   *   BUN 13   CREATININE 0.4*   CALCIUM 7.8*   PROT 5.7*   ALBUMIN 2.6*   BILITOT 1.9*   ALKPHOS 92   AST 17   ALT 11   ANIONGAP 9   ESTGFRAFRICA >60.0   EGFRNONAA >60.0   , CBC       Recent Labs   Lab 01/26/20  2336   WBC 7.36   HGB 8.4*   HCT 27.2*      , INR         Lab Results   Component Value Date     INR 1.4 01/26/2020     INR 1.1 12/13/2019     INR 1.1 11/22/2019   , Lipid Panel         Lab Results   Component Value Date     CHOL 212 (H) 11/21/2019     HDL 68 11/21/2019     LDLCALC 132.0 11/21/2019     TRIG 60 11/21/2019     CHOLHDL 32.1 11/21/2019   , Troponin       Recent Labs   Lab 01/26/20  2336   TROPONINI <0.030   , A1C:       Recent Labs   Lab   11/21/19  1750   HGBA1C 5.1    and All labs within the past 24 hours have been reviewed           Microbiology Results (last 7 days)      Procedure Component Value Units Date/Time     Blood culture #2 **CANNOT BE ORDERED STAT** [[id number]] Collected:  01/27/20 0218     Order Status:  Sent Specimen:  Blood from Peripheral, Antecubital, Left Updated:  01/27/20 0222     Blood culture #1 **CANNOT BE ORDERED STAT** [[id number]] Collected:  01/26/20 2328     Order Status:  Sent Specimen:  Blood from Peripheral, Forearm, Right Updated:  01/27/20 0208          X-Ray Chest AP Portable    (Results Pending)   US Upper Extremity Veins Right    (Results Pending)   ECG reveals sinus rhythm with right bundle branch block     Imaging Results          CTA Chest Non Coronary (Final result)  Result time 01/27/20 04:53:25    Final result by Xavi Maciel MD (01/27/20 04:53:25)                 Narrative:        Exam: CTA OF THE CHEST WITH CONTRAST    Clinical data: Elevated d-dimer. Shortness of breath, increased edema to lower  extremities. History of PE.  History of small bowel enteroscopy, back, knee,  appendix, hysterectomy,  Port-A-Cath and tonsillar surgery.  History of uterine  cancer.    Technique: Axial CT angiography images through the lungs were acquired with  contrast and imaged using soft tissue and lung algorithms. Coronal, sagittal,  and 3D volume reconstructions were performed. Reformatted/3D-MPR images were  performed. Contrast used: Omnipaque. Amount: 100 mL. Radiation dose: CTDIvol =  28.20 mGy, DLP = 445.40 mGy x cm.    Prior studies: No prior studies submitted.    Findings:    Lungs: Several solid nodules are seen in the right lung measuring up to 2.5 mm.  Few solid lung nodules seen in left lung measuring up to 3.5 mm.  Mild pleural  effusion seen bilaterally with underlying lung collapse.  No pneumothorax. The  airway is clear.    Soft Tissues: No mediastinal, axillary or supraclavicular adenopathy is  identified.  Large heterogenous density lesion is seen along the anterior chest  wall, deep and along the pectoralis minor muscle.  It measures 12.4 x 4.8 cm x 5  cm in size.  Much smaller lesion is seen on the left side as well.  Small focal  soft tissue swelling of the right pectoralis major seen.    Vascular: No filling defect within the pulmonary arteries to the segmental  branch level.  Extensive atherosclerotic calcifications seen involving aorta and  its major branches. Grossly unremarkable sized heart.  No definite abnormality  seen on 3D reformatted images.    Bony structures: No acute or destructive abnormality.  Osteopenia is seen.  Extensive degenerative changes seen in the visualized spine, bilateral shoulder  joints and acromioclavicular joints.  Mild scoliosis is seen in the visualized  spine.  Collapse of L1, T12 and T11 and T 9 vertebral body seen.  Vertebroplasty  status at the L1 level.    Heterogeneous density of left lobe of thyroid gland is seen along the inferior  pole.    Diffuse haziness of body fat is seen suggesting third spacing.    Upper Abdomen: Limited visualization of the solid upper  abdominal organs is  grossly unremarkable.    IMPRESSION:    1.  No evidence of acute pulmonary thromboembolus.  No evidence of dissection of  aorta.  2.  Mild pleural effusion bilaterally with underlying lung collapse.  3.  Diffuse haziness of body fat suggesting third spacing.  4.  Several solid lung nodules measuring up to 3.5 mm.  5.  Probable hematoma along the anterior chest wall bilaterally more prominent  on the right side. Other infiltrating masses less likely.  6.  Osteopenia.  Collapse of several thoracolumbar vertebral bodies.  7.  Heterogenous left lobe of thyroid.  Ultrasound correlation is recommended.      Recommendation:  Follow up as clinically indicated.    All CT scans at this facility utilize dose modulation, iterative reconstruction,  and/or weight based dosing when appropriate to reduce radiation dose to as low  as reasonably achievable.      Electronically Signed by GUADALUPE HORVATH MD at 1/27/2020 7:11:03 AM EST                              Upper Extremity Veins Right (Final result)  Result time 01/27/20 04:26:13    Final result by Mirian Berrios MD (01/27/20 04:26:13)                 Narrative:        Exam: UNILATERAL RIGHT  UPPER EXTREMITY VENOUS DUPLEX DOPPLER    Clinical data: Right upper extremity swelling. Arrived from guest house  lethargic and coughing.    Technique: Real time ultrasound and color Doppler imaging of the veins of the  right upper extremity were performed using grayscale, color flow and duplex  Doppler. Vessels imaged: internal jugular, subclavian, axillary, brachial,  cephalic, basilic, radial, and ulnar veins.    Prior studies: None.    Findings: The upper extremity veins demonstrate no evidence of intraluminal  echogenicity. There is normal compressibility, spontaneous blood flow,  augmentation and respiratory phasicity. No dilated veins or wall thickening  seen. The superficial veins visualized and soft tissues are unremarkable.      IMPRESSION: No evidence for deep  vein thrombosis.    Recommendation:  Follow up as clinically indicated.        Electronically Signed by ROHIT KHAN MD at 1/27/2020 5:35:25 AM EST                             X-Ray Chest AP Portable (Final result)  Result time 01/27/20 06:27:00    Final result by Lin Matson MD (01/27/20 06:27:00)                 Narrative:    PROCEDURE:   XR CHEST AP PORTABLE  dated  1/26/2020 11:30 PM    CLINICAL HISTORY:   Female 87 years of age.   sob    TECHNIQUE: AP view of the chest obtained portably at 11:30 PM.    PREVIOUS STUDIES:  None Available    FINDINGS:    MediPort catheter tip is in the SVC. Cardiac and mediastinal contours  are normal. Left hemidiaphragm is mildly elevated. There is no acute  pulmonary infiltrate. There is no significant pleural effusion. There  is no pneumothorax.    IMPRESSION:    No acute findings. MediPort catheter.    Electronically Signed by Lin Matson on 1/27/2020 7:00 AM                           Echocardiogram 11/22/2019 revealed concentric left ventricular remodeling, small LV cavity size, LVEF 55%, grade 1 diastolic dysfunction, mild RV enlargement and RA enlargement, no PFO on bubble study, mild mitral regurgitation  ASSESSMENT & PLAN:   Ellen Maravilla is a 87 y.o. female admitted for dyspnea     1.  Dyspnea acute respiratory distress; anasarca; hypoalbuminemia  -possibly secondary to acute heart failure  -normal sinus rhythm; right bundle branch block  -Lasix IV b.i.d.  -accurate I&Os  -trend cardiac enzymes and troponin  CTA negative for pulmonary embolism; small bilateral pleural effusion ; no DVT on ultrasound of right upper extremity.  Echocardiogram pending  -cardioprotective medications     2 urinary tract infection   -urine cultures sent in the ED  -started on Rocephin     3.  Large mass to right anterior chest wall near Port-A-Cath; multiple lung nodules-large is 3.5 cm on CT scan  -possibly hematoma  -will get CT of the chest     4.  Recent GI bleed taken off  Eliquis  -had EGD per with cauterization     5.  Hyperlipidemia     6. History of PE  -taken off Eliquis after GI bleed 12/2019.  CTA negative for pulmonary embolism     7.  Advanced dementia        DVT Prophylaxis: will be placed on Heparin/Lovenox for DVT prophylaxis and will be advised to be as mobile as possible and sit in a chair as tolerated.   ________________________________________________________________________________     Discharge Planning and Disposition: No mobility needs. Ambulating well. Good social support system. Patient will be discharged in   Face-to-Face encounter date: 01/27/2020  Encounter included review of the medical records, interviewing and examining the patient face-to-face, discussion with family and other health care providers including emergency medicine physician, admission orders, interpreting lab/test results and formulating a plan of care.   Medical Decision Making during this encounter was  [_] Low Complexity  [_] Moderate Complexity  [x] High Complexity  _________________________________________________________________________________     INPATIENT LIST OF MEDICATIONS      Current Facility-Administered Medications:     cefTRIAXone (ROCEPHIN) 1 g in dextrose 5 % 50 mL IVPB, 1 g, Intravenous, Once, Avril Parish MD    furosemide injection 20 mg, 20 mg, Intravenous, ED 1 Time, Avril Parish MD    potassium chloride SA CR tablet 40 mEq, 40 mEq, Oral, Once, Avril Parish MD     Current Outpatient Medications:     albuterol (PROVENTIL) 2.5 mg /3 mL (0.083 %) nebulizer solution, Take 2.5 mg by nebulization every 6 (six) hours as needed for Wheezing. Rescue, Disp: , Rfl:     amLODIPine (NORVASC) 5 MG tablet, Take 1 tablet (5 mg total) by mouth once daily., Disp: 30 tablet, Rfl: 0    ascorbic acid, vitamin C, (VITAMIN C) 500 MG tablet, Take 500 mg by mouth 2 (two) times daily., Disp: , Rfl:     aspirin (ECOTRIN) 81 MG EC tablet, Take 81 mg by mouth once daily.,  Disp: , Rfl:     atorvastatin (LIPITOR) 40 MG tablet, Take 1 tablet (40 mg total) by mouth once daily. (Patient taking differently: Take 40 mg by mouth every evening. ), Disp: 30 tablet, Rfl: 0    cyanocobalamin (VITAMIN B-12) 1000 MCG tablet, Take 1 tablet (1,000 mcg total) by mouth once daily., Disp: , Rfl:     epoetin ronald (PROCRIT INJ), Inject 4,325 Units as directed Every Friday. , Disp: , Rfl:     metoprolol tartrate (LOPRESSOR) 25 MG tablet, Take 1 tablet (25 mg total) by mouth 2 (two) times daily., Disp: 60 tablet, Rfl: 0    multivitamin (THERAGRAN) per tablet, Take 1 tablet by mouth once daily., Disp: , Rfl:     polyethylene glycol (GLYCOLAX) 17 gram/dose powder, Take 17 g by mouth once daily., Disp: , Rfl:     potassium chloride 40 mEq/15 mL Liqd, Take 40 mEq by mouth once., Disp: , Rfl:     protein supplement (PROMOD PROTEIN) Liqd, Take 30 mLs by mouth 2 (two) times daily., Disp: , Rfl:     QUEtiapine (SEROQUEL) 25 MG Tab, Take 25 mg by mouth 2 (two) times daily., Disp: , Rfl:     zinc sulfate (ZINCATE) 220 (50) mg capsule, Take 220 mg by mouth once daily., Disp: , Rfl:     acetaminophen (TYLENOL) 325 MG tablet, Take 650 mg by mouth every 4 (four) hours as needed for Pain., Disp: , Rfl:     bisacodyl (DULCOLAX) 10 mg Supp, Place 1 suppository (10 mg total) rectally daily as needed., Disp: , Rfl: 0    traMADol (ULTRAM) 50 mg tablet, Take 25 mg by mouth every 8 (eight) hours as needed for Pain., Disp: , Rfl:         Scheduled Meds:   cefTRIAXone (ROCEPHIN) IVPB  1 g Intravenous Once    furosemide  20 mg Intravenous ED 1 Time    potassium chloride SA  40 mEq Oral Once      Continuous Infusions:  PRN Meds:.        Lupe Garcia  [facility] Hospitalist NP  01/27/2020      Cosigned by: Anastasiia Alcantar MD at 1/27/2020  6:24 AM   Revision History

## 2020-01-28 NOTE — PT/OT/SLP PROGRESS
"Physical Therapy      Patient Name:  Ellen Maravilla   MRN:  [id number]    Patient not seen today secondary to Patient unwilling to participate("I'm gonna stay just like I am," in supine). Will follow-up 01/29/2020.    Tahmina Collins PT    "

## 2020-01-28 NOTE — PROGRESS NOTES
formerly [facility] Medicine  Progress Note    Patient Name: Ellen Maravilla  MRN: [id number]  Patient Class: IP- Inpatient   Admission Date: 1/26/2020  Length of Stay: 1 days  Attending Physician: Zaid Moya MD  Primary Care Provider: Tien Mckeon MD        Subjective:     Principal Problem:Shortness of breath    Interval History:  Afebrile the past 24 hr on IV antibiotics.  Renal function stable.  Urine culture grew gram-negative rods.    Reports she feels tired.  Denies any chest pain, shortness of breath.  Breathing comfortablyon room air.  Per sitting at bedside, son reports that patient was ambulating with a cane/walker 2 weeks ago.  She went to [facility] and has not been able to ambulate since then.  PT/OT ordered.  Started on therapeutic Lovenox and monitor for any bleeds with history of bleeding on Eliquis and we see EGD.  Placed on fluid restriction.  Increased IV Lasix. Protein supplementation.  Review of recent CT abdomen found no evidence of liver disease.  INR stable, and liver enzymes unremarkable.  Ultrasound right upper extremity.    Review of Systems   Constitutional: Negative for chills, fatigue, fever and unexpected weight change.   HENT: Negative for sore throat.    Eyes: Negative for visual disturbance.   Respiratory: Negative for cough, chest tightness and shortness of breath.    Cardiovascular: Negative for chest pain.   Gastrointestinal: Negative for abdominal pain, constipation, diarrhea, nausea and vomiting.   Endocrine: Negative for polyuria.   Genitourinary: Negative for dysuria and hematuria.   Neurological: Negative for headaches.     Objective:     Vital Signs (Most Recent):  Temp: 98.4 °F (36.9 °C) (01/28/20 1100)  Pulse: 82 (01/28/20 1100)  Resp: 18 (01/28/20 1100)  BP: (!) 120/57 (01/28/20 1100)  SpO2: 97 % (01/28/20 1100) Vital Signs (24h Range):  Temp:  [98.4 °F (36.9 °C)-98.8 °F (37.1 °C)] 98.4 °F (36.9 °C)  Pulse:  [73-94] 82  Resp:  [14-20] 18  SpO2:   [94 %-99 %] 97 %  BP: (110-134)/(56-62) 120/57     Weight: 88.3 kg (194 lb 10.7 oz)  Body mass index is 31.42 kg/m².    Intake/Output Summary (Last 24 hours) at 1/28/2020 1329  Last data filed at 1/28/2020 0947  Gross per 24 hour   Intake 100 ml   Output 1750 ml   Net -1650 ml      Physical Exam   Constitutional: She is oriented to person, place, and time. She appears well-developed and well-nourished. No distress.   HENT:   Right Ear: External ear normal.   Left Ear: External ear normal.   Eyes: Conjunctivae and EOM are normal. Right eye exhibits no discharge. Left eye exhibits no discharge.   Neck: Normal range of motion.   Cardiovascular: Normal rate, regular rhythm and normal heart sounds. Exam reveals no gallop and no friction rub.   No murmur heard.  Anasarca, 3+ Right upper and BL lower extremities   Pulmonary/Chest: Effort normal and breath sounds normal. No respiratory distress. She has no wheezes. She has no rales. She exhibits no tenderness.   Genitourinary:   Genitourinary Comments: Tsai cath   Musculoskeletal: She exhibits no tenderness.   Neurological: She is alert and oriented to person, place, and time.   Skin: Skin is warm. She is not diaphoretic.   Psychiatric: She has a normal mood and affect. Her behavior is normal. Judgment and thought content normal.   Nursing note and vitals reviewed.      Significant Labs:   CBC:   Recent Labs   Lab 01/26/20  2336 01/27/20  0829   WBC 7.36 6.80   HGB 8.4* 9.0*   HCT 27.2* 28.5*    229     CMP:   Recent Labs   Lab 01/26/20  2336 01/27/20  0829 01/28/20  0439    140 137  137   K 3.0* 3.0* 3.4*  3.4*    102 102  102   CO2 26 27 25  25   * 103 104  104   BUN 13 11 14  14   CREATININE 0.4* 0.3* <0.3*  <0.3*   CALCIUM 7.8* 7.8* 7.6*  7.6*   PROT 5.7* 5.7* 5.3*   ALBUMIN 2.6* 2.5* 2.3*   BILITOT 1.9* 1.7* 1.2*   ALKPHOS 92 92 77   AST 17 17 22   ALT 11 11 10   ANIONGAP 9 11 10  10   EGFRNONAA >60.0 >60.0 >60.0  >60.0        Significant Imaging: I have reviewed all pertinent imaging results/findings within the past 24 hours.      Assessment/Plan:      Active Hospital Problems    Diagnosis    *Shortness of breath    Hypoalbuminemia    Congestive heart failure    DNR (do not resuscitate)    Long term (current) use of anticoagulants- On eliquis since 12/2019 post PE disgnosis    Thrombocytopenia    Anemia    Hypertension    Thyroid disease       * Shortness of breath  Suspect from acute HFpEF (Grade 1 diastolic, mild/mod tricuspid regurge) vs Hypoalbuminemia  Recent CT abdomen found no evidence of liver disease.  Liver enzymes unremarkable including INR  CTA chest neg for PE on admit  Lasix IV b.i.d., fluid restriction  Strict I/O, daily weight  On protein supplementation  Cardiology on board  TSH/T4    UTI  UCx G- hernando  Rocephin    History PE  Will need to be started Eliquis if H&H is stable for history of PE  Therapeutic Lovenox  Monitor H/H    DNR    PT/OT  cw home meds      VTE Risk Mitigation (From admission, onward)         Ordered     enoxaparin injection 90 mg  Every 12 hours (non-standard times)      01/28/20 1259     IP VTE LOW RISK PATIENT  Once      01/27/20 0610     Place sequential compression device  Until discontinued      01/27/20 0610                      Zaid Moya MD  Department of Hospital Medicine   [facility]  Date of service: 01/28/2020 1:36 PM

## 2020-01-28 NOTE — CARE UPDATE
01/27/20 2010   Patient Assessment/Suction   Level of Consciousness (AVPU) alert   Respiratory Effort Unlabored   Expansion/Accessory Muscles/Retractions no use of accessory muscles   All Lung Fields Breath Sounds clear;diminished   Rhythm/Pattern, Respiratory no shortness of breath reported   Cough Frequency no cough   PRE-TX-O2   O2 Device (Oxygen Therapy) room air   SpO2 98 %   Pulse 94   Resp 14   Positioning   Head of Bed (HOB) HOB elevated   Respiratory Evaluation   $ Care Plan Tech Time 15 min

## 2020-01-29 LAB
ANION GAP SERPL CALC-SCNC: 10 MMOL/L (ref 8–16)
BACTERIA UR CULT: ABNORMAL
BUN SERPL-MCNC: 12 MG/DL (ref 8–23)
CALCIUM SERPL-MCNC: 7.5 MG/DL (ref 8.7–10.5)
CHLORIDE SERPL-SCNC: 98 MMOL/L (ref 95–110)
CO2 SERPL-SCNC: 30 MMOL/L (ref 23–29)
CREAT SERPL-MCNC: <0.3 MG/DL (ref 0.5–1.4)
ERYTHROCYTE [DISTWIDTH] IN BLOOD BY AUTOMATED COUNT: 17.5 % (ref 11.5–14.5)
EST. GFR  (AFRICAN AMERICAN): >60 ML/MIN/1.73 M^2
EST. GFR  (NON AFRICAN AMERICAN): >60 ML/MIN/1.73 M^2
GLUCOSE SERPL-MCNC: 97 MG/DL (ref 70–110)
HCT VFR BLD AUTO: 25.7 % (ref 37–48.5)
HCT VFR BLD AUTO: 27.4 % (ref 37–48.5)
HCT VFR BLD AUTO: 28 % (ref 37–48.5)
HGB BLD-MCNC: 7.9 G/DL (ref 12–16)
HGB BLD-MCNC: 8.7 G/DL (ref 12–16)
HGB BLD-MCNC: 8.8 G/DL (ref 12–16)
INR PPP: 1.5
MAGNESIUM SERPL-MCNC: 1.4 MG/DL (ref 1.6–2.6)
MCH RBC QN AUTO: 30.2 PG (ref 27–31)
MCHC RBC AUTO-ENTMCNC: 30.7 G/DL (ref 32–36)
MCV RBC AUTO: 98 FL (ref 82–98)
PHOSPHATE SERPL-MCNC: 3.4 MG/DL (ref 2.7–4.5)
PLATELET # BLD AUTO: 196 K/UL (ref 150–350)
PMV BLD AUTO: 9.3 FL (ref 9.2–12.9)
POTASSIUM SERPL-SCNC: 2.8 MMOL/L (ref 3.5–5.1)
POTASSIUM SERPL-SCNC: 3.8 MMOL/L (ref 3.5–5.1)
PROTHROMBIN TIME: 17 SEC (ref 10.6–14.8)
RBC # BLD AUTO: 2.62 M/UL (ref 4–5.4)
SODIUM SERPL-SCNC: 138 MMOL/L (ref 136–145)
T4 FREE SERPL-MCNC: 1.33 NG/DL (ref 0.71–1.51)
TSH SERPL DL<=0.005 MIU/L-ACNC: 4.16 UIU/ML (ref 0.34–5.6)
WBC # BLD AUTO: 5.08 K/UL (ref 3.9–12.7)

## 2020-01-29 PROCEDURE — 25000003 PHARM REV CODE 250: Performed by: FAMILY MEDICINE

## 2020-01-29 PROCEDURE — 97162 PT EVAL MOD COMPLEX 30 MIN: CPT

## 2020-01-29 PROCEDURE — 84100 ASSAY OF PHOSPHORUS: CPT

## 2020-01-29 PROCEDURE — 21400001 HC TELEMETRY ROOM

## 2020-01-29 PROCEDURE — 85014 HEMATOCRIT: CPT | Mod: 91

## 2020-01-29 PROCEDURE — 63600175 PHARM REV CODE 636 W HCPCS: Performed by: SPECIALIST

## 2020-01-29 PROCEDURE — 84132 ASSAY OF SERUM POTASSIUM: CPT

## 2020-01-29 PROCEDURE — 84443 ASSAY THYROID STIM HORMONE: CPT

## 2020-01-29 PROCEDURE — 36415 COLL VENOUS BLD VENIPUNCTURE: CPT

## 2020-01-29 PROCEDURE — 83735 ASSAY OF MAGNESIUM: CPT

## 2020-01-29 PROCEDURE — 25000003 PHARM REV CODE 250: Performed by: NURSE PRACTITIONER

## 2020-01-29 PROCEDURE — 80048 BASIC METABOLIC PNL TOTAL CA: CPT

## 2020-01-29 PROCEDURE — 63600175 PHARM REV CODE 636 W HCPCS: Performed by: FAMILY MEDICINE

## 2020-01-29 PROCEDURE — 85018 HEMOGLOBIN: CPT

## 2020-01-29 PROCEDURE — 84439 ASSAY OF FREE THYROXINE: CPT

## 2020-01-29 PROCEDURE — 85027 COMPLETE CBC AUTOMATED: CPT

## 2020-01-29 PROCEDURE — 85610 PROTHROMBIN TIME: CPT

## 2020-01-29 PROCEDURE — 97530 THERAPEUTIC ACTIVITIES: CPT

## 2020-01-29 RX ORDER — LANOLIN ALCOHOL/MO/W.PET/CERES
800 CREAM (GRAM) TOPICAL
Status: DISCONTINUED | OUTPATIENT
Start: 2020-01-29 | End: 2020-01-30 | Stop reason: HOSPADM

## 2020-01-29 RX ORDER — POTASSIUM CHLORIDE 7.45 MG/ML
10 INJECTION INTRAVENOUS ONCE
Status: DISCONTINUED | OUTPATIENT
Start: 2020-01-29 | End: 2020-01-30 | Stop reason: HOSPADM

## 2020-01-29 RX ORDER — POTASSIUM CHLORIDE 20 MEQ/1
20 TABLET, EXTENDED RELEASE ORAL 3 TIMES DAILY
Status: DISCONTINUED | OUTPATIENT
Start: 2020-01-29 | End: 2020-01-30 | Stop reason: HOSPADM

## 2020-01-29 RX ORDER — POTASSIUM CHLORIDE 7.45 MG/ML
20 INJECTION INTRAVENOUS
Status: DISCONTINUED | OUTPATIENT
Start: 2020-01-29 | End: 2020-01-30 | Stop reason: HOSPADM

## 2020-01-29 RX ORDER — POTASSIUM CHLORIDE 20 MEQ/1
20 TABLET, EXTENDED RELEASE ORAL
Status: DISCONTINUED | OUTPATIENT
Start: 2020-01-29 | End: 2020-01-30 | Stop reason: HOSPADM

## 2020-01-29 RX ORDER — POTASSIUM CHLORIDE 20 MEQ/1
40 TABLET, EXTENDED RELEASE ORAL
Status: DISCONTINUED | OUTPATIENT
Start: 2020-01-29 | End: 2020-01-30 | Stop reason: HOSPADM

## 2020-01-29 RX ORDER — MAGNESIUM SULFATE 1 G/100ML
1 INJECTION INTRAVENOUS
Status: DISCONTINUED | OUTPATIENT
Start: 2020-01-29 | End: 2020-01-30 | Stop reason: HOSPADM

## 2020-01-29 RX ORDER — POTASSIUM CHLORIDE 7.45 MG/ML
40 INJECTION INTRAVENOUS
Status: DISCONTINUED | OUTPATIENT
Start: 2020-01-29 | End: 2020-01-30 | Stop reason: HOSPADM

## 2020-01-29 RX ORDER — MAGNESIUM SULFATE HEPTAHYDRATE 40 MG/ML
2 INJECTION, SOLUTION INTRAVENOUS
Status: DISCONTINUED | OUTPATIENT
Start: 2020-01-29 | End: 2020-01-30 | Stop reason: HOSPADM

## 2020-01-29 RX ORDER — POTASSIUM CHLORIDE 20 MEQ/1
40 TABLET, EXTENDED RELEASE ORAL ONCE
Status: COMPLETED | OUTPATIENT
Start: 2020-01-29 | End: 2020-01-29

## 2020-01-29 RX ORDER — MAGNESIUM SULFATE HEPTAHYDRATE 40 MG/ML
4 INJECTION, SOLUTION INTRAVENOUS
Status: DISCONTINUED | OUTPATIENT
Start: 2020-01-29 | End: 2020-01-30 | Stop reason: HOSPADM

## 2020-01-29 RX ADMIN — QUETIAPINE 25 MG: 25 TABLET ORAL at 09:01

## 2020-01-29 RX ADMIN — MAGNESIUM SULFATE 2 G: 2 INJECTION INTRAVENOUS at 09:01

## 2020-01-29 RX ADMIN — THERA TABS 1 TABLET: TAB at 09:01

## 2020-01-29 RX ADMIN — METOPROLOL TARTRATE 25 MG: 25 TABLET ORAL at 09:01

## 2020-01-29 RX ADMIN — POTASSIUM CHLORIDE 40 MEQ: 7.46 INJECTION, SOLUTION INTRAVENOUS at 06:01

## 2020-01-29 RX ADMIN — METOPROLOL TARTRATE 25 MG: 25 TABLET ORAL at 08:01

## 2020-01-29 RX ADMIN — AMLODIPINE BESYLATE 5 MG: 5 TABLET ORAL at 09:01

## 2020-01-29 RX ADMIN — ZINC SULFATE 220 MG (50 MG) CAPSULE 220 MG: CAPSULE at 09:01

## 2020-01-29 RX ADMIN — FUROSEMIDE 60 MG: 10 INJECTION, SOLUTION INTRAMUSCULAR; INTRAVENOUS at 09:01

## 2020-01-29 RX ADMIN — POTASSIUM CHLORIDE 20 MEQ: 20 TABLET, EXTENDED RELEASE ORAL at 08:01

## 2020-01-29 RX ADMIN — ENOXAPARIN SODIUM 90 MG: 100 INJECTION SUBCUTANEOUS at 01:01

## 2020-01-29 RX ADMIN — ASPIRIN 81 MG: 81 TABLET, DELAYED RELEASE ORAL at 09:01

## 2020-01-29 RX ADMIN — ATORVASTATIN CALCIUM 40 MG: 40 TABLET, FILM COATED ORAL at 08:01

## 2020-01-29 RX ADMIN — NITROGLYCERIN 1 INCH: 20 OINTMENT TOPICAL at 06:01

## 2020-01-29 RX ADMIN — OXYCODONE HYDROCHLORIDE AND ACETAMINOPHEN 500 MG: 500 TABLET ORAL at 09:01

## 2020-01-29 RX ADMIN — OXYCODONE HYDROCHLORIDE AND ACETAMINOPHEN 500 MG: 500 TABLET ORAL at 08:01

## 2020-01-29 RX ADMIN — CEFTRIAXONE 1 G: 1 INJECTION, SOLUTION INTRAVENOUS at 09:01

## 2020-01-29 RX ADMIN — POTASSIUM CHLORIDE 20 MEQ: 20 TABLET, EXTENDED RELEASE ORAL at 03:01

## 2020-01-29 RX ADMIN — FUROSEMIDE 60 MG: 10 INJECTION, SOLUTION INTRAMUSCULAR; INTRAVENOUS at 05:01

## 2020-01-29 RX ADMIN — QUETIAPINE 25 MG: 25 TABLET ORAL at 08:01

## 2020-01-29 RX ADMIN — POTASSIUM CHLORIDE 40 MEQ: 20 TABLET, EXTENDED RELEASE ORAL at 09:01

## 2020-01-29 NOTE — SUBJECTIVE & OBJECTIVE
Interval History:  Patient H&H dropped this morning was started on Eliquis yesterday.  Patient had a recent EGD that found angiectasia in the stomach that was repaired.  GI consulted.  Increased potassium supplementation scheduled.  Urine culture grew E coli that was pansensitive.  Patient reports feeling well.  Denies any chest pain, shortness of breath.  Sons at bedside.  Reports that at discharge they want to take patient home.  The above hospital bed set up home ready.  Reports that she has been nonambulatory for the past 2 months after having a CVA.     Review of Systems   Constitutional: Negative for chills, fatigue, fever and unexpected weight change.   HENT: Negative for sore throat.    Eyes: Negative for visual disturbance.   Respiratory: Negative for cough, chest tightness and shortness of breath.    Cardiovascular: Negative for chest pain.   Gastrointestinal: Negative for abdominal pain, constipation, diarrhea, nausea and vomiting.   Endocrine: Negative for polyuria.   Genitourinary: Negative for dysuria and hematuria.   Neurological: Negative for headaches.     Objective:     Vital Signs (Most Recent):  Temp: 98.4 °F (36.9 °C) (01/29/20 0659)  Pulse: 60 (01/29/20 0659)  Resp: 17 (01/29/20 0659)  BP: 132/60 (01/29/20 0659)  SpO2: 97 % (01/29/20 0659) Vital Signs (24h Range):  Temp:  [98.1 °F (36.7 °C)-98.5 °F (36.9 °C)] 98.4 °F (36.9 °C)  Pulse:  [60-93] 60  Resp:  [16-20] 17  SpO2:  [95 %-98 %] 97 %  BP: (101-146)/(52-66) 132/60     Weight: 87.9 kg (193 lb 12.6 oz)  Body mass index is 31.28 kg/m².    Intake/Output Summary (Last 24 hours) at 1/29/2020 0936  Last data filed at 1/29/2020 0800  Gross per 24 hour   Intake 690 ml   Output 5350 ml   Net -4660 ml      Physical Exam   Constitutional: She is oriented to person, place, and time. She appears well-developed and well-nourished. No distress.   HENT:   Right Ear: External ear normal.   Left Ear: External ear normal.   Eyes: Conjunctivae and EOM are  normal. Right eye exhibits no discharge. Left eye exhibits no discharge.   Neck: Normal range of motion.   Cardiovascular: Normal rate, regular rhythm and normal heart sounds. Exam reveals no gallop and no friction rub.   No murmur heard.  Anasarca, 3+ Right upper and BL lower extremities   Pulmonary/Chest: Effort normal and breath sounds normal. No respiratory distress. She has no wheezes. She has no rales. She exhibits no tenderness.   Genitourinary:   Genitourinary Comments: Tsai cath   Musculoskeletal: She exhibits no tenderness.   Neurological: She is alert and oriented to person, place, and time.   Skin: Skin is warm. She is not diaphoretic.   Psychiatric: She has a normal mood and affect. Her behavior is normal. Judgment and thought content normal.   Nursing note and vitals reviewed.      Significant Labs:   CBC:   Recent Labs   Lab 01/29/20  0456   WBC 5.08   HGB 7.9*   HCT 25.7*        CMP:   Recent Labs   Lab 01/28/20  0439 01/29/20  0456     137 138   K 3.4*  3.4* 2.8*     102 98   CO2 25  25 30*     104 97   BUN 14  14 12   CREATININE <0.3*  <0.3* <0.3*   CALCIUM 7.6*  7.6* 7.5*   PROT 5.3*  --    ALBUMIN 2.3*  --    BILITOT 1.2*  --    ALKPHOS 77  --    AST 22  --    ALT 10  --    ANIONGAP 10  10 10   EGFRNONAA >60.0  >60.0 >60.0       Significant Imaging: I have reviewed all pertinent imaging results/findings within the past 24 hours.

## 2020-01-29 NOTE — PT/OT/SLP EVAL
Physical Therapy Evaluation and Discharge Note    Patient Name:  Ellen Maravilla   MRN:  [id number]    Recommendations:     Discharge Recommendations:  nursing facility, skilled   Discharge Equipment Recommendations: none   Barriers to discharge: None    Assessment:     Ellen Maravilla is a 87 y.o. female admitted with a medical diagnosis of Shortness of breath. .  At this time, patient is functioning at their prior level of function and does not require further acute PT services. Pt reported requiring assistance of 2 or 3 people to t/f OOB to W/C PTA.    Recent Surgery: * No surgery found *      Plan:     During this hospitalization, patient does not require further acute PT services.  Please re-consult if situation changes.      Subjective     Chief Complaint:   Patient/Family Comments/goals:   Pain/Comfort:  ·      Patients cultural, spiritual, Judaism conflicts given the current situation:      Living Environment:  Pt  t/'fed to [facility] from Guest House  Prior to admission, patient required assistance of several people.  Equipment used at home:  .  DME owned (not currently used): none.  Upon discharge, patient will have assistance from [employer].    Objective:     Communicated with nurse prior to session.  Patient found supine with bed alarm, telemetry, heck catheter upon PT entry to room.    General Precautions: Standard, fall   Orthopedic Precautions:    Braces:       Exams:  · Cognitive Exam:  Patient is oriented to Person and Place  · RLE ROM: WFL  · RLE Strength: Deficits: 3+/5 strength  · LLE ROM: WFL  · LLE Strength: Deficits: 3+/5 strength    Functional Mobility:  · Bed Mobility:     · Supine to Sit: total assistance and of 2 persons  · Balance: Sitting balance good/fairf+    AM-PAC 6 CLICK MOBILITY  Total Score:        Therapeutic Activities and Exercises:  PT eval  with t/f to EOB and dangling of feet    AM-PAC 6 CLICK MOBILITY  Total Score:      Patient left supine with call button in reach, bed alarm  on and sitter present.    GOALS:   Multidisciplinary Problems     Physical Therapy Goals     Not on file                History:     Past Medical History:   Diagnosis Date    Coronary artery disease     Dementia     Hypertension     Pulmonary embolism     Stroke     history of CVA    Thyroid disease     Uterine cancer        Past Surgical History:   Procedure Laterality Date    APPENDECTOMY      BACK SURGERY      HYSTERECTOMY      KNEE SURGERY      SMALL BOWEL ENTEROSCOPY Left 1/15/2020    Procedure: ENTEROSCOPY;  Surgeon: Kyle Collins MD;  Location: [facility];  Service: Endoscopy;  Laterality: Left;    TONSILLECTOMY         Time Tracking:     PT Received On: 01/29/20  PT Start Time: 1100     PT Stop Time: 1120  PT Total Time (min): 20 min     Billable Minutes: Evaluation 20 minutes      Tahmina Collins, PT  01/29/2020

## 2020-01-29 NOTE — PLAN OF CARE
01/29/20 1534   Discharge Reassessment   Assessment Type Discharge Planning Reassessment   [facility]     AGA received consult from attending for hospice at home. As per consult, pt's son, Jhonny, wishes to take his mother home on hospice. SW went to room to speak with Jhonny but he had left for the day. AGA attempted to make phone contact with Jhonny at [phone] but was unable to reach him or leave a msg on account of the mailbox being full. SW to continue to f/u on consult.

## 2020-01-29 NOTE — PROGRESS NOTES
Progress Note  Cardiology    Admit Date: 1/26/2020   LOS: 2 days     Follow-up For: CHF    Scheduled Meds:   amLODIPine  5 mg Oral Daily    ascorbic acid (vitamin C)  500 mg Oral BID    aspirin  81 mg Oral Daily    atorvastatin  40 mg Oral QHS    cefTRIAXone (ROCEPHIN) IVPB  1 g Intravenous Q24H    furosemide (LASIX) IV  60 mg Intravenous BID    metoprolol tartrate  25 mg Oral BID    multivitamin  1 tablet Oral Daily    potassium chloride in water  10 mEq Intravenous Once    potassium chloride  20 mEq Oral TID    QUEtiapine  25 mg Oral BID    zinc sulfate  220 mg Oral Daily     Continuous Infusions:  PRN Meds:bisacodyl, calcium chloride IVPB, calcium chloride IVPB, calcium chloride IVPB, magnesium oxide, magnesium sulfate IVPB, magnesium sulfate IVPB, magnesium sulfate IVPB, magnesium sulfate IVPB, morphine, potassium chloride in water, potassium chloride in water, potassium chloride in water, potassium chloride in water, potassium chloride, potassium chloride, potassium chloride, potassium chloride, sodium chloride 0.9%, sodium phosphate IVPB, sodium phosphate IVPB, sodium phosphate IVPB, sodium phosphate IVPB, sodium phosphate IVPB    Review of patient's allergies indicates:  No Known Allergies    SUBJECTIVE:     Interval History: Patient has no complaint of cp or sob..    Review of Systems  Respiratory: negative for cough, hemoptysis, sputum and wheezing  Cardiovascular: negative for dyspnea, orthopnea, palpitations and paroxysmal nocturnal dyspnea    OBJECTIVE:     Vital Signs (Most Recent)  Temp: 98.1 °F (36.7 °C) (01/29/20 1200)  Pulse: 67 (01/29/20 1200)  Resp: 17 (01/29/20 1200)  BP: (!) 115/55 (01/29/20 1200)  SpO2: 95 % (01/29/20 1200)    Vital Signs Range (Last 24H):  Temp:  [98.1 °F (36.7 °C)-98.5 °F (36.9 °C)]   Pulse:  [60-93]   Resp:  [16-20]   BP: (101-146)/(52-66)   SpO2:  [95 %-98 %]       Physical Exam:  Neck: JVD - 1 cm above sternal notch, no carotid bruit and supple, symmetrical,  trachea midline  Lungs: clear to auscultation bilaterally, normal respiratory effort  Heart: regular rate and rhythm, S1, S2 normal, no murmur, click, rub or gallop  Abdomen: abnormal findings:  obese  Extremities: Positive for: edema 2+ and pitting bilaterally    Recent Results (from the past 24 hour(s))   Basic metabolic panel     Collection Time: 01/29/20  4:56 AM   Result Value Ref Range    Sodium 138 136 - 145 mmol/L    Potassium 2.8 (LL) 3.5 - 5.1 mmol/L    Chloride 98 95 - 110 mmol/L    CO2 30 (H) 23 - 29 mmol/L    Glucose 97 70 - 110 mg/dL    BUN, Bld 12 8 - 23 mg/dL    Creatinine <0.3 (L) 0.5 - 1.4 mg/dL    Calcium 7.5 (L) 8.7 - 10.5 mg/dL    Anion Gap 10 8 - 16 mmol/L    eGFR if African American >60.0 >60 mL/min/1.73 m^2    eGFR if non African American >60.0 >60 mL/min/1.73 m^2   Magnesium    Collection Time: 01/29/20  4:56 AM   Result Value Ref Range    Magnesium 1.4 (L) 1.6 - 2.6 mg/dL   Phosphorus    Collection Time: 01/29/20  4:56 AM   Result Value Ref Range    Phosphorus 3.4 2.7 - 4.5 mg/dL   T4, free    Collection Time: 01/29/20  4:56 AM   Result Value Ref Range    Free T4 1.33 0.71 - 1.51 ng/dL   TSH    Collection Time: 01/29/20  4:56 AM   Result Value Ref Range    TSH 4.160 0.340 - 5.600 uIU/mL   CBC Without Differential    Collection Time: 01/29/20  4:56 AM   Result Value Ref Range    WBC 5.08 3.90 - 12.70 K/uL    RBC 2.62 (L) 4.00 - 5.40 M/uL    Hemoglobin 7.9 (L) 12.0 - 16.0 g/dL    Hematocrit 25.7 (L) 37.0 - 48.5 %    Mean Corpuscular Volume 98 82 - 98 fL    Mean Corpuscular Hemoglobin 30.2 27.0 - 31.0 pg    Mean Corpuscular Hemoglobin Conc 30.7 (L) 32.0 - 36.0 g/dL    RDW 17.5 (H) 11.5 - 14.5 %    Platelets 196 150 - 350 K/uL    MPV 9.3 9.2 - 12.9 fL   Protime-INR    Collection Time: 01/29/20  4:56 AM   Result Value Ref Range    PT 17.0 (H) 10.6 - 14.8 sec    INR 1.5    Hematocrit    Collection Time: 01/29/20  9:37 AM   Result Value Ref Range    Hematocrit 27.4 (L) 37.0 - 48.5 %   Hemoglobin     Collection Time: 01/29/20  9:37 AM   Result Value Ref Range    Hemoglobin 8.7 (L) 12.0 - 16.0 g/dL       Diagnostic Results:  Labs: Reviewed  ECG: Reviewed    ASSESSMENT/PLAN:     Continue Lasix iv bid. Home 1/30.

## 2020-01-29 NOTE — PROGRESS NOTES
Notes discussing/proceeding with hospice reviewed.  Please reconsult if patient/family decide to pursue endoscopic evaluation

## 2020-01-29 NOTE — ASSESSMENT & PLAN NOTE
Suspect from acute HFpEF (Grade 1 diastolic, mild/mod tricuspid regurge) vs Hypoalbuminemia  Recent CT abdomen found no evidence of liver disease.  Liver enzymes unremarkable including INR  CTA chest neg for PE on admit  Lasix IV b.i.d., fluid restriction  Strict I/O, daily weight  On protein supplementation  Cardiology on board    UTI  UCx E coli, pan-sensitive   Rocephin    History PE  Will discontinue Eliquis at discharge due to recurrent worsening anemia  CT a on admission was negative for PE    DNR    PT/OT  cw home meds

## 2020-01-29 NOTE — CARE UPDATE
01/28/20 2111   Patient Assessment/Suction   Level of Consciousness (AVPU) alert   Respiratory Effort Unlabored   Expansion/Accessory Muscles/Retractions no use of accessory muscles   All Lung Fields Breath Sounds clear;equal bilaterally   Rhythm/Pattern, Respiratory no shortness of breath reported   Cough Frequency no cough   PRE-TX-O2   O2 Device (Oxygen Therapy) room air   SpO2 96 %   Pulse Oximetry Type Intermittent   $ Pulse Oximetry - Multiple Charge Pulse Oximetry - Multiple   Pulse 90   Resp 17   Positioning   Head of Bed (HOB) HOB elevated

## 2020-01-29 NOTE — CARE UPDATE
Spoke to son, Jhonny, who desires patient to have home with home hospice.   consulted for hospice.

## 2020-01-29 NOTE — PT/OT/SLP PROGRESS
"Occupational Therapy   Treatment    Name: Ellen Maravilla  MRN: [id number]  Admitting Diagnosis:  Shortness of breath       Recommendations:     Discharge Recommendations: nursing facility, skilled  Discharge Equipment Recommendations:  none  Barriers to discharge:  None    Assessment:     Ellen Maravilla is a 87 y.o. female with a medical diagnosis of Shortness of breath. Pt agreeable to tx initially, but became tearful when therapist attempted to assist her EOB, stating her arms were hurting "where they bend." Pt confused and oriented to self only, "I guess I'm at the doctor's office." Pt found to be functioning at baseline, requiring max-total A for ADLs PTA per sitter at bedside and son's report. OT to d/c at this time.     Rehab Prognosis:  OT to d/c       Plan:     Patient to be seen (OT to d/c) to address the above listed problems via self-care/home management, therapeutic activities, therapeutic exercises  · Plan of Care Expires: 01/29/20  · Plan of Care Reviewed with: patient    Subjective     Pain/Comfort:  · Pain Rating 1: (Pt unable to rate.)  · Location 1: ("where my arms bend")    Objective:     Communicated with: nurse prior to session.  Patient found HOB elevated with bed alarm, heck catheter, telemetry upon OT entry to room.    General Precautions: Standard, fall   Orthopedic Precautions:N/A   Braces: N/A     Occupational Performance:     Bed Mobility:    · Patient completed Rolling/Turning to Left with  total assistance  · Patient completed Rolling/Turning to Right with total assistance     Activities of Daily Living:  · Grooming: set-up assistance to wash face at bed level       [facility] 6 Click ADL: 7    Treatment & Education:  OT role/POC  Bed mobility training    Patient left HOB elevated with all lines intact, call button in reach, bed alarm on, nurse notified and sitter presentEducation:      GOALS:   Multidisciplinary Problems     Occupational Therapy Goals        Problem: Occupational Therapy " Goal    Goal Priority Disciplines Outcome Interventions   Occupational Therapy Goal     OT, PT/OT     Description:  Goals to be met by: discharge    Patient will increase functional independence with ADLs by performing:    Feeding with Set-up Assistance.  Grooming while EOB with Stand-by Assistance.  Sitting at edge of bed x15 minutes with Stand-by Assistance.  Supine to sit with Moderate Assistance.    Further goals TBD as patient tolerates    1/29/2020 Pt found to be at baseline function. OT to d/c and treatment plan to be discontinued at this time.                        Time Tracking:     OT Date of Treatment: 01/29/20  OT Start Time: 1406  OT Stop Time: 1418  OT Total Time (min): 12 min    Billable Minutes:Therapeutic Activity 12    Candelaria Thomas OT  1/29/2020

## 2020-01-29 NOTE — PROGRESS NOTES
Progress Note  Cardiology    Admit Date: 1/26/2020   LOS: 1 day     Follow-up For:  Anasarca    Scheduled Meds:   amLODIPine  5 mg Oral Daily    ascorbic acid (vitamin C)  500 mg Oral BID    aspirin  81 mg Oral Daily    atorvastatin  40 mg Oral QHS    cefTRIAXone (ROCEPHIN) IVPB  1 g Intravenous Q24H    enoxparin  1 mg/kg Subcutaneous Q12H    furosemide (LASIX) IV  60 mg Intravenous BID    metoprolol tartrate  25 mg Oral BID    multivitamin  1 tablet Oral Daily    nitroGLYCERIN 2% TD oint  1 inch Topical (Top) Q6H    potassium chloride  20 mEq Oral Daily    QUEtiapine  25 mg Oral BID    zinc sulfate  220 mg Oral Daily     Continuous Infusions:  PRN Meds:bisacodyl, morphine, sodium chloride 0.9%    Review of patient's allergies indicates:  No Known Allergies    SUBJECTIVE:     Interval History: Patient has no complaint of shortness of breath..    Review of Systems  Respiratory: negative for cough, hemoptysis, sputum and wheezing  Cardiovascular: negative for chest pressure/discomfort, dyspnea, palpitations, orthopnea and paroxysmal nocturnal dyspnea    OBJECTIVE:     Vital Signs (Most Recent)  Temp: 98.1 °F (36.7 °C) (01/28/20 1900)  Pulse: 93 (01/28/20 1900)  Resp: 18 (01/28/20 1900)  BP: (!) 146/66 (01/28/20 1900)  SpO2: 98 % (01/28/20 1900)    Vital Signs Range (Last 24H):  Temp:  [98.1 °F (36.7 °C)-98.8 °F (37.1 °C)]   Pulse:  [73-93]   Resp:  [17-18]   BP: (110-146)/(56-66)   SpO2:  [94 %-98 %]       Physical Exam:  Neck: JVD - 2 cm above sternal notch, no carotid bruit and supple, symmetrical, trachea midline  Lungs: clear to auscultation bilaterally, normal respiratory effort  Heart: regular rate and rhythm, S1, S2 normal, no murmur, click, rub or gallop  Abdomen: soft, non-tender; bowel sounds normal; no masses,  no organomegaly  Extremities: Positive for: edema 4+ and pitting bilaterally    Recent Results (from the past 24 hour(s))   Basic metabolic panel     Collection Time: 01/28/20  4:39 AM    Result Value Ref Range    Sodium 137 136 - 145 mmol/L    Potassium 3.4 (L) 3.5 - 5.1 mmol/L    Chloride 102 95 - 110 mmol/L    CO2 25 23 - 29 mmol/L    Glucose 104 70 - 110 mg/dL    BUN, Bld 14 8 - 23 mg/dL    Creatinine <0.3 (L) 0.5 - 1.4 mg/dL    Calcium 7.6 (L) 8.7 - 10.5 mg/dL    Anion Gap 10 8 - 16 mmol/L    eGFR if African American >60.0 >60 mL/min/1.73 m^2    eGFR if non African American >60.0 >60 mL/min/1.73 m^2   Comprehensive metabolic panel    Collection Time: 01/28/20  4:39 AM   Result Value Ref Range    Sodium 137 136 - 145 mmol/L    Potassium 3.4 (L) 3.5 - 5.1 mmol/L    Chloride 102 95 - 110 mmol/L    CO2 25 23 - 29 mmol/L    Glucose 104 70 - 110 mg/dL    BUN, Bld 14 8 - 23 mg/dL    Creatinine <0.3 (L) 0.5 - 1.4 mg/dL    Calcium 7.6 (L) 8.7 - 10.5 mg/dL    Total Protein 5.3 (L) 6.0 - 8.4 g/dL    Albumin 2.3 (L) 3.5 - 5.2 g/dL    Total Bilirubin 1.2 (H) 0.1 - 1.0 mg/dL    Alkaline Phosphatase 77 55 - 135 U/L    AST 22 10 - 40 U/L    ALT 10 10 - 44 U/L    Anion Gap 10 8 - 16 mmol/L    eGFR if African American >60.0 >60 mL/min/1.73 m^2    eGFR if non African American >60.0 >60 mL/min/1.73 m^2       Diagnostic Results:  Labs: Reviewed  ECG: Reviewed    ASSESSMENT/PLAN:     Continue IV furosemide

## 2020-01-29 NOTE — PROGRESS NOTES
[facility] Medicine  Progress Note    Patient Name: Ellen Maravilla  MRN: [id number]  Patient Class: IP- Inpatient   Admission Date: 1/26/2020  Length of Stay: 2 days  Attending Physician: Zaid Moya MD  Primary Care Provider: Tien Mckeon MD        Subjective:     Principal Problem:Shortness of breath    Interval History:  Patient H&H dropped this morning was started on Eliquis yesterday.  Patient had a recent EGD that found angiectasia in the stomach that was repaired.  GI consulted.  Increased potassium supplementation scheduled.  Urine culture grew E coli that was pansensitive.  Patient reports feeling well.  Denies any chest pain, shortness of breath.  Sons at bedside.  Reports that at discharge they want to take patient home.  The above hospital bed set up home ready.  Reports that she has been nonambulatory for the past 2 months after having a CVA.     Review of Systems   Constitutional: Negative for chills, fatigue, fever and unexpected weight change.   HENT: Negative for sore throat.    Eyes: Negative for visual disturbance.   Respiratory: Negative for cough, chest tightness and shortness of breath.    Cardiovascular: Negative for chest pain.   Gastrointestinal: Negative for abdominal pain, constipation, diarrhea, nausea and vomiting.   Endocrine: Negative for polyuria.   Genitourinary: Negative for dysuria and hematuria.   Neurological: Negative for headaches.     Objective:     Vital Signs (Most Recent):  Temp: 98.4 °F (36.9 °C) (01/29/20 0659)  Pulse: 60 (01/29/20 0659)  Resp: 17 (01/29/20 0659)  BP: 132/60 (01/29/20 0659)  SpO2: 97 % (01/29/20 0659) Vital Signs (24h Range):  Temp:  [98.1 °F (36.7 °C)-98.5 °F (36.9 °C)] 98.4 °F (36.9 °C)  Pulse:  [60-93] 60  Resp:  [16-20] 17  SpO2:  [95 %-98 %] 97 %  BP: (101-146)/(52-66) 132/60     Weight: 87.9 kg (193 lb 12.6 oz)  Body mass index is 31.28 kg/m².    Intake/Output Summary (Last 24 hours) at 1/29/2020 0903  Last data  filed at 1/29/2020 0800  Gross per 24 hour   Intake 690 ml   Output 5350 ml   Net -4660 ml      Physical Exam   Constitutional: She is oriented to person, place, and time. She appears well-developed and well-nourished. No distress.   HENT:   Right Ear: External ear normal.   Left Ear: External ear normal.   Eyes: Conjunctivae and EOM are normal. Right eye exhibits no discharge. Left eye exhibits no discharge.   Neck: Normal range of motion.   Cardiovascular: Normal rate, regular rhythm and normal heart sounds. Exam reveals no gallop and no friction rub.   No murmur heard.  Anasarca, 3+ Right upper and BL lower extremities   Pulmonary/Chest: Effort normal and breath sounds normal. No respiratory distress. She has no wheezes. She has no rales. She exhibits no tenderness.   Genitourinary:   Genitourinary Comments: Tsai cath   Musculoskeletal: She exhibits no tenderness.   Neurological: She is alert and oriented to person, place, and time.   Skin: Skin is warm. She is not diaphoretic.   Psychiatric: She has a normal mood and affect. Her behavior is normal. Judgment and thought content normal.   Nursing note and vitals reviewed.      Significant Labs:   CBC:   Recent Labs   Lab 01/29/20  0456   WBC 5.08   HGB 7.9*   HCT 25.7*        CMP:   Recent Labs   Lab 01/28/20  0439 01/29/20  0456     137 138   K 3.4*  3.4* 2.8*     102 98   CO2 25  25 30*     104 97   BUN 14  14 12   CREATININE <0.3*  <0.3* <0.3*   CALCIUM 7.6*  7.6* 7.5*   PROT 5.3*  --    ALBUMIN 2.3*  --    BILITOT 1.2*  --    ALKPHOS 77  --    AST 22  --    ALT 10  --    ANIONGAP 10  10 10   EGFRNONAA >60.0  >60.0 >60.0       Significant Imaging: I have reviewed all pertinent imaging results/findings within the past 24 hours.      Assessment/Plan:      Active Hospital Problems    Diagnosis    *Shortness of breath    Hypoalbuminemia    Congestive heart failure    DNR (do not resuscitate)    Long term (current) use of  anticoagulants- On eliquis since 12/2019 post PE disgnosis    Thrombocytopenia    Anemia    Hypertension    Thyroid disease       * Shortness of breath  Suspect from acute HFpEF (Grade 1 diastolic, mild/mod tricuspid regurge) vs Hypoalbuminemia  Recent CT abdomen found no evidence of liver disease.  Liver enzymes unremarkable including INR  CTA chest neg for PE on admit  Lasix IV b.i.d., fluid restriction  Strict I/O, daily weight  On protein supplementation  Cardiology on board    UTI  UCx E coli, pan-sensitive   Rocephin    History PE  Will discontinue Eliquis at discharge due to recurrent worsening anemia  CT a on admission was negative for PE    DNR    PT/OT  cw home meds      VTE Risk Mitigation (From admission, onward)         Ordered     IP VTE LOW RISK PATIENT  Once      01/27/20 0610     Place sequential compression device  Until discontinued      01/27/20 0610                      Zaid Moya MD  Department of Hospital Medicine   [facility]  Date of service: 01/29/2020 2:48 PM

## 2020-01-30 VITALS
HEIGHT: 66 IN | BODY MASS INDEX: 31.15 KG/M2 | WEIGHT: 193.81 LBS | TEMPERATURE: 98 F | OXYGEN SATURATION: 93 % | SYSTOLIC BLOOD PRESSURE: 121 MMHG | DIASTOLIC BLOOD PRESSURE: 58 MMHG | RESPIRATION RATE: 18 BRPM | HEART RATE: 93 BPM

## 2020-01-30 PROBLEM — R06.02 SHORTNESS OF BREATH: Status: RESOLVED | Noted: 2020-01-27 | Resolved: 2020-01-30

## 2020-01-30 LAB
ANION GAP SERPL CALC-SCNC: 10 MMOL/L (ref 8–16)
BUN SERPL-MCNC: 18 MG/DL (ref 8–23)
CALCIUM SERPL-MCNC: 7.5 MG/DL (ref 8.7–10.5)
CHLORIDE SERPL-SCNC: 96 MMOL/L (ref 95–110)
CO2 SERPL-SCNC: 30 MMOL/L (ref 23–29)
CREAT SERPL-MCNC: 0.4 MG/DL (ref 0.5–1.4)
ERYTHROCYTE [DISTWIDTH] IN BLOOD BY AUTOMATED COUNT: 17.2 % (ref 11.5–14.5)
EST. GFR  (AFRICAN AMERICAN): >60 ML/MIN/1.73 M^2
EST. GFR  (NON AFRICAN AMERICAN): >60 ML/MIN/1.73 M^2
GLUCOSE SERPL-MCNC: 104 MG/DL (ref 70–110)
HCT VFR BLD AUTO: 25.6 % (ref 37–48.5)
HCT VFR BLD AUTO: 25.9 % (ref 37–48.5)
HCT VFR BLD AUTO: 27 % (ref 37–48.5)
HCT VFR BLD AUTO: 27.8 % (ref 37–48.5)
HGB BLD-MCNC: 8.1 G/DL (ref 12–16)
HGB BLD-MCNC: 8.1 G/DL (ref 12–16)
HGB BLD-MCNC: 8.5 G/DL (ref 12–16)
HGB BLD-MCNC: 8.7 G/DL (ref 12–16)
MAGNESIUM SERPL-MCNC: 1.9 MG/DL (ref 1.6–2.6)
MCH RBC QN AUTO: 30 PG (ref 27–31)
MCHC RBC AUTO-ENTMCNC: 31.3 G/DL (ref 32–36)
MCV RBC AUTO: 96 FL (ref 82–98)
PHOSPHATE SERPL-MCNC: 3 MG/DL (ref 2.7–4.5)
PLATELET # BLD AUTO: 209 K/UL (ref 150–350)
PMV BLD AUTO: 8.8 FL (ref 9.2–12.9)
POTASSIUM SERPL-SCNC: 3.2 MMOL/L (ref 3.5–5.1)
RBC # BLD AUTO: 2.7 M/UL (ref 4–5.4)
SODIUM SERPL-SCNC: 136 MMOL/L (ref 136–145)
WBC # BLD AUTO: 4.71 K/UL (ref 3.9–12.7)

## 2020-01-30 PROCEDURE — 83735 ASSAY OF MAGNESIUM: CPT

## 2020-01-30 PROCEDURE — 85018 HEMOGLOBIN: CPT

## 2020-01-30 PROCEDURE — 25000003 PHARM REV CODE 250: Performed by: FAMILY MEDICINE

## 2020-01-30 PROCEDURE — 85027 COMPLETE CBC AUTOMATED: CPT

## 2020-01-30 PROCEDURE — 25000003 PHARM REV CODE 250: Performed by: NURSE PRACTITIONER

## 2020-01-30 PROCEDURE — 36415 COLL VENOUS BLD VENIPUNCTURE: CPT

## 2020-01-30 PROCEDURE — 63600175 PHARM REV CODE 636 W HCPCS: Performed by: FAMILY MEDICINE

## 2020-01-30 PROCEDURE — 80048 BASIC METABOLIC PNL TOTAL CA: CPT

## 2020-01-30 PROCEDURE — 84100 ASSAY OF PHOSPHORUS: CPT

## 2020-01-30 PROCEDURE — 85014 HEMATOCRIT: CPT

## 2020-01-30 RX ORDER — FUROSEMIDE 40 MG/1
40 TABLET ORAL DAILY
Qty: 30 TABLET | Refills: 0 | Status: SHIPPED | OUTPATIENT
Start: 2020-01-30 | End: 2020-02-29

## 2020-01-30 RX ORDER — CEFDINIR 300 MG/1
300 CAPSULE ORAL 2 TIMES DAILY
Qty: 2 CAPSULE | Refills: 0 | Status: SHIPPED | OUTPATIENT
Start: 2020-01-31 | End: 2020-02-01

## 2020-01-30 RX ADMIN — POTASSIUM CHLORIDE 20 MEQ: 20 TABLET, EXTENDED RELEASE ORAL at 02:01

## 2020-01-30 RX ADMIN — FUROSEMIDE 60 MG: 10 INJECTION, SOLUTION INTRAMUSCULAR; INTRAVENOUS at 10:01

## 2020-01-30 RX ADMIN — CEFTRIAXONE 1 G: 1 INJECTION, SOLUTION INTRAVENOUS at 10:01

## 2020-01-30 RX ADMIN — QUETIAPINE 25 MG: 25 TABLET ORAL at 10:01

## 2020-01-30 RX ADMIN — ASPIRIN 81 MG: 81 TABLET, DELAYED RELEASE ORAL at 10:01

## 2020-01-30 RX ADMIN — OXYCODONE HYDROCHLORIDE AND ACETAMINOPHEN 500 MG: 500 TABLET ORAL at 10:01

## 2020-01-30 RX ADMIN — POTASSIUM CHLORIDE 20 MEQ: 20 TABLET, EXTENDED RELEASE ORAL at 10:01

## 2020-01-30 RX ADMIN — METOPROLOL TARTRATE 25 MG: 25 TABLET ORAL at 10:01

## 2020-01-30 RX ADMIN — THERA TABS 1 TABLET: TAB at 10:01

## 2020-01-30 RX ADMIN — AMLODIPINE BESYLATE 5 MG: 5 TABLET ORAL at 10:01

## 2020-01-30 RX ADMIN — ZINC SULFATE 220 MG (50 MG) CAPSULE 220 MG: CAPSULE at 10:01

## 2020-01-30 NOTE — HPI
Ellen Maravilla is a 87 y.o. old  female who  has a past medical history of Coronary artery disease, Dementia, Hypertension, Pulmonary embolism, Stroke, Thyroid disease, and Uterine cancer.. The patient presented to [facility] on 1/26/2020 with a primary complaint of Shortness of Breath (sob started last night, increase edema noted to lower extremities got worse throughout the day, given lasix 40 mg IM and po potassium )  .   87-year-old  female presents emergency room with shortness of breath.  According to the family the patient is a resident and gas house.  The nursing home called the family seen at the patient was short of breath and she needed to come to the emergency room for further evaluation.  This patient was recently discharged from our facility secondary to symptomatic anemia a EGD was performed and the lesion was cauterized.  The patient has a history of PEs and at that time was taken off of her Eliquis.  Also on her prior admit it was noted that she had bruising to her anterior chest wall prior to arrival.  Today on my exam the patient has with appears to be a large hematoma to the right anterior chest wall near her Port-A-Cath.  She was also noted to have extensive swelling to the righy upper extremity

## 2020-01-30 NOTE — HOSPITAL COURSE
Patient was admitted for shortness of breath and anasarca likely secondary to acute HFpEF (Grade 1 diastolic, mild/mod tricuspid regurge) vs Hypoalbuminemia.  Patient was aggressively diuresed and had improvement in anasarca and shortness of breath.  A trial of anticoagulation was restarted on the patient however patient had a sudden drop and H&H.  GI was consulted.  Family wanted hospice and did not want further intervention at that point.  Oral anticoagulation will be discontinued indefinitely due to high risk of GI bleed.  During hospitalization, family decided that they did not want to have hospice but wants to have home health. Family a ready has a hospital bed and 24 hr caregivers set up a home ready.  They do not want SNF or nursing home placement.  Patient was fair discharge with instructions to follow up with Cardiology, GI, PCP.    General: Patient resting comfortably in no acute distress. Appears as stated age. Calm  Eyes: EOM intact. No conjunctivae injection. No scleral icterus.  ENT: Hearing grossly intact. No discharge from ears. No nasal discharge.   CVS: Regular rate. Anasarca +2 in upper and lower extremities  Lungs: CTA BL, no wheezing or crackles. Good breath sounds. No accessory muscle use. No acute respiratory distress

## 2020-01-30 NOTE — NURSING
PER DR. YEBOAH PEREZ NEEDS TO BE REMOVED PRIOR TO PT. LEAVING. I ADVISED THE SON OF THIS BECAUSE HE WAS REQUESTING TO KEEP PEREZ IN UNTIL HE SPEAKS WITH THE HOME HEALTH NURSE. PEREZ WILL BE REMOVED BEFORE PT. LEAVES.

## 2020-01-30 NOTE — DISCHARGE SUMMARY
[facility] Medicine  Discharge Summary      Patient Name: Ellen Maravilla  MRN: [id number]  Admission Date: 1/26/2020  Hospital Length of Stay: 3 days  Discharge Date and Time:  01/30/2020 1:52 PM  Attending Physician: Zaid Moya MD   Discharging Provider: Zaid Moya MD  Primary Care Provider: Tien Mckeon MD      HPI:   Ellen Maravilla is a 87 y.o. old  female who  has a past medical history of Coronary artery disease, Dementia, Hypertension, Pulmonary embolism, Stroke, Thyroid disease, and Uterine cancer.. [facility] with a primary complaint of Shortness of Breath (sob started last night, increase edema noted to lower extremities got worse throughout the day, given lasix 40 mg IM and po potassium )  .   87-year-old  female presents emergency room with shortness of breath.  According to the family the patient is a resident and gas house.  The nursing home called the family seen at the patient was short of breath and she needed to come to the emergency room for further evaluation.  This patient was recently discharged from our facility secondary to symptomatic anemia a EGD was performed and the lesion was cauterized.  The patient has a history of PEs and at that time was taken off of her Eliquis.  Also on her prior admit it was noted that she had bruising to her anterior chest wall prior to arrival.  Today on my exam the patient has with appears to be a large hematoma to the right anterior chest wall near her Port-A-Cath.  She was also noted to have extensive swelling to the righy upper extremity    * No surgery found *      Hospital Course:   Patient was admitted for shortness of breath and anasarca likely secondary to acute HFpEF (Grade 1 diastolic, mild/mod tricuspid regurge) vs Hypoalbuminemia.  Patient was aggressively diuresed and had improvement in anasarca and shortness of breath.  A trial of  anticoagulation was restarted on the patient however patient had a sudden drop and H&H.  GI was consulted.  Family wanted hospice and did not want further intervention at that point.  Oral anticoagulation will be discontinued indefinitely due to high risk of GI bleed.  During hospitalization, family decided that they did not want to have hospice but wants to have home health. Family grover ready has a hospital bed and 24 hr caregivers set up a home ready.  They do not want SNF or nursing home placement.  Patient was fair discharge with instructions to follow up with Cardiology, GI, PCP.    General: Patient resting comfortably in no acute distress. Appears as stated age. Calm  Eyes: EOM intact. No conjunctivae injection. No scleral icterus.  ENT: Hearing grossly intact. No discharge from ears. No nasal discharge.   CVS: Regular rate. Anasarca +2 in upper and lower extremities  Lungs: CTA BL, no wheezing or crackles. Good breath sounds. No accessory muscle use. No acute respiratory distress     Consults:   Consults (From admission, onward)        Status Ordering Provider     Inpatient consult to Cardiology  Once     Provider:  Kuldip Garcia MD    Acknowledged BRY YEBOAH     Inpatient consult to Gastroenterology  Once     Provider:  Kyle Collins MD    Acknowledged BRY YEBOAH     Inpatient consult to Hospitalist  Once     Provider:  Lupe Garcia NP    Acknowledged FRANCIS MARCIAL     Inpatient consult to   Once     Provider:  (Not yet assigned)    Completed BRY YEBOAH          No new Assessment & Plan notes have been filed under this hospital service since the last note was generated.  Service: Hospital Medicine    Final Active Diagnoses:    Diagnosis Date Noted POA    Hypoalbuminemia [E88.09] 01/28/2020 Yes    Congestive heart failure [I50.9] 01/27/2020 Yes     Chronic    DNR (do not resuscitate) [Z66] 01/13/2020 Yes     Chronic    Long term (current) use of  anticoagulants- On eliquis since 12/2019 post PE disgnosis [Z79.01] 01/13/2020 Not Applicable     Chronic    Thrombocytopenia [D69.6] 02/12/2015 Yes    Anemia [D64.9]  Yes    Hypertension [I10] 02/10/2015 Yes     Chronic    Thyroid disease [E07.9] 02/10/2015 Yes     Chronic      Problems Resolved During this Admission:    Diagnosis Date Noted Date Resolved POA    PRINCIPAL PROBLEM:  Shortness of breath [R06.02] 01/27/2020 01/30/2020 Yes       Discharged Condition: fair    Disposition: Home-Health Care [facility] [place], [place]    Follow Up:  Follow-up Information     Kuldip Garcia MD In 2 weeks.    Specialty:  Cardiology  Why:  Heart failure  Contact information:  [address]  [address]  [place] [address]  [phone]             Kyle Collins MD In 2 weeks.    Specialty:  Gastroenterology  Contact information:  [address]  [place] [address]  [phone]             Tien Mckeon MD In 1 week.    Specialty:  Family Medicine  Why:  For check up s/p hospital discharge  Contact information:  [address]  [place] [address]  [phone]                 Patient Instructions:      Referral to Home health   Referral Priority: Routine Referral Type: Home Health   Referral Reason: Specialty Services Required   Requested Specialty: Home Health Services   Number of Visits Requested: 1     Diet Cardiac     Notify your health care provider if you experience any of the following:  temperature >100.4     Notify your health care provider if you experience any of the following:  persistent nausea and vomiting or diarrhea     Notify your health care provider if you experience any of the following:  severe uncontrolled pain     Notify your health care provider if you experience any of the following:  redness, tenderness, or signs of infection (pain, swelling, redness, odor or green/yellow discharge around incision site)     Notify your health care provider if you experience any of the following:  difficulty breathing or  increased cough     Notify your health care provider if you experience any of the following:  severe persistent headache     Notify your health care provider if you experience any of the following:  worsening rash     Notify your health care provider if you experience any of the following:  persistent dizziness, light-headedness, or visual disturbances     Notify your health care provider if you experience any of the following:  increased confusion or weakness     Activity as tolerated       Significant Diagnostic Studies: Labs:   CMP   Recent Labs   Lab 01/29/20  0456 01/29/20  1746 01/30/20  0431     --  136   K 2.8* 3.8 3.2*   CL 98  --  96   CO2 30*  --  30*   GLU 97  --  104   BUN 12  --  18   CREATININE <0.3*  --  0.4*   CALCIUM 7.5*  --  7.5*   ANIONGAP 10  --  10   ESTGFRAFRICA >60.0  --  >60.0   EGFRNONAA >60.0  --  >60.0    and CBC   Recent Labs   Lab 01/29/20  0456  01/29/20  2334 01/30/20  0431 01/30/20  0809   WBC 5.08  --   --  4.71  --    HGB 7.9*   < > 8.1* 8.1* 8.7*   HCT 25.7*   < > 25.6* 25.9* 27.8*     --   --  209  --     < > = values in this interval not displayed.       Pending Diagnostic Studies:     Procedure Component Value Units Date/Time    Hematocrit [[id number]]     Order Status:  Sent Lab Status:  No result     Specimen:  Blood     Hemoglobin [[id number]]     Order Status:  Sent Lab Status:  No result     Specimen:  Blood          Medications:  Reconciled Home Medications:      Medication List      START taking these medications    cefdinir 300 MG capsule  Commonly known as:  OMNICEF  Take 1 capsule (300 mg total) by mouth 2 (two) times daily. for 1 day  Start taking on:  January 31, 2020     furosemide 40 MG tablet  Commonly known as:  LASIX  Take 1 tablet (40 mg total) by mouth once daily.        CHANGE how you take these medications    atorvastatin 40 MG tablet  Commonly known as:  LIPITOR  Take 1 tablet (40 mg total) by mouth once daily.  What changed:  when to take  this        CONTINUE taking these medications    acetaminophen 325 MG tablet  Commonly known as:  TYLENOL  Take 650 mg by mouth every 4 (four) hours as needed for Pain.     albuterol 2.5 mg /3 mL (0.083 %) nebulizer solution  Commonly known as:  PROVENTIL  Take 2.5 mg by nebulization every 6 (six) hours as needed for Wheezing. Rescue     amLODIPine 5 MG tablet  Commonly known as:  NORVASC  Take 1 tablet (5 mg total) by mouth once daily.     ascorbic acid (vitamin C) 500 MG tablet  Commonly known as:  VITAMIN C  Take 500 mg by mouth 2 (two) times daily.     aspirin 81 MG EC tablet  Commonly known as:  ECOTRIN  Take 81 mg by mouth once daily.     bisacodyl 10 mg Supp  Commonly known as:  DULCOLAX  Place 1 suppository (10 mg total) rectally daily as needed.     cyanocobalamin 1000 MCG tablet  Commonly known as:  VITAMIN B-12  Take 1 tablet (1,000 mcg total) by mouth once daily.     metoprolol tartrate 25 MG tablet  Commonly known as:  LOPRESSOR  Take 1 tablet (25 mg total) by mouth 2 (two) times daily.     multivitamin per tablet  Commonly known as:  THERAGRAN  Take 1 tablet by mouth once daily.     polyethylene glycol 17 gram/dose powder  Commonly known as:  GLYCOLAX  Take 17 g by mouth once daily.     potassium chloride 40 mEq/15 mL Liqd  Take 40 mEq by mouth once.     PROCRIT INJ  Inject 4,325 Units as directed Every Friday.     ProMod Protein Liqd  Generic drug:  protein supplement  Take 30 mLs by mouth 2 (two) times daily.     QUEtiapine 25 MG Tab  Commonly known as:  SEROQUEL  Take 25 mg by mouth 2 (two) times daily.     traMADol 50 mg tablet  Commonly known as:  ULTRAM  Take 25 mg by mouth every 8 (eight) hours as needed for Pain.     zinc sulfate 220 (50) mg capsule  Commonly known as:  ZINCATE  Take 220 mg by mouth once daily.            Indwelling Lines/Drains at time of discharge:   Lines/Drains/Airways     Central Venous Catheter Line                 Port A Cath Single Lumen 01/13/20 1602 right subclavian  16 days          Drain                 Urethral Catheter 01/26/20 9190 3 days                Time spent on the discharge of patient: 35 minutes  Patient was seen and examined on the date of discharge and determined to be suitable for discharge.         Zaid Moya MD  Department of Hospital Medicine  [facility]  Date of service: 01/30/2020 1:52 PM

## 2020-01-30 NOTE — NURSING
SON HAS RETURNED TO ROOM. HE IS NOW REQUESTING AN AMBULANCE TO TRANSPORT PT HOME WITH HOME HEALTH. I ADVISED THE  OF THIS AND SHE IS NOW WORKING ON IT. IT SHOULD BE NOTED THAT PT. WILL BE GOING HOME WITH PEREZ CATH. IN PLACE DUE TO CHRONIC RETENTION AND BED BOUND STATUS.

## 2020-01-30 NOTE — PLAN OF CARE
01/30/20 1008   Discharge Reassessment   Assessment Type Discharge Planning Reassessment   Anticipated Discharge Disposition [facility]     AGA placed telephone call to pt's son, Jhonny, [phone], to follow-up on consult for home with hospice. Jhonny stated that he was on his way to the hospital. SW to speak with Jhonny once he arrives.     Update 1110: AGA met with Jhonny at pt's bedside to discuss hospice consult. Jhonny stated that at this time, he would like to take the pt home with home health. He stated that he has already spoken with [facility] and they told him that he would cover that for the pt. Jhonny has set up private pay sitters and as well as another son that will be staying with the pt. This would establish 24/7 caregivers for the pt. AGA obtained a signature on a patient choice form at 1106 and the form will be scanned into the pt's chart. AGA notified attending via secure chat of family's wishes and intentions. Attending acknowledged.    Update 1138: DC orders obtained and faxed to [facility], per protocol [phone], for home health PT/OT/SN.    Update 1446: Pt will be receiving home health with [facility].    Update 1530: AGA received notification from pt's nurse, Ed, that family is requesting ambulance transport for the pt. AGA notified pt's attending of these and requested a consult to  for ambulance transport. AGA obtained consult and faxed it to the Roseanne Schulte, who has been assisting with this pt's d/c and home health. SW awaiting authorization from [facility] then AGA will arrange for ambulance transport with [facility]. As per documentation and pt's nurse, pt lacks the trunk control to safely transport in a wheelchair.     Update 1619: AGA made numerous attempts to fax [facility], [phone] as well as [phone], with each fax not being successful. AGA called [facility] [phone] reguarding the difficulty in getting faxes to go thru and the need for the ambulance auth to get pt transferred  out of the hospital to her home. Aga spoke with [employer] representative, June, regarding this issue. June apologized and stated that it could be emailed securely to her. In the mean time, AGA received notification that the fax had failed after 15 attempts. June provided SW with email address ([email or web address]) and AGA sent a secure encrypted email with [employer], Nurses Note, and Consult to her. AGA awaiting auth.    Update 8233: AGA obtained auth from [facility]; [id number]. AGA called and spoke with Svetlana at [facility] [phone] and provided her with the information for transport. SW to notify family and nurse.

## 2020-02-01 LAB
BACTERIA BLD CULT: NORMAL
BACTERIA BLD CULT: NORMAL

## 2020-02-10 ENCOUNTER — EXTERNAL HOME HEALTH (OUTPATIENT)
Dept: HOME HEALTH SERVICES | Facility: HOSPITAL | Age: 85
End: 2020-02-10

## 2020-07-19 NOTE — PROGRESS NOTES
12/16/19 1436   Discharge Assessment   Assessment Type Discharge Planning Assessment   Confirmed/corrected address and phone number on facesheet? Yes   Assessment information obtained from? Caregiver   Communicated expected length of stay with patient/caregiver yes   Prior to hospitilization cognitive status: Unable to Assess   Prior to hospitalization functional status: Needs Assistance   Current cognitive status: Not Oriented to Place;Not Oriented to Time   Current Functional Status: Needs Assistance   Facility Arrived From: Guest House   Lives With child(buzz), adult   Able to Return to Prior Arrangements yes   Is patient able to care for self after discharge? No   Who are your caregiver(s) and their phone number(s)? Jhonny Patino (son) [phone] (h) [phone] (c)   Patient's perception of discharge disposition skilled nursing facility   If yes, most recent facility name: [facility]   Patient currently being followed by outpatient case management? No   Patient currently receives any other outside agency services? No   Equipment Currently Used at Home cane, straight;wheelchair;walker, rolling   Do you have any problems affording any of your prescribed medications? No   Is the patient taking medications as prescribed? yes   Does the patient have transportation home? No   Does the patient receive [facility] at [facility]? No   Discharge Plan A Skilled Nursing Facility   DME Needed Upon Discharge  none   Patient/Family in Agreement with Plan yes      English

## 2025-02-08 NOTE — PT/OT/SLP PROGRESS
Physical Therapy      Patient Name:  Ellen Maravilla   MRN:  [id number]    Patient not seen today secondary to (Pt refused tx explained importance of OOB pt still refused). Will follow-up Tomorrow.    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 Patient was discharged on 01.17.2025 from hospital after inpatient chemotherapy (Completed cycle 1 of R-EPOCH (D1 on 1/11/2024). Tolerated well. Rituximab was given on D5).   Patient started to get a productive cough with yellow sputum production. He was admitted to SDU for  Acute Hypoxic Respiratory Failure 2/2 Multifocal Pneumonia, Volume Overload, Neutropenic fever, Influenza A infection, decompensated and was upgraded to ICU on 1/27,  started on BIPAP and weaned to HFNC. Received tamiflu course + course of azithromycin+cefepime. Currently being monitored off Abx by ID.    Pt clinically improved and was  downgrade back  to SDU. Here he was weaned off HFNC onto NC. ENT was consulted for black lesions on the base of tongue. They rec CT neck with IV contrast to investigate further. Follow up CT w/ IV contrast read, heme onc for any further management of large B celllymphoma with concern for rodriguez trnasformation and if ok to transition from heparin to xarelto or eliquis regarding his a-fib.    3B Course: During time in 3B, patient began to desaturate and required increased ventilation to Bipap. ABG was collected showing metabolic alkalosis with pCO2 52, pO2 73, HCO3 39, FiO2 30. Also endorsed subjective chills. Lactate was normal. Blood cultures, procal, legionella ordered. Patient was initially being diuresed on Bumex 20mg BID, now dc'd. CXR performed showed some mild congestive opacities improved from before. Fungitell ordered. CT chest noncon ordered. Patient was restarted on IV abx, cefepime 2g BID and vancomycin. Patient will be upgraded to SDU for acute respiratory failure 2/2 suspected multifocal pneumonia.    Vital Signs Last 24 Hrs  T(C): 36.7 (08 Feb 2025 16:00), Max: 36.8 (08 Feb 2025 12:00)  T(F): 98 (08 Feb 2025 16:00), Max: 98.2 (08 Feb 2025 12:00)  HR: 90 (08 Feb 2025 16:00) (69 - 90)  BP: 105/57 (08 Feb 2025 16:00) (105/57 - 148/69)  BP(mean): 75 (08 Feb 2025 16:00) (75 - 99)  RR: 18 (08 Feb 2025 09:30) (18 - 18)  SpO2: 94% (08 Feb 2025 16:00) (92% - 99%)    Parameters below as of 08 Feb 2025 09:30  Patient On (Oxygen Delivery Method): nasal cannula  O2 Flow (L/min): 6    I&O's Summary    07 Feb 2025 07:01  -  08 Feb 2025 07:00  --------------------------------------------------------  IN: 76 mL / OUT: 500 mL / NET: -424 mL      MEDICATIONS  (STANDING):  albuterol/ipratropium for Nebulization 3 milliLiter(s) Nebulizer every 4 hours  allopurinol 150 milliGRAM(s) Oral daily  apixaban 5 milliGRAM(s) Oral every 12 hours  ascorbic acid 500 milliGRAM(s) Oral daily  atorvastatin 10 milliGRAM(s) Oral at bedtime  benzocaine 20% Spray 1 Spray(s) Topical three times a day  buMETAnide 2 milliGRAM(s) Oral daily  cefepime   IVPB 2000 milliGRAM(s) IV Intermittent every 12 hours  cefepime   IVPB      chlorhexidine 2% Cloths 1 Application(s) Topical daily  dextrose 5%. 1000 milliLiter(s) (50 mL/Hr) IV Continuous <Continuous>  dextrose 5%. 1000 milliLiter(s) (100 mL/Hr) IV Continuous <Continuous>  dextrose 50% Injectable 25 Gram(s) IV Push once  dextrose 50% Injectable 12.5 Gram(s) IV Push once  dextrose 50% Injectable 25 Gram(s) IV Push once  FIRST- Mouthwash  BLM 10 milliLiter(s) Swish and Spit every 4 hours  glucagon  Injectable 1 milliGRAM(s) IntraMuscular once  influenza  Vaccine (HIGH DOSE) 0.5 milliLiter(s) IntraMuscular once  insulin glargine Injectable (LANTUS) 40 Unit(s) SubCutaneous at bedtime  insulin lispro (ADMELOG) corrective regimen sliding scale   SubCutaneous three times a day before meals  insulin lispro Injectable (ADMELOG) 16 Unit(s) SubCutaneous three times a day before meals  insulin regular Infusion 7 Unit(s)/Hr (7 mL/Hr) IV Continuous <Continuous>  melatonin 10 milliGRAM(s) Oral at bedtime  methylPREDNISolone sodium succinate Injectable 40 milliGRAM(s) IV Push daily  metoprolol tartrate 25 milliGRAM(s) Oral two times a day  NIFEdipine XL 60 milliGRAM(s) Oral daily  nystatin Cream 1 Application(s) Topical every 12 hours  pantoprazole  Injectable 40 milliGRAM(s) IV Push daily  polyethylene glycol 3350 17 Gram(s) Oral every 12 hours  senna 2 Tablet(s) Oral every 12 hours  sertraline 50 milliGRAM(s) Oral daily  sodium chloride 0.9% 1000 milliLiter(s) (50 mL/Hr) IV Continuous <Continuous>  tamsulosin 0.4 milliGRAM(s) Oral at bedtime    MEDICATIONS  (PRN):  aluminum hydroxide/magnesium hydroxide/simethicone Suspension 30 milliLiter(s) Oral every 4 hours PRN Dyspepsia  bisacodyl 5 milliGRAM(s) Oral daily PRN Constipation  calcium carbonate    500 mG (Tums) Chewable 1 Tablet(s) Chew three times a day PRN Dyspepsia  dextrose Oral Gel 15 Gram(s) Oral once PRN Blood Glucose LESS THAN 70 milliGRAM(s)/deciliter  HYDROmorphone  Injectable 0.5 milliGRAM(s) IV Push every 6 hours PRN Severe Pain (7 - 10)  hydrOXYzine hydrochloride 25 milliGRAM(s) Oral every 6 hours PRN Anxiety  magnesium hydroxide Suspension 30 milliLiter(s) Oral daily PRN Constipation  ondansetron Injectable 4 milliGRAM(s) IV Push every 6 hours PRN Nausea and/or Vomiting  oxyCODONE    IR 10 milliGRAM(s) Oral every 6 hours PRN Moderate Pain (4 - 6)  simethicone 80 milliGRAM(s) Chew daily PRN Gas      LABS                                            8.9                   Neurophils% (auto):   76.8   (02-08 @ 05:59):    16.14)-----------(611          Lymphocytes% (auto):  5.1                                           27.1                   Eosinphils% (auto):   0.0      Manual%: Neutrophils x    ; Lymphocytes x    ; Eosinophils x    ; Bands%: x    ; Blasts x                                    130    |  88     |  121                 Calcium: 8.4   / iCa: x      (02-08 @ 17:29)    ----------------------------<  529       Magnesium: x                                4.6     |  27     |  2.6              Phosphorous: x        TPro  5.4    /  Alb  3.2    /  TBili  0.3    /  DBili  x      /  AST  56     /  ALT  31     /  AlkPhos  261    08 Feb 2025 17:29    ASSESSMENT AND PLAN    # DM  - CC diet   - FS - self monitored via dex com --> On insulin pump @2.85 at home   - Endo consult: keep off insulin pump  - lantus 10 lispro 3 --> has been steadily increased : lantus increased from 30 to 40 and lispro from 10 to 16 on 02/08 --> s/p Humulin 7 units 1 dose  - Patient received total of 31 and Humulin Admelog 16x3  - Repeat BMP showed Gluocse of 529, mild increase in AG (12 --> 15) and Beta hydroxy < 0.2  - Discussed with pulm fellow, will be started on insulin drip (7 units /min), NS fluid with 20 mEq K but slower rate given HFpEF decompensation  - F/U FS and repeat BMP    # Acute Hypoxic Respiratory Failure 2/2 Multifocal Pneumonia/  Acute on chronic diastolic CHF/  Influenza A infection/ RASHAD/ OHS   - clinically improving, on high low oxygen for now, will taper off as tolerated   - NIV PRN and QHS   - nebs Q 6 hours, supportive care, aspiration precautions   - CTA 1/20: Negative for pulmonary emboli. Interval development of infiltrates within the lower lobes and lingula which may reflect acute multilobar pneumonia  - blood cx NGTD x2, UA negative, influenza A + , Nasal MRSA negative   - pt was consulted by ID, treated with IV Abx, completed the course of Tamiflu   - TTE 1/10/25: poor study, EF 60-65%, pt has moderated TR  - fluid restriction 1200 ml in 24 hours , intake and output monitoring, daily weight   - Desaturated again while in ICU on NC, and Bipap was started  - CT chest: Worsening bilateral peribronchial opacities compatible with bronchopneumonia  - F/u cultures, legionella, procal, fungitell  - Started IV cefepime 2g BID and vancomycin  - Follow up ID  - HFNC 40/40, today, titrate off   - duonebs and solumedrol 40 mg daily , wheeze on exam    # 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 Tolerated well. Rituximab was given on D5   - Uric acid 10.3 on 1/16/25, s/p 3 mg IV rasburicase  - sp Zarxio  - daily CBC with diff, active T&S  - c/w Allopurinol   - No onc plans to start chemo/treatment inpatient, can dc when medically appropriate with follow-up with Dr. Hilliard who will schedule cycle 2 of treatment.     # KRISTINE on CKD 3B / Chronic Lower Extremity Edema   - baseline ~high 1s, low 2s  - c/w diuretics: bumex 2mg IV BID --> to 2mg IV TID --> 2 mg PO daily  - monitor BUN/cr and urine output  - ajay d/c'd  - avoid nephrotoxic medications   - will hold off Metolazone on 02/08 as BUN/Cr 115/2.7  - nephrology is following, recommendations noted    # CAD s/p CABG/ Chronic Afib on Xarelto/ HTN  - c/w statin, BB  - Holding ASA  - Still holding lisinopril and aldactone from last admission  - sylvester need to switch to eliquis given CKD from xaerlto   - dc heparin ggt in pm start eliquis 5 mg BID     # HLD   - on Atorvastatin now   - resume Repatha on discharge    # Oral pain due to ulcer with black discoloration   - c/w local couth care  - consult ENT to examine black discoloration -> rec CT neck w/ IV contrast, magic mouthwash  - CT neck w/IV contrast no tongue mass.   - oncology thinking this is mucositis   - Monitor bleed, keep active TS and trend CBC.   - Will c/w magic mouthwash.   - Bleeding is resolving    # Agitation/ confusion   # Insomnia   - pt is calm now   - on Haldol PRN , Sertraline and melatonin   - supportive care     # GI  - on teofilo regimen and PPIs.    #Misc  #Code Status: Full Code  #DVT ppx: Eliquis  #GI ppx: PPI  #Diet: CC  #Activity: OOBTC  #Inpatient Dispo: SDU  #Discharge Dispo: Home vs Rehab SICU Transfer Note    Transfer from: SICU  Transfer to: ICU    HPI: HPI:67-year-old man with extensive medical history including CAD (status post CABG;), DM (on insulin pump), atrial fibrillation on Xarelto, history of distal pancreatomy and splenectomy for unclear reason referred to the ED by his oncologist for abnormal labs. 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 Patient was discharged on 01.17.2025 from hospital after inpatient chemotherapy (Completed cycle 1 of R-EPOCH (D1 on 1/11/2024). Tolerated well. Rituximab was given on D5).   Patient started to get a productive cough with yellow sputum production. He was admitted to SDU for  Acute Hypoxic Respiratory Failure 2/2 Multifocal Pneumonia, Volume Overload, Neutropenic fever, Influenza A infection, decompensated and was upgraded to ICU on 1/27,  started on BIPAP and weaned to HFNC. Received tamiflu course + course of azithromycin+cefepime. Currently being monitored off Abx by ID.    Pt clinically improved and was  downgrade back  to SDU. Here he was weaned off HFNC onto NC. ENT was consulted for black lesions on the base of tongue. They rec CT neck with IV contrast to investigate further. Follow up CT w/ IV contrast read, heme onc for any further management of large B celllymphoma with concern for rodriguez trnasformation and if ok to transition from heparin to xarelto or eliquis regarding his a-fib.    3B Course: During time in 3B, patient began to desaturate and required increased ventilation to Bipap. ABG was collected showing metabolic alkalosis with pCO2 52, pO2 73, HCO3 39, FiO2 30. Also endorsed subjective chills. Lactate was normal. Blood cultures, procal, legionella ordered. Patient was initially being diuresed on Bumex 20mg BID, now dc'd. CXR performed showed some mild congestive opacities improved from before. Fungitell ordered. CT chest noncon ordered. Patient was restarted on IV abx, cefepime 2g BID and vancomycin. Patient will be upgraded to SDU for acute respiratory failure 2/2 suspected multifocal pneumonia.    SDU Course: patient Hypoxic respiratory failure improvingbut since initiation of Methylprednisolone 40 daily, FS have been uncontrolled  Given trend and history on uncontroilled DM, will require insulin drip and ICU monitoring. Patient otherwise asymptomatic, hemodynamically stable and euvolemic on exam    Vital Signs Last 24 Hrs  T(C): 36.7 (08 Feb 2025 16:00), Max: 36.8 (08 Feb 2025 12:00)  T(F): 98 (08 Feb 2025 16:00), Max: 98.2 (08 Feb 2025 12:00)  HR: 90 (08 Feb 2025 16:00) (69 - 90)  BP: 105/57 (08 Feb 2025 16:00) (105/57 - 148/69)  BP(mean): 75 (08 Feb 2025 16:00) (75 - 99)  RR: 18 (08 Feb 2025 09:30) (18 - 18)  SpO2: 94% (08 Feb 2025 16:00) (92% - 99%)    Parameters below as of 08 Feb 2025 09:30  Patient On (Oxygen Delivery Method): nasal cannula  O2 Flow (L/min): 6    I&O's Summary    07 Feb 2025 07:01  -  08 Feb 2025 07:00  --------------------------------------------------------  IN: 76 mL / OUT: 500 mL / NET: -424 mL      MEDICATIONS  (STANDING):  albuterol/ipratropium for Nebulization 3 milliLiter(s) Nebulizer every 4 hours  allopurinol 150 milliGRAM(s) Oral daily  apixaban 5 milliGRAM(s) Oral every 12 hours  ascorbic acid 500 milliGRAM(s) Oral daily  atorvastatin 10 milliGRAM(s) Oral at bedtime  benzocaine 20% Spray 1 Spray(s) Topical three times a day  buMETAnide 2 milliGRAM(s) Oral daily  cefepime   IVPB 2000 milliGRAM(s) IV Intermittent every 12 hours  cefepime   IVPB      chlorhexidine 2% Cloths 1 Application(s) Topical daily  dextrose 5%. 1000 milliLiter(s) (50 mL/Hr) IV Continuous <Continuous>  dextrose 5%. 1000 milliLiter(s) (100 mL/Hr) IV Continuous <Continuous>  dextrose 50% Injectable 25 Gram(s) IV Push once  dextrose 50% Injectable 12.5 Gram(s) IV Push once  dextrose 50% Injectable 25 Gram(s) IV Push once  FIRST- Mouthwash  BLM 10 milliLiter(s) Swish and Spit every 4 hours  glucagon  Injectable 1 milliGRAM(s) IntraMuscular once  influenza  Vaccine (HIGH DOSE) 0.5 milliLiter(s) IntraMuscular once  insulin glargine Injectable (LANTUS) 40 Unit(s) SubCutaneous at bedtime  insulin lispro (ADMELOG) corrective regimen sliding scale   SubCutaneous three times a day before meals  insulin lispro Injectable (ADMELOG) 16 Unit(s) SubCutaneous three times a day before meals  insulin regular Infusion 7 Unit(s)/Hr (7 mL/Hr) IV Continuous <Continuous>  melatonin 10 milliGRAM(s) Oral at bedtime  methylPREDNISolone sodium succinate Injectable 40 milliGRAM(s) IV Push daily  metoprolol tartrate 25 milliGRAM(s) Oral two times a day  NIFEdipine XL 60 milliGRAM(s) Oral daily  nystatin Cream 1 Application(s) Topical every 12 hours  pantoprazole  Injectable 40 milliGRAM(s) IV Push daily  polyethylene glycol 3350 17 Gram(s) Oral every 12 hours  senna 2 Tablet(s) Oral every 12 hours  sertraline 50 milliGRAM(s) Oral daily  sodium chloride 0.9% 1000 milliLiter(s) (50 mL/Hr) IV Continuous <Continuous>  tamsulosin 0.4 milliGRAM(s) Oral at bedtime    MEDICATIONS  (PRN):  aluminum hydroxide/magnesium hydroxide/simethicone Suspension 30 milliLiter(s) Oral every 4 hours PRN Dyspepsia  bisacodyl 5 milliGRAM(s) Oral daily PRN Constipation  calcium carbonate    500 mG (Tums) Chewable 1 Tablet(s) Chew three times a day PRN Dyspepsia  dextrose Oral Gel 15 Gram(s) Oral once PRN Blood Glucose LESS THAN 70 milliGRAM(s)/deciliter  HYDROmorphone  Injectable 0.5 milliGRAM(s) IV Push every 6 hours PRN Severe Pain (7 - 10)  hydrOXYzine hydrochloride 25 milliGRAM(s) Oral every 6 hours PRN Anxiety  magnesium hydroxide Suspension 30 milliLiter(s) Oral daily PRN Constipation  ondansetron Injectable 4 milliGRAM(s) IV Push every 6 hours PRN Nausea and/or Vomiting  oxyCODONE    IR 10 milliGRAM(s) Oral every 6 hours PRN Moderate Pain (4 - 6)  simethicone 80 milliGRAM(s) Chew daily PRN Gas      LABS                                            8.9                   Neurophils% (auto):   76.8   (02-08 @ 05:59):    16.14)-----------(611          Lymphocytes% (auto):  5.1                                           27.1                   Eosinphils% (auto):   0.0      Manual%: Neutrophils x    ; Lymphocytes x    ; Eosinophils x    ; Bands%: x    ; Blasts x                                    130    |  88     |  121                 Calcium: 8.4   / iCa: x      (02-08 @ 17:29)    ----------------------------<  529       Magnesium: x                                4.6     |  27     |  2.6              Phosphorous: x        TPro  5.4    /  Alb  3.2    /  TBili  0.3    /  DBili  x      /  AST  56     /  ALT  31     /  AlkPhos  261    08 Feb 2025 17:29    ASSESSMENT AND PLAN    # DM  - CC diet   - FS - self monitored via dex com --> On insulin pump @2.85 at home   - Endo consult: keep off insulin pump  - lantus 10 lispro 3 --> has been steadily increased : lantus increased from 30 to 40 and lispro from 10 to 16 on 02/08 --> s/p Humulin 7 units 1 dose  - Patient received total of 31 and Humulin Admelog 16x3  - Repeat BMP showed Gluocse of 529, mild increase in AG (12 --> 15) and Beta hydroxy < 0.2  - Discussed with pulm fellow, will be started on insulin drip (7 units /min), NS fluid with 20 mEq K but slower rate given HFpEF decompensation  - F/U FS and repeat BMP    # Acute Hypoxic Respiratory Failure 2/2 Multifocal Pneumonia/  Acute on chronic diastolic CHF/  Influenza A infection/ RASHAD/ OHS   - clinically improving, on high low oxygen for now, will taper off as tolerated   - NIV PRN and QHS   - nebs Q 6 hours, supportive care, aspiration precautions   - CTA 1/20: Negative for pulmonary emboli. Interval development of infiltrates within the lower lobes and lingula which may reflect acute multilobar pneumonia  - blood cx NGTD x2, UA negative, influenza A + , Nasal MRSA negative   - pt was consulted by ID, treated with IV Abx, completed the course of Tamiflu   - TTE 1/10/25: poor study, EF 60-65%, pt has moderated TR  - fluid restriction 1200 ml in 24 hours , intake and output monitoring, daily weight   - Desaturated again while in ICU on NC, and Bipap was started  - CT chest: Worsening bilateral peribronchial opacities compatible with bronchopneumonia  - F/u cultures, legionella, procal, fungitell  - Started IV cefepime 2g BID and vancomycin  - Follow up ID  - HFNC 40/40, today, titrate off   - duonebs and solumedrol 40 mg daily , wheeze on exam    # 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 Tolerated well. Rituximab was given on D5   - Uric acid 10.3 on 1/16/25, s/p 3 mg IV rasburicase  - sp Zarxio  - daily CBC with diff, active T&S  - c/w Allopurinol   - No onc plans to start chemo/treatment inpatient, can dc when medically appropriate with follow-up with Dr. Hilliard who will schedule cycle 2 of treatment.     # KRISTINE on CKD 3B / Chronic Lower Extremity Edema   - baseline ~high 1s, low 2s  - c/w diuretics: bumex 2mg IV BID --> to 2mg IV TID --> 2 mg PO daily  - monitor BUN/cr and urine output  - ajay d/c'd  - avoid nephrotoxic medications   - will hold off Metolazone on 02/08 as BUN/Cr 115/2.7  - nephrology is following, recommendations noted    # CAD s/p CABG/ Chronic Afib on Xarelto/ HTN  - c/w statin, BB  - Holding ASA  - Still holding lisinopril and aldactone from last admission  - sylvester need to switch to eliquis given CKD from xaerlto   - dc heparin ggt in pm start eliquis 5 mg BID     # HLD   - on Atorvastatin now   - resume Repatha on discharge    # Oral pain due to ulcer with black discoloration   - c/w local couth care  - consult ENT to examine black discoloration -> rec CT neck w/ IV contrast, magic mouthwash  - CT neck w/IV contrast no tongue mass.   - oncology thinking this is mucositis   - Monitor bleed, keep active TS and trend CBC.   - Will c/w magic mouthwash.   - Bleeding is resolving    # Agitation/ confusion   # Insomnia   - pt is calm now   - on Haldol PRN , Sertraline and melatonin   - supportive care     # GI  - on teofilo regimen and PPIs.    #Misc  #Code Status: Full Code  #DVT ppx: Eliquis  #GI ppx: PPI  #Diet: CC  #Activity: OOBTC  #Inpatient Dispo: SDU  #Discharge Dispo: Home vs Rehab SICU Transfer Note    Transfer from: ICU  Transfer to: SDU    HPI: HPI:67-year-old man with extensive medical history including CAD (status post CABG;), DM (on insulin pump), atrial fibrillation on Xarelto, history of distal pancreatomy and splenectomy for unclear reason referred to the ED by his oncologist for abnormal labs. 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 Patient was discharged on 01.17.2025 from hospital after inpatient chemotherapy (Completed cycle 1 of R-EPOCH (D1 on 1/11/2024). Tolerated well. Rituximab was given on D5).   Patient started to get a productive cough with yellow sputum production. He was admitted to SDU for  Acute Hypoxic Respiratory Failure 2/2 Multifocal Pneumonia, Volume Overload, Neutropenic fever, Influenza A infection, decompensated and was upgraded to ICU on 1/27,  started on BIPAP and weaned to HFNC. Received tamiflu course + course of azithromycin+cefepime. Currently being monitored off Abx by ID.    Pt clinically improved and was  downgrade back  to SDU. Here he was weaned off HFNC onto NC. ENT was consulted for black lesions on the base of tongue. They rec CT neck with IV contrast to investigate further. Follow up CT w/ IV contrast read, heme onc for any further management of large B celllymphoma with concern for rodriguez trnasformation and if ok to transition from heparin to xarelto or eliquis regarding his a-fib.    3B Course: During time in 3B, patient began to desaturate and required increased ventilation to Bipap. ABG was collected showing metabolic alkalosis with pCO2 52, pO2 73, HCO3 39, FiO2 30. Also endorsed subjective chills. Lactate was normal. Blood cultures, procal, legionella ordered. Patient was initially being diuresed on Bumex 20mg BID, now dc'd. CXR performed showed some mild congestive opacities improved from before. Fungitell ordered. CT chest noncon ordered. Patient was restarted on IV abx, cefepime 2g BID and vancomycin. Patient will be upgraded to SDU for acute respiratory failure 2/2 suspected multifocal pneumonia.    SDU Course: patient Hypoxic respiratory failure improvingbut since initiation of Methylprednisolone 40 daily, FS have been uncontrolled  Given trend and history on uncontroilled DM, will require insulin drip and ICU monitoring. Patient otherwise asymptomatic, hemodynamically stable and euvolemic on exam    CCU Course:  Pt arrived to CCU with a BS of 101, off insulin drip since an hour, already was switched to insulin SQ. Otherwise pt mentioned LLE weakness since admission, in addition to constipation. CTH ordered, bowel regimen increased.    Vital Signs Last 24 Hrs  T(C): 36.7 (08 Feb 2025 16:00), Max: 36.8 (08 Feb 2025 12:00)  T(F): 98 (08 Feb 2025 16:00), Max: 98.2 (08 Feb 2025 12:00)  HR: 90 (08 Feb 2025 16:00) (69 - 90)  BP: 105/57 (08 Feb 2025 16:00) (105/57 - 148/69)  BP(mean): 75 (08 Feb 2025 16:00) (75 - 99)  RR: 18 (08 Feb 2025 09:30) (18 - 18)  SpO2: 94% (08 Feb 2025 16:00) (92% - 99%)    Parameters below as of 08 Feb 2025 09:30  Patient On (Oxygen Delivery Method): nasal cannula  O2 Flow (L/min): 6    I&O's Summary    07 Feb 2025 07:01  -  08 Feb 2025 07:00  --------------------------------------------------------  IN: 76 mL / OUT: 500 mL / NET: -424 mL      MEDICATIONS  (STANDING):  albuterol/ipratropium for Nebulization 3 milliLiter(s) Nebulizer every 4 hours  allopurinol 150 milliGRAM(s) Oral daily  apixaban 5 milliGRAM(s) Oral every 12 hours  ascorbic acid 500 milliGRAM(s) Oral daily  atorvastatin 10 milliGRAM(s) Oral at bedtime  benzocaine 20% Spray 1 Spray(s) Topical three times a day  buMETAnide 2 milliGRAM(s) Oral daily  cefepime   IVPB 2000 milliGRAM(s) IV Intermittent every 12 hours  cefepime   IVPB      chlorhexidine 2% Cloths 1 Application(s) Topical daily  dextrose 5%. 1000 milliLiter(s) (50 mL/Hr) IV Continuous <Continuous>  dextrose 5%. 1000 milliLiter(s) (100 mL/Hr) IV Continuous <Continuous>  dextrose 50% Injectable 25 Gram(s) IV Push once  dextrose 50% Injectable 12.5 Gram(s) IV Push once  dextrose 50% Injectable 25 Gram(s) IV Push once  FIRST- Mouthwash  BLM 10 milliLiter(s) Swish and Spit every 4 hours  glucagon  Injectable 1 milliGRAM(s) IntraMuscular once  influenza  Vaccine (HIGH DOSE) 0.5 milliLiter(s) IntraMuscular once  insulin glargine Injectable (LANTUS) 40 Unit(s) SubCutaneous at bedtime  insulin lispro (ADMELOG) corrective regimen sliding scale   SubCutaneous three times a day before meals  insulin lispro Injectable (ADMELOG) 16 Unit(s) SubCutaneous three times a day before meals  insulin regular Infusion 7 Unit(s)/Hr (7 mL/Hr) IV Continuous <Continuous>  melatonin 10 milliGRAM(s) Oral at bedtime  methylPREDNISolone sodium succinate Injectable 40 milliGRAM(s) IV Push daily  metoprolol tartrate 25 milliGRAM(s) Oral two times a day  NIFEdipine XL 60 milliGRAM(s) Oral daily  nystatin Cream 1 Application(s) Topical every 12 hours  pantoprazole  Injectable 40 milliGRAM(s) IV Push daily  polyethylene glycol 3350 17 Gram(s) Oral every 12 hours  senna 2 Tablet(s) Oral every 12 hours  sertraline 50 milliGRAM(s) Oral daily  sodium chloride 0.9% 1000 milliLiter(s) (50 mL/Hr) IV Continuous <Continuous>  tamsulosin 0.4 milliGRAM(s) Oral at bedtime    MEDICATIONS  (PRN):  aluminum hydroxide/magnesium hydroxide/simethicone Suspension 30 milliLiter(s) Oral every 4 hours PRN Dyspepsia  bisacodyl 5 milliGRAM(s) Oral daily PRN Constipation  calcium carbonate    500 mG (Tums) Chewable 1 Tablet(s) Chew three times a day PRN Dyspepsia  dextrose Oral Gel 15 Gram(s) Oral once PRN Blood Glucose LESS THAN 70 milliGRAM(s)/deciliter  HYDROmorphone  Injectable 0.5 milliGRAM(s) IV Push every 6 hours PRN Severe Pain (7 - 10)  hydrOXYzine hydrochloride 25 milliGRAM(s) Oral every 6 hours PRN Anxiety  magnesium hydroxide Suspension 30 milliLiter(s) Oral daily PRN Constipation  ondansetron Injectable 4 milliGRAM(s) IV Push every 6 hours PRN Nausea and/or Vomiting  oxyCODONE    IR 10 milliGRAM(s) Oral every 6 hours PRN Moderate Pain (4 - 6)  simethicone 80 milliGRAM(s) Chew daily PRN Gas      LABS                                            8.9                   Neurophils% (auto):   76.8   (02-08 @ 05:59):    16.14)-----------(611          Lymphocytes% (auto):  5.1                                           27.1                   Eosinphils% (auto):   0.0      Manual%: Neutrophils x    ; Lymphocytes x    ; Eosinophils x    ; Bands%: x    ; Blasts x                                    130    |  88     |  121                 Calcium: 8.4   / iCa: x      (02-08 @ 17:29)    ----------------------------<  529       Magnesium: x                                4.6     |  27     |  2.6              Phosphorous: x        TPro  5.4    /  Alb  3.2    /  TBili  0.3    /  DBili  x      /  AST  56     /  ALT  31     /  AlkPhos  261    08 Feb 2025 17:29    ASSESSMENT AND PLAN    # DM  - CC diet   - FS - self monitored via dex com --> On insulin pump @2.85 at home   - Endo consult: keep off insulin pump  - lantus 10 lispro 3 --> has been steadily increased : lantus increased from 30 to 40 and lispro from 10 to 16 on 02/08 --> s/p Humulin 7 units 1 dose  - Patient received total of 31 and Humulin Admelog 16x3  - Repeat BMP showed Gluocse of 529, mild increase in AG (12 --> 15) and Beta hydroxy < 0.2  - Discussed with pulm fellow, will be started on insulin drip (7 units /min), NS fluid with 20 mEq K but slower rate given HFpEF decompensation  - F/U FS and repeat BMP    # Acute Hypoxic Respiratory Failure 2/2 Multifocal Pneumonia/  Acute on chronic diastolic CHF/  Influenza A infection/ RASHAD/ O[facility]   - clinically improving, on high low oxygen for now, will taper off as tolerated   - NIV PRN and QHS   - nebs Q 6 hours, supportive care, aspiration precautions   - CTA 1/20: Negative for pulmonary emboli. Interval development of infiltrates within the lower lobes and lingula which may reflect acute multilobar pneumonia  - blood cx NGTD x2, UA negative, influenza A + , Nasal MRSA negative   - pt was consulted by ID, treated with IV Abx, completed the course of Tamiflu   - TTE 1/10/25: poor study, EF 60-65%, pt has moderated TR  - fluid restriction 1200 ml in 24 hours , intake and output monitoring, daily weight   - Desaturated again while in ICU on NC, and Bipap was started  - CT chest: Worsening bilateral peribronchial opacities compatible with bronchopneumonia  - F/u cultures, legionella, procal, fungitell  - Started IV cefepime 2g BID and vancomycin  - Follow up ID  - HFNC 40/40, today, titrate off   - duonebs and solumedrol 40 mg daily , wheeze on exam    # 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 Tolerated well. Rituximab was given on D5   - Uric acid 10.3 on 1/16/25, s/p 3 mg IV rasburicase  - sp Zarxio  - daily CBC with diff, active T&S  - c/w Allopurinol   - No onc plans to start chemo/treatment inpatient, can dc when medically appropriate with follow-up with Dr. Hilliard who will schedule cycle 2 of treatment.     # KRISTINE on CKD 3B / Chronic Lower Extremity Edema   - baseline ~high 1s, low 2s  - c/w diuretics: bumex 2mg IV BID --> to 2mg IV TID --> 2 mg PO daily  - monitor BUN/cr and urine output  - ajay d/c'd  - avoid nephrotoxic medications   - will hold off Metolazone on 02/08 as BUN/Cr 115/2.7  - nephrology is following, recommendations noted    # CAD s/p CABG/ Chronic Afib on Xarelto/ HTN  - c/w statin, BB  - Holding ASA  - Still holding lisinopril and aldactone from last admission  - sylvester need to switch to eliquis given CKD from xaerlto   - dc heparin ggt in pm start eliquis 5 mg BID     # HLD   - on Atorvastatin now   - resume Repatha on discharge    # Oral pain due to ulcer with black discoloration   - c/w local couth care  - consult ENT to examine black discoloration -> rec CT neck w/ IV contrast, magic mouthwash  - CT neck w/IV contrast no tongue mass.   - oncology thinking this is mucositis   - Monitor bleed, keep active TS and trend CBC.   - Will c/w magic mouthwash.   - Bleeding is resolving    # Agitation/ confusion   # Insomnia   - pt is calm now   - on Haldol PRN , Sertraline and melatonin   - supportive care     # GI  - on teofilo regimen and PPIs.    #Misc  #Code Status: Full Code  #DVT ppx: Eliquis  #GI ppx: PPI  #Diet: CC  #Activity: OOBTC  #Inpatient Dispo: SDU  #Discharge Dispo: Home vs Rehab